# Patient Record
Sex: FEMALE | Race: WHITE | NOT HISPANIC OR LATINO | Employment: OTHER | URBAN - METROPOLITAN AREA
[De-identification: names, ages, dates, MRNs, and addresses within clinical notes are randomized per-mention and may not be internally consistent; named-entity substitution may affect disease eponyms.]

---

## 2017-01-30 ENCOUNTER — APPOINTMENT (OUTPATIENT)
Dept: PHYSICAL THERAPY | Facility: CLINIC | Age: 67
End: 2017-01-30
Payer: MEDICARE

## 2017-01-30 PROCEDURE — 97162 PT EVAL MOD COMPLEX 30 MIN: CPT

## 2017-01-30 PROCEDURE — G8991 OTHER PT/OT GOAL STATUS: HCPCS

## 2017-01-30 PROCEDURE — G8990 OTHER PT/OT CURRENT STATUS: HCPCS

## 2017-02-01 ENCOUNTER — APPOINTMENT (OUTPATIENT)
Dept: PHYSICAL THERAPY | Facility: CLINIC | Age: 67
End: 2017-02-01
Payer: MEDICARE

## 2017-02-01 PROCEDURE — 97110 THERAPEUTIC EXERCISES: CPT

## 2017-02-06 ENCOUNTER — APPOINTMENT (OUTPATIENT)
Dept: PHYSICAL THERAPY | Facility: CLINIC | Age: 67
End: 2017-02-06
Payer: MEDICARE

## 2017-02-06 PROCEDURE — 97113 AQUATIC THERAPY/EXERCISES: CPT

## 2017-02-08 ENCOUNTER — APPOINTMENT (OUTPATIENT)
Dept: PHYSICAL THERAPY | Facility: CLINIC | Age: 67
End: 2017-02-08
Payer: MEDICARE

## 2017-02-09 ENCOUNTER — APPOINTMENT (OUTPATIENT)
Dept: PHYSICAL THERAPY | Facility: CLINIC | Age: 67
End: 2017-02-09
Payer: MEDICARE

## 2017-02-13 ENCOUNTER — APPOINTMENT (OUTPATIENT)
Dept: PHYSICAL THERAPY | Facility: CLINIC | Age: 67
End: 2017-02-13
Payer: MEDICARE

## 2017-02-13 PROCEDURE — 97140 MANUAL THERAPY 1/> REGIONS: CPT

## 2017-02-13 PROCEDURE — 97113 AQUATIC THERAPY/EXERCISES: CPT

## 2017-02-16 ENCOUNTER — APPOINTMENT (OUTPATIENT)
Dept: PHYSICAL THERAPY | Facility: CLINIC | Age: 67
End: 2017-02-16
Payer: MEDICARE

## 2017-02-16 PROCEDURE — 97110 THERAPEUTIC EXERCISES: CPT

## 2017-02-20 ENCOUNTER — APPOINTMENT (OUTPATIENT)
Dept: PHYSICAL THERAPY | Facility: CLINIC | Age: 67
End: 2017-02-20
Payer: MEDICARE

## 2017-02-22 ENCOUNTER — APPOINTMENT (OUTPATIENT)
Dept: PHYSICAL THERAPY | Facility: CLINIC | Age: 67
End: 2017-02-22
Payer: MEDICARE

## 2017-02-23 ENCOUNTER — APPOINTMENT (OUTPATIENT)
Dept: PHYSICAL THERAPY | Facility: CLINIC | Age: 67
End: 2017-02-23
Payer: MEDICARE

## 2017-02-23 PROCEDURE — 97113 AQUATIC THERAPY/EXERCISES: CPT

## 2017-02-27 ENCOUNTER — APPOINTMENT (OUTPATIENT)
Dept: PHYSICAL THERAPY | Facility: CLINIC | Age: 67
End: 2017-02-27
Payer: MEDICARE

## 2017-02-27 PROCEDURE — 97113 AQUATIC THERAPY/EXERCISES: CPT

## 2017-02-27 PROCEDURE — G8990 OTHER PT/OT CURRENT STATUS: HCPCS | Performed by: PHYSICAL THERAPIST

## 2017-02-27 PROCEDURE — G8991 OTHER PT/OT GOAL STATUS: HCPCS | Performed by: PHYSICAL THERAPIST

## 2017-03-02 ENCOUNTER — APPOINTMENT (OUTPATIENT)
Dept: PHYSICAL THERAPY | Facility: CLINIC | Age: 67
End: 2017-03-02
Payer: MEDICARE

## 2017-03-02 PROCEDURE — G8990 OTHER PT/OT CURRENT STATUS: HCPCS

## 2017-03-02 PROCEDURE — 97110 THERAPEUTIC EXERCISES: CPT

## 2017-03-02 PROCEDURE — 97140 MANUAL THERAPY 1/> REGIONS: CPT

## 2017-03-02 PROCEDURE — G8991 OTHER PT/OT GOAL STATUS: HCPCS

## 2017-03-06 ENCOUNTER — APPOINTMENT (OUTPATIENT)
Dept: PHYSICAL THERAPY | Facility: CLINIC | Age: 67
End: 2017-03-06
Payer: MEDICARE

## 2017-03-06 PROCEDURE — 97113 AQUATIC THERAPY/EXERCISES: CPT

## 2017-03-09 ENCOUNTER — APPOINTMENT (OUTPATIENT)
Dept: PHYSICAL THERAPY | Facility: CLINIC | Age: 67
End: 2017-03-09
Payer: MEDICARE

## 2017-03-09 PROCEDURE — 97110 THERAPEUTIC EXERCISES: CPT

## 2017-03-13 ENCOUNTER — APPOINTMENT (OUTPATIENT)
Dept: PHYSICAL THERAPY | Facility: CLINIC | Age: 67
End: 2017-03-13
Payer: MEDICARE

## 2017-03-13 PROCEDURE — 97113 AQUATIC THERAPY/EXERCISES: CPT

## 2017-03-16 ENCOUNTER — APPOINTMENT (OUTPATIENT)
Dept: PHYSICAL THERAPY | Facility: CLINIC | Age: 67
End: 2017-03-16
Payer: MEDICARE

## 2017-03-16 PROCEDURE — 97110 THERAPEUTIC EXERCISES: CPT

## 2017-03-20 ENCOUNTER — APPOINTMENT (OUTPATIENT)
Dept: PHYSICAL THERAPY | Facility: CLINIC | Age: 67
End: 2017-03-20
Payer: MEDICARE

## 2017-03-23 ENCOUNTER — APPOINTMENT (OUTPATIENT)
Dept: PHYSICAL THERAPY | Facility: CLINIC | Age: 67
End: 2017-03-23
Payer: MEDICARE

## 2017-03-23 PROCEDURE — 97113 AQUATIC THERAPY/EXERCISES: CPT

## 2017-03-27 ENCOUNTER — APPOINTMENT (OUTPATIENT)
Dept: PHYSICAL THERAPY | Facility: CLINIC | Age: 67
End: 2017-03-27
Payer: MEDICARE

## 2017-03-27 PROCEDURE — 97113 AQUATIC THERAPY/EXERCISES: CPT

## 2017-03-30 ENCOUNTER — APPOINTMENT (OUTPATIENT)
Dept: PHYSICAL THERAPY | Facility: CLINIC | Age: 67
End: 2017-03-30
Payer: MEDICARE

## 2017-03-30 PROCEDURE — 97110 THERAPEUTIC EXERCISES: CPT

## 2017-04-03 ENCOUNTER — APPOINTMENT (OUTPATIENT)
Dept: PHYSICAL THERAPY | Facility: CLINIC | Age: 67
End: 2017-04-03
Payer: MEDICARE

## 2017-04-03 PROCEDURE — 97113 AQUATIC THERAPY/EXERCISES: CPT

## 2017-04-06 ENCOUNTER — APPOINTMENT (OUTPATIENT)
Dept: PHYSICAL THERAPY | Facility: CLINIC | Age: 67
End: 2017-04-06
Payer: MEDICARE

## 2017-04-10 ENCOUNTER — APPOINTMENT (OUTPATIENT)
Dept: PHYSICAL THERAPY | Facility: CLINIC | Age: 67
End: 2017-04-10
Payer: MEDICARE

## 2017-04-13 ENCOUNTER — APPOINTMENT (OUTPATIENT)
Dept: PHYSICAL THERAPY | Facility: CLINIC | Age: 67
End: 2017-04-13
Payer: MEDICARE

## 2017-04-13 PROCEDURE — G8991 OTHER PT/OT GOAL STATUS: HCPCS

## 2017-04-13 PROCEDURE — 97140 MANUAL THERAPY 1/> REGIONS: CPT

## 2017-04-13 PROCEDURE — 97110 THERAPEUTIC EXERCISES: CPT

## 2017-04-13 PROCEDURE — G8990 OTHER PT/OT CURRENT STATUS: HCPCS

## 2017-04-13 PROCEDURE — G8992 OTHER PT/OT  D/C STATUS: HCPCS

## 2018-06-12 ENCOUNTER — HOSPITAL ENCOUNTER (EMERGENCY)
Facility: HOSPITAL | Age: 68
Discharge: HOME | End: 2018-06-12
Attending: EMERGENCY MEDICINE | Admitting: EMERGENCY MEDICINE
Payer: MEDICARE

## 2018-06-12 ENCOUNTER — APPOINTMENT (EMERGENCY)
Dept: RADIOLOGY | Facility: HOSPITAL | Age: 68
End: 2018-06-12
Payer: MEDICARE

## 2018-06-12 VITALS
BODY MASS INDEX: 23.78 KG/M2 | TEMPERATURE: 97.9 F | HEART RATE: 90 BPM | SYSTOLIC BLOOD PRESSURE: 133 MMHG | WEIGHT: 148 LBS | OXYGEN SATURATION: 100 % | DIASTOLIC BLOOD PRESSURE: 62 MMHG | RESPIRATION RATE: 20 BRPM | HEIGHT: 66 IN

## 2018-06-12 DIAGNOSIS — S83.412A SPRAIN OF MEDIAL COLLATERAL LIGAMENT OF LEFT KNEE, INITIAL ENCOUNTER: Primary | ICD-10-CM

## 2018-06-12 PROCEDURE — 99283 EMERGENCY DEPT VISIT LOW MDM: CPT

## 2018-06-12 PROCEDURE — 73564 X-RAY EXAM KNEE 4 OR MORE: CPT

## 2018-06-12 PROCEDURE — 96372 THER/PROPH/DIAG INJ SC/IM: CPT

## 2018-06-12 RX ORDER — KETOROLAC TROMETHAMINE 30 MG/ML
15 INJECTION, SOLUTION INTRAMUSCULAR; INTRAVENOUS ONCE
Status: COMPLETED | OUTPATIENT
Start: 2018-06-12 | End: 2018-06-12

## 2018-06-12 RX ADMIN — KETOROLAC TROMETHAMINE 15 MG: 30 INJECTION, SOLUTION INTRAMUSCULAR; INTRAVENOUS at 15:59

## 2018-06-12 NOTE — DISCHARGE INSTRUCTIONS
Knee Sprain   WHAT YOU NEED TO KNOW:   A knee sprain occurs when one or more ligaments in your knee are suddenly stretched or torn  Ligaments are tissues that hold bones together  Ligaments support the knee and keep the joint and bones in the correct position  DISCHARGE INSTRUCTIONS:   Return to the emergency department if:   · Any part of your leg feels cold, numb, or looks pale     Contact your healthcare provider if:   · You have new or increased swelling, bruising, or pain in your knee  · Your symptoms do not improve within 6 weeks, even with treatment  · You have questions or concerns about your condition or care  Medicines:   · NSAIDs , such as ibuprofen, help decrease swelling, pain, and fever  This medicine is available with or without a doctor's order  NSAIDs can cause stomach bleeding or kidney problems in certain people  If you take blood thinner medicine, always ask your healthcare provider if NSAIDs are safe for you  Always read the medicine label and follow directions  · Acetaminophen  decreases pain and fever  It is available without a doctor's order  Ask how much to take and how often to take it  Follow directions  Read the labels of all other medicines you are using to see if they also contain acetaminophen, or ask your doctor or pharmacist  Acetaminophen can cause liver damage if not taken correctly  Do not use more than 4 grams (4,000 milligrams) total of acetaminophen in one day  · Prescription pain medicine  may be given  Ask how to take this medicine safely  · Take your medicine as directed  Contact your healthcare provider if you think your medicine is not helping or if you have side effects  Tell him or her if you are allergic to any medicine  Keep a list of the medicines, vitamins, and herbs you take  Include the amounts, and when and why you take them  Bring the list or the pill bottles to follow-up visits   Carry your medicine list with you in case of an emergency  Self-care:   · Rest  your knee and do not exercise  You may be told to keep weight off your knee  This means that you should not walk on your injured leg  Rest helps decrease swelling and allows the injury to heal  You can do gentle range of motion (ROM) exercises as directed  This will prevent stiffness  · Apply ice  on your knee for 15 to 20 minutes every hour or as directed  Use an ice pack, or put crushed ice in a plastic bag  Cover it with a towel  Ice helps prevent tissue damage and decreases swelling and pain  · Apply compression to your knee as directed  You may need to wear an elastic bandage  This helps keep your injured knee from moving too much while it heals  You can loosen or tighten the elastic bandage to make it comfortable  It should be tight enough for you to feel support  It should not be so tight that it causes your toes to feel numb or tingly  If you are wearing an elastic bandage, take it off and rewrap it once a day  · Elevate your knee  above the level of your heart as often as you can  This will help decrease swelling and pain  Prop your leg on pillows or blankets to keep it elevated comfortably  Do not put pillows directly behind your knee  · Use support devices as directed:  Support devices such as a splint or brace may be needed  These devices limit movement and protect your joint while it heals  You may be given crutches to use until you can stand on your injured leg without pain  Use devices as directed  Physical therapy:  A physical therapist teaches you exercises to help improve movement and strength, and to decrease pain  Prevent another knee sprain:  Exercise your legs to keep your muscles strong  Strong leg muscles help protect your knee and prevent strain  The following may also prevent a knee sprain:  · Slowly start your exercise or training program   Slowly increase the time, distance, and intensity of your exercise   Sudden increases in training may cause you to injure your knee again  · Wear protective braces and equipment as directed  Braces may prevent your knee from moving the wrong way and causing another sprain  Protective equipment may support your bones and ligaments to prevent injury  · Warm up and stretch before exercise  Warm up by walking or using an exercise bike before starting your regular exercise  Do gentle stretches after warming up  This helps to loosen your muscles and decrease stress on your knee  Cool down and stretch after you exercise  · Wear shoes that fit correctly and support your feet  Replace your running or exercise shoes before the padding or shock absorption is worn out  Ask your healthcare provider which exercise shoes are best for you  Ask if you should wear special shoe inserts  Shoe inserts can help support your heels and arches or keep your foot lined up correctly in your shoes  Exercise on flat surfaces  Follow up with your healthcare provider as directed:  Write down your questions so you remember to ask them during your visits  © 2017 [facility] Information is for End User's use only and may not be sold, redistributed or otherwise used for commercial purposes  All illustrations and images included in CareNotes® are the copyrighted property of [employer] , [employer]  or Saman Guallpa  The above information is an  only  It is not intended as medical advice for individual conditions or treatments  Talk to your doctor, nurse or pharmacist before following any medical regimen to see if it is safe and effective for you

## 2018-06-12 NOTE — ED NOTES
Patient refuses to get changed into a gown states "I wore these pants so I didn't have to I am not getting changed"     Dianne Guillermo RN  06/12/18 3801

## 2018-06-12 NOTE — ED PROVIDER NOTES
History  Chief Complaint   Patient presents with    Knee Pain     Twisted left knee when gettin gout of bed last night  Reports taking excedrin migrain 4 hrs ago  29-year-old female complains of acute onset left knee pain after she "twisted" while getting out of bed last night patient states pain is rated 8/10 mostly localized to the left medial knee denies radiation of pain pain is worse with palpation or attempted straightening of the leg  Denies any focal weakness, numbness, or tingling  Denies any fevers or chills  No other complaints at this time    Impression:  Left knee pain likely sprain  Plan:  Left knee films offer analgesia  Reassess  Prior to Admission Medications   Prescriptions Last Dose Informant Patient Reported? Taking? ALPRAZolam (XANAX) 0 25 mg tablet   Yes No   Sig: Take 0 25 mg by mouth every 6 (six) hours as needed for anxiety  atorvastatin (LIPITOR) 40 mg tablet   Yes No   Sig: Take 40 mg by mouth daily  cilostazol (PLETAL) 50 mg tablet   Yes No   Sig: Take 50 mg by mouth 2 (two) times a day  citalopram (CeleXA) 20 mg tablet   Yes No   Sig: Take 20 mg by mouth daily  clopidogrel (PLAVIX) 75 mg tablet   Yes No   Sig: Take 75 mg by mouth daily  furosemide (LASIX) 20 mg tablet   Yes No   Sig: Take 20 mg by mouth as needed  lisinopril (ZESTRIL) 2 5 mg tablet   Yes No   Sig: Take 2 5 mg by mouth daily  nitroglycerin (NITROSTAT) 0 4 mg SL tablet   Yes No   Sig: Place 0 4 mg under the tongue every 5 (five) minutes as needed for chest pain  omeprazole (PriLOSEC) 20 mg delayed release capsule   Yes No   Sig: Take 20 mg by mouth daily  zolpidem (AMBIEN) 10 mg tablet   Yes No   Sig: Take 10 mg by mouth daily at bedtime as needed for sleep        Facility-Administered Medications: None       Past Medical History:   Diagnosis Date    Anemia     Cardiac disease     PVD (peripheral vascular disease) ([facility] [address] )        Past Surgical History:   Procedure Laterality Date  ANKLE SURGERY         History reviewed  No pertinent family history  I have reviewed and agree with the history as documented  Social History   Substance Use Topics    Smoking status: Current Every Day Smoker    Smokeless tobacco: Never Used    Alcohol use No        Review of Systems   Constitutional: Negative for chills and fever  Respiratory: Negative for chest tightness and shortness of breath  Cardiovascular: Negative for chest pain  Gastrointestinal: Negative for nausea and vomiting  Musculoskeletal: Positive for arthralgias  Negative for back pain and myalgias  Skin: Negative for rash  Neurological: Negative for syncope, weakness, light-headedness, numbness and headaches  All other systems reviewed and are negative  Physical Exam  Physical Exam   Constitutional: She is oriented to person, place, and time  She appears well-developed and well-nourished  No distress  HENT:   Head: Normocephalic and atraumatic  Eyes: Conjunctivae and EOM are normal    Neck: Normal range of motion  Cardiovascular: Intact distal pulses  Pulmonary/Chest: Effort normal  No respiratory distress  Musculoskeletal:   Decreased range of motion of left knee held in partial flexion  Patient unable to straighten it fully due to pain  There is no obvious deformity  There is no effusion over the knee  There is no erythema over the knee  Patient has tenderness to palpation over the medial aspect of the knee  Neurological: She is alert and oriented to person, place, and time  No sensory deficit  She exhibits normal muscle tone  Skin: Skin is warm and dry  Capillary refill takes less than 2 seconds  No rash noted  She is not diaphoretic  No erythema  No pallor  Psychiatric: She has a normal mood and affect  Nursing note and vitals reviewed        Vital Signs  ED Triage Vitals [06/12/18 1507]   Temperature Pulse Respirations Blood Pressure SpO2   97 9 °F (36 6 °C) 90 20 133/62 100 %      Temp Source Heart Rate Source Patient Position - Orthostatic VS BP Location FiO2 (%)   Tympanic Monitor -- Left arm --      Pain Score       8           Vitals:    06/12/18 1507   BP: 133/62   Pulse: 90       Visual Acuity      ED Medications  Medications   ketorolac (TORADOL) injection 15 mg (15 mg Intramuscular Given 6/12/18 1559)       Diagnostic Studies  Results Reviewed     None                 XR knee 4+ views LEFT   ED Interpretation by Elsy Hanna DO (06/12 1540)   Severe tricompartmental arthritis  Final Result by Hellen Connor DO (06/12 1545)      No acute osseous abnormality  Degenerative changes as described  Workstation performed: [id number]                    Procedures  Procedures       Phone Contacts  ED Phone Contact    ED Course                               MDM  CritCare Time    Disposition  Final diagnoses:   Sprain of medial collateral ligament of left knee, initial encounter     Time reflects when diagnosis was documented in both MDM as applicable and the Disposition within this note     Time User Action Codes Description Comment    6/12/2018  3:50 PM LaurenRodo licea Numbers Add [L17 748S] Sprain of medial collateral ligament of left knee, initial encounter       ED Disposition     ED Disposition Condition Comment    Discharge  Antonina Bock discharge to home/self care  Condition at discharge: Good        Follow-up Information     Follow up With Specialties Details Why Contact Info    Jovi Medrano MD Internal Medicine In 1 week  [address] [place] [address]  [phone]            Discharge Medication List as of 6/12/2018  3:50 PM      CONTINUE these medications which have NOT CHANGED    Details   ALPRAZolam (XANAX) 0 25 mg tablet Take 0 25 mg by mouth every 6 (six) hours as needed for anxiety  , Until Discontinued, Historical Med      atorvastatin (LIPITOR) 40 mg tablet Take 40 mg by mouth daily  , Until Discontinued, Historical Med cilostazol (PLETAL) 50 mg tablet Take 50 mg by mouth 2 (two) times a day , Until Discontinued, Historical Med      citalopram (CeleXA) 20 mg tablet Take 20 mg by mouth daily  , Until Discontinued, Historical Med      clopidogrel (PLAVIX) 75 mg tablet Take 75 mg by mouth daily  , Until Discontinued, Historical Med      furosemide (LASIX) 20 mg tablet Take 20 mg by mouth as needed  , Until Discontinued, Historical Med      lisinopril (ZESTRIL) 2 5 mg tablet Take 2 5 mg by mouth daily  , Until Discontinued, Historical Med      nitroglycerin (NITROSTAT) 0 4 mg SL tablet Place 0 4 mg under the tongue every 5 (five) minutes as needed for chest pain , Until Discontinued, Historical Med      omeprazole (PriLOSEC) 20 mg delayed release capsule Take 20 mg by mouth daily  , Until Discontinued, Historical Med      zolpidem (AMBIEN) 10 mg tablet Take 10 mg by mouth daily at bedtime as needed for sleep , Until Discontinued, Historical Med           No discharge procedures on file      ED Provider  Electronically Signed by           Mary Hernandez DO  06/12/18 2043

## 2018-06-27 ENCOUNTER — TRANSCRIBE ORDERS (OUTPATIENT)
Dept: PHYSICAL THERAPY | Facility: CLINIC | Age: 68
End: 2018-06-27

## 2018-06-27 ENCOUNTER — EVALUATION (OUTPATIENT)
Dept: PHYSICAL THERAPY | Facility: CLINIC | Age: 68
End: 2018-06-27
Payer: MEDICARE

## 2018-06-27 DIAGNOSIS — M54.5 LOW BACK PAIN, UNSPECIFIED BACK PAIN LATERALITY, UNSPECIFIED CHRONICITY, WITH SCIATICA PRESENCE UNSPECIFIED: Primary | ICD-10-CM

## 2018-06-27 PROCEDURE — 97162 PT EVAL MOD COMPLEX 30 MIN: CPT

## 2018-06-27 PROCEDURE — G8991 OTHER PT/OT GOAL STATUS: HCPCS

## 2018-06-27 PROCEDURE — 97112 NEUROMUSCULAR REEDUCATION: CPT | Performed by: PHYSICAL THERAPIST

## 2018-06-27 PROCEDURE — G8990 OTHER PT/OT CURRENT STATUS: HCPCS

## 2018-06-27 NOTE — LETTER
2018    Shruti Jordan  [address]  [facility] [address]    Patient: Naomi Bundy   YOB: 1950   Date of Visit: 2018     Encounter Diagnosis     ICD-10-CM    1  Low back pain, unspecified back pain laterality, unspecified chronicity, with sciatica presence unspecified M54 5        Dear Dr Candelario Sensor:    [facility]  Please verify that you agree therapy should continue by signing the attached document and sending it back to our office  If you have any questions or concerns, please don't hesitate to call  Sincerely,    Duane Fuller PT      Referring Provider:      I certify that I have read the below Plan of Care and certify the need for these services furnished under this plan of treatment while under my care  Reymundo Nash MD  [address] [place] [id number]  VIA Facsimile: [phone]          PT Evaluation     Today's date: 2018  Patient name: Naomi Bundy  :   MRN: [id number]  Referring provider: Arslan Mcleod MD  Dx:   Encounter Diagnosis     ICD-10-CM    1  Low back pain, unspecified back pain laterality, unspecified chronicity, with sciatica presence unspecified M54 5                   Assessment  Impairments: abnormal gait, impaired balance, impaired physical strength and pain with function    Assessment details: Pt is a 69year old female with central low back pain  Pt has decreased hip abduction/extension/flexion strength, decreased lumbar extension ROM, decreased knee extension/flexion strength, impaired gait mechanics and balance, and tight hip flexors  Pt has decreased functional mobility and will benefit from physical therapy  Thank you kindly for your referral!  Understanding of Dx/Px/POC: fair   Prognosis: fair  Prognosis details: Biopsychosocial factors may influence prognosis    Goals  ST   Pt will have bilateral hip abduction strength of 4/5 within 4 weeks  2  Pt will have a decrease of 1-3 points on the pain scale within 4 weeks  LT  Pt will be able to walk around the block with no pain within 6 weeks  2  Pt will have a decrease of 4-6 points on the pain scale within 6 weeks  3  Pt will have a FOTO score of 65/100 within 6 weeks  Plan  Planned therapy interventions: functional ROM exercises, home exercise program, therapeutic exercise, therapeutic activities, stretching, strengthening, postural training, neuromuscular re-education, manual therapy and joint mobilization  Frequency: 2x week  Duration in weeks: 6  Plan of Care beginning date: 2018  Plan of Care expiration date: 2018  Treatment plan discussed with: patient        Subjective Evaluation    History of Present Illness  Date of onset: 2012  Mechanism of injury: Pt reports low back pain coming on and off since , when pt broke both ankles  Pt reports pain either present very strongly, or not at all, and comes on randomly     Pain  At best pain ratin  At worst pain ratin  Location: middle of lower sacrum   Quality: dull ache  Relieving factors: relaxation and rest  Aggravating factors: walking  Progression: worsening      Diagnostic Tests  No diagnostic tests performed  Treatments  No previous or current treatments  Patient Goals  Patient goal: walk the dog, walk around the block, no longer being afraid of falling        Objective     General Comments     Lumbar Comments  Gait: increased L knee valgus during stance, wide base of support, high guard upper extremities  Palpation: slight increased lumbar lordosis   SLS eyes open: L and R could not balance without upper extremity support  Step ups: normal   Squat: bilateral knee valgus, 50% of depth  Calf raise - loss of balance, upper extremity support, less height with R ankle (surgery)  Toe raise - 5x toe raise, no loss of balance     ROM: Lumbar  Flexion: normal   Extension: 25% limited   L Rotation: 25% limited   R Rotation: 50% limited   Tight Hip flexors    Slump test (-)   SLR (-)   Lumbar compression/distraction (-)    Strength:  Hip flexion B/L 3+/5  Hip Abduction L 3+/5, R 4-/5  Hip Extension L 3+/5, R 4-/5  Knee Extension L 4-/5, R 4/5   Knee Flexion L 4-/5, R 4/5    Reflexes:   L4: R 2+, L 2+  S2: R 1+, L 1+                   Attestation signed by Patrick Zamudio PT at 6/27/2018  1:05 PM:  Exercise chart:    Daily Treatment Diary     DX: LBP  EPOC: 8/8/18  Precautions: NONE  CO-MORBIDITES: previous b/l ankle surgery  PERSONAL FACTORS: getting cataracts surgery tomorrow    Manual  6/27                                                                                 Exercise Diary              Recumbent Bike             PPT             DLS: hip abduction HEP            DLS: hip adduction HEP            DLS: marches             DLS: pball roll out             Bent Knee Fall Outs             LTRs             Hip abduction (out for 1, in for 4)             Clamshells HEP            Bird Dog             Dying Bug             Pball Core Press                                                                                                                         Modalities                                                       [facility] SPT was under direction supervision of myself for this session

## 2018-06-27 NOTE — PROGRESS NOTES
PT Evaluation     Today's date: 2018  Patient name: Naomi Bundy  : 1950  MRN: [id number]  Referring provider: Arslan Mcleod MD  Dx:   Encounter Diagnosis     ICD-10-CM    1  Low back pain, unspecified back pain laterality, unspecified chronicity, with sciatica presence unspecified M54 5                   Assessment  Impairments: abnormal gait, impaired balance, impaired physical strength and pain with function    Assessment details: Pt is a 69year old female with central low back pain  Pt has decreased hip abduction/extension/flexion strength, decreased lumbar extension ROM, decreased knee extension/flexion strength, impaired gait mechanics and balance, and tight hip flexors  Pt has decreased functional mobility and will benefit from physical therapy  Thank you kindly for your referral!  Understanding of Dx/Px/POC: fair   Prognosis: fair  Prognosis details: Biopsychosocial factors may influence prognosis    Goals  ST  Pt will have bilateral hip abduction strength of 4/5 within 4 weeks  2  Pt will have a decrease of 1-3 points on the pain scale within 4 weeks  LT  Pt will be able to walk around the block with no pain within 6 weeks  2  Pt will have a decrease of 4-6 points on the pain scale within 6 weeks  3  Pt will have a FOTO score of 65/100 within 6 weeks  Plan  Planned therapy interventions: functional ROM exercises, home exercise program, therapeutic exercise, therapeutic activities, stretching, strengthening, postural training, neuromuscular re-education, manual therapy and joint mobilization  Frequency: 2x week  Duration in weeks: 6  Plan of Care beginning date: 2018  Plan of Care expiration date: 2018  Treatment plan discussed with: patient        Subjective Evaluation    History of Present Illness  Date of onset: 2012  Mechanism of injury: Pt reports low back pain coming on and off since , when pt broke both ankles   Pt reports pain either present very strongly, or not at all, and comes on randomly     Pain  At best pain ratin  At worst pain ratin  Location: middle of lower sacrum   Quality: dull ache  Relieving factors: relaxation and rest  Aggravating factors: walking  Progression: worsening      Diagnostic Tests  No diagnostic tests performed  Treatments  No previous or current treatments  Patient Goals  Patient goal: walk the dog, walk around the block, no longer being afraid of falling        Objective     General Comments     Lumbar Comments  Gait: increased L knee valgus during stance, wide base of support, high guard upper extremities  Palpation: slight increased lumbar lordosis   SLS eyes open: L and R could not balance without upper extremity support  Step ups: normal   Squat: bilateral knee valgus, 50% of depth  Calf raise - loss of balance, upper extremity support, less height with R ankle (surgery)  Toe raise - 5x toe raise, no loss of balance     ROM: Lumbar  Flexion: normal   Extension: 25% limited   L Rotation: 25% limited   R Rotation: 50% limited   Tight Hip flexors    Slump test (-)   SLR (-)   Lumbar compression/distraction (-)    Strength:  Hip flexion B/L 3+/5  Hip Abduction L 3+/5, R 4-/5  Hip Extension L 3+/5, R 4-/5  Knee Extension L 4-/5, R 4/5   Knee Flexion L 4-/5, R 4/5    Reflexes:   L4: R 2+, L 2+  S2: R 1+, L 1+

## 2018-07-18 NOTE — PROGRESS NOTES
Patient has not returned to PT due to issues at home  Lapse in skilled care >2 weeks   Pt D/C from PT

## 2019-12-19 ENCOUNTER — HOSPITAL ENCOUNTER (EMERGENCY)
Facility: HOSPITAL | Age: 69
Discharge: HOME | End: 2019-12-19
Attending: EMERGENCY MEDICINE | Admitting: EMERGENCY MEDICINE
Payer: MEDICARE

## 2019-12-19 VITALS
DIASTOLIC BLOOD PRESSURE: 72 MMHG | BODY MASS INDEX: 24.21 KG/M2 | OXYGEN SATURATION: 95 % | SYSTOLIC BLOOD PRESSURE: 170 MMHG | RESPIRATION RATE: 17 BRPM | TEMPERATURE: 97.8 F | WEIGHT: 150 LBS | HEART RATE: 86 BPM

## 2019-12-19 DIAGNOSIS — M79.604 BILATERAL LEG PAIN: ICD-10-CM

## 2019-12-19 DIAGNOSIS — M79.605 BILATERAL LEG PAIN: ICD-10-CM

## 2019-12-19 DIAGNOSIS — Z53.29 LEFT AGAINST MEDICAL ADVICE: Primary | ICD-10-CM

## 2019-12-19 PROCEDURE — 99282 EMERGENCY DEPT VISIT SF MDM: CPT | Performed by: PHYSICIAN ASSISTANT

## 2019-12-19 PROCEDURE — 99284 EMERGENCY DEPT VISIT MOD MDM: CPT

## 2019-12-19 RX ORDER — BUPROPION HYDROCHLORIDE 150 MG/1
150 TABLET, EXTENDED RELEASE ORAL DAILY
COMMUNITY

## 2019-12-19 NOTE — ED PROVIDER NOTES
History  Chief Complaint   Patient presents with    Ankle Pain     To ED wioth c/o bilateral ankle ache and pain which makes "my legs ache and knees hurt for 7 years  Patient is a 67 y/o F with h/o CAD, PVD that presents to the ED with b/l ankle "aching" for 7 years  She states 7 years ago she broke both her ankles and since then has had an aching in her ankles that shoots up her legs  She denies chest pain or SOB  Bearing weight makes the pain worse, nothing makes the pain better  She was seen by ortho for this problem  She was also seen by her cardiologist for this problem due to her PVD  She denies numbness/tingling in her feet  She states she is here today because she has a lot to do with the holiday coming up and does not want to deal with the pain  History provided by:  Patient  Ankle Pain   Location:  Ankle  Time since incident: 7 years  Ankle location:  L ankle and R ankle  Pain details:     Quality:  Aching    Radiates to:  L leg and R leg    Severity:  Moderate    Onset quality:  Gradual    Duration: 7 years  Timing:  Constant    Progression:  Unchanged  Chronicity:  Chronic  Prior injury to area:  Yes  Relieved by:  Rest  Worsened by:  Bearing weight  Ineffective treatments:  None tried  Associated symptoms: no decreased ROM, no fever, no numbness, no swelling and no tingling        Prior to Admission Medications   Prescriptions Last Dose Informant Patient Reported? Taking? ALPRAZolam (XANAX) 0 25 mg tablet   Yes No   Sig: Take 0 25 mg by mouth every 6 (six) hours as needed for anxiety  atorvastatin (LIPITOR) 40 mg tablet   Yes No   Sig: Take 40 mg by mouth daily  buPROPion (WELLBUTRIN SR) 150 mg 12 hr tablet   Yes No   Sig: Take 150 mg by mouth daily   clopidogrel (PLAVIX) 75 mg tablet   Yes No   Sig: Take 75 mg by mouth daily  furosemide (LASIX) 20 mg tablet   Yes No   Sig: Take 20 mg by mouth as needed     omeprazole (PriLOSEC) 20 mg delayed release capsule   Yes No Sig: Take 20 mg by mouth daily  zolpidem (AMBIEN) 10 mg tablet   Yes No   Sig: Take 10 mg by mouth daily at bedtime as needed for sleep  Facility-Administered Medications: None       Past Medical History:   Diagnosis Date    Anemia     Cardiac disease     Chronic pain     PVD (peripheral vascular disease) (HCC)        Past Surgical History:   Procedure Laterality Date    ANKLE SURGERY         Family History   Problem Relation Age of Onset    No Known Problems Mother     No Known Problems Father      I have reviewed and agree with the history as documented  Social History     Tobacco Use    Smoking status: Current Every Day Smoker     Packs/day: 1 00     Types: Cigarettes    Smokeless tobacco: Never Used   Substance Use Topics    Alcohol use: Yes     Comment: social    Drug use: No        Review of Systems   Constitutional: Negative for chills and fever  Musculoskeletal: Positive for arthralgias  Skin: Negative for color change, pallor and rash  Neurological: Negative for dizziness, weakness and numbness  Psychiatric/Behavioral: Negative for confusion  All other systems reviewed and are negative  Physical Exam  Physical Exam   Constitutional: She is oriented to person, place, and time  She appears well-developed and well-nourished  She is cooperative  She does not appear ill  No distress  HENT:   Head: Normocephalic and atraumatic  Nose: Nose normal    Mouth/Throat: Oropharynx is clear and moist and mucous membranes are normal    Eyes: Conjunctivae are normal    Neck: Normal range of motion  Cardiovascular: Normal rate, regular rhythm and normal heart sounds  No murmur heard  Pulses:       Dorsalis pedis pulses are Detected w/ doppler on the right side, and 0 on the left side  Posterior tibial pulses are Detected w/ doppler on the right side, and Detected w/ doppler on the left side  Pulmonary/Chest: Effort normal and breath sounds normal  She has no wheezes  She has no rhonchi  She has no rales  Musculoskeletal:   B/L ankle nontender to palpation  No swelling, erythema or ecchymosis  No swelling of b/l legs  Skin warm and dry, no pallor  Neurological: She is alert and oriented to person, place, and time  She has normal strength  Gait (patient able to ambulate with a cane without difficulty  ) normal    Skin: Skin is warm and dry  No bruising, no ecchymosis and no rash noted  She is not diaphoretic  No erythema  No pallor  Psychiatric: She has a normal mood and affect  Nursing note and vitals reviewed  Vital Signs  ED Triage Vitals   Temperature Pulse Respirations Blood Pressure SpO2   12/19/19 1019 12/19/19 1030 12/19/19 1030 12/19/19 1030 12/19/19 1019   97 8 °F (36 6 °C) 86 17 170/72 100 %      Temp Source Heart Rate Source Patient Position - Orthostatic VS BP Location FiO2 (%)   12/19/19 1019 -- -- -- --   Tympanic          Pain Score       12/19/19 1019       8           Vitals:    12/19/19 1030   BP: 170/72   Pulse: 86         Visual Acuity      ED Medications  Medications - No data to display    Diagnostic Studies  Results Reviewed     None                 No orders to display              Procedures  Procedures         ED Course  ED Course as of Dec 19 1603   Thu Dec 19, 2019   1237 Patient states she does not want to wait for a doppler  Patient signed out AMA  MDM  Number of Diagnoses or Management Options  Bilateral leg pain: established and worsening  Left against medical advice:   Diagnosis management comments: Patient with chronic b/l ankle and leg pain  Faint pulse to right leg, unable to palpate or detect DP on left foot, but foot warm, skin color normal, sensation intact  Will order arterial doppler  Patient requesting to leave, she does not want to wait for a doppler  She states she will just f/u with her cardiologist   Patient signed out AMA    She is aware she could lose sensation and pulses in her legs and risk amputation or death  Amount and/or Complexity of Data Reviewed  Tests in the radiology section of CPT®: ordered    Patient Progress  Patient progress: stable        Disposition  Final diagnoses:   Left against medical advice   Bilateral leg pain     Time reflects when diagnosis was documented in both MDM as applicable and the Disposition within this note     Time User Action Codes Description Comment    12/19/2019 12:36 PM Shaina Das [Z53 [id number]] Left against medical advice     12/19/2019 12:36 PM Bandar Morales [H39 [id number],  [id number]] Bilateral leg pain       ED Disposition     ED Disposition Condition Date/Time Comment    LIANE  yoel Dec 19, 2019 12:35 PM Date: 12/19/2019  Patient: Michelle Enrique  Admitted: 12/19/2019 10:13 AM  Attending Provider: Shannon Ibarra DO    Michelle Enrique or her authorized caregiver has made the decision for the patient to leave the emergency department against the advic e of the emergency department staff  She or her authorized caregiver has been informed and understands the inherent risks, including death, loss of limbs  She or her authorized caregiver has decided to accept the responsibility for this decision  Ma lenaconchita Ashley Morales and all necessary parties have been advised that she may return for further evaluation or treatment  Her condition at time of discharge was stable    Michelle Enrique had current vital signs as follows:  /72   Pulse 86   Temp 97  8 °F (36 6 °C) (Tympanic)   Resp 17   Wt 68 kg (150 lb)         Follow-up Information     Follow up With Specialties Details Why Contact Info    Katrin Mcnulty MD Cardiology Call in 1 day For recheck [address] [facility] [address] [facility]            Discharge Medication List as of 12/19/2019 12:37 PM      CONTINUE these medications which have NOT CHANGED    Details   ALPRAZolam (XANAX) 0 25 mg tablet Take 0 25 mg by mouth every 6 (six) hours as needed for anxiety  , Until Discontinued, Historical Med      atorvastatin (LIPITOR) 40 mg tablet Take 40 mg by mouth daily  , Until Discontinued, Historical Med      buPROPion (WELLBUTRIN SR) 150 mg 12 hr tablet Take 150 mg by mouth daily, Historical Med      clopidogrel (PLAVIX) 75 mg tablet Take 75 mg by mouth daily  , Until Discontinued, Historical Med      furosemide (LASIX) 20 mg tablet Take 20 mg by mouth as needed  , Until Discontinued, Historical Med      omeprazole (PriLOSEC) 20 mg delayed release capsule Take 20 mg by mouth daily  , Until Discontinued, Historical Med      zolpidem (AMBIEN) 10 mg tablet Take 10 mg by mouth daily at bedtime as needed for sleep , Until Discontinued, Historical Med           No discharge procedures on file      ED Provider  Electronically Signed by           Sly Gordon PA-C  12/19/19 9960

## 2019-12-19 NOTE — ED NOTES
Pts family member at nurses station stating patient is not going to wait any longer because she is very inpatient  Family asking when VAS duplex study would be done  VAS tech stated closer to 3pm because of an appointment and another STAT study   Family states "then we're going to leave because were not waiting" KITA hoang notified      Meghan Bautista, RN  12/19/19 5159

## 2019-12-19 NOTE — ED NOTES
Patient ambulated to room without any assistive devices  Gait steady        Nita Da Silva RN  12/19/19 9418

## 2021-03-21 ENCOUNTER — HOSPITAL ENCOUNTER (INPATIENT)
Facility: HOSPITAL | Age: 71
LOS: 7 days | DRG: 871 | End: 2021-03-29
Attending: EMERGENCY MEDICINE | Admitting: INTERNAL MEDICINE
Payer: MEDICARE

## 2021-03-21 ENCOUNTER — APPOINTMENT (EMERGENCY)
Dept: RADIOLOGY | Facility: HOSPITAL | Age: 71
DRG: 871 | End: 2021-03-21
Payer: MEDICARE

## 2021-03-21 DIAGNOSIS — J18.9 PNEUMONIA OF RIGHT LOWER LOBE DUE TO INFECTIOUS ORGANISM: ICD-10-CM

## 2021-03-21 DIAGNOSIS — D68.9 COAGULOPATHY (HCC): ICD-10-CM

## 2021-03-21 DIAGNOSIS — J90 PLEURAL EFFUSION ON RIGHT: ICD-10-CM

## 2021-03-21 DIAGNOSIS — R91.8 MASS OF LOWER LOBE OF RIGHT LUNG: ICD-10-CM

## 2021-03-21 DIAGNOSIS — J18.1 CONSOLIDATION OF RIGHT LOWER LOBE OF LUNG (HCC): Primary | ICD-10-CM

## 2021-03-21 LAB
ALBUMIN SERPL BCP-MCNC: 1.8 G/DL (ref 3.5–5)
ALP SERPL-CCNC: 259 U/L (ref 46–116)
ALT SERPL W P-5'-P-CCNC: 25 U/L (ref 12–78)
ANION GAP SERPL CALCULATED.3IONS-SCNC: 18 MMOL/L (ref 4–13)
AST SERPL W P-5'-P-CCNC: 29 U/L (ref 5–45)
BASOPHILS # BLD MANUAL: 0 THOUSAND/UL (ref 0–0.1)
BASOPHILS NFR MAR MANUAL: 0 % (ref 0–1)
BILIRUB SERPL-MCNC: 0.5 MG/DL (ref 0.2–1)
BUN SERPL-MCNC: 18 MG/DL (ref 5–25)
CALCIUM ALBUM COR SERPL-MCNC: 11 MG/DL (ref 8.3–10.1)
CALCIUM SERPL-MCNC: 9.2 MG/DL (ref 8.3–10.1)
CHLORIDE SERPL-SCNC: 95 MMOL/L (ref 100–108)
CK SERPL-CCNC: 56 U/L (ref 26–192)
CO2 SERPL-SCNC: 21 MMOL/L (ref 21–32)
CREAT SERPL-MCNC: 1.01 MG/DL (ref 0.6–1.3)
CRP SERPL QL: 394.5 MG/L
D DIMER PPP FEU-MCNC: 3.67 UG/ML FEU
EOSINOPHIL # BLD MANUAL: 0 THOUSAND/UL (ref 0–0.4)
EOSINOPHIL NFR BLD MANUAL: 0 % (ref 0–6)
ERYTHROCYTE [DISTWIDTH] IN BLOOD BY AUTOMATED COUNT: 16.6 % (ref 11.6–15.1)
GFR SERPL CREATININE-BSD FRML MDRD: 56 ML/MIN/1.73SQ M
GLUCOSE SERPL-MCNC: 108 MG/DL (ref 65–140)
HCT VFR BLD AUTO: 31.6 % (ref 34.8–46.1)
HGB BLD-MCNC: 10.1 G/DL (ref 11.5–15.4)
LG PLATELETS BLD QL SMEAR: PRESENT
LYMPHOCYTES # BLD AUTO: 10 % (ref 14–44)
LYMPHOCYTES # BLD AUTO: 3.61 THOUSAND/UL (ref 0.6–4.47)
MAGNESIUM SERPL-MCNC: 1.5 MG/DL (ref 1.6–2.6)
MCH RBC QN AUTO: 28.4 PG (ref 26.8–34.3)
MCHC RBC AUTO-ENTMCNC: 32 G/DL (ref 31.4–37.4)
MCV RBC AUTO: 89 FL (ref 82–98)
MONOCYTES # BLD AUTO: 1.8 THOUSAND/UL (ref 0–1.22)
MONOCYTES NFR BLD: 5 % (ref 4–12)
NEUTROPHILS # BLD MANUAL: 30.67 THOUSAND/UL (ref 1.85–7.62)
NEUTS BAND NFR BLD MANUAL: 4 % (ref 0–8)
NEUTS SEG NFR BLD AUTO: 81 % (ref 43–75)
NRBC BLD AUTO-RTO: 0 /100 WBCS
NT-PROBNP SERPL-MCNC: 779 PG/ML
PLATELET # BLD AUTO: 680 THOUSANDS/UL (ref 149–390)
PLATELET BLD QL SMEAR: ABNORMAL
PMV BLD AUTO: 10.9 FL (ref 8.9–12.7)
POTASSIUM SERPL-SCNC: 3.1 MMOL/L (ref 3.5–5.3)
PROT SERPL-MCNC: 7.4 G/DL (ref 6.4–8.2)
RBC # BLD AUTO: 3.56 MILLION/UL (ref 3.81–5.12)
RBC MORPH BLD: NORMAL
SODIUM SERPL-SCNC: 134 MMOL/L (ref 136–145)
TOTAL CELLS COUNTED SPEC: 100
TROPONIN I SERPL-MCNC: <0.02 NG/ML
WBC # BLD AUTO: 36.08 THOUSAND/UL (ref 4.31–10.16)

## 2021-03-21 PROCEDURE — 36415 COLL VENOUS BLD VENIPUNCTURE: CPT | Performed by: EMERGENCY MEDICINE

## 2021-03-21 PROCEDURE — 83735 ASSAY OF MAGNESIUM: CPT | Performed by: EMERGENCY MEDICINE

## 2021-03-21 PROCEDURE — 83880 ASSAY OF NATRIURETIC PEPTIDE: CPT | Performed by: EMERGENCY MEDICINE

## 2021-03-21 PROCEDURE — 96365 THER/PROPH/DIAG IV INF INIT: CPT

## 2021-03-21 PROCEDURE — 84484 ASSAY OF TROPONIN QUANT: CPT | Performed by: EMERGENCY MEDICINE

## 2021-03-21 PROCEDURE — 96375 TX/PRO/DX INJ NEW DRUG ADDON: CPT

## 2021-03-21 PROCEDURE — 85610 PROTHROMBIN TIME: CPT | Performed by: EMERGENCY MEDICINE

## 2021-03-21 PROCEDURE — 82728 ASSAY OF FERRITIN: CPT | Performed by: EMERGENCY MEDICINE

## 2021-03-21 PROCEDURE — 87040 BLOOD CULTURE FOR BACTERIA: CPT | Performed by: EMERGENCY MEDICINE

## 2021-03-21 PROCEDURE — 85007 BL SMEAR W/DIFF WBC COUNT: CPT | Performed by: EMERGENCY MEDICINE

## 2021-03-21 PROCEDURE — 1124F ACP DISCUSS-NO DSCNMKR DOCD: CPT | Performed by: EMERGENCY MEDICINE

## 2021-03-21 PROCEDURE — 85730 THROMBOPLASTIN TIME PARTIAL: CPT | Performed by: EMERGENCY MEDICINE

## 2021-03-21 PROCEDURE — 80053 COMPREHEN METABOLIC PANEL: CPT | Performed by: EMERGENCY MEDICINE

## 2021-03-21 PROCEDURE — 86140 C-REACTIVE PROTEIN: CPT | Performed by: EMERGENCY MEDICINE

## 2021-03-21 PROCEDURE — 99285 EMERGENCY DEPT VISIT HI MDM: CPT

## 2021-03-21 PROCEDURE — 85027 COMPLETE CBC AUTOMATED: CPT | Performed by: EMERGENCY MEDICINE

## 2021-03-21 PROCEDURE — 93005 ELECTROCARDIOGRAM TRACING: CPT

## 2021-03-21 PROCEDURE — 71045 X-RAY EXAM CHEST 1 VIEW: CPT

## 2021-03-21 PROCEDURE — 82550 ASSAY OF CK (CPK): CPT | Performed by: EMERGENCY MEDICINE

## 2021-03-21 PROCEDURE — 84145 PROCALCITONIN (PCT): CPT | Performed by: EMERGENCY MEDICINE

## 2021-03-21 PROCEDURE — 85379 FIBRIN DEGRADATION QUANT: CPT | Performed by: EMERGENCY MEDICINE

## 2021-03-21 PROCEDURE — 82077 ASSAY SPEC XCP UR&BREATH IA: CPT | Performed by: EMERGENCY MEDICINE

## 2021-03-21 PROCEDURE — 99285 EMERGENCY DEPT VISIT HI MDM: CPT | Performed by: EMERGENCY MEDICINE

## 2021-03-21 RX ORDER — CEFTRIAXONE 1 G/50ML
1000 INJECTION, SOLUTION INTRAVENOUS ONCE
Status: COMPLETED | OUTPATIENT
Start: 2021-03-21 | End: 2021-03-22

## 2021-03-21 RX ORDER — BENZONATATE 100 MG/1
100 CAPSULE ORAL ONCE
Status: COMPLETED | OUTPATIENT
Start: 2021-03-21 | End: 2021-03-22

## 2021-03-21 RX ORDER — KETOROLAC TROMETHAMINE 30 MG/ML
15 INJECTION, SOLUTION INTRAMUSCULAR; INTRAVENOUS ONCE
Status: COMPLETED | OUTPATIENT
Start: 2021-03-21 | End: 2021-03-21

## 2021-03-21 RX ORDER — POTASSIUM CHLORIDE 14.9 MG/ML
20 INJECTION INTRAVENOUS ONCE
Status: COMPLETED | OUTPATIENT
Start: 2021-03-21 | End: 2021-03-22

## 2021-03-21 RX ORDER — POTASSIUM CHLORIDE 20 MEQ/1
20 TABLET, EXTENDED RELEASE ORAL ONCE
Status: COMPLETED | OUTPATIENT
Start: 2021-03-21 | End: 2021-03-22

## 2021-03-21 RX ORDER — MORPHINE SULFATE 4 MG/ML
4 INJECTION, SOLUTION INTRAMUSCULAR; INTRAVENOUS ONCE
Status: COMPLETED | OUTPATIENT
Start: 2021-03-21 | End: 2021-03-22

## 2021-03-21 RX ORDER — LORAZEPAM 2 MG/ML
1 INJECTION INTRAMUSCULAR ONCE
Status: COMPLETED | OUTPATIENT
Start: 2021-03-21 | End: 2021-03-21

## 2021-03-21 RX ADMIN — LORAZEPAM 1 MG: 2 INJECTION INTRAMUSCULAR; INTRAVENOUS at 23:18

## 2021-03-21 RX ADMIN — KETOROLAC TROMETHAMINE 15 MG: 30 INJECTION, SOLUTION INTRAMUSCULAR at 23:18

## 2021-03-21 RX ADMIN — CEFTRIAXONE 1000 MG: 1 INJECTION, SOLUTION INTRAVENOUS at 23:46

## 2021-03-22 ENCOUNTER — APPOINTMENT (EMERGENCY)
Dept: CT IMAGING | Facility: HOSPITAL | Age: 71
DRG: 871 | End: 2021-03-22
Payer: MEDICARE

## 2021-03-22 PROBLEM — D68.9 COAGULOPATHY (HCC): Status: ACTIVE | Noted: 2021-03-22

## 2021-03-22 PROBLEM — R79.1 SUPRATHERAPEUTIC INR: Status: ACTIVE | Noted: 2021-03-22

## 2021-03-22 PROBLEM — J96.01 ACUTE RESPIRATORY FAILURE WITH HYPOXIA (HCC): Status: ACTIVE | Noted: 2021-03-22

## 2021-03-22 PROBLEM — I25.10 CAD (CORONARY ARTERY DISEASE): Status: ACTIVE | Noted: 2021-03-22

## 2021-03-22 PROBLEM — K21.9 GERD (GASTROESOPHAGEAL REFLUX DISEASE): Status: ACTIVE | Noted: 2021-03-22

## 2021-03-22 PROBLEM — J18.1 CONSOLIDATION OF RIGHT LOWER LOBE OF LUNG (HCC): Status: ACTIVE | Noted: 2021-03-22

## 2021-03-22 PROBLEM — E87.1 HYPONATREMIA: Status: ACTIVE | Noted: 2021-03-22

## 2021-03-22 PROBLEM — A41.9 SEPSIS (HCC): Status: ACTIVE | Noted: 2021-03-22

## 2021-03-22 PROBLEM — E87.6 HYPOKALEMIA: Status: ACTIVE | Noted: 2021-03-22

## 2021-03-22 PROBLEM — J90 PLEURAL EFFUSION ON RIGHT: Status: ACTIVE | Noted: 2021-03-22

## 2021-03-22 PROBLEM — I10 HTN (HYPERTENSION): Status: ACTIVE | Noted: 2021-03-22

## 2021-03-22 LAB
ALBUMIN SERPL BCP-MCNC: 1.6 G/DL (ref 3.5–5)
ALP SERPL-CCNC: 220 U/L (ref 46–116)
ALT SERPL W P-5'-P-CCNC: 24 U/L (ref 12–78)
ANION GAP SERPL CALCULATED.3IONS-SCNC: 13 MMOL/L (ref 4–13)
APTT PPP: 108 SECONDS (ref 23–37)
AST SERPL W P-5'-P-CCNC: 30 U/L (ref 5–45)
ATRIAL RATE: 101 BPM
BILIRUB SERPL-MCNC: 0.4 MG/DL (ref 0.2–1)
BUN SERPL-MCNC: 17 MG/DL (ref 5–25)
CALCIUM ALBUM COR SERPL-MCNC: 10.4 MG/DL (ref 8.3–10.1)
CALCIUM SERPL-MCNC: 8.5 MG/DL (ref 8.3–10.1)
CHLORIDE SERPL-SCNC: 97 MMOL/L (ref 100–108)
CO2 SERPL-SCNC: 23 MMOL/L (ref 21–32)
CREAT SERPL-MCNC: 0.88 MG/DL (ref 0.6–1.3)
ERYTHROCYTE [DISTWIDTH] IN BLOOD BY AUTOMATED COUNT: 16.9 % (ref 11.6–15.1)
ETHANOL SERPL-MCNC: <3 MG/DL (ref 0–3)
FERRITIN SERPL-MCNC: 1334 NG/ML (ref 8–388)
FLUAV RNA RESP QL NAA+PROBE: NEGATIVE
FLUBV RNA RESP QL NAA+PROBE: NEGATIVE
GFR SERPL CREATININE-BSD FRML MDRD: 66 ML/MIN/1.73SQ M
GLUCOSE SERPL-MCNC: 118 MG/DL (ref 65–140)
HCT VFR BLD AUTO: 29.5 % (ref 34.8–46.1)
HGB BLD-MCNC: 9.3 G/DL (ref 11.5–15.4)
INR PPP: 1.48 (ref 0.84–1.19)
INR PPP: 7.81 (ref 0.84–1.19)
INR PPP: 8.77 (ref 0.84–1.19)
LACTATE SERPL-SCNC: 1.4 MMOL/L (ref 0.5–2)
MAGNESIUM SERPL-MCNC: 1.7 MG/DL (ref 1.6–2.6)
MCH RBC QN AUTO: 28.8 PG (ref 26.8–34.3)
MCHC RBC AUTO-ENTMCNC: 31.5 G/DL (ref 31.4–37.4)
MCV RBC AUTO: 91 FL (ref 82–98)
NRBC BLD AUTO-RTO: 0 /100 WBCS
P AXIS: 77 DEGREES
PHOSPHATE SERPL-MCNC: 3.2 MG/DL (ref 2.3–4.1)
PLATELET # BLD AUTO: 594 THOUSANDS/UL (ref 149–390)
PMV BLD AUTO: 10.5 FL (ref 8.9–12.7)
POTASSIUM SERPL-SCNC: 3.9 MMOL/L (ref 3.5–5.3)
PR INTERVAL: 148 MS
PROCALCITONIN SERPL-MCNC: 0.8 NG/ML
PROT SERPL-MCNC: 6.8 G/DL (ref 6.4–8.2)
PROTHROMBIN TIME: 17.9 SECONDS (ref 11.6–14.5)
PROTHROMBIN TIME: 65 SECONDS (ref 11.6–14.5)
PROTHROMBIN TIME: 71 SECONDS (ref 11.6–14.5)
QRS AXIS: 52 DEGREES
QRSD INTERVAL: 112 MS
QT INTERVAL: 360 MS
QTC INTERVAL: 466 MS
RBC # BLD AUTO: 3.23 MILLION/UL (ref 3.81–5.12)
RSV RNA RESP QL NAA+PROBE: NEGATIVE
SARS-COV-2 RNA RESP QL NAA+PROBE: NEGATIVE
SODIUM SERPL-SCNC: 133 MMOL/L (ref 136–145)
T WAVE AXIS: 87 DEGREES
TROPONIN I SERPL-MCNC: <0.02 NG/ML
TROPONIN I SERPL-MCNC: <0.02 NG/ML
TSH SERPL DL<=0.05 MIU/L-ACNC: 0.89 UIU/ML (ref 0.36–3.74)
VENTRICULAR RATE: 101 BPM
WBC # BLD AUTO: 33.14 THOUSAND/UL (ref 4.31–10.16)

## 2021-03-22 PROCEDURE — 85610 PROTHROMBIN TIME: CPT | Performed by: INTERNAL MEDICINE

## 2021-03-22 PROCEDURE — 84443 ASSAY THYROID STIM HORMONE: CPT | Performed by: INTERNAL MEDICINE

## 2021-03-22 PROCEDURE — 0241U HB NFCT DS VIR RESP RNA 4 TRGT: CPT | Performed by: EMERGENCY MEDICINE

## 2021-03-22 PROCEDURE — 92610 EVALUATE SWALLOWING FUNCTION: CPT

## 2021-03-22 PROCEDURE — 84100 ASSAY OF PHOSPHORUS: CPT | Performed by: INTERNAL MEDICINE

## 2021-03-22 PROCEDURE — 94668 MNPJ CHEST WALL SBSQ: CPT

## 2021-03-22 PROCEDURE — G1004 CDSM NDSC: HCPCS

## 2021-03-22 PROCEDURE — 99223 1ST HOSP IP/OBS HIGH 75: CPT | Performed by: INTERNAL MEDICINE

## 2021-03-22 PROCEDURE — 93010 ELECTROCARDIOGRAM REPORT: CPT | Performed by: INTERNAL MEDICINE

## 2021-03-22 PROCEDURE — 71275 CT ANGIOGRAPHY CHEST: CPT

## 2021-03-22 PROCEDURE — 94760 N-INVAS EAR/PLS OXIMETRY 1: CPT

## 2021-03-22 PROCEDURE — 83735 ASSAY OF MAGNESIUM: CPT | Performed by: INTERNAL MEDICINE

## 2021-03-22 PROCEDURE — 84484 ASSAY OF TROPONIN QUANT: CPT | Performed by: INTERNAL MEDICINE

## 2021-03-22 PROCEDURE — 85027 COMPLETE CBC AUTOMATED: CPT | Performed by: INTERNAL MEDICINE

## 2021-03-22 PROCEDURE — 94669 MECHANICAL CHEST WALL OSCILL: CPT

## 2021-03-22 PROCEDURE — 83605 ASSAY OF LACTIC ACID: CPT | Performed by: EMERGENCY MEDICINE

## 2021-03-22 PROCEDURE — 96367 TX/PROPH/DG ADDL SEQ IV INF: CPT

## 2021-03-22 PROCEDURE — 96375 TX/PRO/DX INJ NEW DRUG ADDON: CPT

## 2021-03-22 PROCEDURE — 36415 COLL VENOUS BLD VENIPUNCTURE: CPT | Performed by: EMERGENCY MEDICINE

## 2021-03-22 PROCEDURE — 80053 COMPREHEN METABOLIC PANEL: CPT | Performed by: INTERNAL MEDICINE

## 2021-03-22 RX ORDER — ONDANSETRON 2 MG/ML
4 INJECTION INTRAMUSCULAR; INTRAVENOUS EVERY 6 HOURS PRN
Status: DISCONTINUED | OUTPATIENT
Start: 2021-03-22 | End: 2021-03-29 | Stop reason: HOSPADM

## 2021-03-22 RX ORDER — ALPRAZOLAM 0.25 MG/1
0.25 TABLET ORAL EVERY 6 HOURS PRN
Status: DISCONTINUED | OUTPATIENT
Start: 2021-03-22 | End: 2021-03-25

## 2021-03-22 RX ORDER — IPRATROPIUM BROMIDE AND ALBUTEROL SULFATE 2.5; .5 MG/3ML; MG/3ML
3 SOLUTION RESPIRATORY (INHALATION) EVERY 6 HOURS PRN
Status: DISCONTINUED | OUTPATIENT
Start: 2021-03-22 | End: 2021-03-29 | Stop reason: HOSPADM

## 2021-03-22 RX ORDER — MAGNESIUM SULFATE HEPTAHYDRATE 40 MG/ML
2 INJECTION, SOLUTION INTRAVENOUS ONCE
Status: COMPLETED | OUTPATIENT
Start: 2021-03-22 | End: 2021-03-22

## 2021-03-22 RX ORDER — BUPROPION HYDROCHLORIDE 150 MG/1
150 TABLET ORAL DAILY
Status: DISCONTINUED | OUTPATIENT
Start: 2021-03-22 | End: 2021-03-25

## 2021-03-22 RX ORDER — ATORVASTATIN CALCIUM 40 MG/1
40 TABLET, FILM COATED ORAL
Status: DISCONTINUED | OUTPATIENT
Start: 2021-03-22 | End: 2021-03-29 | Stop reason: HOSPADM

## 2021-03-22 RX ORDER — FOLIC ACID 1 MG/1
1 TABLET ORAL DAILY
Status: DISCONTINUED | OUTPATIENT
Start: 2021-03-22 | End: 2021-03-25

## 2021-03-22 RX ORDER — LANOLIN ALCOHOL/MO/W.PET/CERES
100 CREAM (GRAM) TOPICAL DAILY
Status: DISCONTINUED | OUTPATIENT
Start: 2021-03-22 | End: 2021-03-25

## 2021-03-22 RX ORDER — ACETAMINOPHEN 325 MG/1
650 TABLET ORAL EVERY 6 HOURS PRN
Status: DISCONTINUED | OUTPATIENT
Start: 2021-03-22 | End: 2021-03-24

## 2021-03-22 RX ORDER — GUAIFENESIN DEXTROMETHORPHAN HYDROBROMIDE ORAL SOLUTION 10; 100 MG/5ML; MG/5ML
10 SOLUTION ORAL EVERY 4 HOURS PRN
Status: DISCONTINUED | OUTPATIENT
Start: 2021-03-22 | End: 2021-03-22 | Stop reason: SDUPTHER

## 2021-03-22 RX ORDER — PANTOPRAZOLE SODIUM 20 MG/1
20 TABLET, DELAYED RELEASE ORAL
Status: DISCONTINUED | OUTPATIENT
Start: 2021-03-22 | End: 2021-03-29 | Stop reason: HOSPADM

## 2021-03-22 RX ORDER — ZOLPIDEM TARTRATE 5 MG/1
5 TABLET ORAL
Status: DISCONTINUED | OUTPATIENT
Start: 2021-03-22 | End: 2021-03-25

## 2021-03-22 RX ORDER — GUAIFENESIN/DEXTROMETHORPHAN 100-10MG/5
10 SYRUP ORAL EVERY 4 HOURS PRN
Status: DISCONTINUED | OUTPATIENT
Start: 2021-03-22 | End: 2021-03-29 | Stop reason: HOSPADM

## 2021-03-22 RX ORDER — POTASSIUM CHLORIDE 20 MEQ/1
40 TABLET, EXTENDED RELEASE ORAL ONCE
Status: COMPLETED | OUTPATIENT
Start: 2021-03-22 | End: 2021-03-22

## 2021-03-22 RX ORDER — HYDRALAZINE HYDROCHLORIDE 20 MG/ML
10 INJECTION INTRAMUSCULAR; INTRAVENOUS EVERY 6 HOURS PRN
Status: DISCONTINUED | OUTPATIENT
Start: 2021-03-22 | End: 2021-03-27

## 2021-03-22 RX ORDER — CEFTRIAXONE 1 G/50ML
1000 INJECTION, SOLUTION INTRAVENOUS EVERY 24 HOURS
Status: DISCONTINUED | OUTPATIENT
Start: 2021-03-22 | End: 2021-03-24

## 2021-03-22 RX ORDER — MULTIVITAMIN/IRON/FOLIC ACID 18MG-0.4MG
1 TABLET ORAL DAILY
Status: DISCONTINUED | OUTPATIENT
Start: 2021-03-22 | End: 2021-03-29 | Stop reason: HOSPADM

## 2021-03-22 RX ORDER — PHYTONADIONE 10 MG/ML
10 INJECTION, EMULSION INTRAMUSCULAR; INTRAVENOUS; SUBCUTANEOUS ONCE
Status: COMPLETED | OUTPATIENT
Start: 2021-03-22 | End: 2021-03-22

## 2021-03-22 RX ORDER — HEPARIN SODIUM 5000 [USP'U]/ML
5000 INJECTION, SOLUTION INTRAVENOUS; SUBCUTANEOUS EVERY 8 HOURS SCHEDULED
Status: DISCONTINUED | OUTPATIENT
Start: 2021-03-22 | End: 2021-03-22

## 2021-03-22 RX ORDER — OXYCODONE HYDROCHLORIDE 5 MG/1
5 TABLET ORAL EVERY 6 HOURS PRN
Status: DISCONTINUED | OUTPATIENT
Start: 2021-03-22 | End: 2021-03-25

## 2021-03-22 RX ORDER — SODIUM CHLORIDE 9 MG/ML
75 INJECTION, SOLUTION INTRAVENOUS CONTINUOUS
Status: DISCONTINUED | OUTPATIENT
Start: 2021-03-22 | End: 2021-03-23

## 2021-03-22 RX ORDER — CLOPIDOGREL BISULFATE 75 MG/1
75 TABLET ORAL DAILY
Status: DISCONTINUED | OUTPATIENT
Start: 2021-03-22 | End: 2021-03-29

## 2021-03-22 RX ADMIN — POTASSIUM CHLORIDE 20 MEQ: 14.9 INJECTION, SOLUTION INTRAVENOUS at 00:27

## 2021-03-22 RX ADMIN — ALPRAZOLAM 0.25 MG: 0.25 TABLET ORAL at 20:50

## 2021-03-22 RX ADMIN — FOLIC ACID 1 MG: 1 TABLET ORAL at 11:14

## 2021-03-22 RX ADMIN — METRONIDAZOLE 500 MG: 500 INJECTION, SOLUTION INTRAVENOUS at 18:31

## 2021-03-22 RX ADMIN — POTASSIUM CHLORIDE 40 MEQ: 1500 TABLET, EXTENDED RELEASE ORAL at 03:16

## 2021-03-22 RX ADMIN — ZOLPIDEM TARTRATE 5 MG: 5 TABLET, COATED ORAL at 23:30

## 2021-03-22 RX ADMIN — IOHEXOL 90 ML: 350 INJECTION, SOLUTION INTRAVENOUS at 00:23

## 2021-03-22 RX ADMIN — SODIUM CHLORIDE 75 ML/HR: 0.9 INJECTION, SOLUTION INTRAVENOUS at 08:14

## 2021-03-22 RX ADMIN — FOLIC ACID 1 MG: 5 INJECTION, SOLUTION INTRAMUSCULAR; INTRAVENOUS; SUBCUTANEOUS at 01:00

## 2021-03-22 RX ADMIN — PHYTONADIONE 10 MG: 10 INJECTION, EMULSION INTRAMUSCULAR; INTRAVENOUS; SUBCUTANEOUS at 05:44

## 2021-03-22 RX ADMIN — ATORVASTATIN CALCIUM 40 MG: 40 TABLET, FILM COATED ORAL at 16:40

## 2021-03-22 RX ADMIN — ACETAMINOPHEN 650 MG: 325 TABLET, FILM COATED ORAL at 13:40

## 2021-03-22 RX ADMIN — MORPHINE SULFATE 4 MG: 4 INJECTION INTRAVENOUS at 00:01

## 2021-03-22 RX ADMIN — GUAIFENESIN AND DEXTROMETHORPHAN 10 ML: 100; 10 SYRUP ORAL at 20:36

## 2021-03-22 RX ADMIN — CLOPIDOGREL BISULFATE 75 MG: 75 TABLET ORAL at 08:05

## 2021-03-22 RX ADMIN — MORPHINE SULFATE 2 MG: 2 INJECTION, SOLUTION INTRAMUSCULAR; INTRAVENOUS at 20:50

## 2021-03-22 RX ADMIN — ALPRAZOLAM 0.25 MG: 0.25 TABLET ORAL at 08:04

## 2021-03-22 RX ADMIN — BENZONATATE 100 MG: 100 CAPSULE ORAL at 00:00

## 2021-03-22 RX ADMIN — METRONIDAZOLE 500 MG: 500 INJECTION, SOLUTION INTRAVENOUS at 11:15

## 2021-03-22 RX ADMIN — AZITHROMYCIN MONOHYDRATE 500 MG: 500 INJECTION, POWDER, LYOPHILIZED, FOR SOLUTION INTRAVENOUS at 04:04

## 2021-03-22 RX ADMIN — MAGNESIUM SULFATE HEPTAHYDRATE 2 G: 40 INJECTION, SOLUTION INTRAVENOUS at 05:15

## 2021-03-22 RX ADMIN — PANTOPRAZOLE SODIUM 20 MG: 20 TABLET, DELAYED RELEASE ORAL at 05:13

## 2021-03-22 RX ADMIN — GUAIFENESIN AND DEXTROMETHORPHAN 10 ML: 100; 10 SYRUP ORAL at 14:15

## 2021-03-22 RX ADMIN — POTASSIUM CHLORIDE 20 MEQ: 1500 TABLET, EXTENDED RELEASE ORAL at 00:00

## 2021-03-22 RX ADMIN — OXYCODONE HYDROCHLORIDE 5 MG: 5 TABLET ORAL at 13:39

## 2021-03-22 RX ADMIN — Medication 1 TABLET: at 11:14

## 2021-03-22 RX ADMIN — SODIUM CHLORIDE 75 ML/HR: 0.9 INJECTION, SOLUTION INTRAVENOUS at 03:29

## 2021-03-22 RX ADMIN — THIAMINE HCL TAB 100 MG 100 MG: 100 TAB at 11:13

## 2021-03-22 RX ADMIN — MORPHINE SULFATE 2 MG: 2 INJECTION, SOLUTION INTRAMUSCULAR; INTRAVENOUS at 08:05

## 2021-03-22 RX ADMIN — SODIUM CHLORIDE 75 ML/HR: 0.9 INJECTION, SOLUTION INTRAVENOUS at 23:44

## 2021-03-22 RX ADMIN — METRONIDAZOLE 500 MG: 500 INJECTION, SOLUTION INTRAVENOUS at 03:16

## 2021-03-22 RX ADMIN — SODIUM CHLORIDE 1000 ML: 0.9 INJECTION, SOLUTION INTRAVENOUS at 01:30

## 2021-03-22 RX ADMIN — SODIUM CHLORIDE 500 ML: 0.9 INJECTION, SOLUTION INTRAVENOUS at 00:30

## 2021-03-22 RX ADMIN — MORPHINE SULFATE 2 MG: 2 INJECTION, SOLUTION INTRAMUSCULAR; INTRAVENOUS at 13:34

## 2021-03-22 RX ADMIN — THIAMINE HYDROCHLORIDE 100 MG: 100 INJECTION, SOLUTION INTRAMUSCULAR; INTRAVENOUS at 02:33

## 2021-03-22 RX ADMIN — BUPROPION HYDROCHLORIDE 150 MG: 150 TABLET, FILM COATED, EXTENDED RELEASE ORAL at 08:05

## 2021-03-22 RX ADMIN — OXYCODONE HYDROCHLORIDE 5 MG: 5 TABLET ORAL at 08:14

## 2021-03-22 RX ADMIN — ALPRAZOLAM 0.25 MG: 0.25 TABLET ORAL at 13:40

## 2021-03-22 RX ADMIN — GUAIFENESIN AND DEXTROMETHORPHAN 10 ML: 100; 10 SYRUP ORAL at 23:30

## 2021-03-22 NOTE — ASSESSMENT & PLAN NOTE
Patient with INR of 8 77 on admission  Not on any anticoagulation  Will repeat PT INR in a m    Will order vitamin K  No evidence of bleeding

## 2021-03-22 NOTE — ASSESSMENT & PLAN NOTE
Coagulopathy possibly due to ETOH abuse a/e/b INR 8 77, treated with vitamin K 10mg SQ, telemetry and monitoring for signs of bleeding/CBCs

## 2021-03-22 NOTE — OCCUPATIONAL THERAPY NOTE
Occupational Therapy Cancellation    Patient Name: Aura Villatoro  BDEHK'D Date: 3/22/2021    OT orders received and chart reviewed  Pt with elevated INR  OT to follow up when pt medically stable      Alexandra [employer] MS [place], OTR/L

## 2021-03-22 NOTE — ED PROVIDER NOTES
History  Chief Complaint   Patient presents with    Cough     pt came is via EMS from home  Pt states she has had a cough for a week, hasn't eaten for a week, and feels weaker than usual      Patient with PMH former smoker, chronic pain, ETOH use, PVD, complains of SOB cough and right chest pain about 1 week now  Denies fever/ chills  No injury  Drank alcohol for pain  Patient was brought in by ambulance  Hx from paramedics, patient, and her   Patient did have right groin outpatient procedure about 2 weeks ago  Prior to Admission Medications   Prescriptions Last Dose Informant Patient Reported? Taking? ALPRAZolam (XANAX) 0 25 mg tablet   Yes No   Sig: Take 0 25 mg by mouth every 6 (six) hours as needed for anxiety  atorvastatin (LIPITOR) 40 mg tablet   Yes No   Sig: Take 40 mg by mouth daily  buPROPion (WELLBUTRIN SR) 150 mg 12 hr tablet   Yes No   Sig: Take 150 mg by mouth daily   clopidogrel (PLAVIX) 75 mg tablet   Yes No   Sig: Take 75 mg by mouth daily  furosemide (LASIX) 20 mg tablet   Yes No   Sig: Take 20 mg by mouth as needed  omeprazole (PriLOSEC) 20 mg delayed release capsule   Yes No   Sig: Take 20 mg by mouth daily  zolpidem (AMBIEN) 10 mg tablet   Yes No   Sig: Take 10 mg by mouth daily at bedtime as needed for sleep  Facility-Administered Medications: None       Past Medical History:   Diagnosis Date    Anemia     Cardiac disease     Chronic pain     Coronary artery disease     Hypertension     PVD (peripheral vascular disease) (HCC)        Past Surgical History:   Procedure Laterality Date    ANKLE SURGERY         Family History   Problem Relation Age of Onset    No Known Problems Mother     No Known Problems Father      I have reviewed and agree with the history as documented      E-Cigarette/Vaping     E-Cigarette/Vaping Substances     Social History     Tobacco Use    Smoking status: Former Smoker     Packs/day: 1 00     Years: 0 10     Pack years: 0 10     Types: Cigarettes    Smokeless tobacco: Never Used    Tobacco comment: pt states she stopped smoking 3 weeks ago   Substance Use Topics    Alcohol use: Yes     Frequency: 2-4 times a month     Drinks per session: 1 or 2     Comment: social    Drug use: No       Review of Systems   Constitutional: Negative for chills and fever  HENT: Negative for rhinorrhea and sore throat  Respiratory: Positive for cough and shortness of breath  Cardiovascular: Positive for chest pain  Gastrointestinal: Negative for constipation, diarrhea, nausea and vomiting  Genitourinary: Negative for dysuria and frequency  Skin: Negative for rash  Neurological: Positive for weakness  Psychiatric/Behavioral: The patient is nervous/anxious  All other systems reviewed and are negative  Physical Exam  Physical Exam  Vitals signs and nursing note reviewed  Constitutional:       General: She is in acute distress  Appearance: She is well-developed  HENT:      Head: Normocephalic and atraumatic  Right Ear: External ear normal       Left Ear: External ear normal    Eyes:      Extraocular Movements: Extraocular movements intact  Conjunctiva/sclera: Conjunctivae normal       Pupils: Pupils are equal, round, and reactive to light  Neck:      Musculoskeletal: Normal range of motion and neck supple  No spinous process tenderness  Cardiovascular:      Rate and Rhythm: Normal rate and regular rhythm  Heart sounds: Normal heart sounds  Pulmonary:      Effort: Pulmonary effort is normal  No respiratory distress  Breath sounds: Examination of the right-upper field reveals decreased breath sounds  Examination of the right-middle field reveals rhonchi  Examination of the right-lower field reveals rhonchi  Decreased breath sounds and rhonchi present  No wheezing  Abdominal:      General: Bowel sounds are normal  There is no distension  Palpations: Abdomen is soft        Tenderness: There is no abdominal tenderness  Musculoskeletal: Normal range of motion  General: No tenderness  Right lower leg: No edema  Left lower leg: No edema  Comments: Generalized weakness   Skin:     General: Skin is warm and dry  Findings: No rash  Neurological:      Mental Status: She is alert and oriented to person, place, and time  GCS: GCS eye subscore is 4  GCS verbal subscore is 5  GCS motor subscore is 6  Sensory: No sensory deficit  Psychiatric:         Mood and Affect: Mood is anxious  Behavior: Behavior is cooperative           Vital Signs  ED Triage Vitals   Temperature Pulse Respirations Blood Pressure SpO2   03/21/21 2321 03/21/21 2232 03/21/21 2232 03/21/21 2232 03/21/21 2232   (!) 97 °F (36 1 °C) 55 19 (!) 169/101 94 %      Temp Source Heart Rate Source Patient Position - Orthostatic VS BP Location FiO2 (%)   03/22/21 0241 03/21/21 2232 03/21/21 2232 03/21/21 2232 --   Oral Monitor Sitting Left arm       Pain Score       03/22/21 0001       8           Vitals:    03/22/21 1532 03/23/21 0029 03/23/21 0512 03/23/21 0715   BP: 104/59 142/94 142/76 (!) 176/74   Pulse: 94 104 104 104   Patient Position - Orthostatic VS: Lying            Visual Acuity  Visual Acuity      Most Recent Value   L Pupil Size (mm)  3          ED Medications  Medications   ALPRAZolam (XANAX) tablet 0 25 mg (0 25 mg Oral Given 3/23/21 0838)   atorvastatin (LIPITOR) tablet 40 mg (40 mg Oral Given 3/22/21 1640)   buPROPion (WELLBUTRIN XL) 24 hr tablet 150 mg (150 mg Oral Given 3/23/21 0838)   clopidogrel (PLAVIX) tablet 75 mg (75 mg Oral Given 3/23/21 0838)   pantoprazole (PROTONIX) EC tablet 20 mg (20 mg Oral Given 3/23/21 0524)   zolpidem (AMBIEN) tablet 5 mg (5 mg Oral Given 3/22/21 2330)   acetaminophen (TYLENOL) tablet 650 mg (650 mg Oral Given 3/22/21 1340)   ondansetron (ZOFRAN) injection 4 mg (has no administration in time range)   cefTRIAXone (ROCEPHIN) IVPB (premix in dextrose) 1,000 mg 50 mL (1,000 mg Intravenous New Bag 3/23/21 0003)   azithromycin (ZITHROMAX) 500 mg in sodium chloride 0 9% 250mL IVPB 500 mg (500 mg Intravenous New Bag 3/23/21 0326)   metroNIDAZOLE (FLAGYL) IVPB (premix) 500 mg 100 mL (500 mg Intravenous New Bag 3/23/21 1047)   ipratropium-albuterol (DUO-NEB) 0 5-2 5 mg/3 mL inhalation solution 3 mL (has no administration in time range)   hydrALAZINE (APRESOLINE) injection 10 mg (has no administration in time range)   morphine injection 2 mg (2 mg Intravenous Given 3/23/21 0835)   oxyCODONE (ROXICODONE) IR tablet 5 mg (5 mg Oral Given 3/23/21 0838)   thiamine tablet 100 mg (100 mg Oral Given 8/02/11 8897)   folic acid (FOLVITE) tablet 1 mg (1 mg Oral Given 3/23/21 0838)   multivitamin-minerals (CENTRUM ADULTS) tablet 1 tablet (1 tablet Oral Given 3/23/21 0838)   dextromethorphan-guaiFENesin (ROBITUSSIN DM) oral syrup 10 mL (10 mL Oral Given 3/23/21 0838)   furosemide (LASIX) tablet 20 mg (20 mg Oral Given 3/23/21 0838)   ipratropium-albuterol (DUO-NEB) 0 5-2 5 mg/3 mL inhalation solution 3 mL (3 mL Nebulization Given 3/23/21 1012)   ketorolac (TORADOL) injection 15 mg (15 mg Intravenous Given 3/21/21 2318)   LORazepam (ATIVAN) injection 1 mg (1 mg Intravenous Given 3/21/21 2318)   cefTRIAXone (ROCEPHIN) IVPB (premix in dextrose) 1,000 mg 50 mL (0 mg Intravenous Stopped 3/22/21 0018)   potassium chloride 20 mEq IVPB (premix) (0 mEq Intravenous Stopped 3/22/21 0214)   potassium chloride (K-DUR,KLOR-CON) CR tablet 20 mEq (20 mEq Oral Given 3/22/21 0000)   benzonatate (TESSALON PERLES) capsule 100 mg (100 mg Oral Given 3/22/21 0000)   thiamine (VITAMIN B1) 100 mg in sodium chloride 0 9 % 50 mL IVPB (0 mg Intravenous Stopped 7/36/63 6430)   folic acid 1 mg in sodium chloride 0 9 % 50 mL IVPB (0 mg Intravenous Stopped 3/22/21 0130)   morphine (PF) 4 mg/mL injection 4 mg (4 mg Intravenous Given 3/22/21 0001)   sodium chloride 0 9 % bolus 500 mL (0 mL Intravenous Stopped 3/22/21 0145)   iohexol (OMNIPAQUE) 350 MG/ML injection (SINGLE-DOSE) 90 mL (90 mL Intravenous Given 3/22/21 0023)   sodium chloride 0 9 % bolus 1,000 mL (0 mL Intravenous Stopped 3/22/21 0200)   potassium chloride (K-DUR,KLOR-CON) CR tablet 40 mEq (40 mEq Oral Given 3/22/21 0316)   magnesium sulfate 2 g/50 mL IVPB (premix) 2 g (0 g Intravenous Stopped 3/22/21 2037)   phytonadione (AQUA-MEPHYTON) 10 mg/mL SC/IM injection 10 mg (10 mg Subcutaneous Given 3/22/21 0544)   LORazepam (ATIVAN) injection 2 mg (2 mg Intravenous Given 3/23/21 0524)   LORazepam (ATIVAN) injection 4 mg (4 mg Intravenous Given 3/23/21 0739)       Diagnostic Studies  Results Reviewed     Procedure Component Value Units Date/Time    Blood culture #2 [[id number]] Collected: 03/21/21 2254    Lab Status: Preliminary result Specimen: Blood from Arm, Right Updated: 03/23/21 0701     Blood Culture No Growth at 24 hrs  Blood culture #1 [[id number]] Collected: 03/21/21 2254    Lab Status: Preliminary result Specimen: Blood from Arm, Left Updated: 03/23/21 0701     Blood Culture No Growth at 24 hrs  Procalcitonin with AM Reflex [[id number]]  (Abnormal) Collected: 03/21/21 2254    Lab Status: Final result Specimen: Blood from Arm, Right Updated: 03/22/21 0736     Procalcitonin 0 80 ng/ml     Ferritin [[id number]]  (Abnormal) Collected: 03/21/21 2256    Lab Status: Final result Specimen: Blood from Arm, Right Updated: 03/22/21 0608     Ferritin 1,334 ng/mL     Lactic acid [[id number]]  (Normal) Collected: 03/22/21 0104    Lab Status: Final result Specimen: Blood from Arm, Right Updated: 03/22/21 0136     LACTIC ACID 1 4 mmol/L     Narrative:      Result may be elevated if tourniquet was used during collection      Protime-INR [[id number]]  (Abnormal) Collected: 03/21/21 2254    Lab Status: Final result Specimen: Blood from Arm, Right Updated: 03/22/21 0117     Protime 71 0 seconds      INR 8 77    APTT [[id number]]  (Abnormal) Collected: 03/21/21 8309    Lab Status: Final result Specimen: Blood from Arm, Right Updated: 03/22/21 0117      seconds     COVID19, [facility]/B, [facility] [[id number]]  (Normal) Collected: 03/22/21 0014    Lab Status: Final result Specimen: Nares from Nasopharyngeal Swab Updated: 03/22/21 0056     SARS-CoV-2 Negative     INFLUENZA A PCR Negative     INFLUENZA B PCR Negative     RSV PCR Negative    Narrative: This test has been authorized by FDA under an EUA (Emergency Use Assay) for use by authorized laboratories  Clinical caution and judgement should be used with the interpretation of these results with consideration of the clinical impression and other laboratory testing  Testing reported as "Positive" or "Negative" has been proven to be accurate according to standard laboratory validation requirements  All testing is performed with control materials showing appropriate reactivity at standard intervals      Ethanol [[id number]]  (Normal) Collected: 03/21/21 2256    Lab Status: Final result Specimen: Blood from Arm, Right Updated: 03/22/21 0001     Ethanol Lvl <3 mg/dL     CBC and differential [[id number]]  (Abnormal) Collected: 03/21/21 2254    Lab Status: Final result Specimen: Blood from Arm, Right Updated: 03/21/21 2346     WBC 36 08 Thousand/uL      RBC 3 56 Million/uL      Hemoglobin 10 1 g/dL      Hematocrit 31 6 %      MCV 89 fL      MCH 28 4 pg      MCHC 32 0 g/dL      RDW 16 6 %      MPV 10 9 fL      Platelets 932 Thousands/uL      nRBC 0 /100 WBCs     Manual Differential(PHLEBS Do Not Order) [[id number]]  (Abnormal) Collected: 03/21/21 2254    Lab Status: Final result Specimen: Blood from Arm, Right Updated: 03/21/21 2346     Segmented % 81 %      Bands % 4 %      Lymphocytes % 10 %      Monocytes % 5 %      Eosinophils, % 0 %      Basophils % 0 %      Absolute Neutrophils 30 67 Thousand/uL      Lymphocytes Absolute 3 61 Thousand/uL      Monocytes Absolute 1 80 Thousand/uL      Eosinophils Absolute 0 00 Thousand/uL Basophils Absolute 0 00 Thousand/uL      Total Counted 100     RBC Morphology Normal     Platelet Estimate Increased     Large Platelet Present    D-dimer, quantitative [[id number]]  (Abnormal) Collected: 03/21/21 2254    Lab Status: Final result Specimen: Blood from Arm, Right Updated: 03/21/21 2345     D-Dimer, Quant 3 67 ug/ml FEU     C-reactive protein [[id number]]  (Abnormal) Collected: 03/21/21 2256    Lab Status: Final result Specimen: Blood from Arm, Right Updated: 03/21/21 2341      5 mg/L     NT-BNP PRO [[id number]]  (Abnormal) Collected: 03/21/21 2254    Lab Status: Final result Specimen: Blood from Arm, Right Updated: 03/21/21 2339     NT-proBNP 779 pg/mL     Magnesium [[id number]]  (Abnormal) Collected: 03/21/21 2254    Lab Status: Final result Specimen: Blood from Arm, Right Updated: 03/21/21 2339     Magnesium 1 5 mg/dL     Troponin I [[id number]]  (Normal) Collected: 03/21/21 2254    Lab Status: Final result Specimen: Blood from Arm, Right Updated: 03/21/21 2331     Troponin I <0 02 ng/mL     Comprehensive metabolic panel [[id number]]  (Abnormal) Collected: 03/21/21 2254    Lab Status: Final result Specimen: Blood from Arm, Right Updated: 03/21/21 2330     Sodium 134 mmol/L      Potassium 3 1 mmol/L      Chloride 95 mmol/L      CO2 21 mmol/L      ANION GAP 18 mmol/L      BUN 18 mg/dL      Creatinine 1 01 mg/dL      Glucose 108 mg/dL      Calcium 9 2 mg/dL      Corrected Calcium 11 0 mg/dL      AST 29 U/L      ALT 25 U/L      Alkaline Phosphatase 259 U/L      Total Protein 7 4 g/dL      Albumin 1 8 g/dL      Total Bilirubin 0 50 mg/dL      eGFR 56 ml/min/1 73sq m     Chad:      [facility] guidelines for Chronic Kidney Disease (CKD):     Stage 1 with normal or high GFR (GFR > 90 mL/min/1 73 square meters)    Stage 2 Mild CKD (GFR = 60-89 mL/min/1 73 square meters)    Stage 3A Moderate CKD (GFR = 45-59 mL/min/1 73 square meters)    Stage 3B Moderate CKD (GFR = 30-44 mL/min/1 73 square meters)    Stage 4 Severe CKD (GFR = 15-29 mL/min/1 73 square meters)    Stage 5 End Stage CKD (GFR <15 mL/min/1 73 square meters)  Note: GFR calculation is accurate only with a steady state creatinine    CK (with reflex to MB) [[id number]]  (Normal) Collected: 03/21/21 2256    Lab Status: Final result Specimen: Blood from Arm, Right Updated: 03/21/21 2327     Total CK 56 U/L                  CTA ED chest PE Study   Final Result by Jasper Fernandez DO (03/22 0056)      No evidence of pulmonary artery embolus      Consolidation versus mass in the right lower lobe  Recommend short-term follow-up CT scan of the chest posttreatment to evaluate for resolution  The study was marked in [facility] for immediate notification                    Workstation performed: [id number]         XR chest 1 view portable   ED Interpretation by Brennen Fallon DO (03/21 9750)   Right lung complete opacification      Final Result by Keyla Kessler MD (03/22 3151)   Fairly diffuse right pulmonary opacification likely representing pneumonia   Chest CT correlation may be warranted         Workstation performed: [id number]         IR chest tube placement    (Results Pending)   XR chest portable    (Results Pending)              Procedures  ECG 12 Lead Documentation Only    Date/Time: 3/22/2021 12:24 AM  Performed by: Brennen Flalon DO  Authorized by: Brennen Fallon DO     Indications / Diagnosis:  Sob  ECG reviewed by me, the ED Provider: yes    Patient location:  ED  Previous ECG:     Previous ECG:  Compared to current    Comparison ECG info:  2012    Similarity:  No change  Interpretation:     Interpretation: non-specific    Rate:     ECG rate:  101    ECG rate assessment: normal    Rhythm:     Rhythm: sinus tachycardia    Ectopy:     Ectopy: PVCs      PVCs:  Infrequent  QRS:     QRS axis:  Normal    QRS intervals:  Normal  Conduction:     Conduction: normal    ST segments:     ST segments:  Normal  T waves:     T waves: normal ED Course                         Initial Sepsis Screening     Row Name 03/22/21 0211 03/22/21 0027 03/21/21 8344          Is the patient's history suggestive of a new or worsening infection?  --  (!) Yes (Proceed)  -FRANDY  (!) Yes (Proceed)  -FRANDY      Suspected source of infection  --  pneumonia  -FRANDY  pneumonia  -FRANDY      Are two or more of the following signs & symptoms of infection both present and new to the patient?  --  (!) Yes (Proceed)  -FRANDY  No  -FRANDY      Indicate SIRS criteria  --  Leukocytosis (WBC > 85930 IJL); Tachycardia > 90 bpm  -FRANDY  --      If the answer is yes to both questions, suspicion of sepsis is present  --  --  --      If severe sepsis is present AND tissue hypoperfusion perists in the hour after fluid resuscitation or lactate > 4, the patient meets criteria for SEPTIC SHOCK  --  --  --      Are any of the following organ dysfunction criteria present within 6 hours of suspected infection and SIRS criteria that are NOT considered to be chronic conditions? (!) Yes  -FRANDY  --  --      Organ dysfunction  INR > 1 5 or aPTT > 60 secs  -FRANDY  --  --      Date of presentation of severe sepsis  03/22/21  -Staci Ospina  --  --      Time of presentation of severe sepsis  0211  -FRANDY  --  --      Tissue hypoperfusion persists in the hour after crystalloid fluid administration, evidenced, by either:  --  --  --      Was hypotension present within one hour of the conclusion of crystalloid fluid administration?   No  -FRANDY  --  --      Date of presentation of septic shock  --  --  --      Time of presentation of septic shock  --  --  --        User Key  (r) = Recorded By, (t) = Taken By, (c) = Cosigned By    [address] Name Provider Type    Iona Morgan DO Physician           Default Flowsheet Data (last 720 hours)      Sepsis Reassess     [address] Name 03/22/21 0211                   Repeat Volume Status and Tissue Perfusion Assessment Performed    Repeat Volume Status and Tissue Perfusion Assessment Performed  Yes  -Staci Ospina Volume Status and Tissue Perfusion Post Fluid Resuscitation * Must Document All *    Vital Signs Reviewed (HR, RR, BP, T)  Yes  -FRANDY        Shock Index Reviewed  --        Arterial Oxygen Saturation Reviewed (POx, SaO2 or SpO2)  Yes (comment %) 98% on 2 liters nasal canula  -FRANDY        Cardio  Normal S1/S2; Regular rate and rhythm  -FRANDY        Pulmonary  (!) Normal effort;Rhonchi  -FRANDY        Capillary Refill  Brisk  -FRANDY        Peripheral Pulses  Radial  -FRANDY        Peripheral Pulse  +2  -FRANDY        Skin  Warm  -FRANDY        Urine output assessed  Decreased  -FRANDY           *OR*   Intensive Monitoring- Must Document One of the Following Four *:    Vital Signs Reviewed  --        * Central Venous Pressure (CVP or RAP)  --        * Central Venous Oxygen (SVO2, ScvO2 or Oxygen saturation via central catheter)  --        * Bedside Cardiovascular US in IVC diameter and % collapse  --        * Passive Leg Raise OR Crystalloid Challenge  --          User Key  (r) = Recorded By, (t) = Taken By, (c) = Cosigned By    Initials Name Provider Type    [address], DO Physician        SBIRT 20yo+      Most Recent Value   SBIRT (23 yo +)   In order to provide better care to our patients, we are screening all of our patients for alcohol and drug use  Would it be okay to ask you these screening questions? Yes Filed at: 03/21/2021 2249   Initial Alcohol Screen: US AUDIT-C    1  How often do you have a drink containing alcohol? 3 Filed at: 03/21/2021 2249   2  How many drinks containing alcohol do you have on a typical day you are drinking? 1 Filed at: 03/21/2021 2249   3a  Male UNDER 65: How often do you have five or more drinks on one occasion? 0 Filed at: 03/21/2021 2249   3b  FEMALE Any Age, or MALE 65+: How often do you have 4 or more drinks on one occassion? 0 Filed at: 03/21/2021 2249   Audit-C Score  4 Filed at: 03/21/2021 2249   CHIVO: How many times in the past year have you       Used an illegal drug or used a prescription medication for non-medical reasons?   Never Filed at: 03/21/2021 2249          Wells' Criteria for PE      Most Recent Value   Wells' Criteria for PE   Clinical signs and symptoms of DVT  0 Filed at: 03/21/2021 2336   PE is primary diagnosis or equally likely  0 Filed at: 03/21/2021 2336   HR >100  1 5 Filed at: 03/21/2021 2336   Immobilization at least 3 days or Surgery in the previous 4 weeks  1 5 Filed at: 03/21/2021 2336   Previous, objectively diagnosed PE or DVT  0 Filed at: 03/21/2021 2336   Hemoptysis  0 Filed at: 03/21/2021 2336   Malignancy with treatment within 6 months or palliative  0 Filed at: 03/21/2021 2336   Wells' Criteria Total  3 Filed at: 03/21/2021 2336                [facility]  Number of Diagnoses or Management Options  Coagulopathy ([address] ): new and requires workup  Consolidation of right lower lobe of lung ([address] ): new and requires workup  Mass of lower lobe of right lung: new and requires workup  Pleural effusion on right: new and requires workup     Amount and/or Complexity of Data Reviewed  Clinical lab tests: ordered and reviewed  Tests in the radiology section of CPT®: ordered and reviewed  Obtain history from someone other than the patient: yes  Discuss the patient with other providers: yes    Patient Progress  Patient progress: improved      Disposition  Final diagnoses:   Consolidation of right lower lobe of lung ([address] )   Mass of lower lobe of right lung   Pleural effusion on right   Coagulopathy ([address] )     Time reflects when diagnosis was documented in both MDM as applicable and the Disposition within this note     Time User Action Codes Description Comment    3/22/2021  1:12 AM Eleni Clay Add [J18 1] Consolidation of right lower lobe of lung ([address] )     3/22/2021  1:12 AM Eleni Clay Add [R91 8] Mass of lower lobe of right lung     3/22/2021  1:12 AM Eleni Clay Add [J90] Pleural effusion on right     3/22/2021  2:09 AM Eleni Clay Add [D68 9] Coagulopathy ([address] )     3/22/2021  2:34 WADE Torrez Modify [J90] Pleural effusion on right       ED Disposition     ED Disposition Condition Date/Time Comment    Admit Stable Mon Mar 22, 2021  1:12 AM Case was discussed with Julia Montoya and the patient's admission status was agreed to be Admission Status: inpatient status to the service of Dr Bindu Santacruz*   Follow-up Information     Follow up With Specialties Details Why Contact Info Additional Information    [facility] Call   [address]  [place] [address] [address] [facility] [id number] [phone] [facility] [address], [address], [place], [place], [address] [facility]          Current Discharge Medication List      CONTINUE these medications which have NOT CHANGED    Details   ALPRAZolam (XANAX) 0 25 mg tablet Take 0 25 mg by mouth every 6 (six) hours as needed for anxiety  atorvastatin (LIPITOR) 40 mg tablet Take 40 mg by mouth daily  [facility]ion [facility]) 150 mg 12 hr tablet Take 150 mg by mouth daily      clopidogrel (PLAVIX) 75 mg tablet Take 75 mg by mouth daily  furosemide (LASIX) 20 mg tablet Take 20 mg by mouth as needed  omeprazole (PriLOSEC) 20 mg delayed release capsule Take 20 mg by mouth daily  zolpidem (AMBIEN) 10 mg tablet Take 10 mg by mouth daily at bedtime as needed for sleep  No discharge procedures on file      PDMP Review     None          ED Provider  Electronically Signed by           Goldie Burk DO  03/23/21 2253

## 2021-03-22 NOTE — ASSESSMENT & PLAN NOTE
Patient has large right-sided pleural effusion  Will need thoracentesis  Pulmonary consult  Oxygen supplementation

## 2021-03-22 NOTE — SEPSIS NOTE
Sepsis Note   Junior Maldonado 65 y o  female MRN: [id number]  Unit/Bed#: ED 11 Encounter: [id number]      qSOFA     [address] Name 03/22/21 0126 03/22/21 0000 03/21/21 2232          Altered mental status GCS < 15  0  --  --      Respiratory Rate > / =22  --  0  0      Systolic BP < / =960  --  0  0      Q Sofa Score  0  0  0          Initial Sepsis Screening     Row Name 03/22/21 0211 03/22/21 0027 03/21/21 0484          Is the patient's history suggestive of a new or worsening infection?  --  (!) Yes (Proceed)  -FRANDY  (!) Yes (Proceed)  -FRANDY      Suspected source of infection  --  pneumonia  -FRANDY  pneumonia  -FRANDY      Are two or more of the following signs & symptoms of infection both present and new to the patient?  --  (!) Yes (Proceed)  -FRANDY  No  -FRANDY      Indicate SIRS criteria  --  Leukocytosis (WBC > 11868 IJL); Tachycardia > 90 bpm  -FRANDY  --      If the answer is yes to both questions, suspicion of sepsis is present  --  --  --      If severe sepsis is present AND tissue hypoperfusion perists in the hour after fluid resuscitation or lactate > 4, the patient meets criteria for SEPTIC SHOCK  --  --  --      Are any of the following organ dysfunction criteria present within 6 hours of suspected infection and SIRS criteria that are NOT considered to be chronic conditions? (!) Yes  -FRANDY  --  --      Organ dysfunction  INR > 1 5 or aPTT > 60 secs  -FRANDY  --  --      Date of presentation of severe sepsis  03/22/21  -Camila Islas  --  --      Time of presentation of severe sepsis  0211  -FRANDY  --  --      Tissue hypoperfusion persists in the hour after crystalloid fluid administration, evidenced, by either:  --  --  --      Was hypotension present within one hour of the conclusion of crystalloid fluid administration?   No  -FRANDY  --  --      Date of presentation of septic shock  --  --  --      Time of presentation of septic shock  --  --  --        User Key  (r) = Recorded By, (t) = Taken By, (c) = Cosigned By    Initials Name Provider Type [address], DO Physician

## 2021-03-22 NOTE — ASSESSMENT & PLAN NOTE
CT chest showed- No evidence of pulmonary artery embolus  Consolidation versus mass in the right lower lobe      Probable pneumonia with parapneumonic effusion  On Rocephin, Zithromax and Flagyl  Follow-up culture results  Sputum culture, urine for Legionella and Streptococcus  Oxygen supplementation and bronchodilators  Pulmonary consult

## 2021-03-22 NOTE — SEPSIS NOTE
Sepsis Note   Sarika Payment 65 y o  female MRN: [id number]  Unit/Bed#: ED 11 Encounter: [id number]      qSOFA     [address] Name 03/21/21 2232                Altered mental status GCS < 15  --        Respiratory Rate > / =95  0        Systolic BP < / =737  0        Q Sofa Score  0            Initial Sepsis Screening     Row Name 03/21/21 4446                Is the patient's history suggestive of a new or worsening infection? (!) Yes (Proceed)  -FRANDY        Suspected source of infection  pneumonia  -FRANDY        Are two or more of the following signs & symptoms of infection both present and new to the patient? No  -FRANDY        Indicate SIRS criteria  --        If the answer is yes to both questions, suspicion of sepsis is present  --        If severe sepsis is present AND tissue hypoperfusion perists in the hour after fluid resuscitation or lactate > 4, the patient meets criteria for SEPTIC SHOCK  --        Are any of the following organ dysfunction criteria present within 6 hours of suspected infection and SIRS criteria that are NOT considered to be chronic conditions?   --        Organ dysfunction  --        Date of presentation of severe sepsis  --        Time of presentation of severe sepsis  --        Tissue hypoperfusion persists in the hour after crystalloid fluid administration, evidenced, by either:  --        Was hypotension present within one hour of the conclusion of crystalloid fluid administration?  --        Date of presentation of septic shock  --        Time of presentation of septic shock  --          User Key  (r) = Recorded By, (t) = Taken By, (c) = Cosigned By    [address] Name Provider Type    [address], DO Physician

## 2021-03-22 NOTE — TELEMEDICINE
INTERPROFESSIONAL (PHONE) CONSULTATION - Interventional Radiology  Aura Villatoro 65 y o  female MRN: [id number]  Unit/Bed#: -01 Encounter: [id number]    IR has been consulted to evaluate the patient, determine the appropriate procedure, and whether or not a procedure can and should be performed regarding the care of Aura Villatoro  IP Consult to IR  Consult performed by: Violet Carmona MD  Consult ordered by: Rosangela Ospina DO        03/22/21      Assessment/Recommendation:   75-year-old woman admitted with respiratory failure found to have right-sided pneumonia and parapneumonic effusion  Referred for chest tube placement  Her INR was over 8 on admission etiology is unknown  Plan for image guided right-sided chest tube placement once INR is corrected  Goal for INR less than 3 0  Discussed with Dr Emilio Villatoro  Patient to receive vitamin K and recheck of INR later this evening to determine need for FFP  Will make NPO after midnight tonight in anticipation of performing procedure tomorrow pending INR correction  Total time spent in review of data, discussion with requesting provider and rendering advice was 15 minutes  Thank you for allowing Interventional Radiology to participate in the care of Aura Villatoro  Please don't hesitate to call or TigerText us with any questions       Sugey Banks MD

## 2021-03-22 NOTE — PLAN OF CARE
Problem: Potential for Falls  Goal: Patient will remain free of falls  Description: INTERVENTIONS:  - Assess patient frequently for physical needs  -  Identify cognitive and physical deficits and behaviors that affect risk of falls    -  Lambert fall precautions as indicated by assessment   - Educate patient/family on patient safety including physical limitations  - Instruct patient to call for assistance with activity based on assessment  - Modify environment to reduce risk of injury  - Consider OT/PT consult to assist with strengthening/mobility  Outcome: Progressing

## 2021-03-22 NOTE — ASSESSMENT & PLAN NOTE
Patient with INR of 8 77 on admission  Not on any anticoagulation  Will repeat PT INR in a m    No evidence of bleeding

## 2021-03-22 NOTE — SEPSIS NOTE
Sepsis Note   Tash Raman 65 y o  female MRN: [id number]  Unit/Bed#: [facility]I Encounter: [id number]      qSOFCATRINA     [address] Name 03/22/21 0126 03/22/21 0000 03/21/21 2232          Altered mental status GCS < 15  0  --  --      Respiratory Rate > / =22  --  0  0      Systolic BP < / =503  --  0  0      Q Sofa Score  0  0  0          Initial Sepsis Screening     Row Name 03/22/21 0211 03/22/21 0027 03/21/21 9664          Is the patient's history suggestive of a new or worsening infection?  --  (!) Yes (Proceed)  -FRANDY  (!) Yes (Proceed)  -FRANDY      Suspected source of infection  --  pneumonia  -FRANDY  pneumonia  -FRANDY      Are two or more of the following signs & symptoms of infection both present and new to the patient?  --  (!) Yes (Proceed)  -FRANDY  No  -FRANDY      Indicate SIRS criteria  --  Leukocytosis (WBC > 14511 IJL); Tachycardia > 90 bpm  -FRANDY  --      If the answer is yes to both questions, suspicion of sepsis is present  --  --  --      If severe sepsis is present AND tissue hypoperfusion perists in the hour after fluid resuscitation or lactate > 4, the patient meets criteria for SEPTIC SHOCK  --  --  --      Are any of the following organ dysfunction criteria present within 6 hours of suspected infection and SIRS criteria that are NOT considered to be chronic conditions? (!) Yes  -FRANDY  --  --      Organ dysfunction  INR > 1 5 or aPTT > 60 secs  -FRANDY  --  --      Date of presentation of severe sepsis  03/22/21  -Videostir  --  --      Time of presentation of severe sepsis  0211  -FRANDY  --  --      Tissue hypoperfusion persists in the hour after crystalloid fluid administration, evidenced, by either:  --  --  --      Was hypotension present within one hour of the conclusion of crystalloid fluid administration?   No  -FRANDY  --  --      Date of presentation of septic shock  --  --  --      Time of presentation of septic shock  --  --  --        User Key  (r) = Recorded By, (t) = Taken By, (c) = Cosigned By    Initials Name Provider Type [address], DO Physician               Default Flowsheet Data (last 720 hours)      Sepsis Reassess     Row Name 03/22/21 0211                   Repeat Volume Status and Tissue Perfusion Assessment Performed    Repeat Volume Status and Tissue Perfusion Assessment Performed  Yes  -FRANDY           Volume Status and Tissue Perfusion Post Fluid Resuscitation * Must Document All *    Vital Signs Reviewed (HR, RR, BP, T)  Yes  -FRANDY        Shock Index Reviewed  --        Arterial Oxygen Saturation Reviewed (POx, SaO2 or SpO2)  Yes (comment %) 98% on 2 liters nasal canula  -FRANDY        Cardio  Normal S1/S2; Regular rate and rhythm  -FRANDY        Pulmonary  (!) Normal effort;Rhonchi  -FRANDY        Capillary Refill  Brisk  -FRANDY        Peripheral Pulses  Radial  -FRANDY        Peripheral Pulse  +2  -FRANDY        Skin  Warm  -FRANDY        Urine output assessed  unknown output  -FRANDY           *OR*   Intensive Monitoring- Must Document One of the Following Four *:    Vital Signs Reviewed  --        * Central Venous Pressure (CVP or RAP)  --        * Central Venous Oxygen (SVO2, ScvO2 or Oxygen saturation via central catheter)  --        * Bedside Cardiovascular US in IVC diameter and % collapse  --        * Passive Leg Raise OR Crystalloid Challenge  --          User Key  (r) = Recorded By, (t) = Taken By, (c) = Cosigned By    Initials Name Provider Type    [address], DO Physician

## 2021-03-22 NOTE — ASSESSMENT & PLAN NOTE
Admitted with leukocytosis and tachycardia  Likely secondary to pneumonia  On IV antibiotics  Follow-up culture results  Sputum culture and urine Legionella and Streptococcus  Trend WBC and fever curve  Lactate is 1 4  Gentle IV fluids

## 2021-03-22 NOTE — H&P
Jaqui Kilgore is a 11year old female who was brought in for this visit. History was provided by the CAREGIVER  HPI:   Patient presents with:   Anxiety: ongoing; pt states that she has been feeling depressed and had a panic attack at school yest.  Aries Varela Robbie Viramontes  H&P- Tash Raman 1950, 65 y o  female MRN: [id number]  Unit/Bed#: -01 Encounter: [id number]  Primary Care Provider: Pia Dawson MD   Date and time admitted to hospital: 3/21/2021 10:27 PM    * Sepsis [facility])  Assessment & Plan  Admitted with leukocytosis and tachycardia  Likely secondary to pneumonia  On IV antibiotics  Follow-up culture results  Sputum culture and urine Legionella and Streptococcus  Trend WBC and fever curve  Lactate is 1 4  Gentle IV fluids    Consolidation of right lower lobe of lung ([address] )  Assessment & Plan  CT chest showed- No evidence of pulmonary artery embolus  Consolidation versus mass in the right lower lobe  Probable pneumonia with parapneumonic effusion  On Rocephin, Zithromax and Flagyl  Follow-up culture results  Sputum culture, urine for Legionella and Streptococcus  Oxygen supplementation and bronchodilators  Pulmonary consult    Pleural effusion on right  Assessment & Plan  Patient has large right-sided pleural effusion  Will need thoracentesis  Pulmonary consult  Oxygen supplementation    Acute respiratory failure with hypoxia [facility])  Assessment & Plan  Patient is requiring 2 L of supplemental oxygen  Secondary to above  Wean oxygen as tolerated    Supratherapeutic INR  Assessment & Plan  Patient with INR of 8 77 on admission  Not on any anticoagulation  Will repeat PT INR in a m  No evidence of bleeding    Hyponatremia  Assessment & Plan  Admitted with sodium of 134  Gentle IV fluids  Monitor labs in a m  HTN (hypertension)  Assessment & Plan  Will hold Lasix  Hydralazine p r n  GERD (gastroesophageal reflux disease)  Assessment & Plan  On Protonix    Hypokalemia  Assessment & Plan  Potassium supplementation  Monitor and replace electrolytes as needed    CAD (coronary artery disease)  Assessment & Plan  Continue on aspirin, Plavix and Lipitor  Hold Lasix  Denies any chest pain    Stable    Anticipated length of stay bruising  Psychologic: tearful when talking about not feeling well      ASSESSMENT AND PLAN:  Diagnoses and all orders for this visit:    Anxiety  -     DRUG ABUSE PANEL 10 SCREEN; Future  -     CBC WITH DIFFERENTIAL WITH PLATELET;  Future  -     BASIC META greater than 2 midnights  Chief Complaint   Patient presents with    Cough     pt came is via EMS from home  Pt states she has had a cough for a week, hasn't eaten for a week, and feels weaker than usual         HPI:  Randell Quezada is a 65 y o  female with past medical history of  CAD, hypertension, hyperlipidemia, former smoker who presented to the emergency department with cough, pleuritic right sided chest pain and shortness of breath  Patient states that she has been coughing for the past 1 week and had associated shortness of breath with exertion and right-sided chest pain which is worse with breathing  Patient lives with her   Denies any sputum production, palpitations, fever, chills, weight loss, nausea, vomiting, abdominal pain, headache, dizziness or any other complaints  In ER patient was found to have right-sided consolidation vs mass and pleural effusion    Patient received Rocephin and had CT chest which was negative for PE    Historical Information   Past Medical History:   Diagnosis Date    Anemia     Cardiac disease     Chronic pain     PVD (peripheral vascular disease) ([facility] [address]      Past Surgical History:   Procedure Laterality Date    ANKLE SURGERY       Social History   Social History     Substance and Sexual Activity   Alcohol Use Yes    Frequency: 2-4 times a month    Drinks per session: 1 or 2    Comment: social     Social History     Substance and Sexual Activity   Drug Use No     Social History     Tobacco Use   Smoking Status Former Smoker    Packs/day: 1 00    Years: 0 10    Pack years: 0 10    Types: Cigarettes   Smokeless Tobacco Never Used   Tobacco Comment    pt states she stopped smoking 3 weeks ago     Family History   Problem Relation Age of Onset    No Known Problems Mother     No Known Problems Father        Meds/Allergies   Allergies   Allergen Reactions    Digoxin Hives     HA    Gadolinium Derivatives        Meds:    Current Facility-Administered Medications:     acetaminophen (TYLENOL) tablet 650 mg, 650 mg, Oral, Q6H PRN, Amber Oro MD    ALPRAZolam Gonzalez ) tablet 0 25 mg, 0 25 mg, Oral, Q6H PRN, Amber Oro MD    atorvastatin (LIPITOR) tablet 40 mg, 40 mg, Oral, Daily, Amber Oro MD    azithromycin (ZITHROMAX) 500 mg in sodium chloride 0 9% 250mL IVPB 500 mg, 500 mg, Intravenous, Q24H, Amber Oro MD    buPROPion (WELLBUTRIN XL) 24 hr tablet 150 mg, 150 mg, Oral, Daily, Amber Oro MD    cefTRIAXone (ROCEPHIN) IVPB (premix in dextrose) 1,000 mg 50 mL, 1,000 mg, Intravenous, Q24H, Amber Oro MD    clopidogrel (PLAVIX) tablet 75 mg, 75 mg, Oral, Daily, Amber Oro MD    heparin (porcine) subcutaneous injection 5,000 Units, 5,000 Units, Subcutaneous, Q8H [facility] **AND** Platelet count, , , Once, Amber Oro MD    metroNIDAZOLE (FLAGYL) IVPB (premix) 500 mg 100 mL, 500 mg, Intravenous, Q8H, Amber Oro MD    ondansetron (ZOFRAN) injection 4 mg, 4 mg, Intravenous, Q6H PRN, Amber Oro MD    pantoprazole (PROTONIX) EC tablet 20 mg, 20 mg, Oral, Early Morning, Amber Oro MD    potassium chloride (K-DUR,KLOR-CON) CR tablet 40 mEq, 40 mEq, Oral, Once, Amber Oro MD    sodium chloride 0 9 % infusion, 75 mL/hr, Intravenous, Continuous, Amber Oro MD    thiamine (VITAMIN B1) 100 mg in sodium chloride 0 9 % 50 mL IVPB, 100 mg, Intravenous, Once, Chelo Fontaine DO, Last Rate: 100 mL/hr at 03/22/21 0233, 100 mg at 03/22/21 0233    zolpidem (AMBIEN) tablet 5 mg, 5 mg, Oral, HS PRN, Amber Oro MD    Medications Prior to Admission   Medication    ALPRAZolam (XANAX) 0 25 mg tablet    atorvastatin (LIPITOR) 40 mg tablet    buPROPion (WELLBUTRIN SR) 150 mg 12 hr tablet    clopidogrel (PLAVIX) 75 mg tablet    furosemide (LASIX) 20 mg tablet    omeprazole (PriLOSEC) 20 mg delayed release capsule    zolpidem (AMBIEN) 10 mg tablet         Review of Systems   Constitutional: Positive for activity change and fatigue  HENT: Negative  Eyes: Negative  Respiratory: Positive for cough and shortness of breath  Cardiovascular: Positive for chest pain  Gastrointestinal: Negative  Endocrine: Negative  Genitourinary: Negative  Musculoskeletal: Negative  Skin: Negative  Allergic/Immunologic: Negative  Neurological: Negative  Hematological: Negative  Psychiatric/Behavioral: Negative  Current Vitals:   Blood Pressure: 128/63 (03/22/21 0241)  Pulse: 96 (03/22/21 0241)  Temperature: 98 9 °F (37 2 °C) (03/22/21 0241)  Respirations: 18 (03/22/21 0000)  SpO2: 93 % (03/22/21 0241)  SPO2 RA Rest      ED to Hosp-Admission (Current) from 3/21/2021 in [address] Med Surg Unit   SpO2  93 %   SpO2 Activity  --   O2 Device  None (Room air)   O2 Flow Rate  --        No intake or output data in the 24 hours ending 03/22/21 0243  There is no height or weight on file to calculate BMI  Physical Exam  Vitals signs and nursing note reviewed  Constitutional:       General: She is not in acute distress  Appearance: She is well-developed  HENT:      Head: Normocephalic and atraumatic  Nose: Nose normal    Eyes:      General: No scleral icterus  Conjunctiva/sclera: Conjunctivae normal       Pupils: Pupils are equal, round, and reactive to light  Neck:      Musculoskeletal: Normal range of motion and neck supple  Thyroid: No thyromegaly  Vascular: No JVD  Trachea: No tracheal deviation  Cardiovascular:      Rate and Rhythm: Regular rhythm  Tachycardia present  Heart sounds: Normal heart sounds  Pulmonary:      Effort: Respiratory distress present  Breath sounds: Normal breath sounds  No wheezing  Comments: Decreased on right side with some rhonchi present  Chest:      Chest wall: No tenderness  Abdominal:      General: Bowel sounds are normal  There is no distension  Palpations: Abdomen is soft  There is no mass  Tenderness: There is no abdominal tenderness  There is no guarding or rebound  Musculoskeletal: Normal range of motion  General: No tenderness or deformity  Right lower leg: No edema  Left lower leg: No edema  Lymphadenopathy:      Cervical: No cervical adenopathy  Skin:     General: Skin is warm  Coloration: Skin is not pale  Findings: No erythema or rash  Neurological:      General: No focal deficit present  Mental Status: She is alert and oriented to person, place, and time  Cranial Nerves: No cranial nerve deficit  Coordination: Coordination normal    Psychiatric:         Behavior: Behavior normal          Thought Content: Thought content normal          Lab Results:   CBC:   Lab Results   Component Value Date    WBC 36 08 (HH) 03/21/2021    HGB 10 1 (L) 03/21/2021    HCT 31 6 (L) 03/21/2021    MCV 89 03/21/2021     (H) 03/21/2021    MCH 28 4 03/21/2021    MCHC 32 0 03/21/2021    RDW 16 6 (H) 03/21/2021    MPV 10 9 03/21/2021    NRBC 0 03/21/2021     CMP:  Lab Results   Component Value Date    CL 95 (L) 03/21/2021    CO2 21 03/21/2021    BUN 18 03/21/2021    CREATININE 1 01 03/21/2021    CALCIUM 9 2 03/21/2021    AST 29 03/21/2021    ALT 25 03/21/2021    ALKPHOS 259 (H) 03/21/2021    EGFR 56 03/21/2021     Lab Results   Component Value Date    TROPONINI <0 02 03/21/2021    CKTOTAL 56 03/21/2021     Coagulation:   Lab Results   Component Value Date    INR 8 77 (HH) 03/21/2021    Urinalysis:No results found for: Deborah Juan, SPECGRAV, PHUR, LEUKOCYTESUR, NITRITE, PROTEINUA, GLUCOSEU, KETONESU, BILIRUBINUR, BLOODU   Amylase: No results found for: AMYLASE  Lipase: No results found for: LIPASE     Imaging: Cta Ed Chest Pe Study    Result Date: 3/22/2021  Narrative: CTA - CHEST WITH IV CONTRAST - PULMONARY ANGIOGRAM INDICATION:   Shortness of breath PE suspected, intermediate prob, positive D-dimer sob elevated d-dimer   Patient has suspected COVID-19  COMPARISON: None  TECHNIQUE: CTA examination of the chest was performed using angiographic technique according to a protocol specifically tailored to evaluate for pulmonary embolism  Axial, sagittal, and coronal 2D reformatted images were created from the source data and  submitted for interpretation  In addition, coronal 3D MIP postprocessing was performed on the acquisition scanner  Radiation dose length product (DLP) for this visit:  428 86 mGy-cm   This examination, like all CT scans performed in the [facility], was performed utilizing techniques to minimize radiation dose exposure, including the use of iterative  reconstruction and automated exposure control  IV Contrast:  90 mL of iohexol (OMNIPAQUE)  FINDINGS: PULMONARY ARTERIAL TREE:  No pulmonary embolus is seen  LUNGS:  The trachea and central bronchial tree are patent  Consolidation in the right lower lobe is seen  PLEURA:  Large right-sided pleural effusion is seen  HEART/GREAT VESSELS:  The heart is enlarged  MEDIASTINUM AND RUBIO:  Unremarkable  CHEST WALL AND LOWER NECK:   Unremarkable  VISUALIZED STRUCTURES IN THE UPPER ABDOMEN:  Large hiatal hernia is seen  OSSEOUS STRUCTURES:  No acute fracture or destructive osseous lesion  Impression: No evidence of pulmonary artery embolus Consolidation versus mass in the right lower lobe  Recommend short-term follow-up CT scan of the chest posttreatment to evaluate for resolution  The study was marked in [facility] for immediate notification  Workstation performed: [id number]     EKG, Pathology, and Other Studies: I have personally reviewed the results  VTE Pharmacologic Prophylaxis: Reason for no pharmacologic prophylaxis Supratherapeutic INR  VTE Mechanical Prophylaxis: sequential compression device    Code Status: Level 1 - Full Code    Counseling / Coordination of Care  Total floor / unit time spent today 75 minutes    Greater than 50% of total time was spent with the patient and / or family counseling and / or coordination of care       "This note has been constructed using a voice recognition system"      Josephine Edwards MD  3/22/2021, 2:43 AM

## 2021-03-22 NOTE — PLAN OF CARE
Problem: Potential for Falls  Goal: Patient will remain free of falls  Description: INTERVENTIONS:  - Assess patient frequently for physical needs  -  Identify cognitive and physical deficits and behaviors that affect risk of falls    -  [place] fall precautions as indicated by assessment   - Educate patient/family on patient safety including physical limitations  - Instruct patient to call for assistance with activity based on assessment  - Modify environment to reduce risk of injury  - Consider OT/PT consult to assist with strengthening/mobility  Outcome: Progressing     Problem: PAIN - ADULT  Goal: Verbalizes/displays adequate comfort level or baseline comfort level  Description: Interventions:  - Encourage patient to monitor pain and request assistance  - Assess pain using appropriate pain scale  - Administer analgesics based on type and severity of pain and evaluate response  - Implement non-pharmacological measures as appropriate and evaluate response  - Consider cultural and social influences on pain and pain management  - Notify physician/advanced practitioner if interventions unsuccessful or patient reports new pain  Outcome: Progressing     Problem: INFECTION - ADULT  Goal: Absence or prevention of progression during hospitalization  Description: INTERVENTIONS:  - Assess and monitor for signs and symptoms of infection  - Monitor lab/diagnostic results  - Monitor all insertion sites, i e  indwelling lines, tubes, and drains  - Monitor endotracheal if appropriate and nasal secretions for changes in amount and color  - [place] appropriate cooling/warming therapies per order  - Administer medications as ordered  - Instruct and encourage patient and family to use good hand hygiene technique  - Identify and instruct in appropriate isolation precautions for identified infection/condition  Outcome: Progressing  Goal: Absence of fever/infection during neutropenic period  Description: INTERVENTIONS:  - Monitor WBC    Outcome: Progressing     Problem: SAFETY ADULT  Goal: Patient will remain free of falls  Description: INTERVENTIONS:  - Assess patient frequently for physical needs  -  Identify cognitive and physical deficits and behaviors that affect risk of falls    -  [place] fall precautions as indicated by assessment   - Educate patient/family on patient safety including physical limitations  - Instruct patient to call for assistance with activity based on assessment  - Modify environment to reduce risk of injury  - Consider OT/PT consult to assist with strengthening/mobility  Outcome: Progressing  Goal: Maintain or return to baseline ADL function  Description: INTERVENTIONS:  -  Assess patient's ability to carry out ADLs; assess patient's baseline for ADL function and identify physical deficits which impact ability to perform ADLs (bathing, care of mouth/teeth, toileting, grooming, dressing, etc )  - Assess/evaluate cause of self-care deficits   - Assess range of motion  - Assess patient's mobility; develop plan if impaired  - Assess patient's need for assistive devices and provide as appropriate  - Encourage maximum independence but intervene and supervise when necessary  - Involve family in performance of ADLs  - Assess for home care needs following discharge   - Consider OT consult to assist with ADL evaluation and planning for discharge  - Provide patient education as appropriate  Outcome: Progressing  Goal: Maintain or return mobility status to optimal level  Description: INTERVENTIONS:  - Assess patient's baseline mobility status (ambulation, transfers, stairs, etc )    - Identify cognitive and physical deficits and behaviors that affect mobility  - Identify mobility aids required to assist with transfers and/or ambulation (gait belt, sit-to-stand, lift, walker, cane, etc )  - [place] fall precautions as indicated by assessment  - Record patient progress and toleration of activity level on Mobility SBAR; progress patient to next Phase/Stage  - Instruct patient to call for assistance with activity based on assessment  - Consider rehabilitation consult to assist with strengthening/weightbearing, etc   Outcome: Progressing     Problem: DISCHARGE PLANNING  Goal: Discharge to home or other facility with appropriate resources  Description: INTERVENTIONS:  - Identify barriers to discharge w/patient and caregiver  - Arrange for needed discharge resources and transportation as appropriate  - Identify discharge learning needs (meds, wound care, etc )  - Arrange for interpretive services to assist at discharge as needed  - Refer to Case Management Department for coordinating discharge planning if the patient needs post-hospital services based on physician/advanced practitioner order or complex needs related to functional status, cognitive ability, or social support system  Outcome: Progressing     Problem: Knowledge Deficit  Goal: Patient/family/caregiver demonstrates understanding of disease process, treatment plan, medications, and discharge instructions  Description: Complete learning assessment and assess knowledge base    Interventions:  - Provide teaching at level of understanding  - Provide teaching via preferred learning methods  Outcome: Progressing

## 2021-03-22 NOTE — CONSULTS
Consultation - Pulmonary Medicine   Yadi Hernandez 65 y o  female MRN: [id number]  Unit/Bed#: -01 Encounter: [id number]      Assessment/Plan:   Acute hypoxic respiratory failure   -likely secondary to her right lower lobe pneumonia atelectasis and effusion    Community-acquired pneumonia with probable parapneumonic effusion   -I agree with Rocephin Zithromax and add Flagyl given her propensity toward aspiration  Unfortunately her right lower lobe effusion is loculated and concerning for a parapneumonic process and/or hemothorax given her coagulopathy  Will discuss with IR insertion of of chest tube and timing a reversal of anticoagulation  Will need eventual repeat CT scan in 6-8 weeks to assure resolution of this masslike consolidation    Coagulopathy   -unclear source will send mixing study patient was given vitamin K will likely need FFP to correct coagulopathy prior to any procedure, likely needs right upper quadrant ultrasound to evaluate for cirrhosis portal hypertension    Possible COPD   -these outpatient follow-up for PFTs but will start on DuoNebs on admission      History of Present Illness   Physician Requesting Consult: Alvarez Prado MD  Reason for Consult / Principal Problem:  Pneumonia  Hx and PE limited by:  Probable mild dementia  HPI: Yadi Hernandez is a 65y o  year old female who presents with increased shortness of breath cough  Priya Perez is a poor historian and difficult to focus with regards to getting history however she complains of weakness and shortness of breath for the past week  She presented emergency room with a right lower lobe pneumonia parapneumonic effusion and a significant coagulopathy of unknown etiology  She does complain of some shortness of breath and was hypoxic offer oxygen when I went to examine her  She is coughing unable to expectorate mucus  She is a lifelong smoker but quit several weeks ago according to her    She claims that she drinks once a month however her previous history of reports heavy beer drinking  She is difficult to focus with regards to her eating but does admit that she does not eat healthy  She was seen by her cardiologist last year but has been quarantine for most of this year  She denies taking Coumadin  Inpatient consult to Pulmonology  Consult performed by: Andre Kim DO  Consult ordered by: Shelly Otero MD          Review of Systems   Constitutional: Negative  Negative for unexpected weight change  HENT: Negative  Negative for postnasal drip  Eyes: Negative  Respiratory: Negative  Negative for cough, shortness of breath and wheezing  Cardiovascular: Negative  Negative for chest pain and leg swelling  Gastrointestinal: Negative  Endocrine: Negative  Genitourinary: Negative  Musculoskeletal: Negative  Allergic/Immunologic: Negative  Neurological: Negative  Hematological: Negative          Historical Information   Past Medical History:   Diagnosis Date    Anemia     Cardiac disease     Chronic pain     Coronary artery disease     Hypertension     PVD (peripheral vascular disease) (HCC)      Past Surgical History:   Procedure Laterality Date    ANKLE SURGERY       Social History   Social History     Substance and Sexual Activity   Alcohol Use Yes    Frequency: 2-4 times a month    Drinks per session: 1 or 2    Comment: social     Social History     Substance and Sexual Activity   Drug Use No     E-Cigarette/Vaping     E-Cigarette/Vaping Substances     Social History     Tobacco Use   Smoking Status Former Smoker    Packs/day: 1 00    Years: 0 10    Pack years: 0 10    Types: Cigarettes   Smokeless Tobacco Never Used   Tobacco Comment    pt states she stopped smoking 3 weeks ago     Occupational History:  Not applicable    Family History: non-contributory    Meds/Allergies   all current active meds have been reviewed    Allergies   Allergen Reactions    Digoxin Hives     HA    Gadolinium Derivatives        Objective   Vitals: Blood pressure 134/75, pulse (!) 110, temperature 98 9 °F (37 2 °C), resp  rate 18, height 5' 6" (1 676 m), weight 68 kg (149 lb 14 6 oz), SpO2 95 %, not currently breastfeeding  ,Body mass index is 24 2 kg/m²  No intake or output data in the 24 hours ending 03/22/21 1103  Invasive Devices     Peripheral Intravenous Line            Peripheral IV 03/21/21 Right Antecubital less than 1 day                Physical Exam  Constitutional:       Appearance: She is well-developed  She is ill-appearing  HENT:      Head: Normocephalic and atraumatic  Eyes:      Pupils: Pupils are equal, round, and reactive to light  Neck:      Musculoskeletal: Normal range of motion and neck supple  Cardiovascular:      Rate and Rhythm: Normal rate and regular rhythm  Heart sounds: No murmur  Pulmonary:      Effort: Pulmonary effort is normal  No respiratory distress  Breath sounds: Rhonchi present  No wheezing or rales  Abdominal:      Palpations: Abdomen is soft  Musculoskeletal:         General: Swelling present  Skin:     General: Skin is warm and dry  Findings: Bruising present  Neurological:      Mental Status: She is alert and oriented to person, place, and time           Lab Results:   BNP: No results found for: BNP, CBC:   Lab Results   Component Value Date    WBC 33 14 (HH) 03/22/2021    HGB 9 3 (L) 03/22/2021    HCT 29 5 (L) 03/22/2021    MCV 91 03/22/2021     (H) 03/22/2021    MCH 28 8 03/22/2021    MCHC 31 5 03/22/2021    RDW 16 9 (H) 03/22/2021    MPV 10 5 03/22/2021    NRBC 0 03/22/2021   , CMP:   Lab Results   Component Value Date    SODIUM 133 (L) 03/22/2021    K 3 9 03/22/2021    CL 97 (L) 03/22/2021    CO2 23 03/22/2021    BUN 17 03/22/2021    CREATININE 0 88 03/22/2021    CALCIUM 8 5 03/22/2021    AST 30 03/22/2021    ALT 24 03/22/2021    ALKPHOS 220 (H) 03/22/2021    EGFR 66 03/22/2021   , PT/INR:   Lab Results   Component Value Date INR 7 81 () 03/22/2021   , Troponin:   Lab Results   Component Value Date    TROPONINI <0 02 03/22/2021     Imaging Studies: I have personally reviewed pertinent films in PACS  EKG, Pathology, and Other Studies: I have personally reviewed pertinent films in PACS    Code Status: Level 1 - Full Code  Advance Directive and Living Will:      Power of :    POLST:      None

## 2021-03-22 NOTE — H&P (VIEW-ONLY)
Consultation - Pulmonary Medicine   Godwin Echeverria 65 y o  female MRN: [id number]  Unit/Bed#: -01 Encounter: [id number]      Assessment/Plan:   Acute hypoxic respiratory failure   -likely secondary to her right lower lobe pneumonia atelectasis and effusion    Community-acquired pneumonia with probable parapneumonic effusion   -I agree with Rocephin Zithromax and add Flagyl given her propensity toward aspiration  Unfortunately her right lower lobe effusion is loculated and concerning for a parapneumonic process and/or hemothorax given her coagulopathy  Will discuss with IR insertion of of chest tube and timing a reversal of anticoagulation  Will need eventual repeat CT scan in 6-8 weeks to assure resolution of this masslike consolidation    Coagulopathy   -unclear source will send mixing study patient was given vitamin K will likely need FFP to correct coagulopathy prior to any procedure, likely needs right upper quadrant ultrasound to evaluate for cirrhosis portal hypertension    Possible COPD   -these outpatient follow-up for PFTs but will start on DuoNebs on admission      History of Present Illness   Physician Requesting Consult: Jasmine Tenorio MD  Reason for Consult / Principal Problem:  Pneumonia  Hx and PE limited by:  Probable mild dementia  HPI: Godwin Echeverria is a 65y o  year old female who presents with increased shortness of breath cough  Hollie Arciniega is a poor historian and difficult to focus with regards to getting history however she complains of weakness and shortness of breath for the past week  She presented emergency room with a right lower lobe pneumonia parapneumonic effusion and a significant coagulopathy of unknown etiology  She does complain of some shortness of breath and was hypoxic offer oxygen when I went to examine her  She is coughing unable to expectorate mucus  She is a lifelong smoker but quit several weeks ago according to her    She claims that she drinks once a month however her previous history of reports heavy beer drinking  She is difficult to focus with regards to her eating but does admit that she does not eat healthy  She was seen by her cardiologist last year but has been quarantine for most of this year  She denies taking Coumadin  Inpatient consult to Pulmonology  Consult performed by: Kieran Henley DO  Consult ordered by: Josephine Edwards MD          Review of Systems   Constitutional: Negative  Negative for unexpected weight change  HENT: Negative  Negative for postnasal drip  Eyes: Negative  Respiratory: Negative  Negative for cough, shortness of breath and wheezing  Cardiovascular: Negative  Negative for chest pain and leg swelling  Gastrointestinal: Negative  Endocrine: Negative  Genitourinary: Negative  Musculoskeletal: Negative  Allergic/Immunologic: Negative  Neurological: Negative  Hematological: Negative          Historical Information   Past Medical History:   Diagnosis Date    Anemia     Cardiac disease     Chronic pain     Coronary artery disease     Hypertension     PVD (peripheral vascular disease) (HCC)      Past Surgical History:   Procedure Laterality Date    ANKLE SURGERY       Social History   Social History     Substance and Sexual Activity   Alcohol Use Yes    Frequency: 2-4 times a month    Drinks per session: 1 or 2    Comment: social     Social History     Substance and Sexual Activity   Drug Use No     E-Cigarette/Vaping     E-Cigarette/Vaping Substances     Social History     Tobacco Use   Smoking Status Former Smoker    Packs/day: 1 00    Years: 0 10    Pack years: 0 10    Types: Cigarettes   Smokeless Tobacco Never Used   Tobacco Comment    pt states she stopped smoking 3 weeks ago     Occupational History:  Not applicable    Family History: non-contributory    Meds/Allergies   all current active meds have been reviewed    Allergies   Allergen Reactions    Digoxin Hives     HA    Gadolinium Derivatives        Objective   Vitals: Blood pressure 134/75, pulse (!) 110, temperature 98 9 °F (37 2 °C), resp  rate 18, height 5' 6" (1 676 m), weight 68 kg (149 lb 14 6 oz), SpO2 95 %, not currently breastfeeding  ,Body mass index is 24 2 kg/m²  No intake or output data in the 24 hours ending 03/22/21 1103  Invasive Devices     Peripheral Intravenous Line            Peripheral IV 03/21/21 Right Antecubital less than 1 day                Physical Exam  Constitutional:       Appearance: She is well-developed  She is ill-appearing  HENT:      Head: Normocephalic and atraumatic  Eyes:      Pupils: Pupils are equal, round, and reactive to light  Neck:      Musculoskeletal: Normal range of motion and neck supple  Cardiovascular:      Rate and Rhythm: Normal rate and regular rhythm  Heart sounds: No murmur  Pulmonary:      Effort: Pulmonary effort is normal  No respiratory distress  Breath sounds: Rhonchi present  No wheezing or rales  Abdominal:      Palpations: Abdomen is soft  Musculoskeletal:         General: Swelling present  Skin:     General: Skin is warm and dry  Findings: Bruising present  Neurological:      Mental Status: She is alert and oriented to person, place, and time           Lab Results:   BNP: No results found for: BNP, CBC:   Lab Results   Component Value Date    WBC 33 14 (HH) 03/22/2021    HGB 9 3 (L) 03/22/2021    HCT 29 5 (L) 03/22/2021    MCV 91 03/22/2021     (H) 03/22/2021    MCH 28 8 03/22/2021    MCHC 31 5 03/22/2021    RDW 16 9 (H) 03/22/2021    MPV 10 5 03/22/2021    NRBC 0 03/22/2021   , CMP:   Lab Results   Component Value Date    SODIUM 133 (L) 03/22/2021    K 3 9 03/22/2021    CL 97 (L) 03/22/2021    CO2 23 03/22/2021    BUN 17 03/22/2021    CREATININE 0 88 03/22/2021    CALCIUM 8 5 03/22/2021    AST 30 03/22/2021    ALT 24 03/22/2021    ALKPHOS 220 (H) 03/22/2021    EGFR 66 03/22/2021   , PT/INR:   Lab Results   Component Value Date INR 7 81 () 03/22/2021   , Troponin:   Lab Results   Component Value Date    TROPONINI <0 02 03/22/2021     Imaging Studies: I have personally reviewed pertinent films in PACS  EKG, Pathology, and Other Studies: I have personally reviewed pertinent films in PACS    Code Status: Level 1 - Full Code  Advance Directive and Living Will:      Power of :    POLST:      None

## 2021-03-22 NOTE — SPEECH THERAPY NOTE
Speech Language/Pathology  Speech-Language Pathology Bedside Swallow Evaluation        Patient Name: Ria Marti    NPPWK'S Date: 3/22/2021     Problem List  Principal Problem:    Sepsis [facility])  Active Problems:    Consolidation of right lower lobe of lung (HCC)    CAD (coronary artery disease)    Hypokalemia    Pleural effusion on right    GERD (gastroesophageal reflux disease)    HTN (hypertension)    Hyponatremia    Acute respiratory failure with hypoxia (HCC)    Supratherapeutic INR    Coagulopathy ([facility] [address] )         Past Medical History  Past Medical History:   Diagnosis Date    Anemia     Cardiac disease     Chronic pain     Coronary artery disease     Hypertension     PVD (peripheral vascular disease) ([facility] [address] )        Past Surgical History  Past Surgical History:   Procedure Laterality Date    ANKLE SURGERY         Summary    Evaluation was somewhat limited due to pt's confusion and agitation, inability to sit still, and pt was only agreeable to eating/drinking a small amount for assessment  With limited assessment, pt presents with mild oral dysphagia, characterized by mildly prolonged mastication, bolus formation and transfer with soft solids  Pt declined to try harder solids, as she reports she eats softer foods from home due to having only a few teeth  Pharyngeal swallows appeared mostly WFL  There were no overt s/s immediately after swallowing, however, pt noted to cough before, during and after eval  Risk of aspiration appears low, however, aspiration cannot be r/o without VBS  Pt is not appropriate for VBS today, due to agitation and confusion, inability to sit still, and limited agreement for PO  Recommend pt remain on current diet, choosing softer foods, but with ST to follow up for further assessment  Pt may benefit from VBS when better able to cooperate and with improved mental status, if aspiration is suspected        Recommendations:   Diet: regular diet and thin liquids as long as pt is tolerating, choose softer foods, straws ok  Meds: whole with liquid   Intermittent supervision/assist as needed  Aspiration precautions and compensatory swallowing strategies: upright posture, only feed when fully alert, slow rate of feeding and small bites/sips  Other Recommendations/ considerations: ST to see for dysphagia tx, 2-3x per week  Consider VBS for further assessment of the swallow when pt better able to participate  If pt not tolerating diet, please contact speech tx  Current Medical Status  Copied from admission:  Sissy Whatley is a 65 y o  female with past medical history of  CAD, hypertension, hyperlipidemia, former smoker who presented to the emergency department with cough, pleuritic right sided chest pain and shortness of breath  Patient states that she has been coughing for the past 1 week and had associated shortness of breath with exertion and right-sided chest pain which is worse with breathing  Patient lives with her   Denies any sputum production, palpitations, fever, chills, weight loss, nausea, vomiting, abdominal pain, headache, dizziness or any other complaints  In ER patient was found to have right-sided consolidation vs mass and pleural effusion  Patient received Rocephin and had CT chest which was negative for PE    Order received and chart reviewed  Spoke with RN  Attempted to see pt earlier this morning, but pt was confused and agitated, and coughing nearly nonstop  Notified RN, and returned later when pt was more calm (she received Ativan, pain meds)  Past medical history:   Please see H&P for details    Special Studies:  CXR-MPRESSION:   No evidence of pulmonary artery embolus  Consolidation versus mass in the right lower lobe  Recommend short-term follow-up CT scan of the chest posttreatment to evaluate for resolution        Social/Education/Vocational Hx:  Pt lives with family    Swallow Information   Current Risks for Dysphagia & Aspiration: AMS  Current Symptoms/Concerns: change in respiratory status and pneumonia vs mass  Current Diet: regular diet and thin liquids   Baseline Diet: regular diet and thin liquids, but more soft foods    Baseline Assessment   Behavior/Cognition: decreased attention and agitated, nonstop moving of legs, moving around in bed, confused  Speech/Language Status: able to participate in basic conversation and able to follow commands inconsistently  Patient Positioning: upright in bed and but pt constantly moving and changing position     Swallow Mechanism Exam   Facial: symmetrical  Labial: WFL  Lingual: WFL  Velum: unable to visualize  Mandible: adequate ROM  Dentition: edentulous on top, some anterior natural dentition on bottom, pt does not have dentures  Vocal quality:somewhat weak, somewhat breathy   Volitional Cough: strong/productive   Respiratory: NC, sats in mid 90s    Consistencies Assessed and Performance   Consistencies Administered: thin liquids, puree and soft solids  Pt drank several sips of thin water by cup, 4-5 bites of pudding, and 2 bites of blueberry muffin with butter, several pills, taken whole with water  Declined harder solids  Oral Stage: Adequate bolus retrieval and draw from straw, good lip seal, prompt bolus manipulation and transfer with pureed and thin liquids; mildly prolonged with soft solid  No pocketing or residue  Prompt transfer of whole pills with water  Pharyngeal Stage: Hyolaryngeal elevation observed and palpated  Swallows appear timely  There were no overt s/s of aspiration, however, pt coughed prior to, during, and after the assessment  There was not immediate cough after the swallow  Esophageal Concerns: none reported      Results Reviewed with: patient and RN   Dysphagia Goals: 1  Pt will participate in further assessment of the swallow  2  Pt will tolerate a regular diet and thin liquids with prompt oropharyngeal swallows and no s/s of aspiration   3  Pt will tolerate LRD without s/s of aspiration     Discharge recommendation: unsure at this time    Speech Therapy Prognosis   Prognosis: fair    Prognosis Considerations: age, medical status, prior medical history, cognitive status and therapeutic potential

## 2021-03-22 NOTE — PROGRESS NOTES
Pastoral Care Progress Note    3/22/2021  Patient: Junior Maldonado : [id number]  Admission Date & Time: 3/21/2021 2227  MRN: [id number] [facility]: [id number]                     Chaplaincy Interventions Utilized:   Empowerment: Clarified, confirmed, or reviewed information from treatment team     Exploration: Explored spiritual needs & resources        Relationship Building: Cultivated a relationship of care and support    Ritual: Provided prayer    Patient requested prayer for her family and for herslf for comfort and healing        Chaplaincy Outcomes Achieved:  Expressed gratitude    Patient grateful for the prayers      Spiritual Coping Strategies Utilized:   Connectedness    Provided prayers for comfort and healing, provided patient with Rosary Beads  Offered to have a visiting Carmelo Riojas come in to see her, patient declined       21 1200   Clinical Encounter Type   Visited With Patient   Routine Visit Introduction   Referral From Other (Comment)  (patient daily census)   Mandaen Encounters   Mandaen Needs Prayer;Mandaen articles   Patient Spiritual Encounters   Fear Level 5   Coping 5   Social Interaction 100% of the time

## 2021-03-23 ENCOUNTER — APPOINTMENT (INPATIENT)
Dept: INTERVENTIONAL RADIOLOGY/VASCULAR | Facility: HOSPITAL | Age: 71
DRG: 871 | End: 2021-03-23
Attending: RADIOLOGY
Payer: MEDICARE

## 2021-03-23 ENCOUNTER — APPOINTMENT (INPATIENT)
Dept: NON INVASIVE DIAGNOSTICS | Facility: HOSPITAL | Age: 71
DRG: 871 | End: 2021-03-23
Payer: MEDICARE

## 2021-03-23 PROBLEM — J18.9 PNEUMONIA OF RIGHT LOWER LOBE DUE TO INFECTIOUS ORGANISM: Status: ACTIVE | Noted: 2021-03-22

## 2021-03-23 LAB
APPEARANCE FLD: ABNORMAL
COLOR FLD: ABNORMAL
GLUCOSE FLD-MCNC: 4 MG/DL
GRAM STN SPEC: NORMAL
GRAM STN SPEC: NORMAL
INR PPP: 1.27 (ref 0.84–1.19)
LDH FLD L TO P-CCNC: 2309 U/L
LYMPHOCYTES NFR BLD AUTO: 10 %
MONOCYTES NFR BLD AUTO: 4 %
NEUTS SEG NFR BLD AUTO: 86 %
PH BODY FLUID: 6.6
PROCALCITONIN SERPL-MCNC: 1.12 NG/ML
PROT FLD-MCNC: 4.3 G/DL
PROTHROMBIN TIME: 15.9 SECONDS (ref 11.6–14.5)
SITE: ABNORMAL
TOTAL CELLS COUNTED SPEC: 100
WBC # FLD MANUAL: 3857 /UL

## 2021-03-23 PROCEDURE — 88305 TISSUE EXAM BY PATHOLOGIST: CPT | Performed by: PATHOLOGY

## 2021-03-23 PROCEDURE — 32557 INSERT CATH PLEURA W/ IMAGE: CPT

## 2021-03-23 PROCEDURE — 87070 CULTURE OTHR SPECIMN AEROBIC: CPT | Performed by: NURSE PRACTITIONER

## 2021-03-23 PROCEDURE — 0W9930Z DRAINAGE OF RIGHT PLEURAL CAVITY WITH DRAINAGE DEVICE, PERCUTANEOUS APPROACH: ICD-10-PCS | Performed by: RADIOLOGY

## 2021-03-23 PROCEDURE — 83986 ASSAY PH BODY FLUID NOS: CPT | Performed by: NURSE PRACTITIONER

## 2021-03-23 PROCEDURE — 94760 N-INVAS EAR/PLS OXIMETRY 1: CPT

## 2021-03-23 PROCEDURE — 99232 SBSQ HOSP IP/OBS MODERATE 35: CPT | Performed by: INTERNAL MEDICINE

## 2021-03-23 PROCEDURE — 93306 TTE W/DOPPLER COMPLETE: CPT | Performed by: INTERNAL MEDICINE

## 2021-03-23 PROCEDURE — 88112 CYTOPATH CELL ENHANCE TECH: CPT | Performed by: PATHOLOGY

## 2021-03-23 PROCEDURE — C1729 CATH, DRAINAGE: HCPCS

## 2021-03-23 PROCEDURE — 83615 LACTATE (LD) (LDH) ENZYME: CPT | Performed by: NURSE PRACTITIONER

## 2021-03-23 PROCEDURE — 94640 AIRWAY INHALATION TREATMENT: CPT

## 2021-03-23 PROCEDURE — 84157 ASSAY OF PROTEIN OTHER: CPT | Performed by: NURSE PRACTITIONER

## 2021-03-23 PROCEDURE — C1769 GUIDE WIRE: HCPCS

## 2021-03-23 PROCEDURE — 32561 LYSE CHEST FIBRIN INIT DAY: CPT | Performed by: INTERNAL MEDICINE

## 2021-03-23 PROCEDURE — 82945 GLUCOSE OTHER FLUID: CPT | Performed by: NURSE PRACTITIONER

## 2021-03-23 PROCEDURE — 87205 SMEAR GRAM STAIN: CPT | Performed by: NURSE PRACTITIONER

## 2021-03-23 PROCEDURE — 85610 PROTHROMBIN TIME: CPT | Performed by: NURSE PRACTITIONER

## 2021-03-23 PROCEDURE — 89051 BODY FLUID CELL COUNT: CPT | Performed by: NURSE PRACTITIONER

## 2021-03-23 PROCEDURE — 84145 PROCALCITONIN (PCT): CPT | Performed by: EMERGENCY MEDICINE

## 2021-03-23 PROCEDURE — 93306 TTE W/DOPPLER COMPLETE: CPT

## 2021-03-23 RX ORDER — FUROSEMIDE 20 MG/1
20 TABLET ORAL DAILY
Status: DISCONTINUED | OUTPATIENT
Start: 2021-03-23 | End: 2021-03-29 | Stop reason: HOSPADM

## 2021-03-23 RX ORDER — LORAZEPAM 2 MG/ML
4 INJECTION INTRAMUSCULAR ONCE
Status: COMPLETED | OUTPATIENT
Start: 2021-03-23 | End: 2021-03-23

## 2021-03-23 RX ORDER — IPRATROPIUM BROMIDE AND ALBUTEROL SULFATE 2.5; .5 MG/3ML; MG/3ML
3 SOLUTION RESPIRATORY (INHALATION)
Status: DISCONTINUED | OUTPATIENT
Start: 2021-03-23 | End: 2021-03-29 | Stop reason: HOSPADM

## 2021-03-23 RX ORDER — LORAZEPAM 2 MG/ML
2 INJECTION INTRAMUSCULAR ONCE
Status: COMPLETED | OUTPATIENT
Start: 2021-03-23 | End: 2021-03-23

## 2021-03-23 RX ADMIN — MORPHINE SULFATE 2 MG: 2 INJECTION, SOLUTION INTRAMUSCULAR; INTRAVENOUS at 08:35

## 2021-03-23 RX ADMIN — GUAIFENESIN AND DEXTROMETHORPHAN 10 ML: 100; 10 SYRUP ORAL at 08:38

## 2021-03-23 RX ADMIN — CEFTRIAXONE 1000 MG: 1 INJECTION, SOLUTION INTRAVENOUS at 00:03

## 2021-03-23 RX ADMIN — FUROSEMIDE 20 MG: 20 TABLET ORAL at 08:38

## 2021-03-23 RX ADMIN — CEFTRIAXONE 1000 MG: 1 INJECTION, SOLUTION INTRAVENOUS at 22:59

## 2021-03-23 RX ADMIN — METRONIDAZOLE 500 MG: 500 INJECTION, SOLUTION INTRAVENOUS at 02:57

## 2021-03-23 RX ADMIN — THIAMINE HCL TAB 100 MG 100 MG: 100 TAB at 08:38

## 2021-03-23 RX ADMIN — OXYCODONE HYDROCHLORIDE 5 MG: 5 TABLET ORAL at 08:38

## 2021-03-23 RX ADMIN — BUPROPION HYDROCHLORIDE 150 MG: 150 TABLET, FILM COATED, EXTENDED RELEASE ORAL at 08:38

## 2021-03-23 RX ADMIN — ALPRAZOLAM 0.25 MG: 0.25 TABLET ORAL at 08:38

## 2021-03-23 RX ADMIN — CLOPIDOGREL BISULFATE 75 MG: 75 TABLET ORAL at 08:38

## 2021-03-23 RX ADMIN — IPRATROPIUM BROMIDE AND ALBUTEROL SULFATE 3 ML: 2.5; .5 SOLUTION RESPIRATORY (INHALATION) at 10:12

## 2021-03-23 RX ADMIN — LORAZEPAM 2 MG: 2 INJECTION INTRAMUSCULAR; INTRAVENOUS at 05:24

## 2021-03-23 RX ADMIN — AZITHROMYCIN MONOHYDRATE 500 MG: 500 INJECTION, POWDER, LYOPHILIZED, FOR SOLUTION INTRAVENOUS at 03:26

## 2021-03-23 RX ADMIN — ALPRAZOLAM 0.25 MG: 0.25 TABLET ORAL at 16:05

## 2021-03-23 RX ADMIN — LORAZEPAM 4 MG: 2 INJECTION INTRAMUSCULAR; INTRAVENOUS at 18:34

## 2021-03-23 RX ADMIN — GUAIFENESIN AND DEXTROMETHORPHAN 10 ML: 100; 10 SYRUP ORAL at 21:40

## 2021-03-23 RX ADMIN — PANTOPRAZOLE SODIUM 20 MG: 20 TABLET, DELAYED RELEASE ORAL at 05:24

## 2021-03-23 RX ADMIN — ATORVASTATIN CALCIUM 40 MG: 40 TABLET, FILM COATED ORAL at 16:05

## 2021-03-23 RX ADMIN — ALPRAZOLAM 0.25 MG: 0.25 TABLET ORAL at 03:22

## 2021-03-23 RX ADMIN — IPRATROPIUM BROMIDE AND ALBUTEROL SULFATE 3 ML: 2.5; .5 SOLUTION RESPIRATORY (INHALATION) at 13:54

## 2021-03-23 RX ADMIN — MORPHINE SULFATE 2 MG: 2 INJECTION, SOLUTION INTRAMUSCULAR; INTRAVENOUS at 21:41

## 2021-03-23 RX ADMIN — METRONIDAZOLE 500 MG: 500 INJECTION, SOLUTION INTRAVENOUS at 20:16

## 2021-03-23 RX ADMIN — FOLIC ACID 1 MG: 1 TABLET ORAL at 08:38

## 2021-03-23 RX ADMIN — Medication 1 TABLET: at 08:38

## 2021-03-23 RX ADMIN — LORAZEPAM 4 MG: 2 INJECTION INTRAMUSCULAR; INTRAVENOUS at 07:39

## 2021-03-23 RX ADMIN — LORAZEPAM 2 MG: 2 INJECTION INTRAMUSCULAR; INTRAVENOUS at 21:06

## 2021-03-23 RX ADMIN — METRONIDAZOLE 500 MG: 500 INJECTION, SOLUTION INTRAVENOUS at 10:47

## 2021-03-23 RX ADMIN — GUAIFENESIN AND DEXTROMETHORPHAN 10 ML: 100; 10 SYRUP ORAL at 03:21

## 2021-03-23 RX ADMIN — IPRATROPIUM BROMIDE AND ALBUTEROL SULFATE 3 ML: 2.5; .5 SOLUTION RESPIRATORY (INHALATION) at 20:19

## 2021-03-23 RX ADMIN — LORAZEPAM 4 MG: 2 INJECTION INTRAMUSCULAR; INTRAVENOUS at 22:56

## 2021-03-23 NOTE — ASSESSMENT & PLAN NOTE
Sepsis, poa, a/e/b leukocytosis 36k with 4% bands and tachycardia secondary to right sided pneumonia/parapneumonic effusion  INR 8 on admission  S/p Vit K, INR now 1 27  On IV antibiotics - Ceftriaxone, flagyl and azithromycin  Plan for IR chest tube placement today  Sputum culture, urine Legionella and Streptococcus - pending collection  Trend WBC and fever curve - wbc downtrending now 33k  Blood cultures negative till date

## 2021-03-23 NOTE — ASSESSMENT & PLAN NOTE
Acute hypoxic resp failure, POA, a/e/b sob, hypoxia 85% on 2L NC, tachypnea 19-22 secondary to right lower lobe pneumonia, parapneumonic effusion and atelectasis  Patient is requiring 2 L of supplemental oxygen  Secondary to above  Wean oxygen as tolerated

## 2021-03-23 NOTE — PROGRESS NOTES
Progress Note - Pulmonary   Sissy Whatley 65 y o  female MRN: [id number]  Unit/Bed#: -01 Encounter: [id number]      Assessment & Recommendations:  1  Acute hypoxic respiratory failure  · Tirate O2 to keep SpO2 >88%  · IS, OOB-Chair, flutter valve    2  Community Acquired pneumonia - possible aspiration component  · Continue CTX/Azithro/Flagyl for now  · Follow cultures  · Trend Temp/WBC    3  Large right parapneumonic pleural effusion, less likely hemothorax given Hgb stability  · Plan for IR CT placement today  · Repeat CXR in AM  · Monitor drainage  · Follow up fluid analysis (cultures, cell count/diff, LDH, TP, glucose, pH, cytology)    4  Coagulopathy - suspect secondary to ETOH abuse and nutritional depletion given rapid improvement - per primary service    5  Possible COPD w/o AE  · Add duonebs TID  · Outpatient PFTs and pulmonary follow up    6  Nicotine dependence in remission    7  ETOH abuse  · CIWA and monitor for ETOH w/d  · Thiamine/folate      Subjective:   Reports feeling tired, denied pain, no fevers or chills, no pleurisy, no hemoptysis    Objective:     Vitals: Blood pressure (!) 176/74, pulse 104, temperature 99 1 °F (37 3 °C), resp  rate 20, height 5' 6" (1 676 m), weight 68 4 kg (150 lb 12 7 oz), SpO2 (!) 85 %, not currently breastfeeding  , 94% 5L/min NC, Body mass index is 24 34 kg/m²      No intake or output data in the 24 hours ending 03/23/21 0919      Physical Exam  Gen: Older woman, mildly somnolent post ativan/xanax dosing, was able to arouse and converse with prompting  HEENT: Mucous membranes moist, no oral lesions, no thrush  NECK: No accessory muscle use, JVP not elevated  Cardiac: Regular, single S1, single S2, no murmurs, no rubs, no gallops  Lungs: course BS on right with diminished at right base, no wheeze, no rales, moist cough noted  Abdomen: normoactive bowel sounds, soft nontender, nondistended, no rebound or rigidity, no guarding  Extremities: no cyanosis, no clubbing, no edema, noted ecchymosis of right arm    Labs: I have personally reviewed pertinent lab results  Laboratory and Diagnostics  Results from last 7 days   Lab Units 03/22/21  0401 03/21/21  2254   WBC Thousand/uL 33 14* 36 08*   HEMOGLOBIN g/dL 9 3* 10 1*   HEMATOCRIT % 29 5* 31 6*   PLATELETS Thousands/uL 594* 680*   BANDS PCT %  --  4   MONO PCT %  --  5     Results from last 7 days   Lab Units 03/22/21  0401 03/21/21  2254   SODIUM mmol/L 133* 134*   POTASSIUM mmol/L 3 9 3 1*   CHLORIDE mmol/L 97* 95*   CO2 mmol/L 23 21   ANION GAP mmol/L 13 18*   BUN mg/dL 17 18   CREATININE mg/dL 0 88 1 01   CALCIUM mg/dL 8 5 9 2   GLUCOSE RANDOM mg/dL 118 108   ALT U/L 24 25   AST U/L 30 29   ALK PHOS U/L 220* 259*   ALBUMIN g/dL 1 6* 1 8*   TOTAL BILIRUBIN mg/dL 0 40 0 50     Results from last 7 days   Lab Units 03/22/21  0401 03/21/21  2254   MAGNESIUM mg/dL 1 7 1 5*   PHOSPHORUS mg/dL 3 2  --       Results from last 7 days   Lab Units 03/23/21  0447 03/22/21  1149 03/22/21  0401 03/21/21  2254   INR  1 27* 1 48* 7 81* 8 77*   PTT seconds  --   --   --  108*      Results from last 7 days   Lab Units 03/22/21  1149 03/22/21  0401 03/21/21  2254   TROPONIN I ng/mL <0 02 <0 02 <0 02     Results from last 7 days   Lab Units 03/22/21  0104   LACTIC ACID mmol/L 1 4     Results from last 7 days   Lab Units 03/21/21  2256   FERRITIN ng/mL 1,334*   CRP mg/L 394 5*             Results from last 7 days   Lab Units 03/21/21  2254   D-DIMER QUANTITATIVE ug/ml FEU 3 67*     Results from last 7 days   Lab Units 03/21/21  2254   PROCALCITONIN ng/ml 0 80*       Microbiology:  FLU/RSV neg  COVID-19 neg 3/22  Bld Cx 3/21 - NGTD    Imaging and other studies: I have personally reviewed pertinent reports  and I have personally reviewed pertinent films in PACS  CT angio 3/22 - large right sided pleural effusion, right basilar infiltrate/consolidation, no PE, no sig mediastinal adenopathy      Blue Cary DO, [address] [facility]

## 2021-03-23 NOTE — BRIEF OP NOTE (RAD/CATH)
INTERVENTIONAL RADIOLOGY PROCEDURE NOTE    Date: 3/23/2021    Procedure: Procedure name not found  Preoperative diagnosis:   1  Consolidation of right lower lobe of lung ([address] )    2  Mass of lower lobe of right lung    3  Pleural effusion on right    4  Coagulopathy ([facility] [address] )         Postoperative diagnosis: Same  Surgeon: Gardenia Fischer MD     Assistant: None  No qualified resident was available  Blood loss: None    Specimens: Yes     Findings: Technically successful placement of 12F APDL throacostomy tube in posterior right pleural space extensively loculated effusion, initially yielding about 70 ml serosanguinous fluid, sent for requested labs  Placed on -20 cm H2O suction  Will likely benefit from pleurolysis to better evaculate  Complications: None immediate      Anesthesia: local

## 2021-03-23 NOTE — ASSESSMENT & PLAN NOTE
Right lower lobe pneumonia, poa  CT chest showed- No evidence of pulmonary artery embolus  Consolidation versus mass in the right lower lobe      Probable pneumonia with parapneumonic effusion  On Rocephin, Zithromax and Flagyl  Follow-up culture results  Sputum culture, urine for Legionella and Streptococcus  Oxygen supplementation and bronchodilators  Pulmonary consult

## 2021-03-23 NOTE — CASE MANAGEMENT
LOS: 1 day  PATIENT IS NOT A MEDICARE BUNDLE OR A 30 DAY READMISSION  UNPLANNED READMISSION RISK SCORE IS 13 - GREEN  Attempted to meet with patient who was somewhat confused and medically unstable  Called and spoke with patients daughter Carlos Silva  Explained role of care management  Patient lives with spouse, Mya Jarquin in a [address] [facility] with 1 + 1 MARIANN  She is independent adl's, uses a cane/RW to ambulate, she can drive but has had cataract surgery and is no longer supposed to drive - family transports, spouse provides meals, grocery shops  DME - cane, RW  Past services - denies history of VNA, SNF or D&A  Daughter states that patient has a history of depression and sees a doctor in [place] who prescribes her meds  Pharmacy of choice is [facility] in [place]  She does not have a POA/AD  Her PCP is Ayesha Jason  As per daughter, patients spouse is not very supportive and patient and spouse fight a lot and threaten divorce often  Daughter feels that patient would benefit from placement and getting out of her current situation  Discussed SNF vs assisted living  Called and [facility] for patients spouse, Mya Jarquin re: discharge plan  Await PT/OT recommendation  CM reviewed d/c planning process including the following: identifying help at home, patient preference for d/c planning needs, availability of treatment team to discuss questions or concerns patient and/or family may have regarding understanding medications and recognizing signs and symptoms once discharged  CM also encouraged patient to follow up with all recommended appointments after discharge  Patient advised of importance for patient and family to participate in managing patients medical well being

## 2021-03-23 NOTE — CASE MANAGEMENT
List of local skilled nursing facilities emailed to patients daughter at [email or web address] with instructions to notify case management of choices  Received call from patients spouse Mathew Jefferson  Spouse given update, informed him that we would call when patient was closer to discharge and medically stable and discharge needs were determined

## 2021-03-23 NOTE — PLAN OF CARE
Problem: Potential for Falls  Goal: Patient will remain free of falls  Description: INTERVENTIONS:  - Assess patient frequently for physical needs  -  Identify cognitive and physical deficits and behaviors that affect risk of falls    -  [place] fall precautions as indicated by assessment   - Educate patient/family on patient safety including physical limitations  - Instruct patient to call for assistance with activity based on assessment  - Modify environment to reduce risk of injury  - Consider OT/PT consult to assist with strengthening/mobility  Outcome: Progressing

## 2021-03-23 NOTE — INTERVAL H&P NOTE
Update: (This section must be completed if the H&P was completed greater than 24 hrs to procedure or admission)    H&P reviewed  After examining the patient, I find no changed to the H&P since it had been written  Patient re-evaluated   Accept as history and physical     Dawna Anderson MD/March 23, 2021/5:33 PM

## 2021-03-23 NOTE — PROGRESS NOTES
[place]  Progress Note - Mkie Ludwig 1950, 65 y o  female MRN: [id number]  Unit/Bed#: -01 Encounter: [id number]  Primary Care Provider: Donna Ryan MD   Date and time admitted to hospital: 3/21/2021 10:27 PM    * Sepsis [facility])  Assessment & Plan  Sepsis, poa, a/e/b leukocytosis 36k with 4% bands and tachycardia secondary to right sided pneumonia/parapneumonic effusion  INR 8 on admission  S/p Vit K, INR now 1 27  On IV antibiotics - Ceftriaxone, flagyl and azithromycin  Plan for IR chest tube placement today  Sputum culture, urine Legionella and Streptococcus - pending collection  Trend WBC and fever curve - wbc downtrending now 33k  Blood cultures negative till date      Coagulopathy [facility])  Assessment & Plan  Coagulopathy possibly due to ETOH abuse a/e/b INR 8 77, treated with vitamin K 10mg SQ, telemetry and monitoring for signs of bleeding/CBCs    Supratherapeutic INR  Assessment & Plan  Patient with INR of 8 77 on admission  Not on any anticoagulation  Will repeat PT INR in a m  Will order vitamin K  No evidence of bleeding    Acute respiratory failure with hypoxia (HCC)  Assessment & Plan  Acute hypoxic resp failure, POA, a/e/b sob, hypoxia 85% on 2L NC, tachypnea 19-22 secondary to right lower lobe pneumonia, parapneumonic effusion and atelectasis  Patient is requiring 2 L of supplemental oxygen  Secondary to above  Wean oxygen as tolerated    Hyponatremia  Assessment & Plan  Hyponatremia, Na 134 on admission  S/p IVFs  Trend BMP      HTN (hypertension)  Assessment & Plan  Home meds - 20mg lasix daily  Monitor BP per unit protocol  Hydralazine p r n      GERD (gastroesophageal reflux disease)  Assessment & Plan  On Protonix    Pleural effusion on right  Assessment & Plan  CT chest - large right-sided pleural effusion  Plan for IR chest tube placement today   Pulmonary on board  Oxygen supplementation to keep sats > 92%    Hypokalemia  Assessment & Plan    Monitor and replace electrolytes as needed    CAD (coronary artery disease)  Assessment & Plan  Continue on aspirin, Plavix and Lipitor  Denies any chest pain  Stable    Pneumonia of right lower lobe due to infectious organism  Assessment & Plan  Right lower lobe pneumonia, poa  CT chest showed- No evidence of pulmonary artery embolus  Consolidation versus mass in the right lower lobe  Probable pneumonia with parapneumonic effusion  On Rocephin, Zithromax and Flagyl  Follow-up culture results  Sputum culture, urine for Legionella and Streptococcus  Oxygen supplementation and bronchodilators  Pulmonary consult      VTE Pharmacologic Prophylaxis:   Pharmacologic: Pharmacologic VTE Prophylaxis contraindicated due to supratherapeutic INR  Mechanical VTE Prophylaxis in Place: Yes    Patient Centered Rounds: I have performed bedside rounds with nursing staff today  Discussions with Specialists or Other Care Team Provider: CM, pulm    Education and Discussions with Family / Patient: I called patient's , no answer, VM box full    Time Spent for Care: 45 minutes  More than 50% of total time spent on counseling and coordination of care as described above  Current Length of Stay: 1 day(s)    Current Patient Status: Inpatient   Certification Statement: The patient will continue to require additional inpatient hospital stay due to as above    Discharge Plan: 2-3 days    Code Status: Level 1 - Full Code      Subjective:   No overnight events, no complaints this am    Objective:     Vitals:   Temp (24hrs), Av 9 °F (37 2 °C), Min:98 7 °F (37 1 °C), Max:99 1 °F (37 3 °C)    Temp:  [98 7 °F (37 1 °C)-99 1 °F (37 3 °C)] 99 1 °F (37 3 °C)  HR:  [] 104  Resp:  [19-22] 20  BP: (104-176)/(59-94) 176/74  SpO2:  [85 %-95 %] 85 %  Body mass index is 24 34 kg/m²       Input and Output Summary (last 24 hours):     No intake or output data in the 24 hours ending 21 0825    Physical Exam:     Physical Exam  Vitals signs and nursing note reviewed  Constitutional:       General: She is not in acute distress  Appearance: She is ill-appearing  She is not toxic-appearing  Cardiovascular:      Rate and Rhythm: Regular rhythm  Tachycardia present  Pulses: Normal pulses  Pulmonary:      Effort: Pulmonary effort is normal  Tachypnea present  No respiratory distress  Breath sounds: Transmitted upper airway sounds present  Examination of the right-lower field reveals decreased breath sounds  Decreased breath sounds present  No rhonchi  Abdominal:      General: Bowel sounds are normal  There is no distension  Palpations: Abdomen is soft  Tenderness: There is no abdominal tenderness  There is no right CVA tenderness, left CVA tenderness or guarding  Musculoskeletal: Normal range of motion  General: No swelling or deformity  Right lower leg: No edema  Left lower leg: No edema  Skin:     General: Skin is warm and dry  Capillary Refill: Capillary refill takes less than 2 seconds  Coloration: Skin is not jaundiced or pale  Findings: No bruising, erythema, lesion or rash  Neurological:      General: No focal deficit present  Mental Status: She is alert  Mental status is at baseline     Psychiatric:         Mood and Affect: Mood normal        Additional Data:     Labs:    Results from last 7 days   Lab Units 03/22/21  0401 03/21/21  2254   WBC Thousand/uL 33 14* 36 08*   HEMOGLOBIN g/dL 9 3* 10 1*   HEMATOCRIT % 29 5* 31 6*   PLATELETS Thousands/uL 594* 680*   BANDS PCT %  --  4   LYMPHO PCT %  --  10*   MONO PCT %  --  5   EOS PCT %  --  0     Results from last 7 days   Lab Units 03/22/21  0401   SODIUM mmol/L 133*   POTASSIUM mmol/L 3 9   CHLORIDE mmol/L 97*   CO2 mmol/L 23   BUN mg/dL 17   CREATININE mg/dL 0 88   ANION GAP mmol/L 13   CALCIUM mg/dL 8 5   ALBUMIN g/dL 1 6*   TOTAL BILIRUBIN mg/dL 0 40   ALK PHOS U/L 220*   ALT U/L 24   AST U/L 30   GLUCOSE RANDOM mg/dL 118 Results from last 7 days   Lab Units 03/23/21  0447   INR  1 27*             Results from last 7 days   Lab Units 03/22/21  0104 03/21/21  2254   LACTIC ACID mmol/L 1 4  --    PROCALCITONIN ng/ml  --  0 80*           * I Have Reviewed All Lab Data Listed Above  * Additional Pertinent Lab Tests Reviewed: All Labs Within Last 24 Hours Reviewed    Imaging:    Imaging Reports Reviewed Today Include:   Imaging Personally Reviewed by Myself Includes:      Recent Cultures (last 7 days):     Results from last 7 days   Lab Units 03/21/21 2254   BLOOD CULTURE  No Growth at 24 hrs  No Growth at 24 hrs         Last 24 Hours Medication List:   Current Facility-Administered Medications   Medication Dose Route Frequency Provider Last Rate    acetaminophen  650 mg Oral Q6H PRN Raheel Young MD      ALPRAZolam  0 25 mg Oral Q6H PRN Raheel Young MD      atorvastatin  40 mg Oral Daily With Derrick Romo MD      azithromycin  500 mg Intravenous Q24H Raheel Young MD      buPROPion  150 mg Oral Daily Raheel Young MD      cefTRIAXone  1,000 mg Intravenous Q24H Raheel Young MD 1,000 mg (03/23/21 0003)    clopidogrel  75 mg Oral Daily Raheel Young MD      dextromethorphan-guaiFENesin  10 mL Oral Q4H PRN Jasmine Tenorio MD      folic acid  1 mg Oral Daily Jasmine Tenorio MD      furosemide  20 mg Oral Daily Jasmine Tenorio MD      hydrALAZINE  10 mg Intravenous Q6H PRN Raheel Young MD      ipratropium-albuterol  3 mL Nebulization Q6H PRN Raheel Young MD      metroNIDAZOLE  500 mg Intravenous Q8H Raheel Young  mg (03/23/21 0257)    morphine injection  2 mg Intravenous Q6H PRN Raheel Young MD      multivitamin-minerals  1 tablet Oral Daily Jasmine Tenorio MD      ondansetron  4 mg Intravenous Q6H PRN Raheel Young MD      oxyCODONE  5 mg Oral Q6H PRN Raheel Young MD      pantoprazole  20 mg Oral Early Morning Isaias S Malik Bailey MD      thiamine  100 mg Oral Daily Sanna Escoto MD      zolpidem  5 mg Oral HS PRN Nae Mederos MD          Today, Patient Was Seen By: Sanna Escoto MD    ** Please Note: Dictation voice to text software may have been used in the creation of this document   **

## 2021-03-24 ENCOUNTER — APPOINTMENT (INPATIENT)
Dept: RADIOLOGY | Facility: HOSPITAL | Age: 71
DRG: 871 | End: 2021-03-24
Payer: MEDICARE

## 2021-03-24 ENCOUNTER — APPOINTMENT (INPATIENT)
Dept: INTERVENTIONAL RADIOLOGY/VASCULAR | Facility: HOSPITAL | Age: 71
DRG: 871 | End: 2021-03-24
Payer: MEDICARE

## 2021-03-24 PROBLEM — E87.1 HYPONATREMIA: Status: RESOLVED | Noted: 2021-03-22 | Resolved: 2021-03-24

## 2021-03-24 PROBLEM — R41.0 DELIRIUM: Status: ACTIVE | Noted: 2021-03-24

## 2021-03-24 PROBLEM — E43 SEVERE PROTEIN-CALORIE MALNUTRITION (HCC): Status: ACTIVE | Noted: 2021-03-24

## 2021-03-24 PROBLEM — D75.839 THROMBOCYTOSIS: Status: ACTIVE | Noted: 2021-03-24

## 2021-03-24 LAB
ANION GAP SERPL CALCULATED.3IONS-SCNC: 13 MMOL/L (ref 4–13)
ARTERIAL PATENCY WRIST A: 13
ATRIAL RATE: 99 BPM
BASE EXCESS BLDA CALC-SCNC: 1 MMOL/L (ref -2–3)
BUN SERPL-MCNC: 11 MG/DL (ref 5–25)
CALCIUM SERPL-MCNC: 8.7 MG/DL (ref 8.3–10.1)
CHLORIDE SERPL-SCNC: 101 MMOL/L (ref 100–108)
CO2 SERPL-SCNC: 22 MMOL/L (ref 21–32)
CREAT SERPL-MCNC: 0.75 MG/DL (ref 0.6–1.3)
DS:DELIVERY SYSTEM: 1
ERYTHROCYTE [DISTWIDTH] IN BLOOD BY AUTOMATED COUNT: 16.7 % (ref 11.6–15.1)
FIO2 GAS DIL.REBREATH: 50 L
GFR SERPL CREATININE-BSD FRML MDRD: 80 ML/MIN/1.73SQ M
GLUCOSE SERPL-MCNC: 100 MG/DL (ref 65–140)
GLUCOSE SERPL-MCNC: 94 MG/DL (ref 65–140)
HCO3 BLDA-SCNC: 24.4 MMOL/L (ref 22–28)
HCT VFR BLD AUTO: 26.3 % (ref 34.8–46.1)
HCT VFR BLD CALC: 20 % (ref 34.8–46.1)
HGB BLD-MCNC: 8.4 G/DL (ref 11.5–15.4)
HGB BLDA-MCNC: 6.8 G/DL (ref 11.5–15.4)
MCH RBC QN AUTO: 28.8 PG (ref 26.8–34.3)
MCHC RBC AUTO-ENTMCNC: 31.9 G/DL (ref 31.4–37.4)
MCV RBC AUTO: 90 FL (ref 82–98)
P AXIS: 60 DEGREES
PCO2 BLD: 25 MMOL/L (ref 21–32)
PCO2 BLD: 30.6 MM HG (ref 36–44)
PH BLD: 7.51 [PH] (ref 7.35–7.45)
PLATELET # BLD AUTO: 618 THOUSANDS/UL (ref 149–390)
PMV BLD AUTO: 10.8 FL (ref 8.9–12.7)
PO2 BLD: 72 MM HG (ref 75–129)
POTASSIUM BLD-SCNC: 4.4 MMOL/L (ref 3.5–5.3)
POTASSIUM SERPL-SCNC: 4.8 MMOL/L (ref 3.5–5.3)
PR INTERVAL: 162 MS
QRS AXIS: 95 DEGREES
QRSD INTERVAL: 106 MS
QT INTERVAL: 362 MS
QTC INTERVAL: 464 MS
RBC # BLD AUTO: 2.92 MILLION/UL (ref 3.81–5.12)
SAO2 % BLD FROM PO2: 96 % (ref 60–85)
SODIUM BLD-SCNC: 134 MMOL/L (ref 136–145)
SODIUM SERPL-SCNC: 136 MMOL/L (ref 136–145)
SPECIMEN SOURCE: ABNORMAL
T WAVE AXIS: 92 DEGREES
VENTRICULAR RATE: 99 BPM
WBC # BLD AUTO: 39.01 THOUSAND/UL (ref 4.31–10.16)

## 2021-03-24 PROCEDURE — 94760 N-INVAS EAR/PLS OXIMETRY 1: CPT

## 2021-03-24 PROCEDURE — 36600 WITHDRAWAL OF ARTERIAL BLOOD: CPT

## 2021-03-24 PROCEDURE — 76937 US GUIDE VASCULAR ACCESS: CPT | Performed by: RADIOLOGY

## 2021-03-24 PROCEDURE — 77001 FLUOROGUIDE FOR VEIN DEVICE: CPT

## 2021-03-24 PROCEDURE — 94002 VENT MGMT INPAT INIT DAY: CPT

## 2021-03-24 PROCEDURE — 99233 SBSQ HOSP IP/OBS HIGH 50: CPT | Performed by: INTERNAL MEDICINE

## 2021-03-24 PROCEDURE — 80048 BASIC METABOLIC PNL TOTAL CA: CPT | Performed by: NURSE PRACTITIONER

## 2021-03-24 PROCEDURE — 71045 X-RAY EXAM CHEST 1 VIEW: CPT

## 2021-03-24 PROCEDURE — 82947 ASSAY GLUCOSE BLOOD QUANT: CPT

## 2021-03-24 PROCEDURE — 85027 COMPLETE CBC AUTOMATED: CPT | Performed by: NURSE PRACTITIONER

## 2021-03-24 PROCEDURE — 76937 US GUIDE VASCULAR ACCESS: CPT

## 2021-03-24 PROCEDURE — 85014 HEMATOCRIT: CPT

## 2021-03-24 PROCEDURE — 94640 AIRWAY INHALATION TREATMENT: CPT

## 2021-03-24 PROCEDURE — C1751 CATH, INF, PER/CENT/MIDLINE: HCPCS

## 2021-03-24 PROCEDURE — 84295 ASSAY OF SERUM SODIUM: CPT

## 2021-03-24 PROCEDURE — 93010 ELECTROCARDIOGRAM REPORT: CPT | Performed by: INTERNAL MEDICINE

## 2021-03-24 PROCEDURE — 93005 ELECTROCARDIOGRAM TRACING: CPT

## 2021-03-24 PROCEDURE — 84132 ASSAY OF SERUM POTASSIUM: CPT

## 2021-03-24 PROCEDURE — 82803 BLOOD GASES ANY COMBINATION: CPT

## 2021-03-24 PROCEDURE — 36556 INSERT NON-TUNNEL CV CATH: CPT | Performed by: RADIOLOGY

## 2021-03-24 PROCEDURE — 99232 SBSQ HOSP IP/OBS MODERATE 35: CPT | Performed by: INTERNAL MEDICINE

## 2021-03-24 PROCEDURE — 36556 INSERT NON-TUNNEL CV CATH: CPT

## 2021-03-24 PROCEDURE — 3E0L3GC INTRODUCTION OF OTHER THERAPEUTIC SUBSTANCE INTO PLEURAL CAVITY, PERCUTANEOUS APPROACH: ICD-10-PCS | Performed by: INTERNAL MEDICINE

## 2021-03-24 RX ORDER — ACETAMINOPHEN 650 MG/1
650 SUPPOSITORY RECTAL EVERY 6 HOURS PRN
Status: DISCONTINUED | OUTPATIENT
Start: 2021-03-24 | End: 2021-03-25 | Stop reason: SDUPTHER

## 2021-03-24 RX ORDER — CEFEPIME HYDROCHLORIDE 2 G/50ML
2000 INJECTION, SOLUTION INTRAVENOUS EVERY 8 HOURS
Status: DISCONTINUED | OUTPATIENT
Start: 2021-03-24 | End: 2021-03-26

## 2021-03-24 RX ORDER — HALOPERIDOL 5 MG/ML
1 INJECTION INTRAMUSCULAR EVERY 4 HOURS PRN
Status: DISCONTINUED | OUTPATIENT
Start: 2021-03-24 | End: 2021-03-25

## 2021-03-24 RX ORDER — FLUOXETINE HYDROCHLORIDE 20 MG/1
20 CAPSULE ORAL DAILY
COMMUNITY
End: 2021-11-24

## 2021-03-24 RX ORDER — TRAZODONE HYDROCHLORIDE 100 MG/1
100 TABLET ORAL
Status: ON HOLD | COMMUNITY
End: 2021-04-13 | Stop reason: SDUPTHER

## 2021-03-24 RX ORDER — LORAZEPAM 2 MG/ML
4 INJECTION INTRAMUSCULAR ONCE
Status: COMPLETED | OUTPATIENT
Start: 2021-03-24 | End: 2021-03-24

## 2021-03-24 RX ORDER — LORAZEPAM 2 MG/ML
2 INJECTION INTRAMUSCULAR ONCE
Status: DISCONTINUED | OUTPATIENT
Start: 2021-03-24 | End: 2021-03-24

## 2021-03-24 RX ORDER — LORAZEPAM 1 MG/1
2 TABLET ORAL ONCE
Status: COMPLETED | OUTPATIENT
Start: 2021-03-24 | End: 2021-03-24

## 2021-03-24 RX ADMIN — LORAZEPAM 2 MG: 1 TABLET ORAL at 03:17

## 2021-03-24 RX ADMIN — METRONIDAZOLE 500 MG: 500 INJECTION, SOLUTION INTRAVENOUS at 19:39

## 2021-03-24 RX ADMIN — LORAZEPAM 4 MG: 2 INJECTION INTRAMUSCULAR; INTRAVENOUS at 02:09

## 2021-03-24 RX ADMIN — GUAIFENESIN AND DEXTROMETHORPHAN 10 ML: 100; 10 SYRUP ORAL at 03:18

## 2021-03-24 RX ADMIN — METRONIDAZOLE 500 MG: 500 INJECTION, SOLUTION INTRAVENOUS at 11:04

## 2021-03-24 RX ADMIN — HALOPERIDOL LACTATE 1 MG: 5 INJECTION, SOLUTION INTRAMUSCULAR at 23:28

## 2021-03-24 RX ADMIN — MORPHINE SULFATE 2 MG: 2 INJECTION, SOLUTION INTRAMUSCULAR; INTRAVENOUS at 18:10

## 2021-03-24 RX ADMIN — HALOPERIDOL LACTATE 1 MG: 5 INJECTION, SOLUTION INTRAMUSCULAR at 14:26

## 2021-03-24 RX ADMIN — CEFEPIME HYDROCHLORIDE 2000 MG: 2 INJECTION, SOLUTION INTRAVENOUS at 12:19

## 2021-03-24 RX ADMIN — ACETAMINOPHEN 650 MG: 650 SUPPOSITORY RECTAL at 20:41

## 2021-03-24 RX ADMIN — IPRATROPIUM BROMIDE AND ALBUTEROL SULFATE 3 ML: 2.5; .5 SOLUTION RESPIRATORY (INHALATION) at 20:07

## 2021-03-24 RX ADMIN — AZITHROMYCIN MONOHYDRATE 500 MG: 500 INJECTION, POWDER, LYOPHILIZED, FOR SOLUTION INTRAVENOUS at 02:55

## 2021-03-24 RX ADMIN — METRONIDAZOLE 500 MG: 500 INJECTION, SOLUTION INTRAVENOUS at 02:15

## 2021-03-24 RX ADMIN — IPRATROPIUM BROMIDE AND ALBUTEROL SULFATE 3 ML: 2.5; .5 SOLUTION RESPIRATORY (INHALATION) at 14:46

## 2021-03-24 RX ADMIN — MORPHINE SULFATE 2 MG: 2 INJECTION, SOLUTION INTRAMUSCULAR; INTRAVENOUS at 03:42

## 2021-03-24 RX ADMIN — IPRATROPIUM BROMIDE AND ALBUTEROL SULFATE 3 ML: 2.5; .5 SOLUTION RESPIRATORY (INHALATION) at 08:55

## 2021-03-24 RX ADMIN — WATER 10 MG: 1 INJECTION INTRAMUSCULAR; INTRAVENOUS; SUBCUTANEOUS at 22:15

## 2021-03-24 RX ADMIN — PHENOBARBITAL SODIUM 165 MG: 65 INJECTION INTRAMUSCULAR; INTRAVENOUS at 10:27

## 2021-03-24 RX ADMIN — MORPHINE SULFATE 2 MG: 2 INJECTION, SOLUTION INTRAMUSCULAR; INTRAVENOUS at 22:45

## 2021-03-24 RX ADMIN — CEFEPIME HYDROCHLORIDE 2000 MG: 2 INJECTION, SOLUTION INTRAVENOUS at 17:54

## 2021-03-24 RX ADMIN — WATER 5 MG: 1 INJECTION INTRAMUSCULAR; INTRAVENOUS; SUBCUTANEOUS at 22:15

## 2021-03-24 NOTE — ASSESSMENT & PLAN NOTE
Patient with INR of 8 77 on admission, possibly secondary to severe malnutrition and alcohol abuse, Albumin was 1 8 on admission  Liver enzymes within normal limits, no history of liver disease  Not on any anticoagulation  Family denies heavy alcohol abuse  S/p vitamin  K 10mg once - with rapid improvement in INR  INR improved to 1 27  For completeness, check factor VII activity and mixing study

## 2021-03-24 NOTE — QUICK NOTE
Chest Tube Management  1015 - placed 10mg TPA in 30ml SW and Dornase 5mg in 30ml SW with additional 15cc NS flush  Tube to be clamped for 60 minutes, unclamp at 1115 and monitor drainage  Will repeat q12hrs x 3 days  Nursing and CCAPs aware      Chandler Black, DO

## 2021-03-24 NOTE — ASSESSMENT & PLAN NOTE
Coagulopathy possibly due to ETOH abuse, malnutrition a/e/b INR 8 77, treated with vitamin K 10mg SQ, telemetry and monitoring for signs of bleeding/CBCs  INR improved to 1 2

## 2021-03-24 NOTE — ASSESSMENT & PLAN NOTE
Acute hypoxic resp failure, POA, a/e/b sob, hypoxia 85% on 2L NC, tachypnea 19-22 secondary to right lower lobe pneumonia, large right parapneumonic effusion and atelectasis  S/p IR chest tube placement 3/23/21  Oxygen requirement up to 8L   Wean oxygen as tolerated  Goal O2 sats > 92%

## 2021-03-24 NOTE — ASSESSMENT & PLAN NOTE
Acute delirium, for which she received 16mg ativan overnight based off CIWA protocol  patient's family denies heavy alcohol abuse, she takes maybe a glass of wine occasionally  Would DC ciwa protocol as patient is being overmedicated  Upgrade to SDU  Place on haldol Prn  Trial of phenobarb x 1  Monitor QTC interval  If mentation does not improve, would check CT head  continue with IV antibiotics for pneumonia treatment

## 2021-03-24 NOTE — ASSESSMENT & PLAN NOTE
Sepsis, poa, a/e/b leukocytosis 36k with 4% bands and tachycardia secondary to right sided pneumonia/large right parapneumonic effusion  INR 8 on admission  S/p Vit K, INR now 1 27  S/p IR chest tube placement 3/23/21 - drainage reduced, for tpa x 3 days  Fluid cultures - rare gram positive cocci, pending sensitivities  On IV antibiotics - Ceftriaxone, flagyl and azithromycin  Sputum culture, urine Legionella and Streptococcus - pending collection  Trend WBC and fever curve - wbc initially improving, but trended back up to 39k - check CBC w/differential in the morning  Consult infectious disease  Blood cultures negative till date

## 2021-03-24 NOTE — ASSESSMENT & PLAN NOTE
CT chest - large right-sided complicated parapenumonic pleural effusion  S/p IR chest tube placement 3/23/21  Chest tube drainage reduced  For trial of tpa BID x 3days  Pulmonary on board  Oxygen supplementation to keep sats > 92%

## 2021-03-24 NOTE — PROGRESS NOTES
[place]  Progress Note - Harry Epps 1950, 65 y o  female MRN: [id number]  Unit/Bed#: -01 Encounter: [id number]  Primary Care Provider: Sylvain Adkins MD   Date and time admitted to hospital: 3/21/2021 10:27 PM    Delirium  Assessment & Plan  Acute delirium, for which she received 16mg ativan overnight based off CIWA protocol  patient's family denies heavy alcohol abuse, she takes maybe a glass of wine occasionally  Would DC ciwa protocol as patient is being overmedicated  Upgrade to SDU  Place on haldol Prn  Trial of phenobarb x 1  Monitor QTC interval  If mentation does not improve, would check CT head  continue with IV antibiotics for pneumonia treatment    * Sepsis [facility])  Assessment & Plan  Sepsis, poa, a/e/b leukocytosis 36k with 4% bands and tachycardia secondary to right sided pneumonia/large right parapneumonic effusion  INR 8 on admission  S/p Vit K, INR now 1 27  S/p IR chest tube placement 3/23/21 - drainage reduced, for tpa x 3 days  Fluid cultures - rare gram positive cocci, pending sensitivities  On IV antibiotics - Ceftriaxone, flagyl and azithromycin  Sputum culture, urine Legionella and Streptococcus - pending collection  Trend WBC and fever curve - wbc initially improving, but trended back up to 39k - check CBC w/differential in the morning  Consult infectious disease  Blood cultures negative till date      Pneumonia of right lower lobe due to infectious organism  Assessment & Plan  Right lower lobe pneumonia with parapneumonic effusion  CT chest showed- No evidence of pulmonary artery embolus  Consolidation versus mass in the right lower lobe      On Rocephin, Zithromax and Flagyl  Follow-up culture results  Sputum culture, urine Legionella and Streptococcus - never collected by nursing  Oxygen supplementation and bronchodilators  Pulmonary on board    Pleural effusion on right  Assessment & Plan  CT chest - large right-sided complicated parapenumonic pleural effusion  S/p IR chest tube placement 3/23/21  Chest tube drainage reduced  For trial of tpa BID x 3days  Pulmonary on board  Oxygen supplementation to keep sats > 92%    Supratherapeutic INR  Assessment & Plan  Patient with INR of 8 77 on admission, possibly secondary to severe malnutrition and alcohol abuse, Albumin was 1 8 on admission  Liver enzymes within normal limits, no history of liver disease  Not on any anticoagulation  Family denies heavy alcohol abuse  S/p vitamin  K 10mg once - with rapid improvement in INR  INR improved to 1 27  For completeness, check factor VII activity and mixing study    Severe protein-calorie malnutrition (HCC)  Assessment & Plan  Malnutrition Findings:       severe protein calorie malnutrition as evidenced by hollowed orbitals, temple hollowing, clavicle protrusion, with prominent bone and low albumin levels 1 6> 1 8, and associated coagulapathy with high INR 8 on admission in the setting of right lower lobe pneumonia with complicated right parapneumonic effusion    BMI Findings: Body mass index is 24 94 kg/m²  Thrombocytosis ([address] )  Assessment & Plan  Most likely reactive, In the setting of coagulopathy/sepsis  Plt 618 today  Check serum ferritin    Coagulopathy (HCC)  Assessment & Plan  Coagulopathy possibly due to ETOH abuse, malnutrition a/e/b INR 8 77, treated with vitamin K 10mg SQ, telemetry and monitoring for signs of bleeding/CBCs  INR improved to 1 2    Acute respiratory failure with hypoxia [facility])  Assessment & Plan  Acute hypoxic resp failure, POA, a/e/b sob, hypoxia 85% on 2L NC, tachypnea 19-22 secondary to right lower lobe pneumonia, large right parapneumonic effusion and atelectasis  S/p IR chest tube placement 3/23/21  Oxygen requirement up to 8L   Wean oxygen as tolerated  Goal O2 sats > 92%    HTN (hypertension)  Assessment & Plan  Home meds - 20mg lasix daily  Monitor BP per unit protocol  Hydralazine p r n      GERD (gastroesophageal reflux disease)  Assessment & Plan  On Protonix    Hypokalemia  Assessment & Plan  Monitor and replace electrolytes as needed    CAD (coronary artery disease)  Assessment & Plan  Continue on aspirin, Plavix and Lipitor  Denies any chest pain  Stable    Hyponatremia-resolved as of 3/24/2021  Assessment & Plan  Hyponatremia, Na 134 on admission  S/p IVFs  Na 136, hyponatremia resolved  Trend BMP        VTE Pharmacologic Prophylaxis:   Pharmacologic: Pharmacologic VTE Prophylaxis contraindicated due to elevated INR  Mechanical VTE Prophylaxis in Place: Yes    Patient Centered Rounds: I have performed bedside rounds with nursing staff today  Discussions with Specialists or Other Care Team Provider: CM, pulm     Education and Discussions with Family / Patient: patient's daughter    Time Spent for Care: 30 minutes  More than 50% of total time spent on counseling and coordination of care as described above  Current Length of Stay: 2 day(s)    Current Patient Status: Inpatient   Certification Statement: The patient will continue to require additional inpatient hospital stay due to as above    Discharge Plan: 2-3 days    Code Status: Level 1 - Full Code      Subjective:   Patient agitated overnight, requiring high amounts of IV ativan  This am, she is delirious, restless, minimally interactive    Objective:     Vitals:   Temp (24hrs), Av 8 °F (37 1 °C), Min:98 3 °F (36 8 °C), Max:99 6 °F (37 6 °C)    Temp:  [98 3 °F (36 8 °C)-99 6 °F (37 6 °C)] 99 4 °F (37 4 °C)  HR:  [] 96  Resp:  [19-35] 35  BP: (112-145)/(60-76) 138/76  SpO2:  [85 %-96 %] 93 %  Body mass index is 24 94 kg/m²  Input and Output Summary (last 24 hours): Intake/Output Summary (Last 24 hours) at 3/24/2021 1652  Last data filed at 3/24/2021 1439  Gross per 24 hour   Intake 250 ml   Output 583 ml   Net -333 ml       Physical Exam:     Physical Exam  Vitals signs and nursing note reviewed     Constitutional:       General: She is not in acute distress  Appearance: She is ill-appearing  She is not toxic-appearing or diaphoretic  Comments: Chronically ill looking, with prominent clavicles, bones, temple hollowing   Cardiovascular:      Rate and Rhythm: Regular rhythm  Tachycardia present  Pulses: Normal pulses  Heart sounds: No murmur  Pulmonary:      Effort: Pulmonary effort is normal  No respiratory distress  Breath sounds: Normal breath sounds  No wheezing or rales  Chest:      Chest wall: No tenderness  Abdominal:      General: Bowel sounds are normal  There is no distension  Palpations: Abdomen is soft  Tenderness: There is no abdominal tenderness  There is no right CVA tenderness, left CVA tenderness or guarding  Musculoskeletal: Normal range of motion  General: No swelling or deformity  Right lower leg: No edema  Left lower leg: No edema  Skin:     General: Skin is warm and dry  Capillary Refill: Capillary refill takes less than 2 seconds  Coloration: Skin is pale  Skin is not jaundiced  Findings: Bruising and erythema (right upper arm erythema (from IV infiltration in the setting of coagulopathy)) present  Neurological:      General: No focal deficit present  Mental Status: She is disoriented and confused  Psychiatric:         Attention and Perception: She is inattentive  Mood and Affect: Mood is anxious  Behavior: Behavior is hyperactive           Additional Data:     Labs:    Results from last 7 days   Lab Units 03/24/21  1236 03/24/21  0336  03/21/21  2254   WBC Thousand/uL  --  39 01*   < > 36 08*   HEMOGLOBIN g/dL  --  8 4*   < > 10 1*   I STAT HEMOGLOBIN g/dl 6 8*  --   --   --    HEMATOCRIT %  --  26 3*   < > 31 6*   HEMATOCRIT, ISTAT % 20*  --   --   --    PLATELETS Thousands/uL  --  618*   < > 680*   BANDS PCT %  --   --   --  4   LYMPHO PCT %  --   --   --  10*   MONO PCT %  --   --   --  5   EOS PCT %  --   --   --  0    < > = values in this interval not displayed  Results from last 7 days   Lab Units 03/24/21  1236 03/24/21  0336 03/22/21  0401   SODIUM mmol/L  --  136 133*   POTASSIUM mmol/L  --  4 8 3 9   CHLORIDE mmol/L  --  101 97*   CO2 mmol/L  --  22 23   CO2, I-STAT mmol/L 25  --   --    BUN mg/dL  --  11 17   CREATININE mg/dL  --  0 75 0 88   ANION GAP mmol/L  --  13 13   CALCIUM mg/dL  --  8 7 8 5   ALBUMIN g/dL  --   --  1 6*   TOTAL BILIRUBIN mg/dL  --   --  0 40   ALK PHOS U/L  --   --  220*   ALT U/L  --   --  24   AST U/L  --   --  30   GLUCOSE RANDOM mg/dL  --  100 118     Results from last 7 days   Lab Units 03/23/21  0447   INR  1 27*             Results from last 7 days   Lab Units 03/23/21  0447 03/22/21  0104 03/21/21  2254   LACTIC ACID mmol/L  --  1 4  --    PROCALCITONIN ng/ml 1 12*  --  0 80*           * I Have Reviewed All Lab Data Listed Above  * Additional Pertinent Lab Tests Reviewed: All Labs Within Last 24 Hours Reviewed    Imaging:    Imaging Reports Reviewed Today Include:   Imaging Personally Reviewed by Myself Includes:      Recent Cultures (last 7 days):     Results from last 7 days   Lab Units 03/23/21  1743 03/21/21  2254   BLOOD CULTURE   --  No Growth at 48 hrs  No Growth at 48 hrs     GRAM STAIN RESULT  1+ Polys*  Rare Gram positive cocci in pairs*  2+ Polys  No No bacteria seen  --    BODY FLUID CULTURE, STERILE  No growth  --        Last 24 Hours Medication List:   Current Facility-Administered Medications   Medication Dose Route Frequency Provider Last Rate    acetaminophen  650 mg Oral Q6H PRN KITA Urena-CELIA      ALPRAZolam  0 25 mg Oral Q6H PRN Bebeto Montague PA-C      dornase sheryl (PULMOZYME) 5 mg in 30 mL SWFI chest tube/drain tube instillation  5 mg Chest Tube BID Bebeto Montague PA-C      And    alteplase (TPA) 10 mg in SWFI 30 mL chest tube/drain tube instillation  10 mg Chest Tube BID Bebeto Montague PA-C      atorvastatin  40 mg Oral Daily With [facility] Charlsie Sandhoff, PA-C      buPROPion  150 mg Oral Daily [place], [place]      cefepime  2,000 mg Intravenous Q8H Savana Linton, HECTOR Stopped (03/24/21 1301)    clopidogrel  75 mg Oral Daily [facility], [place]      dextromethorphan-guaiFENesin  10 mL Oral Q4H PRN Savana Royer, PA-CELIA      folic acid  1 mg Oral Daily [facility] Roy[facility], PA-CELIA      furosemide  20 mg Oral Daily [place], [place]      haloperidol lactate  1 mg Intramuscular Q4H PRN Reynold Dwyer MD      hydrALAZINE  10 mg Intravenous Q6H PRN Jimena Royer, PA-CELIA      ipratropium-albuterol  3 mL Nebulization Q6H PRN Savana Royer, PA-CELIA      ipratropium-albuterol  3 mL Nebulization TID Savana Royer, HECTOR      metroNIDAZOLE  500 mg Intravenous Q8H Savana Linton, HECTOR Stopped (03/24/21 1140)    morphine injection  2 mg Intravenous Q6H PRN Savana Royer, PA-CELIA      multivitamin-minerals  1 tablet Oral Daily Jimena Royer, HECTOR      ondansetron  4 mg Intravenous Q6H PRN Savana Royer, PA-CELIA      oxyCODONE  5 mg Oral Q6H PRN Savana Royer, HECTOR      pantoprazole  20 mg Oral Early Morning Jimena Royer, HECTOR      thiamine  100 mg Oral Daily Rogena Royer, PA-CELIA      zolpidem  5 mg Oral HS PRN Savana Linton PA-C          Today, Patient Was Seen By: Reynold Dwyer MD    ** Please Note: Dictation voice to text software may have been used in the creation of this document   **

## 2021-03-24 NOTE — SPEECH THERAPY NOTE
Attempted to see pt this morning for dysphagia tx, but pt was too lethargic  Returned in the afternoon, but pt had been transferred to ICU; spoke with RN who reports pt is still lethargic and not appropriate for PO at this time  ST to follow up for tx when able and when pt is appropriate/able to participate

## 2021-03-24 NOTE — PROGRESS NOTES
Progress Note - Pulmonary   Michelle Enrique 65 y o  female MRN: [id number]  Unit/Bed#: -01 Encounter: [id number]      Assessment & Recommendations:  1  Acute hypoxic respiratory failure  · Tirate O2 to keep SpO2 >88%  · IS, OOB-Chair, flutter valve    2  Community Acquired pneumonia - possible aspiration component  · Stop Azithro, continue flagyl, will change CTX to Cefepime given increased PCT/WBC  · Follow cultures  · Trend Temp/WBC    3  Large right complicated parapneumonic pleural effusion  · CT placed by IR with diminished output  · Trial of TPA/Dornase BID x 3 days  · Monitor drainage  · Follow up cultures, cytology  · If not improving, may need Thoracic surgery evaluation/VATS    4  Coagulopathy - suspect secondary to ETOH abuse and nutritional depletion given rapid improvement - per primary service    5  Possible COPD w/o AE  · Add duonebs TID  · Outpatient PFTs and pulmonary follow up    6  Nicotine dependence in remission    7  ETOH abuse with active withdraw  · High Ativan requirements and continued agitation  · Will plan transfer to SDU 2 for closer monitoring  · Plan trial of phenobarbital dosing  · Thiamine/folate  · Consider CT head if not improving    8  Thrombocytosis - likely reactive secondary to #2,3, trend    D/W Dr Rajendra Waters - SLIM      Subjective:   Had 12fr right chest tube placed by IR yesterday  PF analysis as listed below  Total 110cc output sine placement  Noted 16mg ativan given overnight, would not answer questions this am and became agitated  Objective:     Vitals: Blood pressure 117/71, pulse 94, temperature 99 6 °F (37 6 °C), resp  rate 20, height 5' 6" (1 676 m), weight 70 1 kg (154 lb 8 7 oz), SpO2 96 %, not currently breastfeeding  , 97% 6L/min NC, Body mass index is 24 94 kg/m²        Intake/Output Summary (Last 24 hours) at 3/24/2021 0857  Last data filed at 3/24/2021 0701  Gross per 24 hour   Intake --   Output 735 ml   Net -735 ml         Physical Exam  Exam  GEN Older woman in bed, awake, restless, agitated, oriented to person only  HEENT Dry MM, no thrush, no oral lesions  Neck No accessory muscle use, JVP not elevated  CV reg, single s1/2, no m/r  Pulm right CT in place, serosanguinous output, no air leak, diminished BS at right base, moist cough, no wheeze  ABD +BS soft NTND, no rebound  EXT warm, brisk cap refill, no edema      Labs: I have personally reviewed pertinent lab results    Laboratory and Diagnostics  Results from last 7 days   Lab Units 03/24/21  0336 03/22/21  0401 03/21/21  2254   WBC Thousand/uL 39 01* 33 14* 36 08*   HEMOGLOBIN g/dL 8 4* 9 3* 10 1*   HEMATOCRIT % 26 3* 29 5* 31 6*   PLATELETS Thousands/uL 618* 594* 680*   BANDS PCT %  --   --  4   MONO PCT %  --   --  5     Results from last 7 days   Lab Units 03/24/21  0336 03/22/21  0401 03/21/21  2254   SODIUM mmol/L 136 133* 134*   POTASSIUM mmol/L 4 8 3 9 3 1*   CHLORIDE mmol/L 101 97* 95*   CO2 mmol/L 22 23 21   ANION GAP mmol/L 13 13 18*   BUN mg/dL 11 17 18   CREATININE mg/dL 0 75 0 88 1 01   CALCIUM mg/dL 8 7 8 5 9 2   GLUCOSE RANDOM mg/dL 100 118 108   ALT U/L  --  24 25   AST U/L  --  30 29   ALK PHOS U/L  --  220* 259*   ALBUMIN g/dL  --  1 6* 1 8*   TOTAL BILIRUBIN mg/dL  --  0 40 0 50     Results from last 7 days   Lab Units 03/22/21  0401 03/21/21  2254   MAGNESIUM mg/dL 1 7 1 5*   PHOSPHORUS mg/dL 3 2  --       Results from last 7 days   Lab Units 03/23/21  0447 03/22/21  1149 03/22/21  0401 03/21/21  2254   INR  1 27* 1 48* 7 81* 8 77*   PTT seconds  --   --   --  108*      Results from last 7 days   Lab Units 03/22/21  1149 03/22/21  0401 03/21/21  2254   TROPONIN I ng/mL <0 02 <0 02 <0 02     Results from last 7 days   Lab Units 03/22/21  0104   LACTIC ACID mmol/L 1 4     Results from last 7 days   Lab Units 03/21/21  2256   FERRITIN ng/mL 1,334*   CRP mg/L 394 5*             Results from last 7 days   Lab Units 03/21/21  2254   D-DIMER QUANTITATIVE ug/ml FEU 3 67*     Results from last 7 days   Lab Units 03/23/21  0447 03/21/21  2254   PROCALCITONIN ng/ml 1 12* 0 80*       Microbiology:  FLU/RSV neg  COVID-19 neg 3/22  Bld Cx 3/21 - NGTD  Right Pleural Fluid - WBC 3800 (N-86%), glucose 4, pH 6 6, TP 4 3, LDH 2300, grm stain (+) PMN, neg bacteria    Imaging and other studies: I have personally reviewed pertinent reports  and I have personally reviewed pertinent films in PACS  CXR 3/24 - large right effusion with alveolar infiltrates, right basilar CT in place w/o purulence, increased left sided vascular infiltrates    CT angio 3/22 - large right sided pleural effusion, right basilar infiltrate/consolidation, no PE, no sig mediastinal adenopathy      Blue Cary DO, [facility]

## 2021-03-24 NOTE — ASSESSMENT & PLAN NOTE
Right lower lobe pneumonia with parapneumonic effusion  CT chest showed- No evidence of pulmonary artery embolus  Consolidation versus mass in the right lower lobe      On Rocephin, Zithromax and Flagyl  Follow-up culture results  Sputum culture, urine Legionella and Streptococcus - never collected by nursing  Oxygen supplementation and bronchodilators  Pulmonary on board

## 2021-03-24 NOTE — PROGRESS NOTES
Chest tube unclamped at 1115 per order from Dr Shayna Daniels  184 thick bloody additional drainage noted in chest tube atrium  Pt currently on 8L [address] SpO2 94%  Pt lethargic but restless and confused, flailing arms around  Dr Kierra Corcoran aware  With orders recieved to place pt in soft B/L Wrist restraints for safety, D/C Ativan and instead use Haldol for agitation as family states she does not drink ETOH  Order also received for ABG  RT at bedside

## 2021-03-24 NOTE — ASSESSMENT & PLAN NOTE
Malnutrition Findings:       severe protein calorie malnutrition as evidenced by hollowed orbitals, temple hollowing, clavicle protrusion, with prominent bone and low albumin levels 1 6> 1 8, and associated coagulapathy with high INR 8 on admission in the setting of right lower lobe pneumonia with complicated right parapneumonic effusion    BMI Findings: Body mass index is 24 94 kg/m²

## 2021-03-25 ENCOUNTER — TELEPHONE (OUTPATIENT)
Dept: CT IMAGING | Facility: HOSPITAL | Age: 71
End: 2021-03-25

## 2021-03-25 LAB
ANION GAP SERPL CALCULATED.3IONS-SCNC: 10 MMOL/L (ref 4–13)
BASOPHILS # BLD MANUAL: 0.25 THOUSAND/UL (ref 0–0.1)
BASOPHILS NFR MAR MANUAL: 1 % (ref 0–1)
BILIRUB UR QL STRIP: NEGATIVE
BUN SERPL-MCNC: 11 MG/DL (ref 5–25)
CALCIUM SERPL-MCNC: 8.1 MG/DL (ref 8.3–10.1)
CHLORIDE SERPL-SCNC: 105 MMOL/L (ref 100–108)
CLARITY UR: CLEAR
CO2 SERPL-SCNC: 25 MMOL/L (ref 21–32)
COLOR UR: YELLOW
CREAT SERPL-MCNC: 0.72 MG/DL (ref 0.6–1.3)
D DIMER PPP FEU-MCNC: 5.19 UG/ML FEU
EOSINOPHIL # BLD MANUAL: 0.25 THOUSAND/UL (ref 0–0.4)
EOSINOPHIL NFR BLD MANUAL: 1 % (ref 0–6)
ERYTHROCYTE [DISTWIDTH] IN BLOOD BY AUTOMATED COUNT: 16.6 % (ref 11.6–15.1)
FERRITIN SERPL-MCNC: 914 NG/ML (ref 8–388)
FIBRINOGEN PPP-MCNC: 883 MG/DL (ref 227–495)
GFR SERPL CREATININE-BSD FRML MDRD: 84 ML/MIN/1.73SQ M
GLUCOSE SERPL-MCNC: 103 MG/DL (ref 65–140)
GLUCOSE UR STRIP-MCNC: NEGATIVE MG/DL
HCT VFR BLD AUTO: 21.4 % (ref 34.8–46.1)
HGB BLD-MCNC: 6.9 G/DL (ref 11.5–15.4)
HGB BLD-MCNC: 8.8 G/DL (ref 11.5–15.4)
HGB UR QL STRIP.AUTO: NEGATIVE
IRON SATN MFR SERPL: 14 %
IRON SERPL-MCNC: 16 UG/DL (ref 50–170)
KETONES UR STRIP-MCNC: ABNORMAL MG/DL
L PNEUMO1 AG UR QL IA.RAPID: NEGATIVE
LEUKOCYTE ESTERASE UR QL STRIP: NEGATIVE
LG PLATELETS BLD QL SMEAR: PRESENT
LYMPHOCYTES # BLD AUTO: 1.52 THOUSAND/UL (ref 0.6–4.47)
LYMPHOCYTES # BLD AUTO: 6 % (ref 14–44)
MCH RBC QN AUTO: 28.5 PG (ref 26.8–34.3)
MCHC RBC AUTO-ENTMCNC: 32.2 G/DL (ref 31.4–37.4)
MCV RBC AUTO: 88 FL (ref 82–98)
METAMYELOCYTES NFR BLD MANUAL: 1 % (ref 0–1)
MONOCYTES # BLD AUTO: 1.78 THOUSAND/UL (ref 0–1.22)
MONOCYTES NFR BLD: 7 % (ref 4–12)
NEUTROPHILS # BLD MANUAL: 21.34 THOUSAND/UL (ref 1.85–7.62)
NEUTS BAND NFR BLD MANUAL: 1 % (ref 0–8)
NEUTS SEG NFR BLD AUTO: 83 % (ref 43–75)
NITRITE UR QL STRIP: NEGATIVE
NRBC BLD AUTO-RTO: 0 /100 WBCS
PH UR STRIP.AUTO: 5 [PH]
PLATELET # BLD AUTO: 605 THOUSANDS/UL (ref 149–390)
PLATELET BLD QL SMEAR: ABNORMAL
PMV BLD AUTO: 10.4 FL (ref 8.9–12.7)
POLYCHROMASIA BLD QL SMEAR: PRESENT
POTASSIUM SERPL-SCNC: 3.9 MMOL/L (ref 3.5–5.3)
PROT UR STRIP-MCNC: NEGATIVE MG/DL
PROTHROMBIN TIME: 17 SECONDS (ref 11.6–14.5)
PT 1H NP PPP: 14.5 SEC (ref 12–14.3)
PT IMM NP PPP: 13.8 SECONDS (ref 12–14.3)
RBC # BLD AUTO: 2.42 MILLION/UL (ref 3.81–5.12)
S PNEUM AG UR QL: NEGATIVE
SODIUM SERPL-SCNC: 140 MMOL/L (ref 136–145)
SP GR UR STRIP.AUTO: 1.02 (ref 1–1.03)
TIBC SERPL-MCNC: 114 UG/DL (ref 250–450)
TOTAL CELLS COUNTED SPEC: 100
UROBILINOGEN UR QL STRIP.AUTO: 0.2 E.U./DL
WBC # BLD AUTO: 25.4 THOUSAND/UL (ref 4.31–10.16)

## 2021-03-25 PROCEDURE — 99232 SBSQ HOSP IP/OBS MODERATE 35: CPT | Performed by: INTERNAL MEDICINE

## 2021-03-25 PROCEDURE — 94640 AIRWAY INHALATION TREATMENT: CPT

## 2021-03-25 PROCEDURE — 94664 DEMO&/EVAL PT USE INHALER: CPT

## 2021-03-25 PROCEDURE — 85018 HEMOGLOBIN: CPT | Performed by: NURSE PRACTITIONER

## 2021-03-25 PROCEDURE — 80048 BASIC METABOLIC PNL TOTAL CA: CPT | Performed by: INTERNAL MEDICINE

## 2021-03-25 PROCEDURE — 3E0L3GC INTRODUCTION OF OTHER THERAPEUTIC SUBSTANCE INTO PLEURAL CAVITY, PERCUTANEOUS APPROACH: ICD-10-PCS | Performed by: INTERNAL MEDICINE

## 2021-03-25 PROCEDURE — 85611 PROTHROMBIN TEST: CPT | Performed by: INTERNAL MEDICINE

## 2021-03-25 PROCEDURE — 85379 FIBRIN DEGRADATION QUANT: CPT | Performed by: INTERNAL MEDICINE

## 2021-03-25 PROCEDURE — 94003 VENT MGMT INPAT SUBQ DAY: CPT

## 2021-03-25 PROCEDURE — 94760 N-INVAS EAR/PLS OXIMETRY 1: CPT

## 2021-03-25 PROCEDURE — 83540 ASSAY OF IRON: CPT | Performed by: INTERNAL MEDICINE

## 2021-03-25 PROCEDURE — 85027 COMPLETE CBC AUTOMATED: CPT | Performed by: INTERNAL MEDICINE

## 2021-03-25 PROCEDURE — 83550 IRON BINDING TEST: CPT | Performed by: INTERNAL MEDICINE

## 2021-03-25 PROCEDURE — 87449 NOS EACH ORGANISM AG IA: CPT | Performed by: PHYSICIAN ASSISTANT

## 2021-03-25 PROCEDURE — 82728 ASSAY OF FERRITIN: CPT | Performed by: INTERNAL MEDICINE

## 2021-03-25 PROCEDURE — 85007 BL SMEAR W/DIFF WBC COUNT: CPT | Performed by: INTERNAL MEDICINE

## 2021-03-25 PROCEDURE — 85230 CLOT FACTOR VII PROCONVERTIN: CPT | Performed by: INTERNAL MEDICINE

## 2021-03-25 PROCEDURE — 85384 FIBRINOGEN ACTIVITY: CPT | Performed by: INTERNAL MEDICINE

## 2021-03-25 PROCEDURE — 81003 URINALYSIS AUTO W/O SCOPE: CPT | Performed by: NURSE PRACTITIONER

## 2021-03-25 RX ORDER — ACETAMINOPHEN 160 MG/5ML
975 SUSPENSION, ORAL (FINAL DOSE FORM) ORAL EVERY 6 HOURS PRN
Status: DISCONTINUED | OUTPATIENT
Start: 2021-03-25 | End: 2021-03-29 | Stop reason: HOSPADM

## 2021-03-25 RX ORDER — KETOROLAC TROMETHAMINE 30 MG/ML
15 INJECTION, SOLUTION INTRAMUSCULAR; INTRAVENOUS ONCE
Status: COMPLETED | OUTPATIENT
Start: 2021-03-25 | End: 2021-03-25

## 2021-03-25 RX ORDER — LIDOCAINE 50 MG/G
1 PATCH TOPICAL DAILY
Status: DISCONTINUED | OUTPATIENT
Start: 2021-03-26 | End: 2021-03-29 | Stop reason: HOSPADM

## 2021-03-25 RX ORDER — LORAZEPAM 2 MG/ML
2 INJECTION INTRAMUSCULAR ONCE
Status: COMPLETED | OUTPATIENT
Start: 2021-03-25 | End: 2021-03-25

## 2021-03-25 RX ORDER — OLANZAPINE 10 MG/1
5 INJECTION, POWDER, LYOPHILIZED, FOR SOLUTION INTRAMUSCULAR ONCE
Status: COMPLETED | OUTPATIENT
Start: 2021-03-25 | End: 2021-03-25

## 2021-03-25 RX ORDER — VANCOMYCIN HYDROCHLORIDE 1 G/200ML
15 INJECTION, SOLUTION INTRAVENOUS EVERY 12 HOURS
Status: DISCONTINUED | OUTPATIENT
Start: 2021-03-25 | End: 2021-03-27 | Stop reason: DRUGHIGH

## 2021-03-25 RX ADMIN — OLANZAPINE 5 MG: 10 INJECTION, POWDER, LYOPHILIZED, FOR SOLUTION INTRAMUSCULAR at 01:58

## 2021-03-25 RX ADMIN — METRONIDAZOLE 500 MG: 500 INJECTION, SOLUTION INTRAVENOUS at 03:03

## 2021-03-25 RX ADMIN — WATER 5 MG: 1 INJECTION INTRAMUSCULAR; INTRAVENOUS; SUBCUTANEOUS at 21:50

## 2021-03-25 RX ADMIN — MORPHINE SULFATE 2 MG: 2 INJECTION, SOLUTION INTRAMUSCULAR; INTRAVENOUS at 05:58

## 2021-03-25 RX ADMIN — WATER 5 MG: 1 INJECTION INTRAMUSCULAR; INTRAVENOUS; SUBCUTANEOUS at 09:33

## 2021-03-25 RX ADMIN — CEFEPIME HYDROCHLORIDE 2000 MG: 2 INJECTION, SOLUTION INTRAVENOUS at 17:25

## 2021-03-25 RX ADMIN — KETOROLAC TROMETHAMINE 15 MG: 30 INJECTION, SOLUTION INTRAMUSCULAR; INTRAVENOUS at 17:30

## 2021-03-25 RX ADMIN — WATER 10 MG: 1 INJECTION INTRAMUSCULAR; INTRAVENOUS; SUBCUTANEOUS at 21:50

## 2021-03-25 RX ADMIN — METRONIDAZOLE 500 MG: 500 INJECTION, SOLUTION INTRAVENOUS at 18:31

## 2021-03-25 RX ADMIN — THIAMINE HYDROCHLORIDE 100 MG: 100 INJECTION, SOLUTION INTRAMUSCULAR; INTRAVENOUS at 12:13

## 2021-03-25 RX ADMIN — IPRATROPIUM BROMIDE AND ALBUTEROL SULFATE 3 ML: 2.5; .5 SOLUTION RESPIRATORY (INHALATION) at 20:11

## 2021-03-25 RX ADMIN — VANCOMYCIN HYDROCHLORIDE 1000 MG: 1 INJECTION, SOLUTION INTRAVENOUS at 22:01

## 2021-03-25 RX ADMIN — IPRATROPIUM BROMIDE AND ALBUTEROL SULFATE 3 ML: 2.5; .5 SOLUTION RESPIRATORY (INHALATION) at 08:00

## 2021-03-25 RX ADMIN — ACETAMINOPHEN 975 MG: 650 SUSPENSION ORAL at 11:25

## 2021-03-25 RX ADMIN — VANCOMYCIN HYDROCHLORIDE 1000 MG: 1 INJECTION, SOLUTION INTRAVENOUS at 11:05

## 2021-03-25 RX ADMIN — CEFEPIME HYDROCHLORIDE 2000 MG: 2 INJECTION, SOLUTION INTRAVENOUS at 10:22

## 2021-03-25 RX ADMIN — FOLIC ACID 1 MG: 5 INJECTION, SOLUTION INTRAMUSCULAR; INTRAVENOUS; SUBCUTANEOUS at 11:45

## 2021-03-25 RX ADMIN — IPRATROPIUM BROMIDE AND ALBUTEROL SULFATE 3 ML: 2.5; .5 SOLUTION RESPIRATORY (INHALATION) at 14:40

## 2021-03-25 RX ADMIN — CEFEPIME HYDROCHLORIDE 2000 MG: 2 INJECTION, SOLUTION INTRAVENOUS at 01:58

## 2021-03-25 RX ADMIN — METRONIDAZOLE 500 MG: 500 INJECTION, SOLUTION INTRAVENOUS at 12:13

## 2021-03-25 RX ADMIN — LORAZEPAM 2 MG: 2 INJECTION INTRAMUSCULAR; INTRAVENOUS at 04:28

## 2021-03-25 RX ADMIN — WATER 10 MG: 1 INJECTION INTRAMUSCULAR; INTRAVENOUS; SUBCUTANEOUS at 09:33

## 2021-03-25 NOTE — ASSESSMENT & PLAN NOTE
Acute delirium, for which she received 16mg ativan overnight based off [facility] protocol  patient's family denies heavy alcohol abuse, she takes maybe a glass of wine occasionally, so it is unlikely she is in active alcohol withdrawal - more like severe delirium    [facility] protocol discontinued 3/24 as patient is being overmedicated  Trial of phenobarb x 1  Upgraded to SDU, but she still received 5mg Zyprexa, 2mg ativan, 2mg morphine, 1mg haldol - disoriented this morning  Would recommend, careful dosing with sedating med  C/w haldol Prn  Check CT head  Monitor QTC interval - daily EKG  continue with IV antibiotics for pneumonia treatment

## 2021-03-25 NOTE — NUTRITION
03/25/21 1203   Assessment   Timepoint Reassess  (per discussion with MD at rounds)   Labs & Meds   Labs/Meds Review Lab values reviewed; Meds reviewed  (labs reviewed, meds: folic acid, zofran, lasix, ativan, centrum, protonix, thiamine)   Adequacy of Intake   Nutrition Modality NPO   Estimated calorie intake compared to estimated need Pt is not meeting estimated needs while NPO  Nutrition Prognosis   Nutrition Concerns RN skin assessment reviewed; No edema documented  (ST: not appropriate for po trials today)   Comorbid Concerns   (per chart review: acute hypoxic respiratory failure, community aquired PNA, large L sided complicated parapenumatic PE, possible COPD, nicotone dependence, TME, delerium, +restraints )   Nutrition Considerations Pt/Parents not appropriate for educ  at this time   PES Statement   Problem Continue previous diagnosis   Patient Nutrition Goals   Goal Nutrition via appropriate route   Goal Status not met;revised   Timeframe to complete goal by next f/u   Recommendations/Interventions   Summary Still unable to interview pt due to mental condition, + restraints, + chest tube, + confusion and agitation  Pt now NPO  Malnutrition/BMI Present Yes   Adult Malnutrition type Acute illness   Adult Degree of Malnutrition Other severe protein calorie malnutrition   Malnutrition Characteristics Muscle loss; Fat loss; Inadequate energy  (Pt presentes with severe protein calroei malnutrition as evideinced by sunken orbitals, temporal hollowing, prominent clavicle bone and < 50% po intake x 3 days )   Nutrition Recommendations Tube feeding recs provided  (If prolonged hypocaloric status anticipated consider start of NGT feeds  Consider Jevity 1 2 @ 20 ml/hr and increase 20 ml q 8 hrs until at goal rate Jevity 1 2 @ 60 ml/hr  100 ml q 4 hr flush   1728 kcal, 80 gm protein, 1759 ml total fluids )   Nutrition Complexity Risk   Nutrition complexity level High risk   Nutrition review: 03/29/21   Follow up date 03/29/21  (TF vs po and supplements )

## 2021-03-25 NOTE — MALNUTRITION/BMI
This medical record reflects one or more clinical indicators suggestive of malnutrition and/or morbid obesity  Malnutrition Findings:   Adult Malnutrition type: Acute illness  Adult Degree of Malnutrition: Other severe protein calorie malnutrition  Malnutrition Characteristics: Muscle loss, Fat loss, Inadequate energy(Pt presentes with severe protein calroei malnutrition as evideinced by sunken orbitals, temporal hollowing, prominent clavicle bone and < 50% po intake x 3 days )   Treat with diet and supplements vs TF pending medical status  BMI Findings: Body mass index is 26 44 kg/m²  See Nutrition note dated 03/25/21 for additional details  Completed nutrition assessment is viewable in the nutrition documentation

## 2021-03-25 NOTE — CONSULTS
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 as cause of patient's current syndrome  However, given her coagulopathy present on admission, documented history of relatively recent heavy ethanol use, and possibility of underlying liver disease patient is also risk for less common pathogens  For now, will continue current broad antibiotic coverage but anticipate narrowing to directed therapy within the next 24 hours as culture results become available     ===> Will CONTINUE VANCOMYCIN, CEFEPIME, METRONIDAZOLE  Will follow BMP, CBC to assess for toxicity while patient is on these agents     ===> Will follow pending pleural fluid cultures  3,4) AMS, heavy EtOH use Hx - Suspect as cause of patient's altered mental status, it is not entirely clear if this is the primary factor  Certainly toxic metabolic encephalopathy due to above-mentioned pneumonia, empyema is playing a role as well  Management per primary team    5) Leukocytosis - 2/2 #1, will follow as marker of clinical improvement  Improving  6) Fever - 2/2 #1, will follow as marker of clinical improvement  Improving           History of Present Illness   Physician Requesting Consult: Reynold Dwyer MD  Hx and PE limited by: somnolence    HPI:   Briefly, this 57-year-old woman with a past medical history significant for peripheral vascular disease, coronary artery disease, hypertension, and heavy ethanol use presented to the [facility] emergency department on 03/23 complaining of roughly 7 days of decreased p o  Intake, increased cough, and right-sided pleuritic chest pain  At the time of admission, she denied fevers or chills, and also denied productive cough  Workup at the time of admission revealed markedly elevated white blood cell count (36,000, now 25,000), an unremarkable creatinine (1 01), as well as a chest x-ray which showed a right-sided consolidation and effusion, which was redemonstrated on CT scan  Patient was started on broad antibiotics upon admission (ceftriaxone, azithromycin (, and vancomycin was added on 03/25 when a culture from the chest tube is placed on 03/24 grew Gram-positive cocci in pairs) procedure report reviewed independently by me)  Pleural fluid analysis showed 3800 white blood cells per mL, and pleural fluid studies are consistent with empyema  Patient has been intermittently febrile on this admission, with T-max 100 8°, though her fever curve is improving  Her hospital course has been complicated by altered mental status, it is unclear if this is from over-sedation, or from alcohol withdrawal  She was found to have coagulopathy (elevated INR), however this was reversed with administration of vitamin K  Physical exam is notable for decreased mental status, decreased breath sounds at the right lung base, and presence of a right-sided internal jugular triple-lumen catheter, as well as right-sided chest tube as mentioned above  Inpatient consult to Infectious Diseases  Consult performed by: Walter Stanton MD  Consult ordered by: Rachel Peng MD          ROS:  A complete review of systems was done and is negative except as per the HPI       Historical Information   Past Medical History:   Diagnosis Date    Anemia     Cardiac disease     Chronic pain     Coronary artery disease     Hypertension     PVD (peripheral vascular disease) ([facility] [address] )      Past Surgical History:   Procedure Laterality Date    ANKLE SURGERY      IR CHEST TUBE PLACEMENT  3/23/2021    IR NON-TUNNELED CENTRAL LINE PLACEMENT  3/24/2021     Social History   Social History     Substance and Sexual Activity   Alcohol Use Yes    Frequency: 2-4 times a month    Drinks per session: 1 or 2    Comment: social     Social History     Substance and Sexual Activity   Drug Use No     E-Cigarette/Vaping     E-Cigarette/Vaping Substances     Social History     Tobacco Use   Smoking Status Former Smoker    Packs/day: 1 00    Years: 0 10    Pack years: 0 10    Types: Cigarettes   Smokeless Tobacco Never Used   Tobacco Comment    pt states she stopped smoking 3 weeks ago     Family History: non-contributory    Meds/Allergies   all current active meds have been reviewed    Allergies   Allergen Reactions    Digoxin Hives     HA    Gadolinium Derivatives        Objective       Intake/Output Summary (Last 24 hours) at 3/25/2021 1557  Last data filed at 3/25/2021 1145  Gross per 24 hour   Intake 750 ml   Output 1549 ml   Net -799 ml       Invasive Devices:   CVC Central Lines 03/24/21 Triple Left Internal jugular (Active)   Reasons to continue CVC line  No peripheral vascular access 03/24/21 2000   Goal for Removal D/C when peripheral access obtained 03/24/21 2000   Line Necessity Reviewed Yes, reviewed with provider 03/24/21 2000   Site Assessment WDL 03/24/21 2000   Proximal Lumen Status Flushed;Blood return noted; Infusing 03/24/21 2000   Medial Lumen Status Flushed;Blood return noted;Normal saline locked 03/24/21 2000   Distal Lumen Status Flushed;Blood return noted;Normal saline locked 03/24/21 2000   Dressing Type Chlorhexidine dressing 03/24/21 2000   Dressing Status Clean;Dry; Intact 03/24/21 2000       Chest Tube Right Other (Comment) 12 Fr  (Active)   Function -20 cm H2O 03/24/21 2200   Chest Tube Air Leak No 03/24/21 2200   Drainage Description Sanguineous 03/24/21 2200   Dressing Status Clean;Dry; Intact 03/24/21 2200   Site Assessment WDL 03/24/21 2200   Surrounding Skin Unable to view 03/24/21 2200   Output (mL) 140 mL 03/25/21 0601       Physical Exam  Gen:  Somnolent, NAD  HENT: Normocephalic, atraumatic, MMM, no oropharyngeal exudate  Neck: Supple, trachea midline  CV: No m/r/g appreciated, regular rate  Pulm: No resp  Distress, otherwise per HPI  Abd: S/NT/ND, no hepatomegaly appreciated  Ext: No LE edema, intact radial pulses  Lymph: No anterior cervical or supraclav  LAD appreciated  Skin: No rash on exposed skin, warm, dry  Psych:  Unable to assess  Neuro:  Unable to assess due to altered mental status    Lab Results: I have personally reviewed pertinent labs  Imaging Studies: I have personally reviewed pertinent reports  and I have personally reviewed pertinent films in PACS  EKG, Pathology, and Other Studies: I have personally reviewed pertinent reports

## 2021-03-25 NOTE — ASSESSMENT & PLAN NOTE
Acute hypoxic resp failure, POA, a/e/b sob, hypoxia 85% on 2L NC, tachypnea 19-22 secondary to right lower lobe pneumonia, large right parapneumonic effusion and atelectasis  S/p IR chest tube placement 3/23/21  Oxygen requirement 8L > 6L NC  Wean oxygen as tolerated  Goal O2 sats > 92%

## 2021-03-25 NOTE — PROGRESS NOTES
[place]  Progress Note - Stephanie Denson 1950, 65 y o  female MRN: [id number]  Unit/Bed#: -01 Encounter: [id number]  Primary Care Provider: Cee Martinez MD   Date and time admitted to hospital: 3/21/2021 10:27 PM    Delirium  Assessment & Plan  Acute delirium, for which she received 16mg ativan overnight based off CIWA protocol  patient's family denies heavy alcohol abuse, she takes maybe a glass of wine occasionally, so it is unlikely she is in active alcohol withdrawal - more like severe delirium  CIWA protocol discontinued 3/24 as patient is being overmedicated  Trial of phenobarb x 1  Upgraded to SDU, but she still received 5mg Zyprexa, 2mg ativan, 2mg morphine, 1mg haldol - disoriented this morning  Would recommend, careful dosing with sedating med  C/w haldol Prn  Check CT head  Monitor QTC interval - daily EKG  continue with IV antibiotics for pneumonia treatment    * Sepsis [facility])  Assessment & Plan  Sepsis, poa, a/e/b leukocytosis 36k with 4% bands and tachycardia secondary to right sided pneumonia/large right parapneumonic effusion  INR 8 77 on admission  S/p Vit K, INR now 1 27  S/p IR chest tube placement 3/23/21 - now on tpa/dornase BID x 3 days  Fluid cultures - rare gram positive cocci, pending sensitivities  On IV antibiotics - Ceftriaxone, flagyl and azithromycin  Sputum culture, urine Legionella and Streptococcus - pending collection  Trend WBC and fever curve - wbc improving, 25k with 1% bands  Infectious disease on board  Blood cultures negative till date      Pneumonia of right lower lobe due to infectious organism  Assessment & Plan  Right lower lobe pneumonia with parapneumonic effusion  CT chest showed- No evidence of pulmonary artery embolus  Consolidation versus mass in the right lower lobe      On Rocephin, Zithromax and Flagyl  urine Legionella and Streptococcus negative  Oxygen supplementation and bronchodilators  Pulmonary on board    Pleural effusion on right  Assessment & Plan  CT chest - large right-sided complicated parapenumonic pleural effusion  S/p IR chest tube placement 3/23/21  Chest tube drainage reduced  For trial of tpa BID x 3days  Pulmonary on board  Oxygen supplementation to keep sats > 92%    Supratherapeutic INR  Assessment & Plan  Patient with INR of 8 77 on admission, possibly secondary to severe malnutrition, Albumin was 1 8 on admission  Patient's family denies history of heavy alcohol abuse (CIWA discontinued)  Liver enzymes within normal limits, no history of liver disease  Not on any anticoagulation  S/p vitamin  K 10mg once - with rapid improvement in INR  INR improved to 1 27  For completeness, check factor VII activity and mixing study - results pending    Severe protein-calorie malnutrition (HCC)  Assessment & Plan  Malnutrition Findings:       severe protein calorie malnutrition as evidenced by hollowed orbitals, temple hollowing, clavicle protrusion, with prominent bone and low albumin levels 1 6> 1 8, and associated coagulapathy with high INR 8 on admission in the setting of right lower lobe pneumonia with complicated right parapneumonic effusion    BMI Findings: Body mass index is 26 44 kg/m²         Thrombocytosis ([address] )  Assessment & Plan  Most likely reactive, In the setting of coagulopathy/sepsis  Plt 618 today  Check serum ferritin    Coagulopathy (HCC)  Assessment & Plan  Coagulopathy possibly due to ETOH abuse, malnutrition a/e/b INR 8 77, treated with vitamin K 10mg SQ, telemetry and monitoring for signs of bleeding/CBCs  INR improved to 1 2    Acute respiratory failure with hypoxia [facility])  Assessment & Plan  Acute hypoxic resp failure, POA, a/e/b sob, hypoxia 85% on 2L NC, tachypnea 19-22 secondary to right lower lobe pneumonia, large right parapneumonic effusion and atelectasis  S/p IR chest tube placement 3/23/21  Oxygen requirement 8L > 6L NC  Wean oxygen as tolerated  Goal O2 sats > 92%    HTN (hypertension)  Assessment & Plan  Home meds - 20mg lasix daily  Monitor BP per unit protocol  Hydralazine p r n  GERD (gastroesophageal reflux disease)  Assessment & Plan  On Protonix    Hypokalemia  Assessment & Plan  Monitor and replace electrolytes as needed    CAD (coronary artery disease)  Assessment & Plan  Continue on aspirin, Plavix and Lipitor  Denies any chest pain  Stable      VTE Pharmacologic Prophylaxis:   Pharmacologic: Pharmacologic VTE Prophylaxis contraindicated due to coagulopathy  Mechanical VTE Prophylaxis in Place: Yes    Patient Centered Rounds: I have performed bedside rounds with nursing staff today  Discussions with Specialists or Other Care Team Provider: CM, pulm    Education and Discussions with Family / Patient: patient's daughter    Time Spent for Care: 30 minutes  More than 50% of total time spent on counseling and coordination of care as described above  Current Length of Stay: 3 day(s)    Current Patient Status: Inpatient   Certification Statement: The patient will continue to require additional inpatient hospital stay due to as above    Discharge Plan: 2-3 days    Code Status: Level 1 - Full Code      Subjective:   Patient was agitated overnight and received heavy doses of sedatives  This am, she is disoriented and restless    Objective:     Vitals:   Temp (24hrs), Av 7 °F (37 6 °C), Min:98 8 °F (37 1 °C), Max:100 8 °F (38 2 °C)    Temp:  [98 8 °F (37 1 °C)-100 8 °F (38 2 °C)] 98 8 °F (37 1 °C)  HR:  [] 105  Resp:  [24-45] 24  BP: (121-138)/(59-76) 130/68  SpO2:  [90 %-100 %] 96 %  Body mass index is 26 44 kg/m²  Input and Output Summary (last 24 hours): Intake/Output Summary (Last 24 hours) at 3/25/2021 1423  Last data filed at 3/25/2021 1145  Gross per 24 hour   Intake 750 ml   Output 1838 ml   Net -1088 ml       Physical Exam:     Physical Exam  Vitals signs and nursing note reviewed     Constitutional:       General: She is not in acute distress  Appearance: She is not ill-appearing or toxic-appearing  Cardiovascular:      Rate and Rhythm: Normal rate and regular rhythm  Pulses: Normal pulses  Heart sounds: No murmur  No gallop  Pulmonary:      Effort: Pulmonary effort is normal  No respiratory distress  Breath sounds: Normal breath sounds  No stridor  No wheezing, rhonchi or rales  Chest:      Chest wall: No tenderness  Abdominal:      General: Bowel sounds are normal  There is no distension  Palpations: Abdomen is soft  Tenderness: There is no abdominal tenderness  There is no right CVA tenderness, left CVA tenderness or guarding  Musculoskeletal: Normal range of motion  General: No swelling or deformity  Right lower leg: No edema  Left lower leg: No edema  Skin:     General: Skin is warm and dry  Capillary Refill: Capillary refill takes less than 2 seconds  Coloration: Skin is not jaundiced or pale  Findings: No bruising, erythema, lesion or rash  Neurological:      General: No focal deficit present  Mental Status: She is disoriented and confused  Cranial Nerves: No cranial nerve deficit  Psychiatric:         Behavior: Behavior is hyperactive  Cognition and Memory: Cognition is impaired           Additional Data:     Labs:    Results from last 7 days   Lab Units 03/25/21  0803 03/25/21  0435   WBC Thousand/uL  --  25 40*   HEMOGLOBIN g/dL 8 8* 6 9*   HEMATOCRIT %  --  21 4*   PLATELETS Thousands/uL  --  605*   BANDS PCT %  --  1   LYMPHO PCT %  --  6*   MONO PCT %  --  7   EOS PCT %  --  1     Results from last 7 days   Lab Units 03/25/21  0435  03/22/21  0401   SODIUM mmol/L 140   < > 133*   POTASSIUM mmol/L 3 9   < > 3 9   CHLORIDE mmol/L 105   < > 97*   CO2 mmol/L 25   < > 23   CO2, I-STAT   --    < >  --    BUN mg/dL 11   < > 17   CREATININE mg/dL 0 72   < > 0 88   ANION GAP mmol/L 10   < > 13   CALCIUM mg/dL 8 1*   < > 8 5   ALBUMIN g/dL  --   -- 1 6*   TOTAL BILIRUBIN mg/dL  --   --  0 40   ALK PHOS U/L  --   --  220*   ALT U/L  --   --  24   AST U/L  --   --  30   GLUCOSE RANDOM mg/dL 103   < > 118    < > = values in this interval not displayed  Results from last 7 days   Lab Units 03/23/21  0447   INR  1 27*             Results from last 7 days   Lab Units 03/23/21  0447 03/22/21  0104 03/21/21  2254   LACTIC ACID mmol/L  --  1 4  --    PROCALCITONIN ng/ml 1 12*  --  0 80*           * I Have Reviewed All Lab Data Listed Above  * Additional Pertinent Lab Tests Reviewed: All Labs Within Last 24 Hours Reviewed    Imaging:    Imaging Reports Reviewed Today Include:   Imaging Personally Reviewed by Myself Includes:      Recent Cultures (last 7 days):     Results from last 7 days   Lab Units 03/25/21  0757 03/23/21  1743 03/21/21  2254   BLOOD CULTURE   --   --  No Growth at 72 hrs  No Growth at 72 hrs     GRAM STAIN RESULT   --  1+ Polys*  Rare Gram positive cocci in pairs*  2+ Polys  No No bacteria seen  --    BODY FLUID CULTURE, STERILE   --  No growth  --    LEGIONELLA URINARY ANTIGEN  Negative  --   --        Last 24 Hours Medication List:   Current Facility-Administered Medications   Medication Dose Route Frequency Provider Last Rate    acetaminophen  975 mg Oral Q6H PRN NICKOLAS Martinez      dornase sheryl (PULMOZYME) 5 mg in 30 mL SWFI chest tube/drain tube instillation  5 mg Chest Tube BID Aiyana HECTOR Wolff      And    alteplase (TPA) 10 mg in SWFI 30 mL chest tube/drain tube instillation  10 mg Chest Tube BID Aiyana HECTOR Wolff      atorvastatin  40 mg Oral Daily With Morris Campoverde PA-C      cefepime  2,000 mg Intravenous Q8H Aiyanamina Wolff PA-C Stopped (03/25/21 1052)    clopidogrel  75 mg Oral Daily Aiyana HECTOR Wolff      dextromethorphan-guaiFENesin  10 mL Oral Q4H PRN Aiyana KITA Wolff-C      folic acid IVPB  1 mg Intravenous Daily NICKOLAS Martinez 1 mg (03/25/21 1145)    furosemide 20 mg Oral Daily Kumar Cindy, PA-C      hydrALAZINE  10 mg Intravenous Q6H PRN Kumar Cindy, PA-C      ipratropium-albuterol  3 mL Nebulization Q6H PRN Kumar Cindy, PA-C      ipratropium-albuterol  3 mL Nebulization TID Kumar Cindy, PA-C      metroNIDAZOLE  500 mg Intravenous Q8H Kumar Cindy, PA-C 500 mg (03/25/21 1213)    multivitamin-minerals  1 tablet Oral Daily Kumar Cindy, PA-C      ondansetron  4 mg Intravenous Q6H PRN Kumar Cindy, PA-C      pantoprazole  20 mg Oral Early Morning Kumar Cindy, PA-C      thiamine  100 mg Intravenous Daily Vallery Ryan, CRNP 100 mg (03/25/21 1213)    vancomycin  15 mg/kg Intravenous Q12H Vallery Ryan, CRNP 1,000 mg (03/25/21 1105)        Today, Patient Was Seen By: Jasmine Tenorio MD    ** Please Note: Dictation voice to text software may have been used in the creation of this document   **

## 2021-03-25 NOTE — PROGRESS NOTES
Vancomycin Assessment    Keiry Cota is a 65 y o  female who is currently receiving vancomycin 1000 mg IV Q12H for Pneumonia     Relevant clinical data and objective history reviewed:  Creatinine   Date Value Ref Range Status   03/25/2021 0 72 0 60 - 1 30 mg/dL Final     Comment:     Standardized to IDMS reference method   03/24/2021 0 75 0 60 - 1 30 mg/dL Final     Comment:     Standardized to IDMS reference method   03/22/2021 0 88 0 60 - 1 30 mg/dL Final     Comment:     Standardized to IDMS reference method     /59 (BP Location: Left arm)   Pulse 100   Temp 98 8 °F (37 1 °C) (Oral)   Resp (!) 24   Ht 5' 6" (1 676 m)   Wt 74 3 kg (163 lb 12 8 oz)   SpO2 93%   BMI 26 44 kg/m²   I/O last 3 completed shifts: In: 550 [IV Piggyback:550]  Out: 0726 [Urine:702; Chest Tube:1044]  Lab Results   Component Value Date/Time    BUN 11 03/25/2021 04:35 AM    WBC 25 40 (H) 03/25/2021 04:35 AM    HGB 8 8 (L) 03/25/2021 08:03 AM    HCT 21 4 (L) 03/25/2021 04:35 AM    MCV 88 03/25/2021 04:35 AM     (H) 03/25/2021 04:35 AM     Temp Readings from Last 3 Encounters:   03/25/21 98 8 °F (37 1 °C) (Oral)   12/19/19 97 8 °F (36 6 °C) (Tympanic)   06/12/18 97 9 °F (36 6 °C) (Tympanic)     Vancomycin Days of Therapy: 1    Assessment/Plan  The patient is currently on vancomycin utilizing scheduled dosing based on actual body weight  Baseline risks associated with therapy include: advanced age and nephrotoxic medication  The patient is currently receiving 1000 mg IV Q12H and is clinically appropriate and dose will be continued  Pharmacy will also follow closely for s/sx of nephrotoxicity, infusion reactions, and appropriateness of therapy  BMP and CBC will be ordered per protocol  Plan for trough as patient approaches steady state, prior to the 5th dose at approximately 1000 on 3/27/2021  Due to infection severity, will target a trough of 15-20 (appropriate for most indications)     Pharmacy will continue to follow the patients culture results and clinical progress daily      Trevon Fill, Pharmacist

## 2021-03-25 NOTE — SPEECH THERAPY NOTE
Speech Language/Pathology  Attempted to see pt for dx dysphagia tx  Per RN, pt is currently agitated, s/p sedation  Pt not appropriate for PO trials for ST tx today  ST to f/u as able and medically/clinically appropriate  May need to consider at least short-term alternate means of nutrition is pt is not able to safely take PO

## 2021-03-25 NOTE — PHYSICAL THERAPY NOTE
Physical Therapy Cancellation Note    Chart review completed  Spoke with RN Mony Mckeon) who reports that patient was highly agitated all night  Not following commands  Not appropriate for OOB activity  Also with low HGB 6 9  Will continue to follow and see when medically stable  Francesco Cespedes, PT

## 2021-03-25 NOTE — ASSESSMENT & PLAN NOTE
Patient with INR of 8 77 on admission, possibly secondary to severe malnutrition, Albumin was 1 8 on admission  Patient's family denies history of heavy alcohol abuse (CIWA discontinued)  Liver enzymes within normal limits, no history of liver disease  Not on any anticoagulation  S/p vitamin  K 10mg once - with rapid improvement in INR  INR improved to 1 27  For completeness, check factor VII activity and mixing study - results pending stated

## 2021-03-25 NOTE — ASSESSMENT & PLAN NOTE
Malnutrition Findings:       severe protein calorie malnutrition as evidenced by hollowed orbitals, temple hollowing, clavicle protrusion, with prominent bone and low albumin levels 1 6> 1 8, and associated coagulapathy with high INR 8 on admission in the setting of right lower lobe pneumonia with complicated right parapneumonic effusion    BMI Findings: Body mass index is 26 44 kg/m²

## 2021-03-25 NOTE — QUICK NOTE
2215 - placed 10mg TPA in 30ml SW and Dornase 5mg in 30ml SW into proximal chest tube port and subsequently clamped  Tube will remain clamped for 60 minutes, unclamp at 2315 and will monitor drainage  Nursing staff aware   Patient tolerated the procedure well

## 2021-03-25 NOTE — OCCUPATIONAL THERAPY NOTE
Occupational Therapy Hold Note     Patient Name: Gonzalo Osman  SSITU'W Date: 3/25/2021  Problem List  Principal Problem:    Sepsis ([facility] )  Active Problems:    Pneumonia of right lower lobe due to infectious organism    CAD (coronary artery disease)    Hypokalemia    Pleural effusion on right    GERD (gastroesophageal reflux disease)    HTN (hypertension)    Acute respiratory failure with hypoxia (HCC)    Supratherapeutic INR    Coagulopathy (HCC)    Delirium    Thrombocytosis (HCC)    Severe protein-calorie malnutrition ([facility] )    Past Medical History  Past Medical History:   Diagnosis Date    Anemia     Cardiac disease     Chronic pain     Coronary artery disease     Hypertension     PVD (peripheral vascular disease) ([facility] )      Past Surgical History  Past Surgical History:   Procedure Laterality Date    ANKLE SURGERY      IR CHEST TUBE PLACEMENT  3/23/2021    IR NON-TUNNELED CENTRAL LINE PLACEMENT  3/24/2021         03/25/21 0826   OT Last Visit   OT Visit Date 03/26/21   Note Type   Cancel Reasons Medical status   Assessment   Assessment OT orders received  Chart reviewed  Per RN Timmie Skiff, pt previously agitated/combative, s/p sedation  + Restraints  Will hold OT evaluation and follow when appropriate            Lynnette Messina OTR/L

## 2021-03-25 NOTE — ASSESSMENT & PLAN NOTE
Sepsis, poa, a/e/b leukocytosis 36k with 4% bands and tachycardia secondary to right sided pneumonia/large right parapneumonic effusion  INR 8 77 on admission  S/p Vit K, INR now 1 27  S/p IR chest tube placement 3/23/21 - now on tpa/dornase BID x 3 days  Fluid cultures - rare gram positive cocci, pending sensitivities  On IV antibiotics - Ceftriaxone, flagyl and azithromycin  Sputum culture, urine Legionella and Streptococcus - pending collection  Trend WBC and fever curve - wbc improving, 25k with 1% bands  Infectious disease on board  Blood cultures negative till date

## 2021-03-25 NOTE — PROGRESS NOTES
Progress Note - Pulmonary   Jay Jay Morales 65 y o  female MRN: [id number]  Unit/Bed#: -01 Encounter: [id number]      Assessment & Recommendations:  1  Acute hypoxic respiratory failure  · Tirate O2 to keep SpO2 >88%  · IS, OOB-Chair, flutter valve if able    2  Community Acquired pneumonia - possible aspiration component  · Continue flagyl and cefepime for now  · GPC on culture - add vanc, check MRSA swab  · Consult ID given (+) pleurla culture   · Follow cultures  · Trend Temp/WBC    3  Large right complicated parapneumonic pleural effusion - GPC Empyema   · CT placed by IR with diminished output  · Trial of TPA/Dornase BID x 3 days - D2/3  · Monitor drainage  · Follow up cultures, cytology, consulted ID  · If not improving, may need Thoracic surgery evaluation/VATS  · Repeat CXR in AM    4  Coagulopathy - suspect secondary to ETOH abuse and nutritional depletion given rapid improvement - per primary service    5  Possible COPD w/o AE  · Cont duonebs TID  · Outpatient PFTs and pulmonary follow up    6  Nicotine dependence in remission    7  ETOH abuse with active withdraw/Toxic-metabolic encephalopathy  · Thiamine/folate  · Potential delirium component, now holding benzo's given less W/D signs/symptoms, follow Qtc with haldol/olanzapine dosing  · Consider CT head if not improving    8  Thrombocytosis - likely reactive secondary to #2,3, trend    D/W Dr Shamir Alejandre and NICKOLAS Hanley Moss- [facility]      Subjective:   Transferred to SDU2, given phenobaritol 165mg x 1 yesterday, reduced ativan needs, given haldol/zyprexa, given tPA/Dornase x 2 with 1L CT output, slight fever 100 8  Had CVC placed by IR  Objective:     Vitals: Blood pressure 129/59, pulse 100, temperature 98 8 °F (37 1 °C), temperature source Oral, resp  rate (!) 24, height 5' 6" (1 676 m), weight 74 3 kg (163 lb 12 8 oz), SpO2 93 %, not currently breastfeeding  , 94% 6L/min NC, Body mass index is 26 44 kg/m²        Intake/Output Summary (Last 24 hours) at 3/25/2021 0859  Last data filed at 3/25/2021 1900  Gross per 24 hour   Intake 550 ml   Output 1952 ml   Net -1402 ml         Physical Exam  GEN older agitated woman in bed, agitated, moaning, answer to person, not answering other questions, redirects briefly  HEENT MMM, no thrush, no oral lesions  Neck No accessory muscle use, JVP not elevated, RIJ TLC in place w/o purulence  CV reg, single s1/2, no m/r  Pulm diminished at right base, no wheeze, moist cough right CT in place 1L serosanguinous output, no air leak  ABD +BS soft NTND, no rebound  EXT warm, brisk cap refill, no edema      Labs: I have personally reviewed pertinent lab results    Laboratory and Diagnostics  Results from last 7 days   Lab Units 03/25/21  0803 03/25/21  0435 03/24/21  1236 03/24/21  0336 03/22/21  0401 03/21/21  2254   WBC Thousand/uL  --  25 40*  --  39 01* 33 14* 36 08*   HEMOGLOBIN g/dL 8 8* 6 9*  --  8 4* 9 3* 10 1*   I STAT HEMOGLOBIN g/dl  --   --  6 8*  --   --   --    HEMATOCRIT %  --  21 4*  --  26 3* 29 5* 31 6*   HEMATOCRIT, ISTAT %  --   --  20*  --   --   --    PLATELETS Thousands/uL  --  605*  --  618* 594* 680*   BANDS PCT %  --  1  --   --   --  4   MONO PCT %  --  7  --   --   --  5     Results from last 7 days   Lab Units 03/25/21  0435 03/24/21  1236 03/24/21  0336 03/22/21  0401 03/21/21  2254   SODIUM mmol/L 140  --  136 133* 134*   POTASSIUM mmol/L 3 9  --  4 8 3 9 3 1*   CHLORIDE mmol/L 105  --  101 97* 95*   CO2 mmol/L 25  --  22 23 21   CO2, I-STAT mmol/L  --  25  --   --   --    ANION GAP mmol/L 10  --  13 13 18*   BUN mg/dL 11  --  11 17 18   CREATININE mg/dL 0 72  --  0 75 0 88 1 01   CALCIUM mg/dL 8 1*  --  8 7 8 5 9 2   GLUCOSE RANDOM mg/dL 103  --  100 118 108   ALT U/L  --   --   --  24 25   AST U/L  --   --   --  30 29   ALK PHOS U/L  --   --   --  220* 259*   ALBUMIN g/dL  --   --   --  1 6* 1 8*   TOTAL BILIRUBIN mg/dL  --   --   --  0 40 0 50     Results from last 7 days   Lab Units 03/22/21  0401 03/21/21  2254   MAGNESIUM mg/dL 1 7 1 5*   PHOSPHORUS mg/dL 3 2  --       Results from last 7 days   Lab Units 03/23/21  0447 03/22/21  1149 03/22/21  0401 03/21/21  2254   INR  1 27* 1 48* 7 81* 8 77*   PTT seconds  --   --   --  108*      Results from last 7 days   Lab Units 03/22/21  1149 03/22/21  0401 03/21/21  2254   TROPONIN I ng/mL <0 02 <0 02 <0 02     Results from last 7 days   Lab Units 03/22/21  0104   LACTIC ACID mmol/L 1 4     Results from last 7 days   Lab Units 03/21/21  2256   FERRITIN ng/mL 1,334*   CRP mg/L 394 5*             Results from last 7 days   Lab Units 03/25/21  0435 03/21/21  2254   D-DIMER QUANTITATIVE ug/ml FEU 5 19* 3 67*     Results from last 7 days   Lab Units 03/23/21  0447 03/21/21  2254   PROCALCITONIN ng/ml 1 12* 0 80*       Microbiology:  FLU/RSV neg  COVID-19 neg 3/22  Bld Cx 3/21 - NGTD  Right Pleural Fluid - WBC 3800 (N-86%), glucose 4, pH 6 6, TP 4 3, LDH 2300, grm stain (+) PMN, Rare GPC  Strep/legionella ag neg    Imaging and other studies: No new images  CXR 3/24 - large right effusion with alveolar infiltrates, right basilar CT in place w/o purulence, increased left sided vascular infiltrates    CT angio 3/22 - large right sided pleural effusion, right basilar infiltrate/consolidation, no PE, no sig mediastinal adenopathy      Blue Cary DO, [facility]

## 2021-03-25 NOTE — ASSESSMENT & PLAN NOTE
Right lower lobe pneumonia with parapneumonic effusion  CT chest showed- No evidence of pulmonary artery embolus  Consolidation versus mass in the right lower lobe      On Rocephin, Zithromax and Flagyl  urine Legionella and Streptococcus negative  Oxygen supplementation and bronchodilators  Pulmonary on board

## 2021-03-26 ENCOUNTER — APPOINTMENT (INPATIENT)
Dept: CT IMAGING | Facility: HOSPITAL | Age: 71
DRG: 871 | End: 2021-03-26
Payer: MEDICARE

## 2021-03-26 ENCOUNTER — APPOINTMENT (INPATIENT)
Dept: RADIOLOGY | Facility: HOSPITAL | Age: 71
DRG: 871 | End: 2021-03-26
Payer: MEDICARE

## 2021-03-26 PROBLEM — I38 VALVULAR HEART DISEASE: Status: ACTIVE | Noted: 2021-03-26

## 2021-03-26 PROBLEM — D64.9 ANEMIA: Status: ACTIVE | Noted: 2021-03-26

## 2021-03-26 LAB
ANION GAP SERPL CALCULATED.3IONS-SCNC: 11 MMOL/L (ref 4–13)
BACTERIA SPEC BFLD CULT: NO GROWTH
BUN SERPL-MCNC: 11 MG/DL (ref 5–25)
CALCIUM SERPL-MCNC: 7.9 MG/DL (ref 8.3–10.1)
CHLORIDE SERPL-SCNC: 103 MMOL/L (ref 100–108)
CO2 SERPL-SCNC: 24 MMOL/L (ref 21–32)
CREAT SERPL-MCNC: 0.75 MG/DL (ref 0.6–1.3)
ERYTHROCYTE [DISTWIDTH] IN BLOOD BY AUTOMATED COUNT: 16.7 % (ref 11.6–15.1)
GFR SERPL CREATININE-BSD FRML MDRD: 80 ML/MIN/1.73SQ M
GLUCOSE SERPL-MCNC: 74 MG/DL (ref 65–140)
GLUCOSE SERPL-MCNC: 79 MG/DL (ref 65–140)
GLUCOSE SERPL-MCNC: 96 MG/DL (ref 65–140)
GRAM STN SPEC: ABNORMAL
GRAM STN SPEC: ABNORMAL
HCT VFR BLD AUTO: 22.6 % (ref 34.8–46.1)
HGB BLD-MCNC: 7.2 G/DL (ref 11.5–15.4)
INR PPP: 1.3 (ref 0.84–1.19)
MCH RBC QN AUTO: 28.3 PG (ref 26.8–34.3)
MCHC RBC AUTO-ENTMCNC: 31.9 G/DL (ref 31.4–37.4)
MCV RBC AUTO: 89 FL (ref 82–98)
NRBC BLD AUTO-RTO: 0 /100 WBCS
PLATELET # BLD AUTO: 624 THOUSANDS/UL (ref 149–390)
PMV BLD AUTO: 10.2 FL (ref 8.9–12.7)
POTASSIUM SERPL-SCNC: 3.5 MMOL/L (ref 3.5–5.3)
PROTHROMBIN TIME: 16.2 SECONDS (ref 11.6–14.5)
RBC # BLD AUTO: 2.54 MILLION/UL (ref 3.81–5.12)
SODIUM SERPL-SCNC: 138 MMOL/L (ref 136–145)
WBC # BLD AUTO: 20.6 THOUSAND/UL (ref 4.31–10.16)

## 2021-03-26 PROCEDURE — 70450 CT HEAD/BRAIN W/O DYE: CPT

## 2021-03-26 PROCEDURE — 3E0L3GC INTRODUCTION OF OTHER THERAPEUTIC SUBSTANCE INTO PLEURAL CAVITY, PERCUTANEOUS APPROACH: ICD-10-PCS | Performed by: INTERNAL MEDICINE

## 2021-03-26 PROCEDURE — 97167 OT EVAL HIGH COMPLEX 60 MIN: CPT

## 2021-03-26 PROCEDURE — 97163 PT EVAL HIGH COMPLEX 45 MIN: CPT

## 2021-03-26 PROCEDURE — 32562 LYSE CHEST FIBRIN SUBQ DAY: CPT | Performed by: INTERNAL MEDICINE

## 2021-03-26 PROCEDURE — 99232 SBSQ HOSP IP/OBS MODERATE 35: CPT | Performed by: INTERNAL MEDICINE

## 2021-03-26 PROCEDURE — 92526 ORAL FUNCTION THERAPY: CPT

## 2021-03-26 PROCEDURE — 94760 N-INVAS EAR/PLS OXIMETRY 1: CPT

## 2021-03-26 PROCEDURE — G1004 CDSM NDSC: HCPCS

## 2021-03-26 PROCEDURE — 85610 PROTHROMBIN TIME: CPT | Performed by: PHYSICIAN ASSISTANT

## 2021-03-26 PROCEDURE — 80048 BASIC METABOLIC PNL TOTAL CA: CPT | Performed by: PHYSICIAN ASSISTANT

## 2021-03-26 PROCEDURE — 85027 COMPLETE CBC AUTOMATED: CPT | Performed by: PHYSICIAN ASSISTANT

## 2021-03-26 PROCEDURE — 94640 AIRWAY INHALATION TREATMENT: CPT

## 2021-03-26 PROCEDURE — 82948 REAGENT STRIP/BLOOD GLUCOSE: CPT

## 2021-03-26 PROCEDURE — 71045 X-RAY EXAM CHEST 1 VIEW: CPT

## 2021-03-26 RX ORDER — FENTANYL CITRATE 50 UG/ML
INJECTION, SOLUTION INTRAMUSCULAR; INTRAVENOUS
Status: COMPLETED
Start: 2021-03-26 | End: 2021-03-26

## 2021-03-26 RX ORDER — ALPRAZOLAM 0.25 MG/1
0.25 TABLET ORAL EVERY 6 HOURS PRN
Status: DISCONTINUED | OUTPATIENT
Start: 2021-03-26 | End: 2021-03-29 | Stop reason: HOSPADM

## 2021-03-26 RX ORDER — LANOLIN ALCOHOL/MO/W.PET/CERES
6 CREAM (GRAM) TOPICAL
Status: DISCONTINUED | OUTPATIENT
Start: 2021-03-26 | End: 2021-03-29 | Stop reason: HOSPADM

## 2021-03-26 RX ORDER — FENTANYL CITRATE 50 UG/ML
25 INJECTION, SOLUTION INTRAMUSCULAR; INTRAVENOUS ONCE
Status: COMPLETED | OUTPATIENT
Start: 2021-03-26 | End: 2021-03-26

## 2021-03-26 RX ORDER — KETOROLAC TROMETHAMINE 30 MG/ML
15 INJECTION, SOLUTION INTRAMUSCULAR; INTRAVENOUS ONCE
Status: COMPLETED | OUTPATIENT
Start: 2021-03-26 | End: 2021-03-26

## 2021-03-26 RX ADMIN — WATER 5 MG: 1 INJECTION INTRAMUSCULAR; INTRAVENOUS; SUBCUTANEOUS at 21:43

## 2021-03-26 RX ADMIN — ATORVASTATIN CALCIUM 40 MG: 40 TABLET, FILM COATED ORAL at 18:14

## 2021-03-26 RX ADMIN — VANCOMYCIN HYDROCHLORIDE 1000 MG: 1 INJECTION, SOLUTION INTRAVENOUS at 10:07

## 2021-03-26 RX ADMIN — IPRATROPIUM BROMIDE AND ALBUTEROL SULFATE 3 ML: 2.5; .5 SOLUTION RESPIRATORY (INHALATION) at 07:35

## 2021-03-26 RX ADMIN — ACETAMINOPHEN 975 MG: 650 SUSPENSION ORAL at 19:47

## 2021-03-26 RX ADMIN — ALPRAZOLAM 0.25 MG: 0.25 TABLET ORAL at 09:47

## 2021-03-26 RX ADMIN — ACETAMINOPHEN 975 MG: 650 SUSPENSION ORAL at 13:46

## 2021-03-26 RX ADMIN — LIDOCAINE 1 PATCH: 50 PATCH TOPICAL at 09:41

## 2021-03-26 RX ADMIN — IPRATROPIUM BROMIDE AND ALBUTEROL SULFATE 3 ML: 2.5; .5 SOLUTION RESPIRATORY (INHALATION) at 19:21

## 2021-03-26 RX ADMIN — CEFEPIME HYDROCHLORIDE 2000 MG: 2 INJECTION, SOLUTION INTRAVENOUS at 09:33

## 2021-03-26 RX ADMIN — METRONIDAZOLE 500 MG: 500 INJECTION, SOLUTION INTRAVENOUS at 20:00

## 2021-03-26 RX ADMIN — METRONIDAZOLE 500 MG: 500 INJECTION, SOLUTION INTRAVENOUS at 11:09

## 2021-03-26 RX ADMIN — FUROSEMIDE 20 MG: 20 TABLET ORAL at 09:43

## 2021-03-26 RX ADMIN — WATER 10 MG: 1 INJECTION INTRAMUSCULAR; INTRAVENOUS; SUBCUTANEOUS at 09:36

## 2021-03-26 RX ADMIN — THIAMINE HYDROCHLORIDE 100 MG: 100 INJECTION, SOLUTION INTRAMUSCULAR; INTRAVENOUS at 09:43

## 2021-03-26 RX ADMIN — IPRATROPIUM BROMIDE AND ALBUTEROL SULFATE 3 ML: 2.5; .5 SOLUTION RESPIRATORY (INHALATION) at 13:42

## 2021-03-26 RX ADMIN — CLOPIDOGREL BISULFATE 75 MG: 75 TABLET ORAL at 09:43

## 2021-03-26 RX ADMIN — KETOROLAC TROMETHAMINE 15 MG: 30 INJECTION, SOLUTION INTRAMUSCULAR at 09:31

## 2021-03-26 RX ADMIN — VANCOMYCIN HYDROCHLORIDE 1000 MG: 1 INJECTION, SOLUTION INTRAVENOUS at 22:25

## 2021-03-26 RX ADMIN — MELATONIN 6 MG: at 22:25

## 2021-03-26 RX ADMIN — FENTANYL CITRATE 25 MCG: 50 INJECTION, SOLUTION INTRAMUSCULAR; INTRAVENOUS at 04:51

## 2021-03-26 RX ADMIN — FOLIC ACID 1 MG: 5 INJECTION, SOLUTION INTRAMUSCULAR; INTRAVENOUS; SUBCUTANEOUS at 09:57

## 2021-03-26 RX ADMIN — WATER 5 MG: 1 INJECTION INTRAMUSCULAR; INTRAVENOUS; SUBCUTANEOUS at 09:37

## 2021-03-26 RX ADMIN — METRONIDAZOLE 500 MG: 500 INJECTION, SOLUTION INTRAVENOUS at 02:45

## 2021-03-26 RX ADMIN — WATER 10 MG: 1 INJECTION INTRAMUSCULAR; INTRAVENOUS; SUBCUTANEOUS at 21:44

## 2021-03-26 RX ADMIN — ENOXAPARIN SODIUM 40 MG: 40 INJECTION SUBCUTANEOUS at 09:31

## 2021-03-26 RX ADMIN — CEFEPIME HYDROCHLORIDE 2000 MG: 2 INJECTION, SOLUTION INTRAVENOUS at 02:45

## 2021-03-26 RX ADMIN — ALPRAZOLAM 0.25 MG: 0.25 TABLET ORAL at 20:11

## 2021-03-26 NOTE — PLAN OF CARE
Problem: Potential for Falls  Goal: Patient will remain free of falls  Description: INTERVENTIONS:  - Assess patient frequently for physical needs  -  Identify cognitive and physical deficits and behaviors that affect risk of falls    -  [place] fall precautions as indicated by assessment   - Educate patient/family on patient safety including physical limitations  - Instruct patient to call for assistance with activity based on assessment  - Modify environment to reduce risk of injury  - Consider OT/PT consult to assist with strengthening/mobility  Outcome: Progressing     Problem: PAIN - ADULT  Goal: Verbalizes/displays adequate comfort level or baseline comfort level  Description: Interventions:  - Encourage patient to monitor pain and request assistance  - Assess pain using appropriate pain scale  - Administer analgesics based on type and severity of pain and evaluate response  - Implement non-pharmacological measures as appropriate and evaluate response  - Consider cultural and social influences on pain and pain management  - Notify physician/advanced practitioner if interventions unsuccessful or patient reports new pain  Outcome: Progressing     Problem: INFECTION - ADULT  Goal: Absence or prevention of progression during hospitalization  Description: INTERVENTIONS:  - Assess and monitor for signs and symptoms of infection  - Monitor lab/diagnostic results  - Monitor all insertion sites, i e  indwelling lines, tubes, and drains  - Monitor endotracheal if appropriate and nasal secretions for changes in amount and color  - [place] appropriate cooling/warming therapies per order  - Administer medications as ordered  - Instruct and encourage patient and family to use good hand hygiene technique  - Identify and instruct in appropriate isolation precautions for identified infection/condition  Outcome: Progressing  Goal: Absence of fever/infection during neutropenic period  Description: INTERVENTIONS:  - Monitor WBC    Outcome: Progressing     Problem: SAFETY ADULT  Goal: Patient will remain free of falls  Description: INTERVENTIONS:  - Assess patient frequently for physical needs  -  Identify cognitive and physical deficits and behaviors that affect risk of falls    -  [place] fall precautions as indicated by assessment   - Educate patient/family on patient safety including physical limitations  - Instruct patient to call for assistance with activity based on assessment  - Modify environment to reduce risk of injury  - Consider OT/PT consult to assist with strengthening/mobility  Outcome: Progressing  Goal: Maintain or return to baseline ADL function  Description: INTERVENTIONS:  -  Assess patient's ability to carry out ADLs; assess patient's baseline for ADL function and identify physical deficits which impact ability to perform ADLs (bathing, care of mouth/teeth, toileting, grooming, dressing, etc )  - Assess/evaluate cause of self-care deficits   - Assess range of motion  - Assess patient's mobility; develop plan if impaired  - Assess patient's need for assistive devices and provide as appropriate  - Encourage maximum independence but intervene and supervise when necessary  - Involve family in performance of ADLs  - Assess for home care needs following discharge   - Consider OT consult to assist with ADL evaluation and planning for discharge  - Provide patient education as appropriate  Outcome: Progressing  Goal: Maintain or return mobility status to optimal level  Description: INTERVENTIONS:  - Assess patient's baseline mobility status (ambulation, transfers, stairs, etc )    - Identify cognitive and physical deficits and behaviors that affect mobility  - Identify mobility aids required to assist with transfers and/or ambulation (gait belt, sit-to-stand, lift, walker, cane, etc )  - [place] fall precautions as indicated by assessment  - Record patient progress and toleration of activity level on Mobility SBAR; progress patient to next Phase/Stage  - Instruct patient to call for assistance with activity based on assessment  - Consider rehabilitation consult to assist with strengthening/weightbearing, etc   Outcome: Progressing     Problem: DISCHARGE PLANNING  Goal: Discharge to home or other facility with appropriate resources  Description: INTERVENTIONS:  - Identify barriers to discharge w/patient and caregiver  - Arrange for needed discharge resources and transportation as appropriate  - Identify discharge learning needs (meds, wound care, etc )  - Arrange for interpretive services to assist at discharge as needed  - Refer to Case Management Department for coordinating discharge planning if the patient needs post-hospital services based on physician/advanced practitioner order or complex needs related to functional status, cognitive ability, or social support system  Outcome: Progressing     Problem: Knowledge Deficit  Goal: Patient/family/caregiver demonstrates understanding of disease process, treatment plan, medications, and discharge instructions  Description: Complete learning assessment and assess knowledge base  Interventions:  - Provide teaching at level of understanding  - Provide teaching via preferred learning methods  Outcome: Progressing     Problem: Nutrition/Hydration-ADULT  Goal: Nutrient/Hydration intake appropriate for improving, restoring or maintaining nutritional needs  Description: Monitor and assess patient's nutrition/hydration status for malnutrition  Collaborate with interdisciplinary team and initiate plan and interventions as ordered  Monitor patient's weight and dietary intake as ordered or per policy  Utilize nutrition screening tool and intervene as necessary  Determine patient's food preferences and provide high-protein, high-caloric foods as appropriate       INTERVENTIONS:  - Monitor oral intake, urinary output, labs, and treatment plans  - Assess nutrition and hydration status and recommend course of action  - Evaluate amount of meals eaten  - Assist patient with eating if necessary   - Allow adequate time for meals  - Recommend/ encourage appropriate diets, oral nutritional supplements, and vitamin/mineral supplements  - Order, calculate, and assess calorie counts as needed  - Recommend, monitor, and adjust tube feedings and TPN/PPN based on assessed needs  - Assess need for intravenous fluids  - Provide specific nutrition/hydration education as appropriate  - Include patient/family/caregiver in decisions related to nutrition  Outcome: Progressing     Problem: Prexisting or High Potential for Compromised Skin Integrity  Goal: Skin integrity is maintained or improved  Description: INTERVENTIONS:  - Identify patients at risk for skin breakdown  - Assess and monitor skin integrity  - Assess and monitor nutrition and hydration status  - Monitor labs   - Assess for incontinence   - Turn and reposition patient  - Assist with mobility/ambulation  - Relieve pressure over bony prominences  - Avoid friction and shearing  - Provide appropriate hygiene as needed including keeping skin clean and dry  - Evaluate need for skin moisturizer/barrier cream  - Collaborate with interdisciplinary team   - Patient/family teaching  - Consider wound care consult   Outcome: Progressing

## 2021-03-26 NOTE — ASSESSMENT & PLAN NOTE
Acute hypoxic resp failure, POA, a/e/b sob, hypoxia 85% on 2L >8L, tachypnea 19-22 secondary to right lower lobe pneumonia, large right parapneumonic effusion and atelectasis  S/p IR chest tube placement 3/23/21  Oxygen requirements down to 6L NC  Wean oxygen as tolerated  Goal O2 sats > 92%

## 2021-03-26 NOTE — ASSESSMENT & PLAN NOTE
Patietn with multiple valvular disease - Moderate AS, mild -mod MR, moderate TR  EF 50%, grade 1 diastolic dysfunction with pulmonary hypertension, 60mmhg

## 2021-03-26 NOTE — ASSESSMENT & PLAN NOTE
Patient with INR of 8 77 on admission, possibly secondary to severe malnutrition, Albumin was 1 8 on admission  Patient's family denies history of heavy alcohol abuse (CIWA discontinued)  Liver enzymes within normal limits, no history of liver disease  Not on any anticoagulation  S/p vitamin  K 10mg once - with rapid improvement in INR to 1 27  factor VII activity results pending

## 2021-03-26 NOTE — CASE MANAGEMENT
Continue to follow  Call placed to Pt's dtr(Sharla: [phone]) re: SNF facilities, left message  Anticipate return call  CM to follow

## 2021-03-26 NOTE — ASSESSMENT & PLAN NOTE
Hb 10 1 on admission - chronic normocytic normochromic anemia  Iron panel - Chronic anemia of inflammation  Hb downtrended to 7 2 today - no signs of overt bleeding seen  Keep hb > 7, transfuse if needed

## 2021-03-26 NOTE — PLAN OF CARE
Problem: Potential for Falls  Goal: Patient will remain free of falls  Description: INTERVENTIONS:  - Assess patient frequently for physical needs  -  Identify cognitive and physical deficits and behaviors that affect risk of falls    -  [place] fall precautions as indicated by assessment   - Educate patient/family on patient safety including physical limitations  - Instruct patient to call for assistance with activity based on assessment  - Modify environment to reduce risk of injury  - Consider OT/PT consult to assist with strengthening/mobility  Outcome: Progressing     Problem: PAIN - ADULT  Goal: Verbalizes/displays adequate comfort level or baseline comfort level  Description: Interventions:  - Encourage patient to monitor pain and request assistance  - Assess pain using appropriate pain scale  - Administer analgesics based on type and severity of pain and evaluate response  - Implement non-pharmacological measures as appropriate and evaluate response  - Consider cultural and social influences on pain and pain management  - Notify physician/advanced practitioner if interventions unsuccessful or patient reports new pain  Outcome: Progressing     Problem: INFECTION - ADULT  Goal: Absence or prevention of progression during hospitalization  Description: INTERVENTIONS:  - Assess and monitor for signs and symptoms of infection  - Monitor lab/diagnostic results  - Monitor all insertion sites, i e  indwelling lines, tubes, and drains  - Monitor endotracheal if appropriate and nasal secretions for changes in amount and color  - [place] appropriate cooling/warming therapies per order  - Administer medications as ordered  - Instruct and encourage patient and family to use good hand hygiene technique  - Identify and instruct in appropriate isolation precautions for identified infection/condition  Outcome: Progressing  Goal: Absence of fever/infection during neutropenic period  Description: INTERVENTIONS:  - Monitor WBC    Outcome: Progressing     Problem: SAFETY ADULT  Goal: Patient will remain free of falls  Description: INTERVENTIONS:  - Assess patient frequently for physical needs  -  Identify cognitive and physical deficits and behaviors that affect risk of falls    -  [place] fall precautions as indicated by assessment   - Educate patient/family on patient safety including physical limitations  - Instruct patient to call for assistance with activity based on assessment  - Modify environment to reduce risk of injury  - Consider OT/PT consult to assist with strengthening/mobility  Outcome: Progressing  Goal: Maintain or return to baseline ADL function  Description: INTERVENTIONS:  -  Assess patient's ability to carry out ADLs; assess patient's baseline for ADL function and identify physical deficits which impact ability to perform ADLs (bathing, care of mouth/teeth, toileting, grooming, dressing, etc )  - Assess/evaluate cause of self-care deficits   - Assess range of motion  - Assess patient's mobility; develop plan if impaired  - Assess patient's need for assistive devices and provide as appropriate  - Encourage maximum independence but intervene and supervise when necessary  - Involve family in performance of ADLs  - Assess for home care needs following discharge   - Consider OT consult to assist with ADL evaluation and planning for discharge  - Provide patient education as appropriate  Outcome: Progressing  Goal: Maintain or return mobility status to optimal level  Description: INTERVENTIONS:  - Assess patient's baseline mobility status (ambulation, transfers, stairs, etc )    - Identify cognitive and physical deficits and behaviors that affect mobility  - Identify mobility aids required to assist with transfers and/or ambulation (gait belt, sit-to-stand, lift, walker, cane, etc )  - [place] fall precautions as indicated by assessment  - Record patient progress and toleration of activity level on Mobility SBAR; progress patient to next Phase/Stage  - Instruct patient to call for assistance with activity based on assessment  - Consider rehabilitation consult to assist with strengthening/weightbearing, etc   Outcome: Progressing     Problem: DISCHARGE PLANNING  Goal: Discharge to home or other facility with appropriate resources  Description: INTERVENTIONS:  - Identify barriers to discharge w/patient and caregiver  - Arrange for needed discharge resources and transportation as appropriate  - Identify discharge learning needs (meds, wound care, etc )  - Arrange for interpretive services to assist at discharge as needed  - Refer to Case Management Department for coordinating discharge planning if the patient needs post-hospital services based on physician/advanced practitioner order or complex needs related to functional status, cognitive ability, or social support system  Outcome: Progressing     Problem: Knowledge Deficit  Goal: Patient/family/caregiver demonstrates understanding of disease process, treatment plan, medications, and discharge instructions  Description: Complete learning assessment and assess knowledge base  Interventions:  - Provide teaching at level of understanding  - Provide teaching via preferred learning methods  Outcome: Progressing     Problem: Nutrition/Hydration-ADULT  Goal: Nutrient/Hydration intake appropriate for improving, restoring or maintaining nutritional needs  Description: Monitor and assess patient's nutrition/hydration status for malnutrition  Collaborate with interdisciplinary team and initiate plan and interventions as ordered  Monitor patient's weight and dietary intake as ordered or per policy  Utilize nutrition screening tool and intervene as necessary  Determine patient's food preferences and provide high-protein, high-caloric foods as appropriate       INTERVENTIONS:  - Monitor oral intake, urinary output, labs, and treatment plans  - Assess nutrition and hydration status and recommend course of action  - Evaluate amount of meals eaten  - Assist patient with eating if necessary   - Allow adequate time for meals  - Recommend/ encourage appropriate diets, oral nutritional supplements, and vitamin/mineral supplements  - Order, calculate, and assess calorie counts as needed  - Recommend, monitor, and adjust tube feedings and TPN/PPN based on assessed needs  - Assess need for intravenous fluids  - Provide specific nutrition/hydration education as appropriate  - Include patient/family/caregiver in decisions related to nutrition  Outcome: Progressing     Problem: Prexisting or High Potential for Compromised Skin Integrity  Goal: Skin integrity is maintained or improved  Description: INTERVENTIONS:  - Identify patients at risk for skin breakdown  - Assess and monitor skin integrity  - Assess and monitor nutrition and hydration status  - Monitor labs   - Assess for incontinence   - Turn and reposition patient  - Assist with mobility/ambulation  - Relieve pressure over bony prominences  - Avoid friction and shearing  - Provide appropriate hygiene as needed including keeping skin clean and dry  - Evaluate need for skin moisturizer/barrier cream  - Collaborate with interdisciplinary team   - Patient/family teaching  - Consider wound care consult   Outcome: Progressing   Updated Care Plan

## 2021-03-26 NOTE — PLAN OF CARE
Problem: Potential for Falls  Goal: Patient will remain free of falls  Description: INTERVENTIONS:  - Assess patient frequently for physical needs  -  Identify cognitive and physical deficits and behaviors that affect risk of falls    -  [place] fall precautions as indicated by assessment   - Educate patient/family on patient safety including physical limitations  - Instruct patient to call for assistance with activity based on assessment  - Modify environment to reduce risk of injury  - Consider OT/PT consult to assist with strengthening/mobility  3/25/2021 2039 by Richard Gaytan RN  Outcome: Progressing  3/25/2021 2023 by Richard Gaytan RN  Outcome: Progressing     Problem: PAIN - ADULT  Goal: Verbalizes/displays adequate comfort level or baseline comfort level  Description: Interventions:  - Encourage patient to monitor pain and request assistance  - Assess pain using appropriate pain scale  - Administer analgesics based on type and severity of pain and evaluate response  - Implement non-pharmacological measures as appropriate and evaluate response  - Consider cultural and social influences on pain and pain management  - Notify physician/advanced practitioner if interventions unsuccessful or patient reports new pain  3/25/2021 2039 by Richard Gaytan RN  Outcome: Progressing  3/25/2021 2023 by Richard Gyatan RN  Outcome: Progressing     Problem: INFECTION - ADULT  Goal: Absence or prevention of progression during hospitalization  Description: INTERVENTIONS:  - Assess and monitor for signs and symptoms of infection  - Monitor lab/diagnostic results  - Monitor all insertion sites, i e  indwelling lines, tubes, and drains  - Monitor endotracheal if appropriate and nasal secretions for changes in amount and color  - [place] appropriate cooling/warming therapies per order  - Administer medications as ordered  - Instruct and encourage patient and family to use good hand hygiene technique  - Identify and instruct in appropriate isolation precautions for identified infection/condition  3/25/2021 2039 by Andrei Ignacio RN  Outcome: Progressing  3/25/2021 2023 by Andrei Ignacio RN  Outcome: Progressing  Goal: Absence of fever/infection during neutropenic period  Description: INTERVENTIONS:  - Monitor WBC    3/25/2021 2039 by Andrei Ignacio RN  Outcome: Progressing  3/25/2021 2023 by Andrei Ignacio RN  Outcome: Progressing     Problem: SAFETY ADULT  Goal: Patient will remain free of falls  Description: INTERVENTIONS:  - Assess patient frequently for physical needs  -  Identify cognitive and physical deficits and behaviors that affect risk of falls    -  [place] fall precautions as indicated by assessment   - Educate patient/family on patient safety including physical limitations  - Instruct patient to call for assistance with activity based on assessment  - Modify environment to reduce risk of injury  - Consider OT/PT consult to assist with strengthening/mobility  3/25/2021 2039 by Andrei Ignacio RN  Outcome: Progressing  3/25/2021 2023 by Andrei Ignacio RN  Outcome: Progressing  Goal: Maintain or return to baseline ADL function  Description: INTERVENTIONS:  -  Assess patient's ability to carry out ADLs; assess patient's baseline for ADL function and identify physical deficits which impact ability to perform ADLs (bathing, care of mouth/teeth, toileting, grooming, dressing, etc )  - Assess/evaluate cause of self-care deficits   - Assess range of motion  - Assess patient's mobility; develop plan if impaired  - Assess patient's need for assistive devices and provide as appropriate  - Encourage maximum independence but intervene and supervise when necessary  - Involve family in performance of ADLs  - Assess for home care needs following discharge   - Consider OT consult to assist with ADL evaluation and planning for discharge  - Provide patient education as appropriate  3/25/2021 2039 by Jonna Jones Timo Mckeon RN  Outcome: Progressing  3/25/2021 2023 by Chase Zuleta RN  Outcome: Progressing  Goal: Maintain or return mobility status to optimal level  Description: INTERVENTIONS:  - Assess patient's baseline mobility status (ambulation, transfers, stairs, etc )    - Identify cognitive and physical deficits and behaviors that affect mobility  - Identify mobility aids required to assist with transfers and/or ambulation (gait belt, sit-to-stand, lift, walker, cane, etc )  - [place] fall precautions as indicated by assessment  - Record patient progress and toleration of activity level on Mobility SBAR; progress patient to next Phase/Stage  - Instruct patient to call for assistance with activity based on assessment  - Consider rehabilitation consult to assist with strengthening/weightbearing, etc   3/25/2021 2039 by Chase Zuleta RN  Outcome: Progressing  3/25/2021 2023 by Chase Zuleta RN  Outcome: Progressing     Problem: DISCHARGE PLANNING  Goal: Discharge to home or other facility with appropriate resources  Description: INTERVENTIONS:  - Identify barriers to discharge w/patient and caregiver  - Arrange for needed discharge resources and transportation as appropriate  - Identify discharge learning needs (meds, wound care, etc )  - Arrange for interpretive services to assist at discharge as needed  - Refer to Case Management Department for coordinating discharge planning if the patient needs post-hospital services based on physician/advanced practitioner order or complex needs related to functional status, cognitive ability, or social support system  3/25/2021 2039 by Chase Zuleta RN  Outcome: Progressing  3/25/2021 2023 by Chase Zuleta RN  Outcome: Progressing     Problem: Knowledge Deficit  Goal: Patient/family/caregiver demonstrates understanding of disease process, treatment plan, medications, and discharge instructions  Description: Complete learning assessment and assess knowledge base  Interventions:  - Provide teaching at level of understanding  - Provide teaching via preferred learning methods  3/25/2021 2039 by Manjit Arnett RN  Outcome: Progressing  3/25/2021 2023 by Manjit Arnett RN  Outcome: Progressing     Problem: Nutrition/Hydration-ADULT  Goal: Nutrient/Hydration intake appropriate for improving, restoring or maintaining nutritional needs  Description: Monitor and assess patient's nutrition/hydration status for malnutrition  Collaborate with interdisciplinary team and initiate plan and interventions as ordered  Monitor patient's weight and dietary intake as ordered or per policy  Utilize nutrition screening tool and intervene as necessary  Determine patient's food preferences and provide high-protein, high-caloric foods as appropriate       INTERVENTIONS:  - Monitor oral intake, urinary output, labs, and treatment plans  - Assess nutrition and hydration status and recommend course of action  - Evaluate amount of meals eaten  - Assist patient with eating if necessary   - Allow adequate time for meals  - Recommend/ encourage appropriate diets, oral nutritional supplements, and vitamin/mineral supplements  - Order, calculate, and assess calorie counts as needed  - Recommend, monitor, and adjust tube feedings and TPN/PPN based on assessed needs  - Assess need for intravenous fluids  - Provide specific nutrition/hydration education as appropriate  - Include patient/family/caregiver in decisions related to nutrition  3/25/2021 2039 by Manjit Arnett RN  Outcome: Progressing  3/25/2021 2023 by Manjit Arnett RN  Outcome: Progressing     Problem: Prexisting or High Potential for Compromised Skin Integrity  Goal: Skin integrity is maintained or improved  Description: INTERVENTIONS:  - Identify patients at risk for skin breakdown  - Assess and monitor skin integrity  - Assess and monitor nutrition and hydration status  - Monitor labs   - Assess for incontinence - Turn and reposition patient  - Assist with mobility/ambulation  - Relieve pressure over bony prominences  - Avoid friction and shearing  - Provide appropriate hygiene as needed including keeping skin clean and dry  - Evaluate need for skin moisturizer/barrier cream  - Collaborate with interdisciplinary team   - Patient/family teaching  - Consider wound care consult   3/25/2021 2039 by Juan Luque RN  Outcome: Progressing  3/25/2021 2023 by Juan Luque RN  Outcome: Progressing   Updated Care Plan

## 2021-03-26 NOTE — QUICK NOTE
2150 - placed 10mg TPA in 30ml SW and Dornase 5mg in 30ml SW into proximal chest tube port and subsequently clamped  Tube will remain clamped for 60 minutes, unclamp at 2250 and will monitor drainage      Nursing staff aware  Patient tolerated the procedure well

## 2021-03-26 NOTE — ASSESSMENT & PLAN NOTE
Sepsis, poa, a/e/b leukocytosis 36k with 4% bands and tachycardia secondary to right sided pneumonia/large right parapneumonic effusion  INR 8 77 on admission  S/p Vit K, INR now 1 27  S/p IR chest tube placement 3/23/21 - now on tpa/dornase BID x 3 days  Fluid cultures - rare gram positive cocci, pending sensitivities  On IV antibiotics - Ceftriaxone, flagyl and vancomycin  urine Legionella and Streptococcus - negative  Trend WBC and fever curve - wbc improving, 20k  Infectious disease on board  Blood cultures negative till date

## 2021-03-26 NOTE — PHYSICAL THERAPY NOTE
PHYSICAL THERAPY EVAL       03/26/21 0903   PT Last Visit   PT Visit Date 03/26/21   Note Type   Note type Evaluation   Pain Assessment   Pain Assessment Tool 0-10   Pain Score Worst Possible Pain   Pain Location/Orientation   (chest tube site)   Home Living   Type of [address] [facility] Two level  (1+1 MARIANN)   Home Equipment Cane;Walker   Prior Function   Level of [place] Independent with ADLs and functional mobility   Lives With Spouse   Receives Help From Family   ADL Assistance Independent   IADLs Independent   Restrictions/Precautions   Weight Bearing Precautions Per Order No   Other Precautions Visual impairment;Pain; Fall Risk;O2;Telemetry;Multiple lines; Bed Alarm;Cognitive   General   Additional Pertinent History delirium improving   Family/Caregiver Present No   Cognition   Overall Cognitive Status Impaired   Arousal/Participation Alert   Attention Attends with cues to redirect   Orientation Level Unable to assess   Memory Decreased recall of recent events   Following Commands Follows one step commands with increased time or repetition   Comments Very soft spoken, hard to understand at times  Emotional     RLE Assessment   RLE Assessment WFL   LLE Assessment   LLE Assessment WFL   Coordination   Sensation WFL   Light Touch   RLE Light Touch Grossly intact   LLE Light Touch Grossly intact   Bed Mobility   Supine to Sit 5  Supervision   Additional items [facility] elevated;Verbal cues   Sit to Supine 5  Supervision   Additional items [facility] elevated;Verbal cues   Additional Comments Sat edge of bed approx 3 minutes with supervision  Patient very fatigued requesting to lie back down     Transfers   Sit to Stand Unable to assess   Balance   Static Sitting Fair +   Dynamic Sitting Fair   Endurance Deficit   Endurance Deficit Yes   Activity Tolerance   Activity Tolerance Patient limited by fatigue   Medical Staff Made Aware OT, [facility]   Nurse Made Aware RNSergio   Assessment   Prognosis Good   Problem List Decreased strength;Decreased endurance; Impaired balance;Decreased mobility; Decreased cognition;Pain   Assessment Patient is a 68y/o F who is currently in the ICU with delirium, pneumonia, sepsis, R pleural effusion  She currently has a chest tube  PMH significant for HTN, GERD, CAD, anemia, cardiac disease, PVD  Patient resides in spouse in a home with steps to enter  Was ind with a cane/RW prior to admission  Current medical status includes ICU stay, multiple lines, chest tube, O2, pain, confusion, fatigue, decreased strength, balance, endurance and mobility  Patient was received supine in bed and needed encouragement to participate  She was able to participate in bed mobility but fatigued quickly and requested to lie back down  Unable to further assess mobility  She has significant pain at the chest tube site which is also limiting her mobility  Cognition has improve per nursing  Will continue to assess as able  At this time, recommending rehab as patient is not at her baseline  The patient's AM-PAC Basic Mobility Inpatient Short Form Raw Score is 16, Standardized Score is 38 32  A standardized score less than 42 9 suggests the patient may benefit from discharge to post-acute rehabilitation services  Please also refer to the recommendation of the Physical Therapist for safe discharge planning  Barriers to Discharge Decreased caregiver support; Inaccessible home environment   Goals   Patient Goals To feel better  STG Expiration Date 04/09/21   Short Term Goal #1 1  Perform supine<>sit with HOB flat without the use of bedrails ind 2  Peform sit<>stand transfers with supervision 3  Amb 100ft with least restrictive device at a mod I level 4   Ascend/descend 1 curb step with least restrictive device at a supervision level   PT Treatment Day 0   Plan   Treatment/Interventions Functional transfer training;LE strengthening/ROM; Elevations; Therapeutic exercise; Endurance training;Cognitive reorientation;Equipment eval/education; Bed mobility;Gait training;Spoke to nursing;OT   PT Frequency Other (Comment)  (3-5x/wk)   Recommendation   PT Discharge Recommendation Post-Acute Rehabilitation Services   Equipment Recommended   (TBD at rehab)   PT - OK to Discharge No   Additional Comments To rehab when medically stable   Martita 8 in Bed Without Bedrails 3   Lying on Back to Sitting on Edge of Flat Bed 3   Moving Bed to Chair 3   Standing Up From Chair 3   Walk in Room 2   Climb 3-5 Stairs 2   Basic Mobility Inpatient Raw Score 16   Basic Mobility Standardized Score 38 32   Cait Cespedes, PT            Patient Name: Mery Alberts  YIUBO'H Date: 3/26/2021

## 2021-03-26 NOTE — PROGRESS NOTES
Vancomycin IV Pharmacy-to-Dose Consultation    Stephanie Denson is a 65 y o  female who is currently receiving Vancomycin IV with management by the Pharmacy Consult service  Assessment/Plan:  The patient was reviewed  Renal function is stable and no signs or symptoms of nephrotoxicity and/or infusion reactions were documented in the chart  Based on todays assessment, continue current vancomycin (day # 2) dosing of 1000mg q12h, with a plan for trough to be drawn at 1000 on 3/27  We will continue to follow the patients culture results and clinical progress daily      Shayla Brandt, Pharmacist

## 2021-03-26 NOTE — PLAN OF CARE
Problem: OCCUPATIONAL THERAPY ADULT  Goal: Performs self-care activities at highest level of function for planned discharge setting  See evaluation for individualized goals  Description: Treatment Interventions: ADL retraining, Functional transfer training, UE strengthening/ROM, Endurance training, Patient/family training, Equipment evaluation/education, Energy conservation, Activityengagement          See flowsheet documentation for full assessment, interventions and recommendations  Note: Limitation: Decreased ADL status, Decreased UE strength, Decreased endurance, Decreased self-care trans, Decreased high-level ADLs  Prognosis: Fair  Assessment: Pt admit 3/24/21 with sepsis  Dx: R lower lobe pneumonia parapneumonic effusion and significant coagulopathy  OT completed extensive review of pt's medical and social history  Pt with h/o anemia, cardiac disease, chronic pain, HTN, PVD, and ankle sx  Prior to admit was living w/ spouse in [address] [place] [address] w/ 1 + 1 MARIANN  Per chart, pt is (I) w/ ADLS and IADLS  Ambulates using RW or SPC  Daughter reported to CM that spouse is not supportive and they fight often  Therefore, would like pt to be placed  Pt presents to OT below baseline due to the following performance deficits: strength; pain; balance; functional mobility; problem solving; awareness; coping; community integration; self care; and IADLs  Therefore, pt would benefit from OT services to achieve optimal level of performance  Occupational performance areas to be addressed include: grooming, bathing, toileting, dressing, activity tolerance, functional mobility, and clothing management  Pt currently has chest tube and numerous lines for monitoring  Able to follow simple 1 step commands w/ increased time  Difficulties assessing orientation due to low whispers to communicate; anxious  Pt is (S) for bed mobility and tolerated sitting for approx 3 mins then needed to return to bed  Activity tolerance remains low   Pt not appropriate for mobility attempt at this time due to fatigue levels  Remains below functional baseline  Decreased home situation  Based on findings, pt is of high complexity  The patient's raw score on the AM-PAC Daily Activity inpatient short form is 17, standardized score is 37 26, less than 39 4  Patients at this level are likely to benefit from discharge to post-acute rehabilitation services  Recommend STR        OT Discharge Recommendation: Post-Acute Rehabilitation Services

## 2021-03-26 NOTE — SPEECH THERAPY NOTE
Speech Language/Pathology    Speech/Language Pathology Progress Note    Patient Name: Stephanie Denson  [facility]'L Date: 3/26/2021     Problem List  Principal Problem:    Sepsis ([facility] )  Active Problems:    Pneumonia of right lower lobe due to infectious organism    CAD (coronary artery disease)    Hypokalemia    Pleural effusion on right    GERD (gastroesophageal reflux disease)    HTN (hypertension)    Acute respiratory failure with hypoxia (HCC)    Supratherapeutic INR    Coagulopathy (HCC)    Delirium    Thrombocytosis (HCC)    Severe protein-calorie malnutrition ([facility] )    Anemia    Valvular heart disease       Past Medical History  Past Medical History:   Diagnosis Date    Anemia     Cardiac disease     Chronic pain     Coronary artery disease     Hypertension     [facility]         Past Surgical History  Past Surgical History:   Procedure Laterality Date    ANKLE SURGERY      IR CHEST TUBE PLACEMENT  3/23/2021    IR NON-TUNNELED CENTRAL LINE PLACEMENT  3/24/2021         Subjective:  Pt seen for dysphagia tx  Pt alert, but frequently tearful, asking for family, asking for something to calm her down  Pt's daughter arrived during tx  Objective:  Reviewed chart and spoke with her nurse, who reported pt to be more alert and asking for food  Pt had been NPO but was changed to pureed and thin liquids this morning  RN reports pt has already been given something for her anxiety  Pt's speech is somewhat garbled, with breathy vocal quality and reduced volume, which reduced intelligibility  Pt fed herself a few bites of her lunch, including mashed potatoes with gravy, pureed carrots and broccoli, and thin liquids by cup and straw  Bolus manipulation and transfer appeared prompt  Pharyngeal swallows appeared timely, and there were no s/s of aspiration  O2 sats remained in the low to mid 90s   Pt asked for crackers; she ate 2 kimberlee crackers, with moderately prolonged mastication, bolus formation and transfer, but no significant oral residue, and no s/s of aspiration  Discussed pt's premorbid nutrition and swallowing with pt's daughter (she is radiology tech and is educated on aspiration)  Daughter reports no s/s of aspiration at home, and none observed here when she has been present here at this facility  She did report reduced PO at home, due to being edentulous and reduced appetite; pt ate softer foods at home  Assessment:  Pt more alert today, but frequently crying/tearful, difficulty attending to task, distractible  She tolerated pureed foods well, prolonged oral phase with hard solids  There were no s/s of aspiration  Plan/Recommendations:  Change diet to dysphagia 2 and continue thin liquids  Aspiration precautions  Avoid eating when upset/tearful  ST to monitor diet toleration, trial more advanced solids  VBS can be completed if physician feels it's needed to r/o aspiration, although not suspected at this time based on today's tx session

## 2021-03-26 NOTE — QUICK NOTE
Chest Tube Management  0935 - placed 10mg TPA in 30ml SW and Dornase 5mg in 30ml SW with additional 10cc NS flush  Tube to be clamped for 60 minutes, unclamp at 1035 and monitor drainage      Instillation #5/6      Nursing and CCAPs aware      Trixie Burnham, DO

## 2021-03-26 NOTE — PROGRESS NOTES
Progress Note - Pulmonary   Junior Maldonado 65 y o  female MRN: [id number]  Unit/Bed#: -01 Encounter: [id number]      Assessment & Recommendations:  1  Acute hypoxic respiratory failure  · Tirate O2 to keep SpO2 >88%  · IS, OOB-Chair, flutter valve if able  · Despite CXR with appearance of volume overload, physical exam is not consistent, will hold on diuresis at this time    2  Community Acquired pneumonia - possible aspiration component  · Continue cefepime/flagyl/vanc for now  · Further abx adjustments per ID, appreciate consultation  · Follow cultures  · Trend Temp/WBC    3  Large right complicated parapneumonic pleural effusion - GPC Empyema   · CT placed by IR with initial low output  · Trial of TPA/Dornase BID x 3 days - D3/3  · Monitor drainage  · Follow up cultures, cytology  · If not improving, may need Thoracic surgery evaluation/VATS  · Repeat CXR in AM, may need CT Chest    4  Coagulopathy - suspect secondary to ETOH abuse and nutritional depletion given rapid improvement - per primary service    5  Possible COPD w/o AE  · Cont duonebs TID  · Outpatient PFTs and pulmonary follow up    6  Nicotine dependence in remission    7  ETOH abuse with active withdraw/Toxic-metabolic encephalopathy  · Thiamine/folate  · CT head w/o acute changes  · Clinically improving    8  Thrombocytosis - likely reactive secondary to #2,3, trend    D/W HECTOR Carranza SLIM      Subjective:   Good response to TPA/dornase, added vanc yesterday, no further benzodiazepines needed, improving mental status, reported pain at CT site, no other pain  Objective:     Vitals: Blood pressure 127/60, pulse 91, temperature 98 3 °F (36 8 °C), temperature source Oral, resp  rate 22, height 5' 6" (1 676 m), weight 70 6 kg (155 lb 10 3 oz), SpO2 90 %, not currently breastfeeding  , 98% 4L/min NC, Body mass index is 25 12 kg/m²        Intake/Output Summary (Last 24 hours) at 3/26/2021 9003  Last data filed at 3/26/2021 0600  Gross per 24 hour Intake 1690 ml   Output 1342 ml   Net 348 ml         Physical Exam  GEN older womain in bed, more conversant, oriented to person/place, year, less agitated  HEENT MMM, no thrush, no oral lesions  Neck No accessory muscle use, JVP not elevated  CV reg, single s1/2, no m/r  Pulm improving BS at right base, moist cough, no wheeze, no rales, CT with 600cc output, cloudy serous, no air leak  ABD +BS soft NTND, no rebound  EXT warm, brisk cap refill, no edema        Labs: I have personally reviewed pertinent lab results    Laboratory and Diagnostics  Results from last 7 days   Lab Units 03/26/21  0246 03/25/21  0803 03/25/21  0435 03/24/21  1236 03/24/21  0336 03/22/21  0401 03/21/21  2254   WBC Thousand/uL 20 60*  --  25 40*  --  39 01* 33 14* 36 08*   HEMOGLOBIN g/dL 7 2* 8 8* 6 9*  --  8 4* 9 3* 10 1*   I STAT HEMOGLOBIN g/dl  --   --   --  6 8*  --   --   --    HEMATOCRIT % 22 6*  --  21 4*  --  26 3* 29 5* 31 6*   HEMATOCRIT, ISTAT %  --   --   --  20*  --   --   --    PLATELETS Thousands/uL 624*  --  605*  --  618* 594* 680*   BANDS PCT %  --   --  1  --   --   --  4   MONO PCT %  --   --  7  --   --   --  5     Results from last 7 days   Lab Units 03/26/21  0303 03/25/21  0435 03/24/21  1236 03/24/21  0336 03/22/21  0401 03/21/21  2254   SODIUM mmol/L 138 140  --  136 133* 134*   POTASSIUM mmol/L 3 5 3 9  --  4 8 3 9 3 1*   CHLORIDE mmol/L 103 105  --  101 97* 95*   CO2 mmol/L 24 25  --  22 23 21   CO2, I-STAT mmol/L  --   --  25  --   --   --    ANION GAP mmol/L 11 10  --  13 13 18*   BUN mg/dL 11 11  --  11 17 18   CREATININE mg/dL 0 75 0 72  --  0 75 0 88 1 01   CALCIUM mg/dL 7 9* 8 1*  --  8 7 8 5 9 2   GLUCOSE RANDOM mg/dL 79 103  --  100 118 108   ALT U/L  --   --   --   --  24 25   AST U/L  --   --   --   --  30 29   ALK PHOS U/L  --   --   --   --  220* 259*   ALBUMIN g/dL  --   --   --   --  1 6* 1 8*   TOTAL BILIRUBIN mg/dL  --   --   --   --  0 40 0 50     Results from last 7 days   Lab Units 03/22/21  0401 03/21/21  2254   MAGNESIUM mg/dL 1 7 1 5*   PHOSPHORUS mg/dL 3 2  --       Results from last 7 days   Lab Units 03/26/21  0246 03/23/21  0447 03/22/21  1149 03/22/21  0401 03/21/21  2254   INR  1 30* 1 27* 1 48* 7 81* 8 77*   PTT seconds  --   --   --   --  108*      Results from last 7 days   Lab Units 03/22/21  1149 03/22/21  0401 03/21/21  2254   TROPONIN I ng/mL <0 02 <0 02 <0 02     Results from last 7 days   Lab Units 03/22/21  0104   LACTIC ACID mmol/L 1 4     Results from last 7 days   Lab Units 03/25/21  0435 03/21/21  2256   FERRITIN ng/mL 914* 1,334*   CRP mg/L  --  394 5*         Results from last 7 days   Lab Units 03/25/21  0435   FIBRINOGEN mg/dL 883*     Results from last 7 days   Lab Units 03/25/21  0435 03/21/21  2254   D-DIMER QUANTITATIVE ug/ml FEU 5 19* 3 67*     Results from last 7 days   Lab Units 03/23/21  0447 03/21/21  2254   PROCALCITONIN ng/ml 1 12* 0 80*       Microbiology:  FLU/RSV neg  COVID-19 neg 3/22  Bld Cx 3/21 - NGTD  Right Pleural Fluid - WBC 3800 (N-86%), glucose 4, pH 6 6, TP 4 3, LDH 2300, grm stain (+) PMN, Rare GPC  Strep/legionella ag neg    Imaging and other studies: New images personally reviewed  CT head 3/26 - no acute mass, bleed or shift    CXR 3/26 - significantly improved right pleural effusion, right CT and CVC in place, persistent fluid in fissure, bilateral alveolar infiltrates with cephalization/vascular prominence    CXR 3/24 - large right effusion with alveolar infiltrates, right basilar CT in place w/o purulence, increased left sided vascular infiltrates    CT angio 3/22 - large right sided pleural effusion, right basilar infiltrate/consolidation, no PE, no sig mediastinal adenopathy      Blue Cary DO, Wend[facility] Mor[facility]

## 2021-03-26 NOTE — PROGRESS NOTES
Follow up visit with patient and her daughter  Patient is now requesting a visit with Trina Weaver, contacted Lynn Connolly to visit with patient  Informed patients nurse and patient and family  03/26/21 1300   Clinical Encounter Type   Visited With Patient; Family   Routine Visit Follow-up   Sacramental Encounters   Sacrament of Sick-Anointing Patient requested anointing   Family Spiritual Encounters   Family Coping Accepting;Open/discussion   Family Normalization 5   Family Participation in Care 5   Family Support During Treatment 5   Caregiver-Patient Relationship 5

## 2021-03-26 NOTE — ASSESSMENT & PLAN NOTE
Malnutrition Findings:   Adult Malnutrition type: Acute illness  Adult Degree of Malnutrition: Other severe protein calorie malnutritionsevere protein calorie malnutrition as evidenced by hollowed orbitals, temple hollowing, clavicle protrusion, with prominent bone and low albumin levels 1 6> 1 8, and associated coagulapathy with high INR 8 on admission in the setting of right lower lobe pneumonia with complicated right parapneumonic effusion    BMI Findings: Body mass index is 25 12 kg/m²

## 2021-03-26 NOTE — PLAN OF CARE
Problem: PHYSICAL THERAPY ADULT  Goal: Performs mobility at highest level of function for planned discharge setting  See evaluation for individualized goals  Description: Treatment/Interventions: Functional transfer training, LE strengthening/ROM, Elevations, Therapeutic exercise, Endurance training, Cognitive reorientation, Equipment eval/education, Bed mobility, Gait training, Spoke to nursing, OT  Equipment Recommended: (TBD at rehab)       See flowsheet documentation for full assessment, interventions and recommendations  Note: Prognosis: Good  Problem List: Decreased strength, Decreased endurance, Impaired balance, Decreased mobility, Decreased cognition, Pain  Assessment: Patient is a 68y/o F who is currently in the ICU with delirium, pneumonia, sepsis, R pleural effusion  She currently has a chest tube  PMH significant for HTN, GERD, CAD, anemia, cardiac disease, PVD  Patient resides in spouse in a home with steps to enter  Was ind with a cane/RW prior to admission  Current medical status includes ICU stay, multiple lines, chest tube, O2, pain, confusion, fatigue, decreased strength, balance, endurance and mobility  Patient was received supine in bed and needed encouragement to participate  She was able to participate in bed mobility but fatigued quickly and requested to lie back down  Unable to further assess mobility  She has significant pain at the chest tube site which is also limiting her mobility  Cognition has improve per nursing  Will continue to assess as able  At this time, recommending rehab as patient is not at her baseline  The patient's AM-PAC Basic Mobility Inpatient Short Form Raw Score is 16, Standardized Score is 38 32  A standardized score less than 42 9 suggests the patient may benefit from discharge to post-acute rehabilitation services  Please also refer to the recommendation of the Physical Therapist for safe discharge planning    Barriers to Discharge: Decreased caregiver support, Inaccessible home environment     PT Discharge Recommendation: [id number]     PT - OK to Discharge: No    See flowsheet documentation for full assessment

## 2021-03-26 NOTE — PROGRESS NOTES
Progress Note - Infectious Disease   Dwight Nolan 65 y o  female MRN: [id number]  Unit/Bed#: -01 Encounter: [id number]    Assessment:  75-year-old woman with right lower lobe pneumonia complicated by empyema, leukocytosis, history of heavy ethanol use, altered mental status, leukocytosis, fever     Plan:  1,2) Pneumonia, empyema - unfortunately, pleural fluid cultures are without growth  That said, gram-positive cocci 3 she did on g stain are helpful in directing therapy  Could this be a streptococcal infection? An anaerobic infection (possibly including organisms such as Peptostreptococci spp )  Could also explain microbiologic findings less far  Given lack of evidence for Gram-negative infection, will narrow antibiotics and continue to follow patient     ===> Will CONTINUE VANCOMYCIN, METRONIDAZOLE  Will follow BMP, CBC to assess for toxicity while patient is on these agents     ===> Will DISCONTINUE CEFEPIME      ===> Will follow pending pleural fluid cultures     3) AMS, improving    4) Leukocytosis - 2/2 #1, will follow as marker of clinical improvement  Improving       5) Fever - 2/2 #1, will follow as marker of clinical improvement  Improving      ===> Will f/u formally with pt  in 72h, but will monitor relevant clinical data in the interim, which may include changes to the patient's antibiotic regimen as culture data become available  Please do not hesitate to contact ID attending on call should questions arise, or if a change in pt's clinical status should warrant  Subjective/Objective   No acute events overnight  Patient more alert today, but still not at baseline  She does remain afebrile, and white blood cell count continues to improve  Pleural fluid cultures without growth      Temp:  [97 7 °F (36 5 °C)-98 9 °F (37 2 °C)] 97 8 °F (36 6 °C)  HR:  [79-91] 85  Resp:  [20-26] 26  BP: ()/(33-64) 95/62  SpO2:  [90 %-100 %] 91 %  Temp (24hrs), Av 2 °F (36 8 °C), Min:97 7 °F (36 5 °C), Max:98 9 °F (37 2 °C)  Current: Temperature: 97 8 °F (36 6 °C)    Physical Exam:   Gen:  Awake, but remains somewhat confused, NAD, VS reviewed  ENT: MMM  CV: No m/r/g, regular rate  Pulm: Lungs CTAB, no respiratory distress, right-sided chest tube in place  ABD: S/NT/DT  Skin: No rash on exposed skin  Psych:  Unable to assess    Invasive Devices     Central Venous Catheter Line            CVC Central Lines 03/24/21 Triple Left Internal jugular 1 day          Drain            Chest Tube Right Other (Comment) 12 Fr  2 days                Lab, Imaging and other studies: I have personally reviewed pertinent reports

## 2021-03-26 NOTE — OCCUPATIONAL THERAPY NOTE
Occupational Therapy Evaluation     Patient Name: Mj Ball  [id number] Date: 3/26/2021  Problem List  Principal Problem:    Sepsis ([facility] )  Active Problems:    Pneumonia of right lower lobe due to infectious organism    CAD (coronary artery disease)    Hypokalemia    Pleural effusion on right    GERD (gastroesophageal reflux disease)    HTN (hypertension)    Acute respiratory failure with hypoxia (HCC)    Supratherapeutic INR    Coagulopathy (HCC)    Delirium    Thrombocytosis (HCC)    Severe protein-calorie malnutrition ([facility] )    Anemia    Valvular heart disease    Past Medical History  Past Medical History:   Diagnosis Date    Anemia     Cardiac disease     Chronic pain     Coronary artery disease     Hypertension     PVD (peripheral vascular disease) ([facility] )      Past Surgical History  Past Surgical History:   Procedure Laterality Date    ANKLE SURGERY      IR CHEST TUBE PLACEMENT  3/23/2021    IR NON-TUNNELED CENTRAL LINE PLACEMENT  3/24/2021             03/26/21 0900   OT Last Visit   OT Visit Date 03/26/21   Note Type   Note type Evaluation   Restrictions/Precautions   Weight Bearing Precautions Per Order No   Other Precautions Bed Alarm;Telemetry;O2;Fall Risk;Pain; Visual impairment   Pain Assessment   Pain Assessment Tool 0-10   Pain Score Worst Possible Pain   Pain Location/Orientation Location: Back  (chest tube site)   Patient's Stated Pain Goal No pain   Hospital Pain Intervention(s) Repositioned; Ambulation/increased activity; Emotional support   Home Living   Type of [address] [place] [address] Two level  (1+1 MARIANN )   Bathroom Accessibility Accessible  (Via RW or [facility] )   Home Equipment Walker;Cane  (, [facility] )   Prior Function   Lives With Spouse   Receives Help From Family   ADL Assistance Independent   IADLs Independent   Comments Per CM notes, daughter reports spouse is not very supportive  Spouse and pt fight often and threaten divorce  Daughter would like pt to be placed      Lifestyle Autonomy Per chart, pt is (I) w/ ADLs and IADLS  Ambulates w/ MI using RW and SPC  + drives, but should not be driving due to catract sx  Reciprocal Relationships Daughter   Intrinsic Gratification None stated    Psychosocial   Psychosocial (WDL) X   Patient Behaviors/Mood Anxious   Subjective   Subjective " they were so mean downstairs"    ADL   Eating Assistance 5  Supervision/Setup   Eating Deficit Setup   Grooming Assistance 4  Minimal Assistance   Grooming Deficit Setup   UB Bathing Assistance 4  Minimal Assistance   UB Bathing Deficit Setup   LB Bathing Assistance 3  Moderate Assistance   LB Bathing Deficit Setup   UB Dressing Assistance 4  Minimal Assistance   UB Dressing Deficit Setup   LB Dressing Assistance 3  Moderate Assistance   LB Dressing Deficit Setup   Toileting Assistance  4  Minimal Assistance   Additional Comments Pt hindered primarily by fatigue  Low tolerance  Bed Mobility   Supine to Sit 5  Supervision   Additional items HOB elevated; Bedrails; Increased time required   Sit to Supine 5  Supervision   Additional items HOB elevated; Increased time required   Additional Comments Remains in bed w/ all needs and alarm engaged    Transfers   Sit to Stand Unable to assess   Additional Comments Pt not appropriate due to fatigue and low tolerance    Balance   Static Sitting Fair   Dynamic Sitting Fair   Activity Tolerance   Activity Tolerance Patient limited by fatigue   Medical Staff Made Aware PT, Eileen    Nurse Made Aware RNSergio    RUE Assessment   RUE Assessment WFL  (Grossly at least 4-/5 t/o all planes)   LUE Assessment   LUE Assessment WFL  (Grossly at least 4-/5 t/o all planes)   Hand Function   Gross Motor Coordination Functional   Fine Motor Coordination Functional   Sensation   Light Touch No apparent deficits  (Denies numbness)   Vision-Basic Assessment   Current Vision Other (Comment)  (Recent catract sx)   Cognition   Arousal/Participation Cooperative; delirium improving   Attention Attends with cues to redirect   Orientation Level Unable to assess   Memory Decreased short term memory;Decreased recall of recent events;Decreased recall of precautions   Following Commands Follows one step commands with increased time or repetition   Comments Pt very soft spoke and difficult to understand  Assessment   Limitation Decreased ADL status; Decreased UE strength;Decreased endurance;Decreased self-care trans;Decreased high-level ADLs   Prognosis Fair   Assessment Pt admit 3/24/21 with sepsis  Dx: R lower lobe pneumonia parapneumonic effusion and significant coagulopathy  OT completed extensive review of pt's medical and social history  Pt with h/o anemia, cardiac disease, chronic pain, HTN, PVD, and ankle sx  Prior to admit was living w/ spouse in [address] [place] [address] w/ 1 + 1 MARIANN  Per chart, pt is (I) w/ ADLS and IADLS  Ambulates using RW or SPC  Daughter reported to CM that spouse is not supportive and they fight often  Therefore, would like pt to be placed  Pt presents to OT below baseline due to the following performance deficits: strength; pain; balance; functional mobility; problem solving; awareness; coping; community integration; self care; and IADLs  Therefore, pt would benefit from OT services to achieve optimal level of performance  Occupational performance areas to be addressed include: grooming, bathing, toileting, dressing, activity tolerance, functional mobility, and clothing management  Pt currently has chest tube and numerous lines for monitoring  Able to follow simple 1 step commands w/ increased time  Difficulties assessing orientation due to low whispers to communicate; anxious  Pt is (S) for bed mobility and tolerated sitting for approx 3 mins then needed to return to bed  Activity tolerance remains low  Pt not appropriate for mobility attempt at this time due to fatigue levels  Remains below functional baseline  Decreased home situation  Based on findings, pt is of high complexity   The patient's raw score on the AM-PAC Daily Activity inpatient short form is 17, standardized score is 37 26, less than 39 4  Patients at this level are likely to benefit from discharge to post-acute rehabilitation services  Recommend STR  Goals   Patient Goals To call her daughter   Plan   Treatment Interventions ADL retraining;Functional transfer training;UE strengthening/ROM; Endurance training;Patient/family training;Equipment evaluation/education; Energy conservation; Activityengagement   Goal Expiration Date 04/09/21   OT Treatment Day 0   OT Frequency 3-5x/wk   Recommendation   OT Discharge Recommendation Post-Acute Rehabilitation Services   [facility] Daily Activity Inpatient   Lower Body Dressing 2   Bathing 2   Toileting 3   Upper Body Dressing 3   Grooming 3   Eating 4   Daily Activity Raw Score 17   Daily Activity Standardized Score (Calc for Raw Score >=11) 37 26   AM-PAC Applied Cognition Inpatient   Following a Speech/Presentation 3   Understanding Ordinary Conversation 3   Taking Medications 2   Remembering Where Things Are Placed or Put Away 3   Remembering List of 4-5 Errands 2   Taking Care of Complicated Tasks 2   Applied Cognition Raw Score 15   Applied Cognition Standardized Score 33 54      GOALS:      Pt will complete UB ADL's w/ MI      Pt will complete LB ADL's w/ MI using AE PRN     Pt will complete toileting including hygiene and clothing management w/ MI      Pt will complete functional transfers w/ MI using AD     Pt will complete dynamic and unsupported stand balance w/ F+ for safe clothing management      Pt will improve stand tolerance to 4-6 mins for safety w/ ADL tasks      Pt will improve activity tolerance to F+ for 20- 30 min tx session for increased engagement in functional tasks      Pt will achieve UE MMT 4+/5 to increase independence w/ ADL txfs       After education, pt will verbalize 3 energy conservation techs to utilize to increase activity tolerance during functional tasks  Pt will participate in ongoing cognitive assessment to determine level of safety for discharge disposition     Pt will perform simulated IADLS w/ F+ balance     Carlene Crawford MS, OTR/L

## 2021-03-26 NOTE — ASSESSMENT & PLAN NOTE
Acute delirium, for which she received 16mg ativan overnight based off CIWA protocol  patient's family denies heavy alcohol abuse, she takes maybe a glass of wine occasionally, so it is unlikely she is in active alcohol withdrawal - more like severe delirium    CT head - No acute intracranial anomaly,  CIWA protocol discontinued 3/24 as patient is being overmedicated  Trial of phenobarb x 1  Patient received only one dose of fentanyl overnight, Mentation improving - more interactive this am  C/w haldol Prn  QTC interval - 468  continue with IV antibiotics for pneumonia treatment

## 2021-03-27 ENCOUNTER — APPOINTMENT (INPATIENT)
Dept: RADIOLOGY | Facility: HOSPITAL | Age: 71
DRG: 871 | End: 2021-03-27
Payer: MEDICARE

## 2021-03-27 PROBLEM — I38 VALVULAR HEART DISEASE: Chronic | Status: ACTIVE | Noted: 2021-03-26

## 2021-03-27 PROBLEM — D64.9 ANEMIA: Chronic | Status: ACTIVE | Noted: 2021-03-26

## 2021-03-27 PROBLEM — J18.9 PARAPNEUMONIC EFFUSION: Status: ACTIVE | Noted: 2021-03-22

## 2021-03-27 PROBLEM — E43 SEVERE PROTEIN-CALORIE MALNUTRITION (HCC): Chronic | Status: ACTIVE | Noted: 2021-03-24

## 2021-03-27 PROBLEM — D68.9 COAGULOPATHY (HCC): Status: RESOLVED | Noted: 2021-03-22 | Resolved: 2021-03-27

## 2021-03-27 PROBLEM — R13.10 DYSPHAGIA: Status: ACTIVE | Noted: 2021-03-27

## 2021-03-27 PROBLEM — J91.8 PARAPNEUMONIC EFFUSION: Status: ACTIVE | Noted: 2021-03-22

## 2021-03-27 PROBLEM — I25.10 CAD (CORONARY ARTERY DISEASE): Chronic | Status: ACTIVE | Noted: 2021-03-22

## 2021-03-27 PROBLEM — K21.9 GERD (GASTROESOPHAGEAL REFLUX DISEASE): Chronic | Status: ACTIVE | Noted: 2021-03-22

## 2021-03-27 LAB
ANION GAP SERPL CALCULATED.3IONS-SCNC: 10 MMOL/L (ref 4–13)
BACTERIA BLD CULT: NORMAL
BACTERIA BLD CULT: NORMAL
BUN SERPL-MCNC: 10 MG/DL (ref 5–25)
CALCIUM SERPL-MCNC: 7.7 MG/DL (ref 8.3–10.1)
CHLORIDE SERPL-SCNC: 104 MMOL/L (ref 100–108)
CO2 SERPL-SCNC: 23 MMOL/L (ref 21–32)
CREAT SERPL-MCNC: 0.75 MG/DL (ref 0.6–1.3)
ERYTHROCYTE [DISTWIDTH] IN BLOOD BY AUTOMATED COUNT: 16.5 % (ref 11.6–15.1)
FACT VII ACT/NOR PPP: 68 % (ref 51–186)
GFR SERPL CREATININE-BSD FRML MDRD: 80 ML/MIN/1.73SQ M
GLUCOSE SERPL-MCNC: 100 MG/DL (ref 65–140)
HCT VFR BLD AUTO: 23.2 % (ref 34.8–46.1)
HGB BLD-MCNC: 7.6 G/DL (ref 11.5–15.4)
MAGNESIUM SERPL-MCNC: 1.8 MG/DL (ref 1.6–2.6)
MCH RBC QN AUTO: 29.1 PG (ref 26.8–34.3)
MCHC RBC AUTO-ENTMCNC: 32.8 G/DL (ref 31.4–37.4)
MCV RBC AUTO: 89 FL (ref 82–98)
PLATELET # BLD AUTO: 685 THOUSANDS/UL (ref 149–390)
PMV BLD AUTO: 10.2 FL (ref 8.9–12.7)
POTASSIUM SERPL-SCNC: 3.2 MMOL/L (ref 3.5–5.3)
RBC # BLD AUTO: 2.61 MILLION/UL (ref 3.81–5.12)
SODIUM SERPL-SCNC: 137 MMOL/L (ref 136–145)
VANCOMYCIN TROUGH SERPL-MCNC: 21.3 UG/ML (ref 10–20)
WBC # BLD AUTO: 21.38 THOUSAND/UL (ref 4.31–10.16)

## 2021-03-27 PROCEDURE — 80048 BASIC METABOLIC PNL TOTAL CA: CPT | Performed by: PHYSICIAN ASSISTANT

## 2021-03-27 PROCEDURE — 99232 SBSQ HOSP IP/OBS MODERATE 35: CPT | Performed by: INTERNAL MEDICINE

## 2021-03-27 PROCEDURE — 94760 N-INVAS EAR/PLS OXIMETRY 1: CPT

## 2021-03-27 PROCEDURE — 83735 ASSAY OF MAGNESIUM: CPT | Performed by: INTERNAL MEDICINE

## 2021-03-27 PROCEDURE — 87081 CULTURE SCREEN ONLY: CPT | Performed by: NURSE PRACTITIONER

## 2021-03-27 PROCEDURE — 85027 COMPLETE CBC AUTOMATED: CPT | Performed by: INTERNAL MEDICINE

## 2021-03-27 PROCEDURE — 32562 LYSE CHEST FIBRIN SUBQ DAY: CPT | Performed by: NURSE PRACTITIONER

## 2021-03-27 PROCEDURE — 71045 X-RAY EXAM CHEST 1 VIEW: CPT

## 2021-03-27 PROCEDURE — 3E0L3GC INTRODUCTION OF OTHER THERAPEUTIC SUBSTANCE INTO PLEURAL CAVITY, PERCUTANEOUS APPROACH: ICD-10-PCS | Performed by: INTERNAL MEDICINE

## 2021-03-27 PROCEDURE — 94668 MNPJ CHEST WALL SBSQ: CPT

## 2021-03-27 PROCEDURE — 80202 ASSAY OF VANCOMYCIN: CPT | Performed by: INTERNAL MEDICINE

## 2021-03-27 PROCEDURE — 94640 AIRWAY INHALATION TREATMENT: CPT

## 2021-03-27 RX ORDER — TRAMADOL HYDROCHLORIDE 50 MG/1
50 TABLET ORAL EVERY 6 HOURS PRN
Status: DISCONTINUED | OUTPATIENT
Start: 2021-03-27 | End: 2021-03-29 | Stop reason: HOSPADM

## 2021-03-27 RX ORDER — BUPROPION HYDROCHLORIDE 150 MG/1
150 TABLET ORAL DAILY
Status: DISCONTINUED | OUTPATIENT
Start: 2021-03-27 | End: 2021-03-29 | Stop reason: HOSPADM

## 2021-03-27 RX ORDER — POTASSIUM CHLORIDE 20 MEQ/1
40 TABLET, EXTENDED RELEASE ORAL ONCE
Status: COMPLETED | OUTPATIENT
Start: 2021-03-27 | End: 2021-03-27

## 2021-03-27 RX ORDER — KETOROLAC TROMETHAMINE 30 MG/ML
15 INJECTION, SOLUTION INTRAMUSCULAR; INTRAVENOUS ONCE
Status: COMPLETED | OUTPATIENT
Start: 2021-03-27 | End: 2021-03-27

## 2021-03-27 RX ORDER — FLUOXETINE HYDROCHLORIDE 20 MG/1
20 CAPSULE ORAL DAILY
Status: DISCONTINUED | OUTPATIENT
Start: 2021-03-27 | End: 2021-03-29 | Stop reason: HOSPADM

## 2021-03-27 RX ADMIN — ACETAMINOPHEN 975 MG: 650 SUSPENSION ORAL at 02:54

## 2021-03-27 RX ADMIN — ENOXAPARIN SODIUM 40 MG: 40 INJECTION SUBCUTANEOUS at 09:45

## 2021-03-27 RX ADMIN — Medication 1 TABLET: at 09:45

## 2021-03-27 RX ADMIN — METRONIDAZOLE 500 MG: 500 INJECTION, SOLUTION INTRAVENOUS at 02:54

## 2021-03-27 RX ADMIN — IPRATROPIUM BROMIDE AND ALBUTEROL SULFATE 3 ML: 2.5; .5 SOLUTION RESPIRATORY (INHALATION) at 08:38

## 2021-03-27 RX ADMIN — FLUOXETINE 20 MG: 20 CAPSULE ORAL at 09:45

## 2021-03-27 RX ADMIN — POTASSIUM CHLORIDE 40 MEQ: 1500 TABLET, EXTENDED RELEASE ORAL at 10:35

## 2021-03-27 RX ADMIN — FUROSEMIDE 20 MG: 20 TABLET ORAL at 09:45

## 2021-03-27 RX ADMIN — MAGNESIUM OXIDE TAB 400 MG (241.3 MG ELEMENTAL MG) 400 MG: 400 (241.3 MG) TAB at 17:11

## 2021-03-27 RX ADMIN — ALPRAZOLAM 0.25 MG: 0.25 TABLET ORAL at 21:20

## 2021-03-27 RX ADMIN — ACETAMINOPHEN 975 MG: 650 SUSPENSION ORAL at 09:45

## 2021-03-27 RX ADMIN — VANCOMYCIN HYDROCHLORIDE 1000 MG: 1 INJECTION, SOLUTION INTRAVENOUS at 11:23

## 2021-03-27 RX ADMIN — DORNASE ALFA 5 MG: 1 SOLUTION RESPIRATORY (INHALATION) at 10:28

## 2021-03-27 RX ADMIN — METRONIDAZOLE 500 MG: 500 INJECTION, SOLUTION INTRAVENOUS at 19:06

## 2021-03-27 RX ADMIN — IPRATROPIUM BROMIDE AND ALBUTEROL SULFATE 3 ML: 2.5; .5 SOLUTION RESPIRATORY (INHALATION) at 20:15

## 2021-03-27 RX ADMIN — MAGNESIUM OXIDE TAB 400 MG (241.3 MG ELEMENTAL MG) 400 MG: 400 (241.3 MG) TAB at 10:34

## 2021-03-27 RX ADMIN — BUPROPION HYDROCHLORIDE 150 MG: 150 TABLET, FILM COATED, EXTENDED RELEASE ORAL at 09:45

## 2021-03-27 RX ADMIN — ATORVASTATIN CALCIUM 40 MG: 40 TABLET, FILM COATED ORAL at 17:11

## 2021-03-27 RX ADMIN — LIDOCAINE 1 PATCH: 50 PATCH TOPICAL at 09:45

## 2021-03-27 RX ADMIN — ACETAMINOPHEN 975 MG: 650 SUSPENSION ORAL at 19:17

## 2021-03-27 RX ADMIN — ALTEPLASE 10 MG: 2.2 INJECTION, POWDER, LYOPHILIZED, FOR SOLUTION INTRAVENOUS at 10:28

## 2021-03-27 RX ADMIN — KETOROLAC TROMETHAMINE 15 MG: 30 INJECTION, SOLUTION INTRAMUSCULAR at 11:52

## 2021-03-27 RX ADMIN — CLOPIDOGREL BISULFATE 75 MG: 75 TABLET ORAL at 09:45

## 2021-03-27 RX ADMIN — ALPRAZOLAM 0.25 MG: 0.25 TABLET ORAL at 06:21

## 2021-03-27 RX ADMIN — MELATONIN 6 MG: at 21:20

## 2021-03-27 RX ADMIN — METRONIDAZOLE 500 MG: 500 INJECTION, SOLUTION INTRAVENOUS at 11:23

## 2021-03-27 RX ADMIN — IPRATROPIUM BROMIDE AND ALBUTEROL SULFATE 3 ML: 2.5; .5 SOLUTION RESPIRATORY (INHALATION) at 14:32

## 2021-03-27 RX ADMIN — PANTOPRAZOLE SODIUM 20 MG: 20 TABLET, DELAYED RELEASE ORAL at 05:48

## 2021-03-27 NOTE — ASSESSMENT & PLAN NOTE
Patietn with multiple valvular disease - Moderate AS, mild -mod MR, moderate TR  EF 67%, grade 1 diastolic dysfunction with pulmonary hypertension, 60mmhg

## 2021-03-27 NOTE — PROGRESS NOTES
Progress Note - Pulmonary   Sánchez Calamity 65 y o  female MRN: [id number]  Unit/Bed#: -01 Encounter: [id number]      Interval History:   Pt c/o pain in the R back near chest tube insertion site  Pt also c/o neck pain @ insertion site of IJ catheter  No f/c  Review of Systems:  Full 12 point review of systems negative other than previously mentioned    Objective:   Vitals: Blood pressure 116/53, pulse 86, temperature 97 7 °F (36 5 °C), temperature source Oral, resp  rate 18, height 5' 6" (1 676 m), weight 76 9 kg (169 lb 8 5 oz), SpO2 96 %, not currently breastfeeding , Body mass index is 27 36 kg/m²  Physical Exam  Gen: Awake, alert, oriented x 3, no acute distress  HEENT: Mucous membranes mildly dry, no oral lesions, no thrush  NECK: No accessory muscle use, JVP not elevated, IJ catheter in R neck  Cardiac: Regular, single S1, single S2, no gross murmurs, rubs, or gallops  Lungs: diminshed on R side, otherwise fiarly clear  Abdomen: normoactive bowel sounds, soft nontender, nondistended, no rebound or rigidity, no guarding  Extremities: no cyanosis, no clubbing    Labs: I have personally reviewed pertinent lab results  Results Reviewed     Procedure Component Value Units Date/Time    Blood culture #2 [[id number]] Collected: 03/21/21 2254    Lab Status: Final result Specimen: Blood from Arm, Right Updated: 03/27/21 0701     Blood Culture No Growth After 5 Days  Blood culture #1 [[id number]] Collected: 03/21/21 2254    Lab Status: Final result Specimen: Blood from Arm, Left Updated: 03/27/21 0701     Blood Culture No Growth After 5 Days      Procalcitonin with AM Reflex [[id number]]  (Abnormal) Collected: 03/21/21 2254    Lab Status: Final result Specimen: Blood from Arm, Right Updated: 03/22/21 0736     Procalcitonin 0 80 ng/ml     Ferritin [[id number]]  (Abnormal) Collected: 03/21/21 2256    Lab Status: Final result Specimen: Blood from Arm, Right Updated: 03/22/21 2989     Ferritin 1,334 ng/mL Lactic acid [[id number]]  (Normal) Collected: 03/22/21 0104    Lab Status: Final result Specimen: Blood from Arm, Right Updated: 03/22/21 0136     LACTIC ACID 1 4 mmol/L     Narrative:      Result may be elevated if tourniquet was used during collection  Protime-INR [[id number]]  (Abnormal) Collected: 03/21/21 2254    Lab Status: Final result Specimen: Blood from Arm, Right Updated: 03/22/21 0117     Protime 71 0 seconds      INR 8 77    APTT [[id number]]  (Abnormal) Collected: 03/21/21 2254    Lab Status: Final result Specimen: Blood from Arm, Right Updated: 03/22/21 0117      seconds     COVID19, Influenza A/B, RSV PCR, [facility] [[id number]]  (Normal) Collected: 03/22/21 0014    Lab Status: Final result Specimen: Nares from Nasopharyngeal Swab Updated: 03/22/21 0056     SARS-CoV-2 Negative     INFLUENZA A PCR Negative     INFLUENZA B PCR Negative     RSV PCR Negative    Narrative: This test has been authorized by FDA under an EUA (Emergency Use Assay) for use by authorized laboratories  Clinical caution and judgement should be used with the interpretation of these results with consideration of the clinical impression and other laboratory testing  Testing reported as "Positive" or "Negative" has been proven to be accurate according to standard laboratory validation requirements  All testing is performed with control materials showing appropriate reactivity at standard intervals      Ethanol [[id number]]  (Normal) Collected: 03/21/21 2256    Lab Status: Final result Specimen: Blood from Arm, Right Updated: 03/22/21 0001     Ethanol Lvl <3 mg/dL     CBC and differential [[id number]]  (Abnormal) Collected: 03/21/21 2254    Lab Status: Final result Specimen: Blood from Arm, Right Updated: 03/21/21 2346     WBC 36 08 Thousand/uL      RBC 3 56 Million/uL      Hemoglobin 10 1 g/dL      Hematocrit 31 6 %      MCV 89 fL      MCH 28 4 pg      MCHC 32 0 g/dL      RDW 16 6 %      MPV 10 9 fL      Platelets 034 Thousands/uL      nRBC 0 /100 WBCs     Manual Differential(PHLEBS Do Not Order) [[id number]]  (Abnormal) Collected: 03/21/21 2254    Lab Status: Final result Specimen: Blood from Arm, Right Updated: 03/21/21 2346     Segmented % 81 %      Bands % 4 %      Lymphocytes % 10 %      Monocytes % 5 %      Eosinophils, % 0 %      Basophils % 0 %      Absolute Neutrophils 30 67 Thousand/uL      Lymphocytes Absolute 3 61 Thousand/uL      Monocytes Absolute 1 80 Thousand/uL      Eosinophils Absolute 0 00 Thousand/uL      Basophils Absolute 0 00 Thousand/uL      Total Counted 100     RBC Morphology Normal     Platelet Estimate Increased     Large Platelet Present    D-dimer, quantitative [[id number]]  (Abnormal) Collected: 03/21/21 2254    Lab Status: Final result Specimen: Blood from Arm, Right Updated: 03/21/21 2345     D-Dimer, Quant 3 67 ug/ml FEU     C-reactive protein [[id number]]  (Abnormal) Collected: 03/21/21 2256    Lab Status: Final result Specimen: Blood from Arm, Right Updated: 03/21/21 2341      5 mg/L     NT-BNP PRO [[id number]]  (Abnormal) Collected: 03/21/21 2254    Lab Status: Final result Specimen: Blood from Arm, Right Updated: 03/21/21 2339     NT-proBNP 779 pg/mL     Magnesium [[id number]]  (Abnormal) Collected: 03/21/21 2254    Lab Status: Final result Specimen: Blood from Arm, Right Updated: 03/21/21 2339     Magnesium 1 5 mg/dL     Troponin I [[id number]]  (Normal) Collected: 03/21/21 2254    Lab Status: Final result Specimen: Blood from Arm, Right Updated: 03/21/21 2331     Troponin I <0 02 ng/mL     Comprehensive metabolic panel [[id number]]  (Abnormal) Collected: 03/21/21 2254    Lab Status: Final result Specimen: Blood from Arm, Right Updated: 03/21/21 2330     Sodium 134 mmol/L      Potassium 3 1 mmol/L      Chloride 95 mmol/L      CO2 21 mmol/L      ANION GAP 18 mmol/L      BUN 18 mg/dL      Creatinine 1 01 mg/dL      Glucose 108 mg/dL      Calcium 9 2 mg/dL      Corrected Calcium 11 0 mg/dL AST 29 U/L      ALT 25 U/L      Alkaline Phosphatase 259 U/L      Total Protein 7 4 g/dL      Albumin 1 8 g/dL      Total Bilirubin 0 50 mg/dL      eGFR 56 ml/min/1 73sq m     Narrative:      [place] guidelines for Chronic Kidney Disease (CKD):     Stage 1 with normal or high GFR (GFR > 90 mL/min/1 73 square meters)    Stage 2 Mild CKD (GFR = 60-89 mL/min/1 73 square meters)    Stage 3A Moderate CKD (GFR = 45-59 mL/min/1 73 square meters)    Stage 3B Moderate CKD (GFR = 30-44 mL/min/1 73 square meters)    Stage 4 Severe CKD (GFR = 15-29 mL/min/1 73 square meters)    Stage 5 End Stage CKD (GFR <15 mL/min/1 73 square meters)  Note: GFR calculation is accurate only with a steady state creatinine    CK (with reflex to MB) [[id number]]  (Normal) Collected: 03/21/21 2256    Lab Status: Final result Specimen: Blood from Arm, Right Updated: 03/21/21 2327     Total CK 56 U/L            Current Medications:    Current Facility-Administered Medications:     acetaminophen (TYLENOL) oral suspension 975 mg, 975 mg, Oral, Q6H PRN, NICKOLAS Martinez, 975 mg at 03/27/21 0254    ALPRAZolam (XANAX) tablet 0 25 mg, 0 25 mg, Oral, Q6H PRN, Roly Banks MD, 0 25 mg at 03/27/21 6121    atorvastatin (LIPITOR) tablet 40 mg, 40 mg, Oral, Daily With Boby Jensen PA-C, 40 mg at 03/26/21 1814    buPROPion (WELLBUTRIN XL) 24 hr tablet 150 mg, 150 mg, Oral, Daily, NICKOLAS Krause    clopidogrel (PLAVIX) tablet 75 mg, 75 mg, Oral, Daily, Aiyana Wolff PA-C, 75 mg at 03/26/21 0943    dextromethorphan-guaiFENesin (ROBITUSSIN DM) oral syrup 10 mL, 10 mL, Oral, Q4H PRN, Aiyana Wolff PA-C, 10 mL at 03/24/21 0318    enoxaparin (LOVENOX) subcutaneous injection 40 mg, 40 mg, Subcutaneous, Daily, Roly Banks MD, 40 mg at 03/26/21 0931    FLUoxetine (PROzac) capsule 20 mg, 20 mg, Oral, Daily, NICKOLAS Krause    folic acid 1 mg in sodium chloride 0 9 % 50 mL IVPB, 1 mg, Intravenous, Daily, NICKOLAS Song, Stopped at 03/26/21 1027    furosemide (LASIX) tablet 20 mg, 20 mg, Oral, Daily, Elane Dulce Maria, PA-C, 20 mg at 03/26/21 0943    ipratropium-albuterol (DUO-NEB) 0 5-2 5 mg/3 mL inhalation solution 3 mL, 3 mL, Nebulization, Q6H PRN, Elane Dulce Maria, PA-C    ipratropium-albuterol (DUO-NEB) 0 5-2 5 mg/3 mL inhalation solution 3 mL, 3 mL, Nebulization, TID, Elane Dulce Maria, PA-C, 3 mL at 03/27/21 0838    lidocaine (LIDODERM) 5 % patch 1 patch, 1 patch, Topical, Daily, NICKOLAS Song, 1 patch at 03/26/21 0941    melatonin tablet 6 mg, 6 mg, Oral, HS, Gisella Arriaga, CRNP, 6 mg at 03/26/21 2225    metroNIDAZOLE (FLAGYL) IVPB (premix) 500 mg 100 mL, 500 mg, Intravenous, Q8H, Elane Dulce Maria, PA-C, Stopped at 03/27/21 0400    multivitamin-minerals (CENTRUM ADULTS) tablet 1 tablet, 1 tablet, Oral, Daily, Elane Dulce Maria, PA-C, 1 tablet at 03/23/21 0838    ondansetron (ZOFRAN) injection 4 mg, 4 mg, Intravenous, Q6H PRN, Elane Dulce Maria, PA-C    pantoprazole (PROTONIX) EC tablet 20 mg, 20 mg, Oral, Early Morning, Elane Dulce Maria, PA-C, 20 mg at 03/27/21 0548    thiamine (VITAMIN B1) 100 mg in sodium chloride 0 9 % 50 mL IVPB, 100 mg, Intravenous, Daily, NICKOLAS Song, Stopped at 03/26/21 1013    vancomycin (VANCOCIN) IVPB (premix in dextrose) 1,000 mg 200 mL, 15 mg/kg, Intravenous, Q12H, NICKOLAS Hwang, Last Rate: 200 mL/hr at 03/26/21 2225, 1,000 mg at 03/26/21 2225     Microbiology:  Chest tube: 3/26/21 rare GPC    Imaging and other studies:   I personally viewed and interpreted the following imaging studies:  CXR improvement of R sided pleural effusion, persistent interstial>alveolar opacification, unclear if this is related to consolidation or persistent effusion    Assessment:  1  Complicated parapneumonic effusion vs empyema given +GS  2   Pneumonia      Plan:   Maintain chest tube to suction   Continue tpa dornase instillation for total of 6 doses   Will recheck CT Chest wo contrast in AM to evaluate for resolution of effusion  If residual effusion, should be evaluated for VATS/decortication   F/u pleural cx's   Cont  Abx per primary service and ID, will need extended course of abx given complicated nature of parapneumonic effusion   I personally discussed this patient in depth with the primary service      RICHARD Heard [facility]

## 2021-03-27 NOTE — QUICK NOTE
2135-instilled last dose of 10mg altepase in 30ml SW and Dornase 5 mg in 30 ml SW with additional 10 cc NS flush into right chest tube  Tube to be clamped for 1 hour and unclamped at 2235  Monitor drainage

## 2021-03-27 NOTE — PROGRESS NOTES
TPA/Dornase instilled to R CT and clamped for 1 hr  Dsg was saturated with serous drainage prior to instillation  CT dsg taken down and changed  Flushed easily, no kinks   Pt aware to attempt repositioning to right/left/back and upright during the hr      CT back to suction at 1130 1 hr post tpa/dornase administration

## 2021-03-27 NOTE — ASSESSMENT & PLAN NOTE
Acute hypoxic resp failure, POA, a/e/b sob, hypoxia 85% on 2L >8L, tachypnea 19-22 secondary to right lower lobe pneumonia, large right parapneumonic effusion and atelectasis  S/p IR chest tube placement 3/23/21  Oxygen requirements down to 4L NC  Wean oxygen as tolerated  Goal O2 sats > 92%

## 2021-03-27 NOTE — PROGRESS NOTES
Vancomycin Assessment    Mery Alberts is a 65 y o  female who is currently receiving vancomycin 1000 mg q12h (vanco trough 21 3) for Pneumonia     Relevant clinical data and objective history reviewed:  Creatinine   Date Value Ref Range Status   03/27/2021 0 75 0 60 - 1 30 mg/dL Final     Comment:     Standardized to IDMS reference method   03/26/2021 0 75 0 60 - 1 30 mg/dL Final     Comment:     Standardized to IDMS reference method   03/25/2021 0 72 0 60 - 1 30 mg/dL Final     Comment:     Standardized to IDMS reference method     /53   Pulse 86   Temp 97 7 °F (36 5 °C) (Oral)   Resp 18   Ht 5' 6" (1 676 m)   Wt 76 9 kg (169 lb 8 5 oz) Comment: no blankets/equpment on bed, 1 pillow for weight  SpO2 96%   BMI 27 36 kg/m²   I/O last 3 completed shifts: In: 2700 [P O :1660; I V :90; IV Piggyback:950]  Out: 9071 [Urine:2067; Chest Tube:690]  Lab Results   Component Value Date/Time    BUN 10 03/27/2021 05:54 AM    WBC 21 38 (H) 03/27/2021 05:54 AM    HGB 7 6 (L) 03/27/2021 05:54 AM    HCT 23 2 (L) 03/27/2021 05:54 AM    MCV 89 03/27/2021 05:54 AM     (H) 03/27/2021 05:54 AM     Temp Readings from Last 3 Encounters:   03/27/21 97 7 °F (36 5 °C) (Oral)   12/19/19 97 8 °F (36 6 °C) (Tympanic)   06/12/18 97 9 °F (36 6 °C) (Tympanic)     Vancomycin Days of Therapy: 3    Assessment/Plan  The patient is currently on vancomycin utilizing scheduled dosing  Baseline risks associated with therapy include: advanced age  The patient is receiving 1000 mg q12h (vanco trough 21 3) with the most recent vancomycin level being not at steady-state and supratherapeutic based on a goal of 15-20 (appropriate for most indications) ; therefore, after clinical evaluation will be changed to 750 mg q12h hours (starting 0330 on 3/28/21)   Pharmacy will continue to follow closely for s/sx of nephrotoxicity, infusion reactions, and appropriateness of therapy  BMP and CBC will be ordered per protocol    Plan for trough as patient approaches steady state, prior to the 4th  dose at approximately 1500 hrs on 3/29/21  Pharmacy will continue to follow the patients culture results and clinical progress daily      Guicho Moran, Pharmacist

## 2021-03-27 NOTE — ASSESSMENT & PLAN NOTE
Sepsis, poa, a/e/b leukocytosis 36k with 4% bands and tachycardia secondary to right sided pneumonia/large right parapneumonic effusion  INR 8 77 on admission  S/p Vit K, INR now 1 27  S/p IR chest tube placement 3/23/21 - now on tpa/dornase BID x 3 days  Fluid cultures - rare gram positive cocci  On IV antibiotics -  flagyl and vancomycin; cefepime discontinued  urine Legionella and Streptococcus - negative  Trend WBC and fever curve   Infectious disease on board  Blood cultures negative till date

## 2021-03-27 NOTE — PROGRESS NOTES
[place]  Progress Note - Keiry Cota 1950, 65 y o  female MRN: [id number]  Unit/Bed#: -01 Encounter: [id number]  Primary Care Provider: Bassem Mansfield MD   Date and time admitted to hospital: 3/21/2021 10:27 PM    Delirium  Assessment & Plan  Acute delirium, for which she received 16mg ativan overnight based off CIWA protocol  patient's family denies heavy alcohol abuse, she takes maybe a glass of wine occasionally, so it is unlikely she is in active alcohol withdrawal - more like severe delirium  CT head - No acute intracranial anomaly,  CIWA protocol discontinued 3/24 as patient is being overmedicated  Trial of phenobarb x 1  Now at baseline mental status AO x4  C/w haldol Prn  QTC interval - 468  continue with IV antibiotics for pneumonia treatment    * Sepsis [facility])  Assessment & Plan  Sepsis, poa, a/e/b leukocytosis 36k with 4% bands and tachycardia secondary to right sided pneumonia/large right parapneumonic effusion  INR 8 77 on admission  S/p Vit K, INR now 1 27  S/p IR chest tube placement 3/23/21 - now on tpa/dornase BID x 3 days  Fluid cultures - rare gram positive cocci  On IV antibiotics -  flagyl and vancomycin; cefepime discontinued  urine Legionella and Streptococcus - negative  Trend WBC and fever curve   Infectious disease on board  Blood cultures negative till date      Pneumonia of right lower lobe due to infectious organism  Assessment & Plan  Right lower lobe pneumonia with parapneumonic effusion  CT chest showed- No evidence of pulmonary artery embolus  Consolidation versus mass in the right lower lobe      On vancomycin, Flagyl  urine Legionella and Streptococcus negative  Oxygen supplementation and bronchodilators  Pulmonary on board    Parapneumonic effusion  Assessment & Plan  CT chest - large right-sided complicated parapenumonic pleural effusion  S/p IR chest tube placement 3/23/21  Chest tube drainage reduced  S/p chest tube tpa BID x 3days,last dose 3/26  For chest Xray check today  Pulmonary on board,driving care  Oxygen supplementation to keep sats > 92%    Supratherapeutic INR  Assessment & Plan  Patient with INR of 8 77 on admission, possibly secondary to severe malnutrition, Albumin was 1 8 on admission  Patient's family denies history of heavy alcohol abuse (CIWA discontinued)  Liver enzymes within normal limits, no history of liver disease  Not on any anticoagulation  S/p vitamin  K 10mg once - with rapid improvement in INR to 1 27  factor VII activity results pending      Dysphagia  Assessment & Plan  Dysphagia 2 diet  Aspiration precautions    Valvular heart disease  Assessment & Plan  Patietn with multiple valvular disease - Moderate AS, mild -mod MR, moderate TR  EF 73%, grade 1 diastolic dysfunction with pulmonary hypertension, 60mmhg    Anemia  Assessment & Plan  Hb 10 1 on admission - chronic normocytic normochromic anemia  Iron panel - Chronic anemia of inflammation  Hb downtrended to 7 2 today - no signs of overt bleeding seen  Keep hb > 7, transfuse if needed    Severe protein-calorie malnutrition (HCC)  Assessment & Plan  Malnutrition Findings:   Adult Malnutrition type: Acute illness  Adult Degree of Malnutrition: Other severe protein calorie malnutritionsevere protein calorie malnutrition as evidenced by hollowed orbitals, temple hollowing, clavicle protrusion, with prominent bone and low albumin levels 1 6> 1 8, and associated coagulapathy with high INR 8 on admission in the setting of right lower lobe pneumonia with complicated right parapneumonic effusion    BMI Findings: Body mass index is 27 36 kg/m²         Thrombocytosis ([facility] [address] )  Assessment & Plan  Most likely reactive, In the setting of coagulopathy/sepsis        Acute respiratory failure with hypoxia (HCC)  Assessment & Plan  Acute hypoxic resp failure, POA, a/e/b sob, hypoxia 85% on 2L >8L, tachypnea 19-22 secondary to right lower lobe pneumonia, large right parapneumonic effusion and atelectasis  S/p IR chest tube placement 3/23/21  Oxygen requirements down to 4L NC  Wean oxygen as tolerated  Goal O2 sats > 92%    GERD (gastroesophageal reflux disease)  Assessment & Plan  On Protonix    Hypokalemia  Assessment & Plan  Monitor and replace electrolytes as needed    CAD (coronary artery disease)  Assessment & Plan  Continue on Plavix and Lipitor        VTE Pharmacologic Prophylaxis:   Pharmacologic: Enoxaparin (Lovenox)  Mechanical VTE Prophylaxis in Place: Yes    Patient Centered Rounds: I have performed bedside rounds with nursing staff today  Discussions with Specialists or Other Care Team Provider:     Education and Discussions with Family / Patient: patient's daughter updated     Time Spent for Care: 30 minutes  More than 50% of total time spent on counseling and coordination of care as described above  Current Length of Stay: 5 day(s)    Current Patient Status: Inpatient   Certification Statement: The patient, who was admitted as an inpatient, is being discharged sooner than originally anticipated due to: as above    Discharge Plan: 2-3days    Code Status: Level 1 - Full Code      Subjective:   No overnight events, feeling tired this morning    Objective:     Vitals:   Temp (24hrs), Av 1 °F (36 7 °C), Min:97 7 °F (36 5 °C), Max:98 5 °F (36 9 °C)    Temp:  [97 7 °F (36 5 °C)-98 5 °F (36 9 °C)] 97 9 °F (36 6 °C)  HR:  [76-92] 76  Resp:  [16-30] 20  BP: ()/(53-72) 107/53  SpO2:  [93 %-100 %] 93 %  Body mass index is 27 36 kg/m²  Input and Output Summary (last 24 hours): Intake/Output Summary (Last 24 hours) at 3/27/2021 1408  Last data filed at 3/27/2021 1322  Gross per 24 hour   Intake 1324 ml   Output 2660 ml   Net -1336 ml       Physical Exam:     Physical Exam  Vitals signs reviewed  Constitutional:       General: She is not in acute distress  Appearance: Normal appearance  She is not ill-appearing, toxic-appearing or diaphoretic  Cardiovascular:      Rate and Rhythm: Normal rate and regular rhythm  Pulses: Normal pulses  Heart sounds: Murmur present  No gallop  Pulmonary:      Effort: Pulmonary effort is normal  No respiratory distress  Breath sounds: Normal breath sounds  No stridor  No wheezing, rhonchi or rales  Chest:      Chest wall: No tenderness  Abdominal:      General: Bowel sounds are normal  There is no distension  Palpations: Abdomen is soft  Tenderness: There is no abdominal tenderness  There is no right CVA tenderness, left CVA tenderness or guarding  Musculoskeletal: Normal range of motion  General: No swelling or deformity  Right lower leg: No edema  Left lower leg: No edema  Skin:     General: Skin is warm and dry  Capillary Refill: Capillary refill takes less than 2 seconds  Coloration: Skin is not jaundiced  Findings: No bruising, erythema, lesion or rash  Neurological:      General: No focal deficit present  Mental Status: She is alert  Mental status is at baseline  Cranial Nerves: No cranial nerve deficit  Psychiatric:         Mood and Affect: Mood normal        Additional Data:     Labs:    Results from last 7 days   Lab Units 03/27/21  0554  03/25/21  0435   WBC Thousand/uL 21 38*   < > 25 40*   HEMOGLOBIN g/dL 7 6*   < > 6 9*   HEMATOCRIT % 23 2*   < > 21 4*   PLATELETS Thousands/uL 685*   < > 605*   BANDS PCT %  --   --  1   LYMPHO PCT %  --   --  6*   MONO PCT %  --   --  7   EOS PCT %  --   --  1    < > = values in this interval not displayed       Results from last 7 days   Lab Units 03/27/21  0554  03/22/21  0401   SODIUM mmol/L 137   < > 133*   POTASSIUM mmol/L 3 2*   < > 3 9   CHLORIDE mmol/L 104   < > 97*   CO2 mmol/L 23   < > 23   CO2, I-STAT   --    < >  --    BUN mg/dL 10   < > 17   CREATININE mg/dL 0 75   < > 0 88   ANION GAP mmol/L 10   < > 13   CALCIUM mg/dL 7 7*   < > 8 5   ALBUMIN g/dL  --   --  1 6*   TOTAL BILIRUBIN mg/dL  --   --  0 40   ALK PHOS U/L  --   --  220*   ALT U/L  --   --  24   AST U/L  --   --  30   GLUCOSE RANDOM mg/dL 100   < > 118    < > = values in this interval not displayed  Results from last 7 days   Lab Units 03/26/21  0246   INR  1 30*     Results from last 7 days   Lab Units 03/26/21  0556 03/26/21  0014   POC GLUCOSE mg/dl 74 96         Results from last 7 days   Lab Units 03/23/21  0447 03/22/21  0104 03/21/21  2254   LACTIC ACID mmol/L  --  1 4  --    PROCALCITONIN ng/ml 1 12*  --  0 80*           * I Have Reviewed All Lab Data Listed Above  * Additional Pertinent Lab Tests Reviewed: All Labs Within Last 24 Hours Reviewed    Imaging:    Imaging Reports Reviewed Today Include:   Imaging Personally Reviewed by Myself Includes:      Recent Cultures (last 7 days):     Results from last 7 days   Lab Units 03/25/21  0757 03/23/21  1743 03/21/21  2254   BLOOD CULTURE   --   --  No Growth After 5 Days  No Growth After 5 Days     GRAM STAIN RESULT   --  1+ Polys*  Rare Gram positive cocci in pairs*  2+ Polys  No No bacteria seen  --    BODY FLUID CULTURE, STERILE   --  No growth  --    LEGIONELLA URINARY ANTIGEN  Negative  --   --        Last 24 Hours Medication List:   Current Facility-Administered Medications   Medication Dose Route Frequency Provider Last Rate    acetaminophen  975 mg Oral Q6H PRN NICKOLAS Zee      ALPRAZolam  0 25 mg Oral Q6H PRN Arnoldo Dwyer MD      atorvastatin  40 mg Oral Daily With Dieter Wiseman PA-C      buPROPion  150 mg Oral Daily NICKOLAS Berg      clopidogrel  75 mg Oral Daily [place], [place]      dextromethorphan-guaiFENesin  10 mL Oral Q4H PRN Corbin Verduzco PA-C      enoxaparin  40 mg Subcutaneous Daily Arnoldo Dwyer MD      FLUoxetine  20 mg Oral Daily NICKOLAS Berg      furosemide  20 mg Oral Daily [place], [place]      ipratropium-albuterol  3 mL Nebulization Q6H PRN Mansi Triana Wil Storm PA-C      ipratropium-albuterol  3 mL Nebulization TID De Johnson PA-C      lidocaine  1 patch Topical Daily NICKOLAS Hayes      magnesium oxide  400 mg Oral BID NICKOLAS Becker      melatonin  6 mg Oral HS NICKOLAS Miranda      metroNIDAZOLE  500 mg Intravenous Q8H De Johnson PA-C Stopped (03/27/21 1217)    multivitamin-minerals  1 tablet Oral Daily De Johnson PA-C      ondansetron  4 mg Intravenous Q6H PRN De Johnson PA-C      pantoprazole  20 mg Oral Early Morning De Johnson PA-C      traMADol  50 mg Oral Q6H PRN NICKOLAS Becker      [START ON 3/28/2021] vancomycin  10 mg/kg (Adjusted) Intravenous Q12H NICKOLAS Hayes          Today, Patient Was Seen By: Korey Carolina MD    ** Please Note: Dictation voice to text software may have been used in the creation of this document   **

## 2021-03-27 NOTE — ASSESSMENT & PLAN NOTE
Malnutrition Findings:   Adult Malnutrition type: Acute illness  Adult Degree of Malnutrition: Other severe protein calorie malnutritionsevere protein calorie malnutrition as evidenced by hollowed orbitals, temple hollowing, clavicle protrusion, with prominent bone and low albumin levels 1 6> 1 8, and associated coagulapathy with high INR 8 on admission in the setting of right lower lobe pneumonia with complicated right parapneumonic effusion    BMI Findings: Body mass index is 27 36 kg/m²

## 2021-03-27 NOTE — ASSESSMENT & PLAN NOTE
CT chest - large right-sided complicated parapenumonic pleural effusion  S/p IR chest tube placement 3/23/21  Chest tube drainage reduced  S/p chest tube tpa BID x 3days,last dose 3/26  For chest Xray check today  Pulmonary on board,driving care  Oxygen supplementation to keep sats > 92%

## 2021-03-27 NOTE — ASSESSMENT & PLAN NOTE
Acute delirium, for which she received 16mg ativan overnight based off CIWA protocol  patient's family denies heavy alcohol abuse, she takes maybe a glass of wine occasionally, so it is unlikely she is in active alcohol withdrawal - more like severe delirium    CT head - No acute intracranial anomaly,  CIWA protocol discontinued 3/24 as patient is being overmedicated  Trial of phenobarb x 1  Now at baseline mental status AO x4  C/w haldol Prn  QTC interval - 468  continue with IV antibiotics for pneumonia treatment

## 2021-03-27 NOTE — ASSESSMENT & PLAN NOTE
Right lower lobe pneumonia with parapneumonic effusion  CT chest showed- No evidence of pulmonary artery embolus  Consolidation versus mass in the right lower lobe      On vancomycin, Flagyl  urine Legionella and Streptococcus negative  Oxygen supplementation and bronchodilators  Pulmonary on board

## 2021-03-28 ENCOUNTER — APPOINTMENT (INPATIENT)
Dept: CT IMAGING | Facility: HOSPITAL | Age: 71
DRG: 871 | End: 2021-03-28
Payer: MEDICARE

## 2021-03-28 LAB
ANION GAP SERPL CALCULATED.3IONS-SCNC: 8 MMOL/L (ref 4–13)
BUN SERPL-MCNC: 8 MG/DL (ref 5–25)
CALCIUM SERPL-MCNC: 7.8 MG/DL (ref 8.3–10.1)
CHLORIDE SERPL-SCNC: 107 MMOL/L (ref 100–108)
CO2 SERPL-SCNC: 25 MMOL/L (ref 21–32)
CREAT SERPL-MCNC: 0.7 MG/DL (ref 0.6–1.3)
ERYTHROCYTE [DISTWIDTH] IN BLOOD BY AUTOMATED COUNT: 16.9 % (ref 11.6–15.1)
GFR SERPL CREATININE-BSD FRML MDRD: 87 ML/MIN/1.73SQ M
GLUCOSE SERPL-MCNC: 92 MG/DL (ref 65–140)
HCT VFR BLD AUTO: 24 % (ref 34.8–46.1)
HGB BLD-MCNC: 7.8 G/DL (ref 11.5–15.4)
MAGNESIUM SERPL-MCNC: 1.7 MG/DL (ref 1.6–2.6)
MCH RBC QN AUTO: 28.9 PG (ref 26.8–34.3)
MCHC RBC AUTO-ENTMCNC: 32.5 G/DL (ref 31.4–37.4)
MCV RBC AUTO: 89 FL (ref 82–98)
MRSA NOSE QL CULT: NORMAL
PLATELET # BLD AUTO: 725 THOUSANDS/UL (ref 149–390)
PMV BLD AUTO: 10.2 FL (ref 8.9–12.7)
POTASSIUM SERPL-SCNC: 4.1 MMOL/L (ref 3.5–5.3)
PROCALCITONIN SERPL-MCNC: 0.54 NG/ML
RBC # BLD AUTO: 2.7 MILLION/UL (ref 3.81–5.12)
SODIUM SERPL-SCNC: 140 MMOL/L (ref 136–145)
WBC # BLD AUTO: 22.51 THOUSAND/UL (ref 4.31–10.16)

## 2021-03-28 PROCEDURE — 80048 BASIC METABOLIC PNL TOTAL CA: CPT | Performed by: INTERNAL MEDICINE

## 2021-03-28 PROCEDURE — 83735 ASSAY OF MAGNESIUM: CPT | Performed by: INTERNAL MEDICINE

## 2021-03-28 PROCEDURE — 85027 COMPLETE CBC AUTOMATED: CPT | Performed by: INTERNAL MEDICINE

## 2021-03-28 PROCEDURE — 94664 DEMO&/EVAL PT USE INHALER: CPT

## 2021-03-28 PROCEDURE — 71250 CT THORAX DX C-: CPT

## 2021-03-28 PROCEDURE — 94640 AIRWAY INHALATION TREATMENT: CPT

## 2021-03-28 PROCEDURE — 99232 SBSQ HOSP IP/OBS MODERATE 35: CPT | Performed by: INTERNAL MEDICINE

## 2021-03-28 PROCEDURE — 94760 N-INVAS EAR/PLS OXIMETRY 1: CPT

## 2021-03-28 PROCEDURE — G1004 CDSM NDSC: HCPCS

## 2021-03-28 PROCEDURE — 84145 PROCALCITONIN (PCT): CPT | Performed by: INTERNAL MEDICINE

## 2021-03-28 RX ORDER — TRAZODONE HYDROCHLORIDE 50 MG/1
100 TABLET ORAL
Status: DISCONTINUED | OUTPATIENT
Start: 2021-03-28 | End: 2021-03-29 | Stop reason: HOSPADM

## 2021-03-28 RX ADMIN — IPRATROPIUM BROMIDE AND ALBUTEROL SULFATE 3 ML: 2.5; .5 SOLUTION RESPIRATORY (INHALATION) at 20:56

## 2021-03-28 RX ADMIN — TRAMADOL HYDROCHLORIDE 50 MG: 50 TABLET, FILM COATED ORAL at 07:45

## 2021-03-28 RX ADMIN — FLUOXETINE 20 MG: 20 CAPSULE ORAL at 07:53

## 2021-03-28 RX ADMIN — ATORVASTATIN CALCIUM 40 MG: 40 TABLET, FILM COATED ORAL at 16:35

## 2021-03-28 RX ADMIN — PANTOPRAZOLE SODIUM 20 MG: 20 TABLET, DELAYED RELEASE ORAL at 05:53

## 2021-03-28 RX ADMIN — ACETAMINOPHEN 975 MG: 650 SUSPENSION ORAL at 23:10

## 2021-03-28 RX ADMIN — Medication 1 TABLET: at 07:54

## 2021-03-28 RX ADMIN — MAGNESIUM OXIDE TAB 400 MG (241.3 MG ELEMENTAL MG) 400 MG: 400 (241.3 MG) TAB at 07:52

## 2021-03-28 RX ADMIN — IPRATROPIUM BROMIDE AND ALBUTEROL SULFATE 3 ML: 2.5; .5 SOLUTION RESPIRATORY (INHALATION) at 15:00

## 2021-03-28 RX ADMIN — TRAMADOL HYDROCHLORIDE 50 MG: 50 TABLET, FILM COATED ORAL at 13:52

## 2021-03-28 RX ADMIN — TRAZODONE HYDROCHLORIDE 100 MG: 50 TABLET ORAL at 21:19

## 2021-03-28 RX ADMIN — FUROSEMIDE 20 MG: 20 TABLET ORAL at 07:52

## 2021-03-28 RX ADMIN — ACETAMINOPHEN 975 MG: 650 SUSPENSION ORAL at 11:06

## 2021-03-28 RX ADMIN — MELATONIN 6 MG: at 21:19

## 2021-03-28 RX ADMIN — ALPRAZOLAM 0.25 MG: 0.25 TABLET ORAL at 04:50

## 2021-03-28 RX ADMIN — METRONIDAZOLE 500 MG: 500 INJECTION, SOLUTION INTRAVENOUS at 11:09

## 2021-03-28 RX ADMIN — TRAMADOL HYDROCHLORIDE 50 MG: 50 TABLET, FILM COATED ORAL at 01:15

## 2021-03-28 RX ADMIN — MAGNESIUM OXIDE TAB 400 MG (241.3 MG ELEMENTAL MG) 400 MG: 400 (241.3 MG) TAB at 16:35

## 2021-03-28 RX ADMIN — METRONIDAZOLE 500 MG: 500 INJECTION, SOLUTION INTRAVENOUS at 04:05

## 2021-03-28 RX ADMIN — BUPROPION HYDROCHLORIDE 150 MG: 150 TABLET, FILM COATED, EXTENDED RELEASE ORAL at 07:52

## 2021-03-28 RX ADMIN — TRAMADOL HYDROCHLORIDE 50 MG: 50 TABLET, FILM COATED ORAL at 20:05

## 2021-03-28 RX ADMIN — CLOPIDOGREL BISULFATE 75 MG: 75 TABLET ORAL at 07:53

## 2021-03-28 RX ADMIN — VANCOMYCIN HYDROCHLORIDE 750 MG: 750 INJECTION, SOLUTION INTRAVENOUS at 04:15

## 2021-03-28 RX ADMIN — LIDOCAINE 1 PATCH: 50 PATCH TOPICAL at 07:49

## 2021-03-28 RX ADMIN — METRONIDAZOLE 500 MG: 500 INJECTION, SOLUTION INTRAVENOUS at 20:06

## 2021-03-28 RX ADMIN — ENOXAPARIN SODIUM 40 MG: 40 INJECTION SUBCUTANEOUS at 07:54

## 2021-03-28 RX ADMIN — GUAIFENESIN AND DEXTROMETHORPHAN 10 ML: 100; 10 SYRUP ORAL at 23:10

## 2021-03-28 RX ADMIN — VANCOMYCIN HYDROCHLORIDE 750 MG: 750 INJECTION, SOLUTION INTRAVENOUS at 14:52

## 2021-03-28 RX ADMIN — IPRATROPIUM BROMIDE AND ALBUTEROL SULFATE 3 ML: 2.5; .5 SOLUTION RESPIRATORY (INHALATION) at 08:40

## 2021-03-28 NOTE — PROGRESS NOTES
Progress Note - Pulmonary   [place] 65 y o  female MRN: [id number]  Unit/Bed#: -01 Encounter: [id number]      Interval History:   Still c/o R back pain at site of CT insertion  No f/c    Review of Systems:  Full 12 point review of systems negative other than previously mentioned    Objective:   Vitals: Blood pressure 143/64, pulse 80, temperature 98 5 °F (36 9 °C), temperature source Oral, resp  rate 18, height 5' 6" (1 676 m), weight 73 8 kg (162 lb 11 2 oz), SpO2 96 %, not currently breastfeeding , Body mass index is 26 26 kg/m²  Physical Exam  Gen: Awake, alert, oriented x 3, no acute distress  HEENT: Mucous membranes mildly dry, no oral lesions, no thrush  NECK: No accessory muscle use, JVP not elevated, IJ catheter in R neck  Cardiac: Regular, single S1, single S2, no gross murmurs, rubs, or gallops  Lungs: diminshed on R side, otherwise fiarly clear  Abdomen: normoactive bowel sounds, soft nontender, nondistended, no rebound or rigidity, no guarding  Extremities: no cyanosis, no clubbing     Labs: I have personally reviewed pertinent lab results  Results Reviewed     Procedure Component Value Units Date/Time    Blood culture #2 [[id number]] Collected: 03/21/21 2254    Lab Status: Final result Specimen: Blood from Arm, Right Updated: 03/27/21 0701     Blood Culture No Growth After 5 Days  Blood culture #1 [[id number]] Collected: 03/21/21 2254    Lab Status: Final result Specimen: Blood from Arm, Left Updated: 03/27/21 0701     Blood Culture No Growth After 5 Days      Procalcitonin with AM Reflex [[id number]]  (Abnormal) Collected: 03/21/21 2254    Lab Status: Final result Specimen: Blood from Arm, Right Updated: 03/22/21 0736     Procalcitonin 0 80 ng/ml     Ferritin [[id number]]  (Abnormal) Collected: 03/21/21 2256    Lab Status: Final result Specimen: Blood from Arm, Right Updated: 03/22/21 0608     Ferritin 1,334 ng/mL     Lactic acid [[id number]]  (Normal) Collected: 03/22/21 0104    Lab Status: Final result Specimen: Blood from Arm, Right Updated: 03/22/21 0136     LACTIC ACID 1 4 mmol/L     Narrative:      Result may be elevated if tourniquet was used during collection  Protime-INR [[id number]]  (Abnormal) Collected: 03/21/21 2254    Lab Status: Final result Specimen: Blood from Arm, Right Updated: 03/22/21 0117     Protime 71 0 seconds      INR 8 77    APTT [[id number]]  (Abnormal) Collected: 03/21/21 2254    Lab Status: Final result Specimen: Blood from Arm, Right Updated: 03/22/21 0117      seconds     COVID19, Influenza A/B, RSV PCR, [facility] [[id number]]  (Normal) Collected: 03/22/21 0014    Lab Status: Final result Specimen: Nares from Nasopharyngeal Swab Updated: 03/22/21 0056     SARS-CoV-2 Negative     INFLUENZA A PCR Negative     INFLUENZA B PCR Negative     RSV PCR Negative    Narrative: This test has been authorized by FDA under an EUA (Emergency Use Assay) for use by authorized laboratories  Clinical caution and judgement should be used with the interpretation of these results with consideration of the clinical impression and other laboratory testing  Testing reported as "Positive" or "Negative" has been proven to be accurate according to standard laboratory validation requirements  All testing is performed with control materials showing appropriate reactivity at standard intervals      Ethanol [[id number]]  (Normal) Collected: 03/21/21 2256    Lab Status: Final result Specimen: Blood from Arm, Right Updated: 03/22/21 0001     Ethanol Lvl <3 mg/dL     CBC and differential [[id number]]  (Abnormal) Collected: 03/21/21 2254    Lab Status: Final result Specimen: Blood from Arm, Right Updated: 03/21/21 2346     WBC 36 08 Thousand/uL      RBC 3 56 Million/uL      Hemoglobin 10 1 g/dL      Hematocrit 31 6 %      MCV 89 fL      MCH 28 4 pg      MCHC 32 0 g/dL      RDW 16 6 %      MPV 10 9 fL      Platelets 980 Thousands/uL      nRBC 0 /100 WBCs     Manual Differential(PHLEBS Do Not Order) [[id number]]  (Abnormal) Collected: 03/21/21 2254    Lab Status: Final result Specimen: Blood from Arm, Right Updated: 03/21/21 2346     Segmented % 81 %      Bands % 4 %      Lymphocytes % 10 %      Monocytes % 5 %      Eosinophils, % 0 %      Basophils % 0 %      Absolute Neutrophils 30 67 Thousand/uL      Lymphocytes Absolute 3 61 Thousand/uL      Monocytes Absolute 1 80 Thousand/uL      Eosinophils Absolute 0 00 Thousand/uL      Basophils Absolute 0 00 Thousand/uL      Total Counted 100     RBC Morphology Normal     Platelet Estimate Increased     Large Platelet Present    D-dimer, quantitative [[id number]]  (Abnormal) Collected: 03/21/21 2254    Lab Status: Final result Specimen: Blood from Arm, Right Updated: 03/21/21 2345     D-Dimer, Quant 3 67 ug/ml FEU     C-reactive protein [[id number]]  (Abnormal) Collected: 03/21/21 2256    Lab Status: Final result Specimen: Blood from Arm, Right Updated: 03/21/21 2341      5 mg/L     NT-BNP PRO [[id number]]  (Abnormal) Collected: 03/21/21 2254    Lab Status: Final result Specimen: Blood from Arm, Right Updated: 03/21/21 2339     NT-proBNP 779 pg/mL     Magnesium [[id number]]  (Abnormal) Collected: 03/21/21 2254    Lab Status: Final result Specimen: Blood from Arm, Right Updated: 03/21/21 2339     Magnesium 1 5 mg/dL     Troponin I [[id number]]  (Normal) Collected: 03/21/21 2254    Lab Status: Final result Specimen: Blood from Arm, Right Updated: 03/21/21 2331     Troponin I <0 02 ng/mL     Comprehensive metabolic panel [[id number]]  (Abnormal) Collected: 03/21/21 2254    Lab Status: Final result Specimen: Blood from Arm, Right Updated: 03/21/21 2330     Sodium 134 mmol/L      Potassium 3 1 mmol/L      Chloride 95 mmol/L      CO2 21 mmol/L      ANION GAP 18 mmol/L      BUN 18 mg/dL      Creatinine 1 01 mg/dL      Glucose 108 mg/dL      Calcium 9 2 mg/dL      Corrected Calcium 11 0 mg/dL      AST 29 U/L      ALT 25 U/L      Alkaline Phosphatase 259 U/L      Total Protein 7 4 g/dL      Albumin 1 8 g/dL      Total Bilirubin 0 50 mg/dL      eGFR 56 ml/min/1 73sq m     Narrative:      [place] guidelines for Chronic Kidney Disease (CKD):     Stage 1 with normal or high GFR (GFR > 90 mL/min/1 73 square meters)    Stage 2 Mild CKD (GFR = 60-89 mL/min/1 73 square meters)    Stage 3A Moderate CKD (GFR = 45-59 mL/min/1 73 square meters)    Stage 3B Moderate CKD (GFR = 30-44 mL/min/1 73 square meters)    Stage 4 Severe CKD (GFR = 15-29 mL/min/1 73 square meters)    Stage 5 End Stage CKD (GFR <15 mL/min/1 73 square meters)  Note: GFR calculation is accurate only with a steady state creatinine    CK (with reflex to MB) [[id number]]  (Normal) Collected: 03/21/21 2256    Lab Status: Final result Specimen: Blood from Arm, Right Updated: 03/21/21 2327     Total CK 56 U/L            Current Medications:    Current Facility-Administered Medications:     acetaminophen (TYLENOL) oral suspension 975 mg, 975 mg, Oral, Q6H PRN, NICKOLAS Parra, 975 mg at 03/27/21 1917    ALPRAZolam (XANAX) tablet 0 25 mg, 0 25 mg, Oral, Q6H PRN, Huong Glynn MD, 0 25 mg at 03/28/21 0450    atorvastatin (LIPITOR) tablet 40 mg, 40 mg, Oral, Daily With Tashia Vargas PA-C, 40 mg at 03/27/21 1711    buPROPion (WELLBUTRIN XL) 24 hr tablet 150 mg, 150 mg, Oral, Daily, NICKOLAS Roe, 150 mg at 03/28/21 0111    clopidogrel (PLAVIX) tablet 75 mg, 75 mg, Oral, Daily, Cass Libman, PA-C, 75 mg at 03/28/21 0753    dextromethorphan-guaiFENesin (ROBITUSSIN DM) oral syrup 10 mL, 10 mL, Oral, Q4H PRN, Cass Libman, PA-C, 10 mL at 03/24/21 0318    enoxaparin (LOVENOX) subcutaneous injection 40 mg, 40 mg, Subcutaneous, Daily, Huong Glynn MD, 40 mg at 03/28/21 0754    FLUoxetine (PROzac) capsule 20 mg, 20 mg, Oral, Daily, NICKOLAS Roe, 20 mg at 03/28/21 0753    furosemide (LASIX) tablet 20 mg, 20 mg, Oral, Daily, Zelalem Hansen Arlin Regan PA-C, 20 mg at 03/28/21 0752    ipratropium-albuterol (DUO-NEB) 0 5-2 5 mg/3 mL inhalation solution 3 mL, 3 mL, Nebulization, Q6H PRN, Kumar White PA-C    ipratropium-albuterol (DUO-NEB) 0 5-2 5 mg/3 mL inhalation solution 3 mL, 3 mL, Nebulization, TID, Kumar White PA-C, 3 mL at 03/28/21 0840    lidocaine (LIDODERM) 5 % patch 1 patch, 1 patch, Topical, Daily, Vallery Ryan, CRNP, 1 patch at 03/28/21 0749    magnesium oxide (MAG-OX) tablet 400 mg, 400 mg, Oral, BID, Janel Eugene, CRNP, 400 mg at 03/28/21 2350    melatonin tablet 6 mg, 6 mg, Oral, HS, Gisella Arriaga, CRNP, 6 mg at 03/27/21 2120    metroNIDAZOLE (FLAGYL) IVPB (premix) 500 mg 100 mL, 500 mg, Intravenous, Q8H, Kumar White PA-C, Stopped at 03/28/21 2887    multivitamin-minerals (CENTRUM ADULTS) tablet 1 tablet, 1 tablet, Oral, Daily, Kumar White PA-C, 1 tablet at 03/28/21 0754    ondansetron (ZOFRAN) injection 4 mg, 4 mg, Intravenous, Q6H PRN, Kumar White PA-C    pantoprazole (PROTONIX) EC tablet 20 mg, 20 mg, Oral, Early Morning, Kumar White PA-C, 20 mg at 03/28/21 0553    traMADol (ULTRAM) tablet 50 mg, 50 mg, Oral, Q6H PRN, Caden Park CRNP, 50 mg at 03/28/21 0745    vancomycin (VANCOCIN) IVPB (premix in dextrose) 750 mg 150 mL, 10 mg/kg (Adjusted), Intravenous, Q12H, Jamie Ryan, CRNP, Stopped at 03/28/21 3741     Imaging and other studies:   I personally viewed and interpreted the following imaging studies:  CT Chest 3/28/2021 shows persistent R basilar effusion and adjacent atatlectasis with possible consolidation    Assessment:  1  Complicated parapneumonic effusion  2  pneumonia    Plan:   Keep chest tube to suction   S/p 6 doses of tpa/dornase   Recommend evaluation by thoracic surgery for VATS/decort   Cont   Abx per ID, will need extended course given complicated nature of parapeumonic   I personally discussed this patient in depth with the primary service      [facility]

## 2021-03-28 NOTE — ASSESSMENT & PLAN NOTE
Patient with INR of 8 77 on admission, possibly secondary to severe malnutrition, Albumin was 1 8 on admission  Patient and patient's family denies history of heavy alcohol abuse (CIWA discontinued)  Liver enzymes within normal limits, no history of liver disease  Not on any anticoagulation  S/p vitamin  K 10mg once - with rapid improvement in INR to 1 27

## 2021-03-28 NOTE — ASSESSMENT & PLAN NOTE
Sepsis, poa, a/e/b leukocytosis 36k with 4% bands and tachycardia secondary to right sided pneumonia/large right parapneumonic effusion  INR 8 77 on admission  S/p Vit K, INR now 1 27  S/p IR chest tube placement 3/23/21 - s/p tpa/dornase BID x 3 days - follow results of CT chest  Fluid cultures - rare gram positive cocci  On IV antibiotics -  flagyl and vancomycin; cefepime discontinued  urine Legionella and Streptococcus - negative  Trend WBC and fever curve   Infectious disease on board  Blood cultures negative till date

## 2021-03-28 NOTE — ASSESSMENT & PLAN NOTE
Right lower lobe pneumonia with parapneumonic effusion  CT chest showed- No evidence of pulmonary artery embolus  Consolidation versus mass in the right lower lobe      On vancomycin, Flagyl per ID recs  urine Legionella and Streptococcus negative  Oxygen supplementation and bronchodilators  Pulmonary on board

## 2021-03-28 NOTE — PROGRESS NOTES
Vancomycin IV Pharmacy-to-Dose Consultation    Jf Mcgee is a 65 y o  female who is currently receiving Vancomycin IV with management by the Pharmacy Consult service  Indication: pneumonia    Assessment/Plan:  The patient was reviewed  Renal function is stable and no signs or symptoms of nephrotoxicity and/or infusion reactions were documented in the chart  Based on todays assessment, continue current vancomycin (day 4) dosing of 750 mg q12h, with a plan for trough to be drawn at 1500 hours on 3/29/21  We will continue to follow the patients culture results and clinical progress daily      Brian Frias, Pharmacist

## 2021-03-28 NOTE — ASSESSMENT & PLAN NOTE
Hb 10 1 on admission - chronic normocytic normochromic anemia  Iron panel - Chronic anemia of inflammation  Hb 7 8 today, no signs of overt bleeding  Check FOBT  Keep hb > 7, transfuse if needed

## 2021-03-28 NOTE — ASSESSMENT & PLAN NOTE
Acute delirium, for which she received 16mg ativan overnight based off CIWA protocol  patient's family denies heavy alcohol abuse, she takes maybe a glass of wine occasionally, so it is unlikely she is in active alcohol withdrawal - more like severe delirium    CT head - No acute intracranial anomaly,  CIWA protocol discontinued 3/24 as patient was being overmedicated and does not have a history of heavy alcohol abuse  Trial of phenobarb x 1  Now at baseline mental status AO x4, delirium resolved

## 2021-03-28 NOTE — ASSESSMENT & PLAN NOTE
Patietn with multiple valvular disease - Moderate AS, mild -mod MR, moderate TR  EF 04%, grade 1 diastolic dysfunction with pulmonary hypertension, 60mmhg

## 2021-03-28 NOTE — ASSESSMENT & PLAN NOTE
Malnutrition Findings:   Adult Malnutrition type: Acute illness  Adult Degree of Malnutrition: Other severe protein calorie malnutritionsevere protein calorie malnutrition as evidenced by hollowed orbitals, temple hollowing, clavicle protrusion, with prominent bone and low albumin levels 1 6> 1 8, and associated coagulapathy with high INR 8 on admission in the setting of right lower lobe pneumonia with complicated right parapneumonic effusion    BMI Findings: Body mass index is 26 26 kg/m²

## 2021-03-28 NOTE — PLAN OF CARE
Problem: Potential for Falls  Goal: Patient will remain free of falls  Description: INTERVENTIONS:  - Assess patient frequently for physical needs  -  Identify cognitive and physical deficits and behaviors that affect risk of falls    -  [place] fall precautions as indicated by assessment   - Educate patient/family on patient safety including physical limitations  - Instruct patient to call for assistance with activity based on assessment  - Modify environment to reduce risk of injury  - Consider OT/PT consult to assist with strengthening/mobility  Outcome: Progressing     Problem: PAIN - ADULT  Goal: Verbalizes/displays adequate comfort level or baseline comfort level  Description: Interventions:  - Encourage patient to monitor pain and request assistance  - Assess pain using appropriate pain scale  - Administer analgesics based on type and severity of pain and evaluate response  - Implement non-pharmacological measures as appropriate and evaluate response  - Consider cultural and social influences on pain and pain management  - Notify physician/advanced practitioner if interventions unsuccessful or patient reports new pain  Outcome: Progressing     Problem: INFECTION - ADULT  Goal: Absence or prevention of progression during hospitalization  Description: INTERVENTIONS:  - Assess and monitor for signs and symptoms of infection  - Monitor lab/diagnostic results  - Monitor all insertion sites, i e  indwelling lines, tubes, and drains  - Monitor endotracheal if appropriate and nasal secretions for changes in amount and color  - [place] appropriate cooling/warming therapies per order  - Administer medications as ordered  - Instruct and encourage patient and family to use good hand hygiene technique  - Identify and instruct in appropriate isolation precautions for identified infection/condition  Outcome: Progressing  Goal: Absence of fever/infection during neutropenic period  Description: INTERVENTIONS:  - Monitor WBC    Outcome: Progressing     Problem: SAFETY ADULT  Goal: Patient will remain free of falls  Description: INTERVENTIONS:  - Assess patient frequently for physical needs  -  Identify cognitive and physical deficits and behaviors that affect risk of falls    -  [place] fall precautions as indicated by assessment   - Educate patient/family on patient safety including physical limitations  - Instruct patient to call for assistance with activity based on assessment  - Modify environment to reduce risk of injury  - Consider OT/PT consult to assist with strengthening/mobility  Outcome: Progressing  Goal: Maintain or return to baseline ADL function  Description: INTERVENTIONS:  -  Assess patient's ability to carry out ADLs; assess patient's baseline for ADL function and identify physical deficits which impact ability to perform ADLs (bathing, care of mouth/teeth, toileting, grooming, dressing, etc )  - Assess/evaluate cause of self-care deficits   - Assess range of motion  - Assess patient's mobility; develop plan if impaired  - Assess patient's need for assistive devices and provide as appropriate  - Encourage maximum independence but intervene and supervise when necessary  - Involve family in performance of ADLs  - Assess for home care needs following discharge   - Consider OT consult to assist with ADL evaluation and planning for discharge  - Provide patient education as appropriate  Outcome: Progressing  Goal: Maintain or return mobility status to optimal level  Description: INTERVENTIONS:  - Assess patient's baseline mobility status (ambulation, transfers, stairs, etc )    - Identify cognitive and physical deficits and behaviors that affect mobility  - Identify mobility aids required to assist with transfers and/or ambulation (gait belt, sit-to-stand, lift, walker, cane, etc )  - [place] fall precautions as indicated by assessment  - Record patient progress and toleration of activity level on Mobility SBAR; progress patient to next Phase/Stage  - Instruct patient to call for assistance with activity based on assessment  - Consider rehabilitation consult to assist with strengthening/weightbearing, etc   Outcome: Progressing     Problem: DISCHARGE PLANNING  Goal: Discharge to home or other facility with appropriate resources  Description: INTERVENTIONS:  - Identify barriers to discharge w/patient and caregiver  - Arrange for needed discharge resources and transportation as appropriate  - Identify discharge learning needs (meds, wound care, etc )  - Arrange for interpretive services to assist at discharge as needed  - Refer to Case Management Department for coordinating discharge planning if the patient needs post-hospital services based on physician/advanced practitioner order or complex needs related to functional status, cognitive ability, or social support system  Outcome: Progressing     Problem: Knowledge Deficit  Goal: Patient/family/caregiver demonstrates understanding of disease process, treatment plan, medications, and discharge instructions  Description: Complete learning assessment and assess knowledge base  Interventions:  - Provide teaching at level of understanding  - Provide teaching via preferred learning methods  Outcome: Progressing     Problem: Nutrition/Hydration-ADULT  Goal: Nutrient/Hydration intake appropriate for improving, restoring or maintaining nutritional needs  Description: Monitor and assess patient's nutrition/hydration status for malnutrition  Collaborate with interdisciplinary team and initiate plan and interventions as ordered  Monitor patient's weight and dietary intake as ordered or per policy  Utilize nutrition screening tool and intervene as necessary  Determine patient's food preferences and provide high-protein, high-caloric foods as appropriate       INTERVENTIONS:  - Monitor oral intake, urinary output, labs, and treatment plans  - Assess nutrition and hydration status and recommend course of action  - Evaluate amount of meals eaten  - Assist patient with eating if necessary   - Allow adequate time for meals  - Recommend/ encourage appropriate diets, oral nutritional supplements, and vitamin/mineral supplements  - Order, calculate, and assess calorie counts as needed  - Recommend, monitor, and adjust tube feedings and TPN/PPN based on assessed needs  - Assess need for intravenous fluids  - Provide specific nutrition/hydration education as appropriate  - Include patient/family/caregiver in decisions related to nutrition  Outcome: Progressing     Problem: Prexisting or High Potential for Compromised Skin Integrity  Goal: Skin integrity is maintained or improved  Description: INTERVENTIONS:  - Identify patients at risk for skin breakdown  - Assess and monitor skin integrity  - Assess and monitor nutrition and hydration status  - Monitor labs   - Assess for incontinence   - Turn and reposition patient  - Assist with mobility/ambulation  - Relieve pressure over bony prominences  - Avoid friction and shearing  - Provide appropriate hygiene as needed including keeping skin clean and dry  - Evaluate need for skin moisturizer/barrier cream  - Collaborate with interdisciplinary team   - Patient/family teaching  - Consider wound care consult   Outcome: Progressing   Updated Care Plan

## 2021-03-28 NOTE — ASSESSMENT & PLAN NOTE
CT chest - large right-sided complicated parapenumonic pleural effusion  S/p IR chest tube placement 3/23/21  Chest tube drainage reduced  S/p chest tube tpa BID x 3days,last dose 3/26  For chest CT today - follow results  Pulmonary on board,driving care  Oxygen supplementation to keep sats > 92%

## 2021-03-28 NOTE — PROGRESS NOTES
[place]  Progress Note - Harry Epps 1950, 65 y o  female MRN: [id number]  Unit/Bed#: -01 Encounter: [id number]  Primary Care Provider: Sylvain Adkins MD   Date and time admitted to hospital: 3/21/2021 10:27 PM    Delirium  Assessment & Plan  Acute delirium, for which she received 16mg ativan overnight based off CIWA protocol  patient's family denies heavy alcohol abuse, she takes maybe a glass of wine occasionally, so it is unlikely she is in active alcohol withdrawal - more like severe delirium  CT head - No acute intracranial anomaly,  CIWA protocol discontinued 3/24 as patient was being overmedicated and does not have a history of heavy alcohol abuse  Trial of phenobarb x 1  Now at baseline mental status AO x4, delirium resolved      * Sepsis ([facility] [address] )  Assessment & Plan  Sepsis, poa, a/e/b leukocytosis 36k with 4% bands and tachycardia secondary to right sided pneumonia/large right parapneumonic effusion  INR 8 77 on admission  S/p Vit K, INR now 1 27  S/p IR chest tube placement 3/23/21 - s/p tpa/dornase BID x 3 days - follow results of CT chest  Fluid cultures - rare gram positive cocci  On IV antibiotics -  flagyl and vancomycin; cefepime discontinued  urine Legionella and Streptococcus - negative  Trend WBC and fever curve   Infectious disease on board  Blood cultures negative till date      Pneumonia of right lower lobe due to infectious organism  Assessment & Plan  Right lower lobe pneumonia with parapneumonic effusion  CT chest showed- No evidence of pulmonary artery embolus  Consolidation versus mass in the right lower lobe      On vancomycin, Flagyl per ID recs  urine Legionella and Streptococcus negative  Oxygen supplementation and bronchodilators  Pulmonary on board    Parapneumonic effusion  Assessment & Plan  CT chest - large right-sided complicated parapenumonic pleural effusion  S/p IR chest tube placement 3/23/21  Chest tube drainage reduced  S/p chest tube tpa BID x 3days,last dose 3/26  For chest CT today - follow results  Pulmonary on board,driving care  Oxygen supplementation to keep sats > 92%    Supratherapeutic INR  Assessment & Plan  Patient with INR of 8 77 on admission, possibly secondary to severe malnutrition, Albumin was 1 8 on admission  Patient and patient's family denies history of heavy alcohol abuse (CIWA discontinued)  Liver enzymes within normal limits, no history of liver disease  Not on any anticoagulation  S/p vitamin  K 10mg once - with rapid improvement in INR to 1 27        Dysphagia  Assessment & Plan  Dysphagia 2 diet  Aspiration precautions    Valvular heart disease  Assessment & Plan  Patietn with multiple valvular disease - Moderate AS, mild -mod MR, moderate TR  EF 55%, grade 1 diastolic dysfunction with pulmonary hypertension, 60mmhg    Anemia  Assessment & Plan  Hb 10 1 on admission - chronic normocytic normochromic anemia  Iron panel - Chronic anemia of inflammation  Hb 7 8 today, no signs of overt bleeding  Check FOBT  Keep hb > 7, transfuse if needed    Severe protein-calorie malnutrition (HCC)  Assessment & Plan  Malnutrition Findings:   Adult Malnutrition type: Acute illness  Adult Degree of Malnutrition: Other severe protein calorie malnutritionsevere protein calorie malnutrition as evidenced by hollowed orbitals, temple hollowing, clavicle protrusion, with prominent bone and low albumin levels 1 6> 1 8, and associated coagulapathy with high INR 8 on admission in the setting of right lower lobe pneumonia with complicated right parapneumonic effusion    BMI Findings: Body mass index is 26 26 kg/m²         Thrombocytosis ([facility] [address] )  Assessment & Plan  Most likely reactive, In the setting of coagulopathy/sepsis        Acute respiratory failure with hypoxia (HCC)  Assessment & Plan  Acute hypoxic resp failure, POA, a/e/b sob, hypoxia 85% on 2L >8L, tachypnea 19-22 secondary to right lower lobe pneumonia, large right parapneumonic effusion and atelectasis  S/p IR chest tube placement 3/23/21  Oxygen requirements down to 4L NC  Wean oxygen as tolerated  Goal O2 sats > 92%    GERD (gastroesophageal reflux disease)  Assessment & Plan  On Protonix    Hypokalemia  Assessment & Plan  Monitor and replace electrolytes as needed    CAD (coronary artery disease)  Assessment & Plan  Continue on Plavix and Lipitor        VTE Pharmacologic Prophylaxis:   Pharmacologic: Enoxaparin (Lovenox)  Mechanical VTE Prophylaxis in Place: Yes    Patient Centered Rounds: I have performed bedside rounds with nursing staff today  Discussions with Specialists or Other Care Team Provider:     Education and Discussions with Family / Patient: patient's daughter called, voicemail left    Time Spent for Care: 30 minutes  More than 50% of total time spent on counseling and coordination of care as described above  Current Length of Stay: 6 day(s)    Current Patient Status: Inpatient   Certification Statement: The patient will continue to require additional inpatient hospital stay due to as above    Discharge Plan: 1-2days    Code Status: Level 1 - Full Code      Subjective:   No overnight events, no complaints this am    Objective:     Vitals:   Temp (24hrs), Av 5 °F (36 9 °C), Min:98 5 °F (36 9 °C), Max:98 5 °F (36 9 °C)    Temp:  [98 5 °F (36 9 °C)] 98 5 °F (36 9 °C)  HR:  [80-89] 80  Resp:  [18-20] 18  BP: (143)/(64) 143/64  SpO2:  [89 %-98 %] 96 %  Body mass index is 26 26 kg/m²  Input and Output Summary (last 24 hours): Intake/Output Summary (Last 24 hours) at 3/28/2021 1456  Last data filed at 3/28/2021 1035  Gross per 24 hour   Intake 1150 ml   Output 763 ml   Net 387 ml       Physical Exam:     Physical Exam  Vitals signs and nursing note reviewed  Constitutional:       General: She is not in acute distress  Appearance: Normal appearance  She is not ill-appearing, toxic-appearing or diaphoretic     Cardiovascular:      Rate and Rhythm: Normal rate and regular rhythm  Pulses: Normal pulses  Heart sounds: Murmur present  Pulmonary:      Effort: Pulmonary effort is normal  No respiratory distress  Breath sounds: Examination of the right-lower field reveals decreased breath sounds  Examination of the left-lower field reveals decreased breath sounds  Decreased breath sounds present  No wheezing, rhonchi or rales  Comments: Chest tube in place  Chest:      Chest wall: No tenderness  Abdominal:      General: Bowel sounds are normal  There is no distension  Palpations: Abdomen is soft  Tenderness: There is no abdominal tenderness  There is no right CVA tenderness, left CVA tenderness or guarding  Musculoskeletal: Normal range of motion  General: No swelling or deformity  Right lower leg: No edema  Left lower leg: No edema  Skin:     General: Skin is warm  Capillary Refill: Capillary refill takes less than 2 seconds  Coloration: Skin is not jaundiced  Findings: No bruising, erythema, lesion or rash  Neurological:      General: No focal deficit present  Mental Status: She is alert  Mental status is at baseline  Cranial Nerves: No cranial nerve deficit  Psychiatric:         Mood and Affect: Mood normal          Thought Content: Thought content normal            Additional Data:     Labs:    Results from last 7 days   Lab Units 03/28/21  0405  03/25/21  0435   WBC Thousand/uL 22 51*   < > 25 40*   HEMOGLOBIN g/dL 7 8*   < > 6 9*   HEMATOCRIT % 24 0*   < > 21 4*   PLATELETS Thousands/uL 725*   < > 605*   BANDS PCT %  --   --  1   LYMPHO PCT %  --   --  6*   MONO PCT %  --   --  7   EOS PCT %  --   --  1    < > = values in this interval not displayed       Results from last 7 days   Lab Units 03/28/21  0405  03/22/21  0401   SODIUM mmol/L 140   < > 133*   POTASSIUM mmol/L 4 1   < > 3 9   CHLORIDE mmol/L 107   < > 97*   CO2 mmol/L 25   < > 23   CO2, I-STAT   --    < >  -- BUN mg/dL 8   < > 17   CREATININE mg/dL 0 70   < > 0 88   ANION GAP mmol/L 8   < > 13   CALCIUM mg/dL 7 8*   < > 8 5   ALBUMIN g/dL  --   --  1 6*   TOTAL BILIRUBIN mg/dL  --   --  0 40   ALK PHOS U/L  --   --  220*   ALT U/L  --   --  24   AST U/L  --   --  30   GLUCOSE RANDOM mg/dL 92   < > 118    < > = values in this interval not displayed  Results from last 7 days   Lab Units 03/26/21  0246   INR  1 30*     Results from last 7 days   Lab Units 03/26/21  0556 03/26/21  0014   POC GLUCOSE mg/dl 74 96         Results from last 7 days   Lab Units 03/28/21  0405 03/23/21  0447 03/22/21  0104 03/21/21  2254   LACTIC ACID mmol/L  --   --  1 4  --    PROCALCITONIN ng/ml 0 54* 1 12*  --  0 80*           * I Have Reviewed All Lab Data Listed Above  * Additional Pertinent Lab Tests Reviewed: All Labs Within Last 24 Hours Reviewed    Imaging:    Imaging Reports Reviewed Today Include:   Imaging Personally Reviewed by Myself Includes:      Recent Cultures (last 7 days):     Results from last 7 days   Lab Units 03/25/21  0757 03/23/21  1743 03/21/21  2254   BLOOD CULTURE   --   --  No Growth After 5 Days  No Growth After 5 Days     GRAM STAIN RESULT   --  1+ Polys*  Rare Gram positive cocci in pairs*  2+ Polys  No No bacteria seen  --    BODY FLUID CULTURE, STERILE   --  No growth  --    LEGIONELLA URINARY ANTIGEN  Negative  --   --        Last 24 Hours Medication List:   Current Facility-Administered Medications   Medication Dose Route Frequency Provider Last Rate    acetaminophen  975 mg Oral Q6H PRN NICKOLAS Burgess      ALPRAZolam  0 25 mg Oral Q6H PRN Rachel Peng MD      atorvastatin  40 mg Oral Daily With Aly Das PA-C      buPROPion  150 mg Oral Daily NICKOLAS Ayala      clopidogrel  75 mg Oral Daily Zeina Liu [place]      dextromethorphan-guaiFENesin  10 mL Oral Q4H PRN Zeina Liu PA-C      enoxaparin  40 mg Subcutaneous Daily Carlos CONTRERAS Rajendra Waters MD      FLUoxetine  20 mg Oral Daily NICKOLAS Miramontes      furosemide  20 mg Oral Daily Zeina Noe [place]      ipratropium-albuterol  3 mL Nebulization Q6H PRN Zeina Ready, PA-CELIA      ipratropium-albuterol  3 mL Nebulization TID Zeina Ready, PA-CELIA      lidocaine  1 patch Topical Daily Brendia NICKOLAS Castillo      magnesium oxide  400 mg Oral BID NICKOLAS Miramontes      melatonin  6 mg Oral HS NICKOLAS Miranda      metroNIDAZOLE  500 mg Intravenous Q8H Zeina Ready, PA-C 500 mg (03/28/21 1109)    multivitamin-minerals  1 tablet Oral Daily Zeina Ready, HECTOR      ondansetron  4 mg Intravenous Q6H PRN Zeina Ready, PA-CELIA      pantoprazole  20 mg Oral Early Morning Zeina Ready, PA-CELIA      traMADol  50 mg Oral Q6H PRN NICKOLAS Miramontes      traZODone  100 mg Oral HS NICKOLAS Roe      vancomycin  10 mg/kg (Adjusted) Intravenous Q12H RONALDO BurgessNP 750 mg (03/28/21 4115)        Today, Patient Was Seen By: Rachel Peng MD    ** Please Note: Dictation voice to text software may have been used in the creation of this document   **

## 2021-03-29 ENCOUNTER — APPOINTMENT (INPATIENT)
Dept: RADIOLOGY | Facility: HOSPITAL | Age: 71
DRG: 853 | End: 2021-03-29
Payer: MEDICARE

## 2021-03-29 ENCOUNTER — HOSPITAL ENCOUNTER (INPATIENT)
Facility: HOSPITAL | Age: 71
LOS: 15 days | Discharge: NURSING, REHAB OR LONG-TERM CARE | DRG: 853 | End: 2021-04-13
Attending: INTERNAL MEDICINE | Admitting: INTERNAL MEDICINE
Payer: MEDICARE

## 2021-03-29 VITALS
BODY MASS INDEX: 26.08 KG/M2 | SYSTOLIC BLOOD PRESSURE: 117 MMHG | HEIGHT: 66 IN | TEMPERATURE: 97.3 F | HEART RATE: 89 BPM | OXYGEN SATURATION: 97 % | RESPIRATION RATE: 23 BRPM | DIASTOLIC BLOOD PRESSURE: 69 MMHG | WEIGHT: 162.26 LBS

## 2021-03-29 DIAGNOSIS — I25.10 CAD (CORONARY ARTERY DISEASE): Chronic | ICD-10-CM

## 2021-03-29 DIAGNOSIS — R65.20 SEPSIS WITH ACUTE HYPOXIC RESPIRATORY FAILURE WITHOUT SEPTIC SHOCK, DUE TO UNSPECIFIED ORGANISM (HCC): ICD-10-CM

## 2021-03-29 DIAGNOSIS — K21.9 GASTROESOPHAGEAL REFLUX DISEASE WITHOUT ESOPHAGITIS: Chronic | ICD-10-CM

## 2021-03-29 DIAGNOSIS — I38 VALVULAR HEART DISEASE: Chronic | ICD-10-CM

## 2021-03-29 DIAGNOSIS — J91.8 PARAPNEUMONIC EFFUSION: Primary | ICD-10-CM

## 2021-03-29 DIAGNOSIS — J18.9 PARAPNEUMONIC EFFUSION: Primary | ICD-10-CM

## 2021-03-29 DIAGNOSIS — J18.9 PNEUMONIA OF RIGHT LOWER LOBE DUE TO INFECTIOUS ORGANISM: ICD-10-CM

## 2021-03-29 DIAGNOSIS — F41.9 ANXIETY: Chronic | ICD-10-CM

## 2021-03-29 DIAGNOSIS — M25.462 EFFUSION OF LEFT KNEE: ICD-10-CM

## 2021-03-29 DIAGNOSIS — I10 ESSENTIAL HYPERTENSION: Chronic | ICD-10-CM

## 2021-03-29 DIAGNOSIS — J96.01 SEPSIS WITH ACUTE HYPOXIC RESPIRATORY FAILURE WITHOUT SEPTIC SHOCK, DUE TO UNSPECIFIED ORGANISM (HCC): ICD-10-CM

## 2021-03-29 DIAGNOSIS — A41.9 SEPSIS WITH ACUTE HYPOXIC RESPIRATORY FAILURE WITHOUT SEPTIC SHOCK, DUE TO UNSPECIFIED ORGANISM (HCC): ICD-10-CM

## 2021-03-29 PROBLEM — E87.6 HYPOKALEMIA: Status: RESOLVED | Noted: 2021-03-22 | Resolved: 2021-03-29

## 2021-03-29 PROBLEM — R91.1 PULMONARY NODULE: Status: ACTIVE | Noted: 2021-03-29

## 2021-03-29 PROBLEM — F10.10 ETOH ABUSE: Status: ACTIVE | Noted: 2021-03-29

## 2021-03-29 PROBLEM — R41.0 DELIRIUM: Status: RESOLVED | Noted: 2021-03-24 | Resolved: 2021-03-29

## 2021-03-29 PROBLEM — D75.839 THROMBOCYTOSIS: Chronic | Status: ACTIVE | Noted: 2021-03-24

## 2021-03-29 PROBLEM — F10.10 ETOH ABUSE: Chronic | Status: ACTIVE | Noted: 2021-03-29

## 2021-03-29 PROBLEM — R91.1 PULMONARY NODULE: Chronic | Status: ACTIVE | Noted: 2021-03-29

## 2021-03-29 PROBLEM — R79.1 SUPRATHERAPEUTIC INR: Status: RESOLVED | Noted: 2021-03-22 | Resolved: 2021-03-29

## 2021-03-29 LAB
ANION GAP SERPL CALCULATED.3IONS-SCNC: 7 MMOL/L (ref 4–13)
BUN SERPL-MCNC: 8 MG/DL (ref 5–25)
CALCIUM SERPL-MCNC: 7.6 MG/DL (ref 8.3–10.1)
CHLORIDE SERPL-SCNC: 107 MMOL/L (ref 100–108)
CO2 SERPL-SCNC: 25 MMOL/L (ref 21–32)
CREAT SERPL-MCNC: 0.67 MG/DL (ref 0.6–1.3)
ERYTHROCYTE [DISTWIDTH] IN BLOOD BY AUTOMATED COUNT: 17.2 % (ref 11.6–15.1)
GFR SERPL CREATININE-BSD FRML MDRD: 88 ML/MIN/1.73SQ M
GLUCOSE SERPL-MCNC: 96 MG/DL (ref 65–140)
HCT VFR BLD AUTO: 22.4 % (ref 34.8–46.1)
HGB BLD-MCNC: 7.3 G/DL (ref 11.5–15.4)
MCH RBC QN AUTO: 28.6 PG (ref 26.8–34.3)
MCHC RBC AUTO-ENTMCNC: 32.6 G/DL (ref 31.4–37.4)
MCV RBC AUTO: 88 FL (ref 82–98)
PLATELET # BLD AUTO: 684 THOUSANDS/UL (ref 149–390)
PMV BLD AUTO: 10.1 FL (ref 8.9–12.7)
POTASSIUM SERPL-SCNC: 3.7 MMOL/L (ref 3.5–5.3)
PROCALCITONIN SERPL-MCNC: 0.32 NG/ML
RBC # BLD AUTO: 2.55 MILLION/UL (ref 3.81–5.12)
SODIUM SERPL-SCNC: 139 MMOL/L (ref 136–145)
VANCOMYCIN TROUGH SERPL-MCNC: 21.2 UG/ML (ref 10–20)
WBC # BLD AUTO: 16.77 THOUSAND/UL (ref 4.31–10.16)

## 2021-03-29 PROCEDURE — 94640 AIRWAY INHALATION TREATMENT: CPT

## 2021-03-29 PROCEDURE — 99238 HOSP IP/OBS DSCHRG MGMT 30/<: CPT | Performed by: INTERNAL MEDICINE

## 2021-03-29 PROCEDURE — 99223 1ST HOSP IP/OBS HIGH 75: CPT | Performed by: INTERNAL MEDICINE

## 2021-03-29 PROCEDURE — 71046 X-RAY EXAM CHEST 2 VIEWS: CPT

## 2021-03-29 PROCEDURE — 92526 ORAL FUNCTION THERAPY: CPT

## 2021-03-29 PROCEDURE — 94669 MECHANICAL CHEST WALL OSCILL: CPT

## 2021-03-29 PROCEDURE — 84145 PROCALCITONIN (PCT): CPT | Performed by: INTERNAL MEDICINE

## 2021-03-29 PROCEDURE — 94664 DEMO&/EVAL PT USE INHALER: CPT

## 2021-03-29 PROCEDURE — 80048 BASIC METABOLIC PNL TOTAL CA: CPT | Performed by: INTERNAL MEDICINE

## 2021-03-29 PROCEDURE — 94760 N-INVAS EAR/PLS OXIMETRY 1: CPT

## 2021-03-29 PROCEDURE — 94668 MNPJ CHEST WALL SBSQ: CPT

## 2021-03-29 PROCEDURE — 80202 ASSAY OF VANCOMYCIN: CPT | Performed by: INTERNAL MEDICINE

## 2021-03-29 PROCEDURE — 85027 COMPLETE CBC AUTOMATED: CPT | Performed by: INTERNAL MEDICINE

## 2021-03-29 RX ORDER — ACETAMINOPHEN 160 MG/5ML
975 SUSPENSION, ORAL (FINAL DOSE FORM) ORAL EVERY 6 HOURS PRN
Status: CANCELLED | OUTPATIENT
Start: 2021-03-29

## 2021-03-29 RX ORDER — OXYCODONE HYDROCHLORIDE 5 MG/1
5 TABLET ORAL ONCE
Status: DISCONTINUED | OUTPATIENT
Start: 2021-03-29 | End: 2021-03-29

## 2021-03-29 RX ORDER — LIDOCAINE 50 MG/G
1 PATCH TOPICAL DAILY
Status: CANCELLED | OUTPATIENT
Start: 2021-03-30

## 2021-03-29 RX ORDER — BUPROPION HYDROCHLORIDE 150 MG/1
150 TABLET ORAL DAILY
Status: DISCONTINUED | OUTPATIENT
Start: 2021-03-30 | End: 2021-04-13 | Stop reason: HOSPADM

## 2021-03-29 RX ORDER — ATORVASTATIN CALCIUM 40 MG/1
40 TABLET, FILM COATED ORAL
Status: CANCELLED | OUTPATIENT
Start: 2021-03-29

## 2021-03-29 RX ORDER — TRAMADOL HYDROCHLORIDE 50 MG/1
50 TABLET ORAL EVERY 6 HOURS PRN
Status: CANCELLED | OUTPATIENT
Start: 2021-03-29

## 2021-03-29 RX ORDER — VANCOMYCIN HYDROCHLORIDE 500 MG/100ML
500 INJECTION, SOLUTION INTRAVENOUS EVERY 12 HOURS
Status: DISCONTINUED | OUTPATIENT
Start: 2021-03-30 | End: 2021-03-30

## 2021-03-29 RX ORDER — ACETAMINOPHEN 160 MG/5ML
975 SUSPENSION, ORAL (FINAL DOSE FORM) ORAL EVERY 6 HOURS PRN
Status: DISCONTINUED | OUTPATIENT
Start: 2021-03-29 | End: 2021-03-29

## 2021-03-29 RX ORDER — ALPRAZOLAM 0.25 MG/1
0.25 TABLET ORAL EVERY 6 HOURS PRN
Status: CANCELLED | OUTPATIENT
Start: 2021-03-29

## 2021-03-29 RX ORDER — ALBUTEROL SULFATE 2.5 MG/3ML
2.5 SOLUTION RESPIRATORY (INHALATION) EVERY 4 HOURS PRN
Status: DISCONTINUED | OUTPATIENT
Start: 2021-03-29 | End: 2021-04-02

## 2021-03-29 RX ORDER — PANTOPRAZOLE SODIUM 20 MG/1
20 TABLET, DELAYED RELEASE ORAL
Status: DISCONTINUED | OUTPATIENT
Start: 2021-03-30 | End: 2021-04-13 | Stop reason: HOSPADM

## 2021-03-29 RX ORDER — ALPRAZOLAM 0.25 MG/1
0.25 TABLET ORAL EVERY 6 HOURS PRN
Status: DISCONTINUED | OUTPATIENT
Start: 2021-03-29 | End: 2021-04-05

## 2021-03-29 RX ORDER — IPRATROPIUM BROMIDE AND ALBUTEROL SULFATE 2.5; .5 MG/3ML; MG/3ML
3 SOLUTION RESPIRATORY (INHALATION) EVERY 6 HOURS PRN
Status: CANCELLED | OUTPATIENT
Start: 2021-03-29

## 2021-03-29 RX ORDER — LIDOCAINE 50 MG/G
1 PATCH TOPICAL DAILY
Status: DISCONTINUED | OUTPATIENT
Start: 2021-03-30 | End: 2021-04-13 | Stop reason: HOSPADM

## 2021-03-29 RX ORDER — IPRATROPIUM BROMIDE AND ALBUTEROL SULFATE 2.5; .5 MG/3ML; MG/3ML
3 SOLUTION RESPIRATORY (INHALATION)
Status: DISCONTINUED | OUTPATIENT
Start: 2021-03-29 | End: 2021-03-29

## 2021-03-29 RX ORDER — MULTIVITAMIN/IRON/FOLIC ACID 18MG-0.4MG
1 TABLET ORAL DAILY
Status: CANCELLED | OUTPATIENT
Start: 2021-03-30

## 2021-03-29 RX ORDER — MULTIVITAMIN/IRON/FOLIC ACID 18MG-0.4MG
1 TABLET ORAL DAILY
Status: DISCONTINUED | OUTPATIENT
Start: 2021-03-30 | End: 2021-04-13 | Stop reason: HOSPADM

## 2021-03-29 RX ORDER — LANOLIN ALCOHOL/MO/W.PET/CERES
6 CREAM (GRAM) TOPICAL
Status: CANCELLED | OUTPATIENT
Start: 2021-03-29

## 2021-03-29 RX ORDER — ATORVASTATIN CALCIUM 40 MG/1
40 TABLET, FILM COATED ORAL
Status: DISCONTINUED | OUTPATIENT
Start: 2021-03-29 | End: 2021-04-02

## 2021-03-29 RX ORDER — IPRATROPIUM BROMIDE AND ALBUTEROL SULFATE 2.5; .5 MG/3ML; MG/3ML
3 SOLUTION RESPIRATORY (INHALATION)
Status: CANCELLED | OUTPATIENT
Start: 2021-03-29

## 2021-03-29 RX ORDER — TRAMADOL HYDROCHLORIDE 50 MG/1
50 TABLET ORAL EVERY 6 HOURS PRN
Status: DISCONTINUED | OUTPATIENT
Start: 2021-03-29 | End: 2021-03-31

## 2021-03-29 RX ORDER — FLUOXETINE HYDROCHLORIDE 20 MG/1
20 CAPSULE ORAL DAILY
Status: DISCONTINUED | OUTPATIENT
Start: 2021-03-30 | End: 2021-04-13 | Stop reason: HOSPADM

## 2021-03-29 RX ORDER — BUPROPION HYDROCHLORIDE 150 MG/1
150 TABLET ORAL DAILY
Status: CANCELLED | OUTPATIENT
Start: 2021-03-30

## 2021-03-29 RX ORDER — HYDROMORPHONE HCL/PF 1 MG/ML
0.5 SYRINGE (ML) INJECTION ONCE
Status: COMPLETED | OUTPATIENT
Start: 2021-03-29 | End: 2021-03-29

## 2021-03-29 RX ORDER — LANOLIN ALCOHOL/MO/W.PET/CERES
6 CREAM (GRAM) TOPICAL
Status: DISCONTINUED | OUTPATIENT
Start: 2021-03-29 | End: 2021-04-13 | Stop reason: HOSPADM

## 2021-03-29 RX ORDER — PANTOPRAZOLE SODIUM 20 MG/1
20 TABLET, DELAYED RELEASE ORAL
Status: CANCELLED | OUTPATIENT
Start: 2021-03-30

## 2021-03-29 RX ORDER — ONDANSETRON 2 MG/ML
4 INJECTION INTRAMUSCULAR; INTRAVENOUS EVERY 6 HOURS PRN
Status: CANCELLED | OUTPATIENT
Start: 2021-03-29

## 2021-03-29 RX ORDER — FLUOXETINE HYDROCHLORIDE 20 MG/1
20 CAPSULE ORAL DAILY
Status: CANCELLED | OUTPATIENT
Start: 2021-03-30

## 2021-03-29 RX ORDER — GUAIFENESIN/DEXTROMETHORPHAN 100-10MG/5
10 SYRUP ORAL EVERY 4 HOURS PRN
Status: DISCONTINUED | OUTPATIENT
Start: 2021-03-29 | End: 2021-04-13 | Stop reason: HOSPADM

## 2021-03-29 RX ORDER — TRAZODONE HYDROCHLORIDE 50 MG/1
100 TABLET ORAL
Status: CANCELLED | OUTPATIENT
Start: 2021-03-29

## 2021-03-29 RX ORDER — GUAIFENESIN/DEXTROMETHORPHAN 100-10MG/5
10 SYRUP ORAL EVERY 4 HOURS PRN
Status: CANCELLED | OUTPATIENT
Start: 2021-03-29

## 2021-03-29 RX ORDER — IPRATROPIUM BROMIDE AND ALBUTEROL SULFATE 2.5; .5 MG/3ML; MG/3ML
3 SOLUTION RESPIRATORY (INHALATION) EVERY 6 HOURS PRN
Status: DISCONTINUED | OUTPATIENT
Start: 2021-03-29 | End: 2021-03-29

## 2021-03-29 RX ORDER — FUROSEMIDE 20 MG/1
20 TABLET ORAL DAILY
Status: DISCONTINUED | OUTPATIENT
Start: 2021-03-30 | End: 2021-04-02

## 2021-03-29 RX ORDER — ONDANSETRON 2 MG/ML
4 INJECTION INTRAMUSCULAR; INTRAVENOUS EVERY 6 HOURS PRN
Status: DISCONTINUED | OUTPATIENT
Start: 2021-03-29 | End: 2021-04-02

## 2021-03-29 RX ORDER — FUROSEMIDE 20 MG/1
20 TABLET ORAL DAILY
Status: CANCELLED | OUTPATIENT
Start: 2021-03-30

## 2021-03-29 RX ORDER — TRAZODONE HYDROCHLORIDE 100 MG/1
100 TABLET ORAL
Status: DISCONTINUED | OUTPATIENT
Start: 2021-03-29 | End: 2021-03-30

## 2021-03-29 RX ORDER — ACETAMINOPHEN 160 MG/5ML
975 SUSPENSION, ORAL (FINAL DOSE FORM) ORAL EVERY 6 HOURS PRN
Status: DISCONTINUED | OUTPATIENT
Start: 2021-03-29 | End: 2021-03-30

## 2021-03-29 RX ADMIN — FUROSEMIDE 20 MG: 20 TABLET ORAL at 08:21

## 2021-03-29 RX ADMIN — ATORVASTATIN CALCIUM 40 MG: 40 TABLET, FILM COATED ORAL at 17:05

## 2021-03-29 RX ADMIN — TRAMADOL HYDROCHLORIDE 50 MG: 50 TABLET, FILM COATED ORAL at 19:17

## 2021-03-29 RX ADMIN — VANCOMYCIN HYDROCHLORIDE 750 MG: 750 INJECTION, SOLUTION INTRAVENOUS at 19:17

## 2021-03-29 RX ADMIN — METRONIDAZOLE 500 MG: 500 INJECTION, SOLUTION INTRAVENOUS at 11:05

## 2021-03-29 RX ADMIN — ALPRAZOLAM 0.25 MG: 0.25 TABLET ORAL at 11:03

## 2021-03-29 RX ADMIN — METRONIDAZOLE 500 MG: 500 INJECTION, SOLUTION INTRAVENOUS at 03:54

## 2021-03-29 RX ADMIN — TRAZODONE HYDROCHLORIDE 100 MG: 100 TABLET ORAL at 21:10

## 2021-03-29 RX ADMIN — LIDOCAINE 1 PATCH: 50 PATCH TOPICAL at 08:21

## 2021-03-29 RX ADMIN — IPRATROPIUM BROMIDE AND ALBUTEROL SULFATE 3 ML: 2.5; .5 SOLUTION RESPIRATORY (INHALATION) at 08:47

## 2021-03-29 RX ADMIN — HYDROMORPHONE HYDROCHLORIDE 0.5 MG: 1 INJECTION, SOLUTION INTRAMUSCULAR; INTRAVENOUS; SUBCUTANEOUS at 15:45

## 2021-03-29 RX ADMIN — ACETAMINOPHEN 975 MG: 650 SUSPENSION ORAL at 23:39

## 2021-03-29 RX ADMIN — TRAMADOL HYDROCHLORIDE 50 MG: 50 TABLET, FILM COATED ORAL at 04:40

## 2021-03-29 RX ADMIN — MELATONIN TAB 3 MG 6 MG: 3 TAB at 21:09

## 2021-03-29 RX ADMIN — Medication 1 TABLET: at 08:20

## 2021-03-29 RX ADMIN — ACETAMINOPHEN 650 MG: 650 SUSPENSION ORAL at 08:18

## 2021-03-29 RX ADMIN — CLOPIDOGREL BISULFATE 75 MG: 75 TABLET ORAL at 08:20

## 2021-03-29 RX ADMIN — FLUOXETINE 20 MG: 20 CAPSULE ORAL at 08:20

## 2021-03-29 RX ADMIN — ENOXAPARIN SODIUM 40 MG: 40 INJECTION SUBCUTANEOUS at 08:22

## 2021-03-29 RX ADMIN — CEFEPIME HYDROCHLORIDE 2000 MG: 2 INJECTION, POWDER, FOR SOLUTION INTRAVENOUS at 17:38

## 2021-03-29 RX ADMIN — ALPRAZOLAM 0.25 MG: 0.25 TABLET ORAL at 23:39

## 2021-03-29 RX ADMIN — METRONIDAZOLE 500 MG: 500 INJECTION, SOLUTION INTRAVENOUS at 20:16

## 2021-03-29 RX ADMIN — BUPROPION HYDROCHLORIDE 150 MG: 150 TABLET, FILM COATED, EXTENDED RELEASE ORAL at 08:21

## 2021-03-29 RX ADMIN — TRAMADOL HYDROCHLORIDE 50 MG: 50 TABLET, FILM COATED ORAL at 11:04

## 2021-03-29 RX ADMIN — VANCOMYCIN HYDROCHLORIDE 750 MG: 750 INJECTION, SOLUTION INTRAVENOUS at 05:01

## 2021-03-29 RX ADMIN — PANTOPRAZOLE SODIUM 20 MG: 20 TABLET, DELAYED RELEASE ORAL at 04:41

## 2021-03-29 RX ADMIN — ONDANSETRON 4 MG: 2 INJECTION INTRAMUSCULAR; INTRAVENOUS at 22:19

## 2021-03-29 NOTE — CONSULTS
Consultation -Thoracic Surgery   Hiren Mccloud 65 y o  female MRN: [id number]  Unit/Bed#: [facility] 408-01 Encounter: [id number]      Assessment/Plan      Assessment:  The patient is here with right sided parapneumonic effusion for which she has had a right pigtail catheter placed by IR on 3/23  Cultures are awaited and Gram stain from the effusion is positive for Gram positive cocci  She has a leukocytosis and is on antibiotics for the same  Imaging reveals a large right sided pleural effusion,likely empyema  She has been on clopidogrel until today  Plan:  CXR to check chest tube position  Chest tube to -20 cm suction on pleurevac  OR on 4/2 for R VATS washout with decortication  Hold clopidogrel  Continue antibiotics cefepime, metronidazole and vancomycin  Diet as tolerated    History of Present Illness   Physician Requesting Consult: Julissa Mclaughlin DO  Reason for Consult / Principal Problem: R empyema    HPI: Hiren Mccloud is a 65y o  year old female who presents with a right sided parapneumonic effusion s/p IR placement of a chest pigtail on 3/23  She has Gram positive cocci seen on Gram stain from the fluid  She c/o some shortness of breath but no other complaints at present  She has a h/o ankle surgery as well as CAD, HTN, PVD, anemia  She has a leukocytosis and imaging reveals a large R sided pleural effusion, likely empyema  She is on antibiotics for the same and was [facility] to [facility] for Thoracic Surgery evaluation  She is on clopidogrel and has been on it until today  Inpatient consult to Thoracic Surgery     Performed by  Beryle Drones, MD     Authorized by Mariya Rowe MD              Review of Systems   Constitutional: Negative for activity change, diaphoresis, fever and unexpected weight change  HENT: Negative for congestion, ear discharge, hearing loss, nosebleeds, sinus pain, sneezing, tinnitus and voice change      Eyes: Negative for discharge, redness, itching and visual disturbance  Respiratory: Positive for shortness of breath  Negative for apnea, cough, choking, chest tightness, wheezing and stridor  Cardiovascular: Negative for chest pain, palpitations and leg swelling  Gastrointestinal: Negative for abdominal distention, abdominal pain, anal bleeding, constipation, diarrhea, nausea, rectal pain and vomiting  Endocrine: Negative for cold intolerance, heat intolerance, polyphagia and polyuria  Genitourinary: Negative for difficulty urinating, flank pain and frequency  Musculoskeletal: Negative for arthralgias, back pain, joint swelling, neck pain and neck stiffness  Skin: Negative for color change and wound  Allergic/Immunologic: Negative for environmental allergies  Neurological: Negative for dizziness, speech difficulty, weakness and light-headedness  Hematological: Negative for adenopathy  Does not bruise/bleed easily  Psychiatric/Behavioral: Negative for agitation, behavioral problems and hallucinations  The patient is not hyperactive          Historical Information   Past Medical History:   Diagnosis Date    Anemia     Cardiac disease     Chronic pain     Coronary artery disease     Hypertension     PVD (peripheral vascular disease) ([facility] [address] )      Past Surgical History:   Procedure Laterality Date    ANKLE SURGERY      IR CHEST TUBE PLACEMENT  3/23/2021    IR NON-TUNNELED CENTRAL LINE PLACEMENT  3/24/2021     Social History   Social History     Substance and Sexual Activity   Alcohol Use Yes    Frequency: 2-4 times a month    Drinks per session: 1 or 2    Comment: social     Social History     Substance and Sexual Activity   Drug Use No     E-Cigarette/Vaping     E-Cigarette/Vaping Substances     Social History     Tobacco Use   Smoking Status Former Smoker    Packs/day: 1 00    Years: 0 10    Pack years: 0 10    Types: Cigarettes   Smokeless Tobacco Never Used   Tobacco Comment    pt states she stopped smoking 3 weeks ago Family History: non-contributory    Meds/Allergies   all current active meds have been reviewed  Allergies   Allergen Reactions    Digoxin Hives     HA    Gadolinium Derivatives        Objective   Vitals: Blood pressure 133/65, pulse 101, temperature 97 5 °F (36 4 °C), temperature source Oral, resp  rate 20, SpO2 98 %, not currently breastfeeding  ,There is no height or weight on file to calculate BMI  Intake/Output Summary (Last 24 hours) at 3/29/2021 1840  Last data filed at 3/29/2021 1801  Gross per 24 hour   Intake 480 ml   Output 290 ml   Net 190 ml     Invasive Devices     Central Venous Catheter Line            CVC Central Lines 03/24/21 Triple Left Internal jugular 5 days          Drain            Chest Tube Right Other (Comment) 12 Fr  6 days                Physical Exam  Constitutional:       General: She is not in acute distress  Appearance: Normal appearance  She is not ill-appearing, toxic-appearing or diaphoretic  HENT:      Head: Normocephalic and atraumatic  Right Ear: Ear canal and external ear normal       Left Ear: Ear canal and external ear normal       Nose: No congestion or rhinorrhea  Mouth/Throat:      Pharynx: No oropharyngeal exudate or posterior oropharyngeal erythema  Eyes:      General: No scleral icterus  Right eye: No discharge  Left eye: No discharge  Neck:      Musculoskeletal: No neck rigidity or muscular tenderness  Vascular: No carotid bruit  Cardiovascular:      Rate and Rhythm: Normal rate and regular rhythm  Pulses: Normal pulses  Heart sounds: Normal heart sounds  No murmur  No friction rub  No gallop  Pulmonary:      Effort: Pulmonary effort is normal  No respiratory distress  Breath sounds: Normal breath sounds  No stridor  No wheezing, rhonchi or rales  Comments: R chest pigtail in place, to -20 cm suction on pleurevac with serous output   Dressing was removed and pigtail was seen to be securely sutured and knotted in place  New dressing was placed  Chest:      Chest wall: No tenderness  Abdominal:      General: Abdomen is flat  Bowel sounds are normal  There is no distension  Palpations: Abdomen is soft  There is no mass  Tenderness: There is no abdominal tenderness  There is no right CVA tenderness, left CVA tenderness, guarding or rebound  Hernia: No hernia is present  Musculoskeletal:         General: No swelling, tenderness, deformity or signs of injury  Right lower leg: No edema  Left lower leg: No edema  Lymphadenopathy:      Cervical: No cervical adenopathy  Skin:     Coloration: Skin is not jaundiced or pale  Findings: No bruising, erythema, lesion or rash  Neurological:      General: No focal deficit present  Mental Status: She is alert and oriented to person, place, and time  Cranial Nerves: No cranial nerve deficit  Sensory: No sensory deficit  Motor: No weakness  Coordination: Coordination normal       Gait: Gait normal       Deep Tendon Reflexes: Reflexes normal    Psychiatric:         Mood and Affect: Mood normal          Behavior: Behavior normal          Thought Content: Thought content normal          Judgment: Judgment normal          Lab Results: I have personally reviewed pertinent reports  Imaging Studies: I have personally reviewed pertinent reports  EKG, Pathology, and Other Studies: I have personally reviewed pertinent reports  Code Status: Level 1 - Full Code  Advance Directive and Living Will:      Power of :    POLST:      Counseling / Coordination of Care  Counseling/Coordination of Care: Total floor / unit time spent today 15 minutes  Greater than 50% of total time was spent with the patient and / or family counseling and / or coordination of care   A description of the counseling / coordination of care: New consult

## 2021-03-29 NOTE — EMTALA/ACUTE CARE TRANSFER
[facility] INTENSIVE CARE UNIT  [address] [facility] [address]  Dept: [phone]      ACUTE CARE TRANSFER CONSENT    NAME Rafa POTTER 1950                              MRN [id number]    I have been informed of my rights regarding examination, treatment, and transfer   by Dr Favian Ashford MD    Benefits:      Risks:        Consent for Transfer:  I acknowledge that my medical condition has been evaluated and explained to me by the treating physician or other qualified medical person and/or my attending physician, who has recommended that I be transferred to the service of    at    The above potential benefits of such transfer, the potential risks associated with such transfer, and the probable risks of not being transferred have been explained to me, and I fully understand them  The doctor has explained that, in my case, the benefits of transfer outweigh the risks  I agree to be transferred  I authorize the performance of emergency medical procedures and treatments upon me in both transit and upon arrival at the receiving facility  Additionally, I authorize the release of any and all medical records to the receiving facility and request they be transported with me, if possible  I understand that the safest mode of transportation during a medical emergency is an ambulance and that the Hospital advocates the use of this mode of transport  Risks of traveling to the receiving facility by car, including absence of medical control, life sustaining equipment, such as oxygen, and medical personnel has been explained to me and I fully understand them  (QUE CORRECT BOX BELOW)  [x  ]  I consent to the stated transfer and to be transported by ambulance/helicopter  [  ]  I consent to the stated transfer, but refuse transportation by ambulance and accept full responsibility for my transportation by car    I understand the risks of non-ambulance transfers and I exonerate the Hospital and its staff from any deterioration in my condition that results from this refusal     X___________________________________________    DATE  21  TIME________  Signature of patient or legally responsible individual signing on patient behalf           RELATIONSHIP TO PATIENT_________________________          Provider Certification    NAME Michelle Enrique                                         1950                              MRN [id number]    A medical screening exam was performed on the above named patient  Based on the examination:    Condition Necessitating Transfer slb for vats decortication    Patient Condition:      Reason for Transfer:      Transfer Requirements: Facility     · Space available and qualified personnel available for treatment as acknowledged by    · Agreed to accept transfer and to provide appropriate medical treatment as acknowledged by          · Appropriate medical records of the examination and treatment of the patient are provided at the time of transfer   [address] [place] [address], [address] _______  · Transfer will be performed by qualified personnel from    and appropriate transfer equipment as required, including the use of necessary and appropriate life support measures  Provider Certification: I have examined the patient and explained the following risks and benefits of being transferred/refusing transfer to the patient/family:         Based on these reasonable risks and benefits to the patient and/or the unborn child(amelia), and based upon the information available at the time of the patients examination, I certify that the medical benefits reasonably to be expected from the provision of appropriate medical treatments at another medical facility outweigh the increasing risks, if any, to the individuals medical condition, and in the case of labor to the unborn child, from effecting the transfer      X______kota robles middleton______________________________________ DATE 03/29/21        TIME__1251_____      ORIGINAL - SEND TO MEDICAL RECORDS   COPY - SEND WITH PATIENT DURING TRANSFER

## 2021-03-29 NOTE — ASSESSMENT & PLAN NOTE
Supratherapeutic INR  Presented with INR of 8 7    On admission, possibly secondary to severe malnutrition, albumin was 1 8 on admission  No history of heavy alcohol use per family  Liver enzymes are within normal limits  Received a 10 mg of vitamin K with improvement of INR to 1 27  Follow INR daily

## 2021-03-29 NOTE — PLAN OF CARE
Problem: Potential for Falls  Goal: Patient will remain free of falls  Description: INTERVENTIONS:  - Assess patient frequently for physical needs  -  Identify cognitive and physical deficits and behaviors that affect risk of falls    -  [place] fall precautions as indicated by assessment   - Educate patient/family on patient safety including physical limitations  - Instruct patient to call for assistance with activity based on assessment  - Modify environment to reduce risk of injury  - Consider OT/PT consult to assist with strengthening/mobility  Outcome: Progressing     Problem: PAIN - ADULT  Goal: Verbalizes/displays adequate comfort level or baseline comfort level  Description: Interventions:  - Encourage patient to monitor pain and request assistance  - Assess pain using appropriate pain scale  - Administer analgesics based on type and severity of pain and evaluate response  - Implement non-pharmacological measures as appropriate and evaluate response  - Consider cultural and social influences on pain and pain management  - Notify physician/advanced practitioner if interventions unsuccessful or patient reports new pain  Outcome: Progressing     Problem: INFECTION - ADULT  Goal: Absence or prevention of progression during hospitalization  Description: INTERVENTIONS:  - Assess and monitor for signs and symptoms of infection  - Monitor lab/diagnostic results  - Monitor all insertion sites, i e  indwelling lines, tubes, and drains  - Monitor endotracheal if appropriate and nasal secretions for changes in amount and color  - [place] appropriate cooling/warming therapies per order  - Administer medications as ordered  - Instruct and encourage patient and family to use good hand hygiene technique  - Identify and instruct in appropriate isolation precautions for identified infection/condition  Outcome: Progressing  Goal: Absence of fever/infection during neutropenic period  Description: INTERVENTIONS:  - Monitor WBC    Outcome: Progressing     Problem: SAFETY ADULT  Goal: Patient will remain free of falls  Description: INTERVENTIONS:  - Assess patient frequently for physical needs  -  Identify cognitive and physical deficits and behaviors that affect risk of falls    -  [place] fall precautions as indicated by assessment   - Educate patient/family on patient safety including physical limitations  - Instruct patient to call for assistance with activity based on assessment  - Modify environment to reduce risk of injury  - Consider OT/PT consult to assist with strengthening/mobility  Outcome: Progressing  Goal: Maintain or return to baseline ADL function  Description: INTERVENTIONS:  -  Assess patient's ability to carry out ADLs; assess patient's baseline for ADL function and identify physical deficits which impact ability to perform ADLs (bathing, care of mouth/teeth, toileting, grooming, dressing, etc )  - Assess/evaluate cause of self-care deficits   - Assess range of motion  - Assess patient's mobility; develop plan if impaired  - Assess patient's need for assistive devices and provide as appropriate  - Encourage maximum independence but intervene and supervise when necessary  - Involve family in performance of ADLs  - Assess for home care needs following discharge   - Consider OT consult to assist with ADL evaluation and planning for discharge  - Provide patient education as appropriate  Outcome: Progressing  Goal: Maintain or return mobility status to optimal level  Description: INTERVENTIONS:  - Assess patient's baseline mobility status (ambulation, transfers, stairs, etc )    - Identify cognitive and physical deficits and behaviors that affect mobility  - Identify mobility aids required to assist with transfers and/or ambulation (gait belt, sit-to-stand, lift, walker, cane, etc )  - [place] fall precautions as indicated by assessment  - Record patient progress and toleration of activity level on Mobility SBAR; progress patient to next Phase/Stage  - Instruct patient to call for assistance with activity based on assessment  - Consider rehabilitation consult to assist with strengthening/weightbearing, etc   Outcome: Progressing     Problem: DISCHARGE PLANNING  Goal: Discharge to home or other facility with appropriate resources  Description: INTERVENTIONS:  - Identify barriers to discharge w/patient and caregiver  - Arrange for needed discharge resources and transportation as appropriate  - Identify discharge learning needs (meds, wound care, etc )  - Arrange for interpretive services to assist at discharge as needed  - Refer to Case Management Department for coordinating discharge planning if the patient needs post-hospital services based on physician/advanced practitioner order or complex needs related to functional status, cognitive ability, or social support system  Outcome: Progressing     Problem: Knowledge Deficit  Goal: Patient/family/caregiver demonstrates understanding of disease process, treatment plan, medications, and discharge instructions  Description: Complete learning assessment and assess knowledge base  Interventions:  - Provide teaching at level of understanding  - Provide teaching via preferred learning methods  Outcome: Progressing     Problem: Nutrition/Hydration-ADULT  Goal: Nutrient/Hydration intake appropriate for improving, restoring or maintaining nutritional needs  Description: Monitor and assess patient's nutrition/hydration status for malnutrition  Collaborate with interdisciplinary team and initiate plan and interventions as ordered  Monitor patient's weight and dietary intake as ordered or per policy  Utilize nutrition screening tool and intervene as necessary  Determine patient's food preferences and provide high-protein, high-caloric foods as appropriate       INTERVENTIONS:  - Monitor oral intake, urinary output, labs, and treatment plans  - Assess nutrition and hydration status and recommend course of action  - Evaluate amount of meals eaten  - Assist patient with eating if necessary   - Allow adequate time for meals  - Recommend/ encourage appropriate diets, oral nutritional supplements, and vitamin/mineral supplements  - Order, calculate, and assess calorie counts as needed  - Recommend, monitor, and adjust tube feedings and TPN/PPN based on assessed needs  - Assess need for intravenous fluids  - Provide specific nutrition/hydration education as appropriate  - Include patient/family/caregiver in decisions related to nutrition  Outcome: Progressing     Problem: Prexisting or High Potential for Compromised Skin Integrity  Goal: Skin integrity is maintained or improved  Description: INTERVENTIONS:  - Identify patients at risk for skin breakdown  - Assess and monitor skin integrity  - Assess and monitor nutrition and hydration status  - Monitor labs   - Assess for incontinence   - Turn and reposition patient  - Assist with mobility/ambulation  - Relieve pressure over bony prominences  - Avoid friction and shearing  - Provide appropriate hygiene as needed including keeping skin clean and dry  - Evaluate need for skin moisturizer/barrier cream  - Collaborate with interdisciplinary team   - Patient/family teaching  - Consider wound care consult   Outcome: Progressing   Updated Care Plan

## 2021-03-29 NOTE — ASSESSMENT & PLAN NOTE
Delirium  Probably related to her alcoholism she received 16 mg of Ativan based on the [facility] protocol  Continue with the [facility] protocol  Family denies any heavy alcohol use, maybe she takes a glass of wine occasionally so this unlikely active alcohol use more like severe delirium in the setting of parapneumonic effusion    CT head was negative for intracranial anomaly  She received a trial of phenobarb x1  Now back to baseline mental status  Delirium has resolved

## 2021-03-29 NOTE — PROGRESS NOTES
Progress Note - Infectious Disease   Sophia Oro 65 y o  female MRN: [id number]  Unit/Bed#: -01 Encounter: [id number]    77-year-old woman with right lower lobe pneumonia complicated by empyema, leukocytosis, history of heavy ethanol use, altered mental status, leukocytosis, fever     Plan:  1,2) Pneumonia, empyema - Pt  remains afebrile and her leukocytosis continues to improve, however unfortunately Ms Chaz Briones has a persistent large right pleural effusion  Await thoracic surgery evaluation, and agree with same  Cultures from pleural fluid are negative as mentioned previously, suspect Streptococcal and/or anaerobic infection  For now, will continue current broad Gram-positive and anaerobic infection  Anticipate that patient will be able to be discharged on oral antibiotics, eventually     ===> Will CONTINUE VANCOMYCIN, METRONIDAZOLE  Will follow BMP, CBC to assess for toxicity while patient is on these agents      ===> Await results of pending thoracic surgery evaluation     3) AMS - Resolved  Patient now baseline     4) Leukocytosis - 2/2 #1, will follow as marker of clinical improvement  Improving       5) Fever - 2/2 #1, will follow as marker of clinical improvement  Improving      ===> D/W 1'  ===> Will follow       Subjective/Objective   No acute events overnight  Patient denies chills, vomiting, nausea, fever  Complains of some intermittent chest discomfort, exacerbated by coughing  Repeat CT scan done yesterday (3/28) shows persisting large right pleural effusion  Thoracic surgery evaluation pending  Cultures remain without growth  Fortunately, white blood cell count continues to improve, and patient remains afebrile      Temp:  [97 °F (36 1 °C)-97 5 °F (36 4 °C)] 97 5 °F (36 4 °C)  HR:  [89-94] 91  Resp:  [18-24] 24  BP: (120-125)/(56-59) 120/59  SpO2:  [96 %-100 %] 96 %  Temp (24hrs), Av 3 °F (36 3 °C), Min:97 °F (36 1 °C), Max:97 5 °F (36 4 °C)  Current: Temperature: 97 5 °F (36 4 °C)    Physical Exam:   Gen: AA, NAD, VS reviewed  ENT: MMM  CV: No m/r/g, regular rate  Pulm: Lungs with decreased breath sounds on the right, no respiratory distress  ABD: S/NT/DT  Skin: No rash on exposed skin  Psych: Oriented, nl  affect     Invasive Devices     Central Venous Catheter Line            CVC Central Lines 03/24/21 Triple Left Internal jugular 4 days          Drain            Chest Tube Right Other (Comment) 12 Fr  5 days    External Urinary Catheter 2 days                Lab, Imaging and other studies: I have personally reviewed pertinent reports     and I have personally reviewed pertinent films in PACS

## 2021-03-29 NOTE — ASSESSMENT & PLAN NOTE
Sepsis secondary to parapneumonic effusion  Presented with sepsis POA tachycardia, elevated white count to 36 K with 4% bands, found to have right-sided large parapneumonic effusion  Patient says did not have any fever prior to presentation  Subsequent g stain fluid cultures revealed Gram-positive cocci in pairs  She has chest tube placed by IR on 03/23 at [facility]  Six doses of tPA/dornase have completed on 03/27  Repeat CT scan showed mid size residual right pleural effusion slightly decreased  New ground-glass opacity in the left anterior upper lobe, of 5 centimetre left pulmonary nodule noted  After discussion between Pulmonary and thoracic surgery decision was made to transfer patient to [place] for decortication and VATS procedure  She is scheduled for procedure on Friday  Continue with the Flagyl day 8, vancomycin day 5 with pharmacy consult  Patient is having chest to with 35 cc of fluid last night  Lidocaine all tram for p r n  Chest to site pain  One dose of Dilaudid given, will give p r n   Due to history of delirium

## 2021-03-29 NOTE — SPEECH THERAPY NOTE
Speech Language/Pathology    Speech/Language Pathology Progress Note    Patient Name: Tamica James  UAFSD[facility]H Date: 3/29/2021     Problem List  Principal Problem:    Sepsis ([facility] [address] )  Active Problems:    Pneumonia of right lower lobe due to infectious organism    CAD (coronary artery disease)    Hypokalemia    Parapneumonic effusion    GERD (gastroesophageal reflux disease)    Acute respiratory failure with hypoxia (HCC)    Supratherapeutic INR    Delirium    Thrombocytosis (HCC)    Severe protein-calorie malnutrition (HCC)    Anemia    Valvular heart disease    Dysphagia       Past Medical History  Past Medical History:   Diagnosis Date    Anemia     Cardiac disease     Chronic pain     Coronary artery disease     Hypertension     [facility]         Past Surgical History  Past Surgical History:   Procedure Laterality Date    ANKLE SURGERY      IR CHEST TUBE PLACEMENT  3/23/2021    IR NON-TUNNELED CENTRAL LINE PLACEMENT  3/24/2021         Subjective:  Pt seen for dysphagia tx  Pt sitting mostly upright in bed, c/o pain at chest tube sight  Pt calm and pleasant  Objective:  Reviewed chart for recent events and spoke with RN  RN aware of pain, cannot have pain meds for a couple hours  Attempted to reposition pt, but she didn't want to move  A dysphagia 2 diet was recommended by ST last week, but diet was not changed in computer, pt still on pureed diet  Pt much calmer, not tearful today, better able to focus and attend  Pt's breakfast tray was in room; she had taken only one bite, and declined to take more, due to poor appetite and pain  She was willing to eat kimberlee crackers and drink water by straw  Mastication, bolus formation and transfer was mildly prolonged, with no significant residue  Pharyngeal swallows appeared timely, and there were no s/s of aspiration, no coughing at all during tx  Sats in mid 90s       Sent a tiger text to physician requesting diet changed, discussed with RN and pt ie diet change, aspiration precautions  Assessment:  Pt is calmer, more attentive today  Tolerated kimberlee crackers and thin liquids well, no s/s of aspiration  Plan/Recommendations:  Change diet to dysphagia 2 and thin liquids  Aspiration and reflux precautions  Continue with ST for dysphagia tx  Trials of more advanced solids if pt's calm affect and improve attention continue

## 2021-03-29 NOTE — ASSESSMENT & PLAN NOTE
Malnutrition Findings:   Adult Malnutrition type: Acute illness  Adult Degree of Malnutrition: Other severe protein calorie malnutritionsevere protein calorie malnutrition as evidenced by hollowed orbitals, temple hollowing, clavicle protrusion, with prominent bone and low albumin levels 1 6> 1 8, and associated coagulapathy with high INR 8 on admission in the setting of right lower lobe pneumonia with complicated right parapneumonic effusion    BMI Findings: Body mass index is 26 19 kg/m²       Nutrition following

## 2021-03-29 NOTE — H&P
[facility]  H&P- Daycristin Dies 1950, 65 y o  female MRN: [id number]  Unit/Bed#: [facility] 408-01 Encounter: [id number]  Primary Care Provider: Carlota Méndez MD   Date and time admitted to hospital: 3/29/2021  2:37 PM    Pulmonary nodule  Assessment & Plan  5 centimeter nodule noted   Would monitor closely   Pulmonary follow up     [facility] with Xanax and trazodone    Dysphagia  Assessment & Plan  Patient has dysphagia to thin liquids  Speech therapy consult    Valvular heart disease  Assessment & Plan    Valvular disease  Aortic valve is 1 39 with moderate tricuspid and PA pressures of 60, echo shows ejection fraction of 60% with grade 1 diastolic dysfunction    Anemia  Assessment & Plan  Chronic normocytic normochromic anemia  Iron panel suggested chronic anemia of inflammation  Keep hemoglobin greater than 7, transfuse if needed    Severe protein-calorie malnutrition (HCC)  Assessment & Plan  Malnutrition Findings:        Severe protein calorie malnutrition  Adult malnutrition type:  Acute illness  Adult degree of malnutrition:  Other severe protein calorie malnutrition severe protein calorie malnutrition as evidenced by [employer] [place] orbits, temp temple following, clavicle protrusion, with prominent bone and low albumin levels 1 6 greater than 1 8, and associated with coagulopathy with high INR 8 on admission in the setting of right lower lobe pneumonia with complicated right parapneumonic effusion  Also has a pulmonary nodule needs to be worked up  BMI finding  Body mass index is 26 19  Nutrition is following      BMI Findings:             Thrombocytosis (HCC)  Assessment & Plan  Thrombocytosis  Chronic due to alcoholism   follow daily CBC      Delirium  Assessment & Plan  Delirium  Probably related to her alcoholism she received 16 mg of Ativan based on the CIWA protocol  Continue with the CIWA protocol  Family denies any heavy alcohol use, maybe [employer] orientation, pt somnolent. No indicators of pain. Comfort cares maintained. DD1+honey thick liquid diet. Aguilar patent, adequate output. Repositioned q2h. Family at bedside. Appeared comfortable overnight.    she takes a glass of wine occasionally so this unlikely active alcohol use more like severe delirium in the setting of parapneumonic effusion  CT head was negative for intracranial anomaly  She received a trial of phenobarb x1  Now back to baseline mental status  Delirium has resolved      Supratherapeutic INR  Assessment & Plan  Supratherapeutic INR  Presented with INR of 8 7  On admission, possibly secondary to severe malnutrition, albumin was 1 8 on admission  No history of heavy alcohol use per family  Liver enzymes are within normal limits  Received a 10 mg of vitamin K with improvement of INR to 1 27  Follow INR daily    GERD (gastroesophageal reflux disease)  Assessment & Plan  Continue with proton pump inhibitor    CAD (coronary artery disease)  Assessment & Plan  Coronary artery disease  Hold Plavix for VATS procedure  Continue Lipitor    * Sepsis secondary to parapneumonic effusion/pna  Assessment & Plan  Sepsis secondary to parapneumonic effusion  Presented with sepsis POA tachycardia, elevated white count to 36 K with 4% bands, found to have right-sided large parapneumonic effusion  Patient says did not have any fever prior to presentation  Subsequent g stain fluid cultures revealed Gram-positive cocci in pairs  She has chest tube placed by IR on 03/23 at [facility]  Six doses of tPA/dornase have completed on 03/27  Repeat CT scan showed mid size residual right pleural effusion slightly decreased  New ground-glass opacity in the left anterior upper lobe, of 5 centimetre left pulmonary nodule noted  After discussion between Pulmonary and thoracic surgery decision was made to transfer patient to [place] for decortication and VATS procedure  She is scheduled for procedure on Friday  Continue with the Flagyl day 8, vancomycin day 5 with pharmacy consult  Patient is having chest to with 35 cc of fluid last night  Lidocaine all tram for p r n   Chest to site pain  One dose of Dilaudid given, will give p r n  Due to history of delirium        cefepime was added to the antibiotics per thoracic surgery request  ID consulted    VTE Prophylaxis: Enoxaparin (Lovenox)  / sequential compression device   Code Status: full code   POLST: POLST is not applicable to this patient  Discussion with family: daughters at bedside     Anticipated Length of Stay:  Patient will be admitted on an Inpatient basis with an anticipated length of stay of  More than 2 midnights  Justification for Hospital Stay: due to parapneumonia effusion     Total Time for Visit, including Counseling / Coordination of Care: 45 minutes  Greater than 50% of this total time spent on direct patient counseling and coordination of care  Chief Complaint:   sob    History of Present Illness:    Jorje Cruz is a 65 y o  female who presents with sepsis POA tachycardia, elevated white count to 36 K with 4% bands, found to have right-sided large parapneumonic effusion   Patient says did not have any fever prior to presentation   Subsequent g stain fluid cultures revealed Gram-positive cocci in pairs   She has chest tube placed by IR on 03/23 [place]   Six doses of tPA/dornase have completed on 03/27   Repeat CT scan showed mid size residual right pleural effusion slightly decreased   New ground-glass opacity in the left anterior upper lobe, of 5 centimetre left pulmonary nodule noted  After discussion between Pulmonary and thoracic surgery decision was made to transfer patient to [place] for decortication and VATS procedure  Review of Systems:    Review of Systems   Constitutional: Positive for activity change, appetite change and fatigue  HENT: Negative  Respiratory: Positive for cough and shortness of breath  Cardiovascular: Negative for chest pain  Gastrointestinal: Negative  Endocrine: Negative  Genitourinary: Negative  Musculoskeletal: Negative  Skin: Negative  Neurological: Negative  Hematological: Negative  Psychiatric/Behavioral: Negative  Past Medical and Surgical History:     Past Medical History:   Diagnosis Date    Anemia     Cardiac disease     Chronic pain     Coronary artery disease     Hypertension     PVD (peripheral vascular disease) ([facility] [address] )        Past Surgical History:   Procedure Laterality Date    ANKLE SURGERY      IR CHEST TUBE PLACEMENT  3/23/2021    IR NON-TUNNELED CENTRAL LINE PLACEMENT  3/24/2021       Meds/Allergies:    Prior to Admission medications    Medication Sig Start Date End Date Taking? Authorizing Provider   ALPRAZolam [employer] Buys) 0 25 mg tablet Take 0 25 mg by mouth every 6 (six) hours as needed for anxiety  Yes Historical Provider, MD   atorvastatin (LIPITOR) 40 mg tablet Take 40 mg by mouth daily  Yes Historical Provider, MD   buPROPion [facility] SR) 150 mg 12 hr tablet Take 150 mg by mouth daily   Yes Historical Provider, MD   clopidogrel (PLAVIX) 75 mg tablet Take 75 mg by mouth daily  Yes Historical Provider, MD   FLUoxetine (PROzac) 20 mg capsule Take 20 mg by mouth daily   Yes Historical Provider, MD   omeprazole (PriLOSEC) 20 mg delayed release capsule Take 20 mg by mouth daily  Yes Historical Provider, MD   traZODone (DESYREL) 100 mg tablet Take 100 mg by mouth daily at bedtime   Yes Historical Provider, MD   furosemide (LASIX) 20 mg tablet Take 20 mg by mouth as needed  Historical Provider, MD   zolpidem (AMBIEN) 10 mg tablet Take 10 mg by mouth daily at bedtime as needed for sleep  Historical Provider, MD     I have reviewed home medications with patient personally  Allergies:    Allergies   Allergen Reactions    Digoxin Hives     HA    Gadolinium Derivatives        Social History:     Marital Status: /Civil Union      Substance Use History:   Social History     Substance and Sexual Activity   Alcohol Use Yes    Frequency: 2-4 times a month    Drinks per session: 1 or 2    Comment: social     Social History Tobacco Use   Smoking Status Former Smoker    Packs/day: 1 00    Years: 0 10    Pack years: 0 10    Types: Cigarettes   Smokeless Tobacco Never Used   Tobacco Comment    pt states she stopped smoking 3 weeks ago     Social History     Substance and Sexual Activity   Drug Use No       Family History:    Family History   Problem Relation Age of Onset    No Known Problems Mother     No Known Problems Father        Physical Exam:     Vitals:   Blood Pressure: 133/65 (03/29/21 1446)  Pulse: 101 (03/29/21 1446)  Temperature: 97 5 °F (36 4 °C) (03/29/21 1446)  Temp Source: Oral (03/29/21 1446)  Respirations: 20 (03/29/21 1446)  SpO2: 98 % (03/29/21 1446)    Physical Exam  Vitals signs and nursing note reviewed  HENT:      Head: Normocephalic and atraumatic  Mouth/Throat:      Mouth: Mucous membranes are moist       Pharynx: Oropharynx is clear  Eyes:      Extraocular Movements: Extraocular movements intact  Pupils: Pupils are equal, round, and reactive to light  Neck:      Musculoskeletal: Normal range of motion and neck supple  No neck rigidity or muscular tenderness  Vascular: No carotid bruit  Cardiovascular:      Rate and Rhythm: Tachycardia present  Pulmonary:      Effort: Pulmonary effort is normal       Breath sounds: Wheezing and rhonchi present  Chest:      Chest wall: Tenderness present  Abdominal:      General: Abdomen is flat  Bowel sounds are normal  There is no distension  Palpations: There is no mass  Tenderness: There is no abdominal tenderness  Musculoskeletal: Normal range of motion  General: No swelling, tenderness or deformity  Lymphadenopathy:      Cervical: No cervical adenopathy  Skin:     General: Skin is warm and dry  Coloration: Skin is not jaundiced or pale  Findings: No bruising or erythema  Neurological:      General: No focal deficit present  Mental Status: She is alert and oriented to person, place, and time  Psychiatric:         Mood and Affect: Mood normal          Behavior: Behavior normal          Thought Content: Thought content normal           Additional Data:     Lab Results: I have personally reviewed pertinent reports  Results from last 7 days   Lab Units 03/29/21  0502  03/25/21  0435   WBC Thousand/uL 16 77*   < > 25 40*   HEMOGLOBIN g/dL 7 3*   < > 6 9*   HEMATOCRIT % 22 4*   < > 21 4*   PLATELETS Thousands/uL 684*   < > 605*   BANDS PCT %  --   --  1   LYMPHO PCT %  --   --  6*   MONO PCT %  --   --  7   EOS PCT %  --   --  1    < > = values in this interval not displayed  Results from last 7 days   Lab Units 03/29/21  0502   SODIUM mmol/L 139   POTASSIUM mmol/L 3 7   CHLORIDE mmol/L 107   CO2 mmol/L 25   BUN mg/dL 8   CREATININE mg/dL 0 67   ANION GAP mmol/L 7   CALCIUM mg/dL 7 6*   GLUCOSE RANDOM mg/dL 96     Results from last 7 days   Lab Units 03/26/21  0246   INR  1 30*     Results from last 7 days   Lab Units 03/26/21  0556 03/26/21  0014   POC GLUCOSE mg/dl 74 96         Results from last 7 days   Lab Units 03/29/21  0502 03/28/21  0405 03/23/21  0447   PROCALCITONIN ng/ml 0 32* 0 54* 1 12*       Imaging: I have personally reviewed pertinent reports  XR chest pa & lateral    (Results Pending)       EKG, Pathology, and Other Studies Reviewed on Admission:   · EKG:  NSR, low voltage QRS    Allscripts / Epic Records Reviewed: Yes     ** Please Note: This note has been constructed using a voice recognition system   **

## 2021-03-29 NOTE — DISCHARGE SUMMARY
[place]  Discharge- Harry Epps 1950, 65 y o  female MRN: [id number]  Unit/Bed#: -01 Encounter: [id number]  Primary Care Provider: Sylvain Adkins MD   Date and time admitted to hospital: 3/21/2021 10:27 PM    * Sepsis secondary to parapneumonic effusion/pna  Assessment & Plan  · Sepsis, poa, a/e/b leukocytosis 36k with 4% bands and tachycardia   · secondary to right sided pneumonia/large right parapneumonic effusion  · G stain/fluid cultures with 2+ polys rare Gram-positive cocci in pairs  · Blood cultures no growth  · Status post IR chest tube 3/23  · Six doses tPA/dornase completed 3/7 am  · Repeat CT of the chest revealed midsize residual right pleural effusion slightly decreased  New ground-glass opacity in the left anterior upper lobe  5 cm left pulmonary nodule  · Pulmonary following and spoke with Thoracics today will transfer to [facility] for VATS/decortication  · Plavix discontinued will need to hold prior to surgery  · Continue Flagyl day 8/vancomycin day 5 per infectious disease  · Chest tube with only 35 cc out overnight  Disconnected and large fixed ring removed  Now draining serous fluid continue 20 cm of suction  · Lidocaine/Ultram p r n   For chest tube site pain      Pneumonia of right lower lobe due to infectious organism  Assessment & Plan  As above    Parapneumonic effusion  Assessment & Plan  As above    Dysphagia  Assessment & Plan  · Dysphagia to thin liquids  · Speech following    Thrombocytosis (HCC)  Assessment & Plan  Chronic        ETOH abuse  Assessment & Plan  · Cessation education    CAD (coronary artery disease)  Assessment & Plan  · Plavix held for VATS/decortication  · Continue Lipitor      GERD (gastroesophageal reflux disease)  Assessment & Plan  · On Protonix    Severe protein-calorie malnutrition ([facility] [address] )  Assessment & Plan  Malnutrition Findings:   Adult Malnutrition type: Acute illness  Adult Degree of Malnutrition: Other severe protein calorie malnutritionsevere protein calorie malnutrition as evidenced by hollowed orbitals, temple hollowing, clavicle protrusion, with prominent bone and low albumin levels 1 6> 1 8, and associated coagulapathy with high INR 8 on admission in the setting of right lower lobe pneumonia with complicated right parapneumonic effusion    BMI Findings: Body mass index is 26 19 kg/m²  Nutrition following      Anemia  Assessment & Plan  chronic normocytic normochromic anemia  Iron panel - Chronic anemia of inflammation  Keep hb > 7, transfuse if needed    Valvular heart disease  Assessment & Plan  · Echo with an EF of 21% grade 1 diastolic dysfunction aortic valve area of 1 39 with moderate TR and PA pressure of 60    Anxiety  Assessment & Plan  · Continue home Xanax and trazodone      Discharging Physician / Practitioner: NICKOLAS San  PCP: Pia Dawson MD  Admission Date:   Admission Orders (From admission, onward)     Ordered        03/22/21 0113  Inpatient Admission  Once                   Discharge Date: 03/29/21    Resolved Problems  Date Reviewed: 3/29/2021          Resolved    Hypokalemia 3/29/2021     Resolved by  NICKOLAS San    Hyponatremia 3/24/2021     Resolved by  Jonny Aguilar MD    Acute respiratory failure with hypoxia ([facility] [address] ) 3/29/2021     Resolved by  NICKOLAS San    Supratherapeutic INR 3/29/2021     Resolved by  NICKOLAS San    Coagulopathy ([facility] [address] ) 3/27/2021     Resolved by  NICKOLAS San    Delirium 3/29/2021     Resolved by  Hailey Burgess, [facility] Stay:  · Pulmonary    Procedures Performed:   · 3/23 IR chest tube placement on the right    Significant Findings / Test Results:   · 3/22 CTA of the chest PE study-No evidence of pulmonary artery embolus  Consolidation versus mass in the right lower lobe     · 3/28 CT of the chest-Modest sized residual right pleural effusion, slightly decreased from previous study   Locules of air within the pleural fluid which could be related to previous chest tube placement   There is some loculation of fluid and pleural thickening suggested in spite of absence of IV contrast  At least some component of right lower lobe compressive atelectasis and suspected pneumonia with overall slight improvement in right lower lobe aeration suggested  Limited evaluation of the right hilum without IV contrast  New groundglass opacity within the anterior left upper lobe   Although this is not a typical location, aspiration may be considered  Mild to moderate COPD   A 5 mm left lower lobe pulmonary nodule, unchanged in retrospect  Based on current Fleischner Society 2017 Guidelines on incidental pulmonary nodule, given patient is considered high risk for lung cancer, 12 month follow-up   non-contrast chest CT is recommended  Large hiatal hernia  New small left pleural effusion  · 3/21 blood cultures no growth  · 3/21 COVID negative  · 3/23 g stain 2+ polys  · 3/23 fluid culture 1+ polys rare Gram-positive cocci in pairs  · 3/25 urine Legionella strep negative  · 3/27 MRSA negative    Incidental Findings:   · Pulmonary nodule     Test Results Pending at Discharge (will require follow up):   · na Douglas id to follow     Outpatient Tests Requested:  · Na    Complications:  None    Reason for Admission:  Sepsis secondary to right parapneumonic effusion/pneumonia    Hospital Course:     Janiya George is a 65 y o  female patient who originally presented to the hospital on 3/21/2021 due to cough/shortness of breath/chest pain  with past medical history of CAD, PVD, hyperlipidemia, tobacco abuse, alcohol abuse, GERD, diastolic CHF, anxiety who presented to [facility] on 03/22 with complaints of cough/right-sided chest pain/shortness of breath    She was found to have a large right pleural effusion in which IR was consulted for chest tube placement and was started on antibiotics  Fluid results positive for exudate of fluid  Cultures remain with 1 to 2+ polys with rare Gram-positive cocci in pairs  Id was consulted  Patient remains on Flagyl/vancomycin  She received 6 doses of tPA/DNase and then CT of the chest was repeated in which there remained a modest size right pleural effusion with new ground-glass opacity in the left upper lobe  Pulmonary spoke with thoracic son decision was made to transfer for VATS decortication  The patient was on Plavix which was discontinued today and will need to be held prior to surgical intervention  Please see above list of diagnoses and related plan for additional information  Condition at Discharge: stable     Discharge Day Visit / Exam:     Subjective:  Patient states she is anxious  She wants to go home  Continues to complain of pain at chest tube site  Vitals: Blood Pressure: 117/69 (03/29/21 1111)  Pulse: 89 (03/29/21 1111)  Temperature: (!) 97 3 °F (36 3 °C) (03/29/21 1111)  Temp Source: Oral (03/29/21 1111)  Respirations: (!) 23 (03/29/21 1111)  Height: 5' 6" (167 6 cm) (03/22/21 0241)  Weight - Scale: 73 6 kg (162 lb 4 1 oz) (03/29/21 0548)  SpO2: 97 % (03/29/21 1111)  Exam:   Physical Exam  Constitutional:       General: She is not in acute distress  Appearance: She is underweight  HENT:      Head: Normocephalic and atraumatic  Mouth/Throat:      Mouth: Mucous membranes are moist    Eyes:      General: No scleral icterus  Pupils: Pupils are equal, round, and reactive to light  Neck:      Musculoskeletal: Neck supple  Cardiovascular:      Rate and Rhythm: Normal rate and regular rhythm  Pulses: Normal pulses  Heart sounds: Normal heart sounds  No murmur  Pulmonary:      Effort: Pulmonary effort is normal  No respiratory distress  Breath sounds: Normal breath sounds        Comments: Right chest tube with serous drainage  Abdominal:      General: Bowel sounds are normal  There is no distension  Palpations: Abdomen is soft  Tenderness: There is no abdominal tenderness  Musculoskeletal: Normal range of motion  General: Swelling present  Right lower leg: No edema  Left lower leg: No edema  Comments: Trace pedal edema   Skin:     General: Skin is warm and dry  Capillary Refill: Capillary refill takes less than 2 seconds  Coloration: Skin is not pale  Findings: No erythema  Neurological:      General: No focal deficit present  Mental Status: She is alert and oriented to person, place, and time  Cranial Nerves: No cranial nerve deficit  Psychiatric:      Comments: Anxious         Discussion with Family:  Decision for transfer discussed with patient's daughter  Left message on  cell phone also  Discharge instructions/Information to patient and family:   See after visit summary for information provided to patient and family  Provisions for Follow-Up Care:  See after visit summary for information related to follow-up care and any pertinent home health orders  Disposition:     Other: Will to [facility] SNF:   · Not Applicable to this Patient - Not Applicable to this Patient    Planned Readmission:  Yes to [facility] for VATS decortication     Discharge Statement:  I spent 30 minutes discharging the patient  This time was spent on the day of discharge  I had direct contact with the patient on the day of discharge  Greater than 50% of the total time was spent examining patient, answering all patient questions, arranging and discussing plan of care with patient as well as directly providing post-discharge instructions  Additional time then spent on discharge activities  Discharge Medications:  See after visit summary for reconciled discharge medications provided to patient and family        ** Please Note: This note has been constructed using a voice recognition system **

## 2021-03-29 NOTE — ASSESSMENT & PLAN NOTE
· Echo with an EF of 90% grade 1 diastolic dysfunction aortic valve area of 1 39 with moderate TR and PA pressure of 60

## 2021-03-29 NOTE — PROGRESS NOTES
Vancomycin Assessment    Nate Carey is a 65 y o  female who is currently receiving vancomycin 750 mg IV q12h for Pneumonia     Relevant clinical data and objective history reviewed:  Creatinine   Date Value Ref Range Status   03/29/2021 0 67 0 60 - 1 30 mg/dL Final     Comment:     Standardized to IDMS reference method   03/28/2021 0 70 0 60 - 1 30 mg/dL Final     Comment:     Standardized to IDMS reference method   03/27/2021 0 75 0 60 - 1 30 mg/dL Final     Comment:     Standardized to IDMS reference method     /58 (BP Location: Left arm)   Pulse 97   Temp 98 9 °F (37 2 °C) (Oral)   Resp 19   SpO2 95%   I/O last 3 completed shifts: In: 480 [P O :480]  Out: 290 [Urine:200; Chest Tube:90]  Lab Results   Component Value Date/Time    BUN 8 03/29/2021 05:02 AM    WBC 16 77 (H) 03/29/2021 05:02 AM    HGB 7 3 (L) 03/29/2021 05:02 AM    HCT 22 4 (L) 03/29/2021 05:02 AM    MCV 88 03/29/2021 05:02 AM     (H) 03/29/2021 05:02 AM     Temp Readings from Last 3 Encounters:   03/29/21 98 9 °F (37 2 °C) (Oral)   03/29/21 (!) 97 3 °F (36 3 °C) (Oral)   12/19/19 97 8 °F (36 6 °C) (Tympanic)     Vancomycin Days of Therapy: 5    Assessment/Plan  The patient is currently on vancomycin utilizing scheduled dosing  Baseline risks associated with therapy include: advanced age  The patient is receiving 750 mg IV q12h with the most recent vancomycin level being at steady-state and supratherapeutic based on a goal of 15-20 (appropriate for most indications) ; therefore, after clinical evaluation will be changed to 500 mg IV q12h   Pharmacy will continue to follow closely for s/sx of nephrotoxicity, infusion reactions, and appropriateness of therapy  BMP and CBC will be ordered per protocol  Plan for trough as patient approaches steady state, prior to the 4th  dose at approximately 1830 on 3/31  Pharmacy will continue to follow the patients culture results and clinical progress daily      Kameron Mcrae Pharmacist

## 2021-03-29 NOTE — ASSESSMENT & PLAN NOTE
· Sepsis, poa, a/e/b leukocytosis 36k with 4% bands and tachycardia   · secondary to right sided pneumonia/large right parapneumonic effusion  · G stain/fluid cultures with 2+ polys rare Gram-positive cocci in pairs  · Blood cultures no growth  · Status post IR chest tube 3/23  · Six doses tPA/dornase completed 3/7 am  · Repeat CT of the chest revealed midsize residual right pleural effusion slightly decreased  New ground-glass opacity in the left anterior upper lobe  5 cm left pulmonary nodule  · Pulmonary following and spoke with Thoracics today will transfer to [facility] for VATS/decortication  · Plavix discontinued will need to hold prior to surgery  · Continue Flagyl day 8/vancomycin day 5 per infectious disease  · Chest tube with only 35 cc out overnight  Disconnected and large fixed ring removed  Now draining serous fluid continue 20 cm of suction  · Lidocaine/Ultram p r n   For chest tube site pain

## 2021-03-29 NOTE — ASSESSMENT & PLAN NOTE
chronic normocytic normochromic anemia  Iron panel - Chronic anemia of inflammation  Keep hb > 7, transfuse if needed

## 2021-03-29 NOTE — ASSESSMENT & PLAN NOTE
Valvular disease  Aortic valve is 1 39 with moderate tricuspid and PA pressures of 60, echo shows ejection fraction of 60% with grade 1 diastolic dysfunction

## 2021-03-29 NOTE — ASSESSMENT & PLAN NOTE
Malnutrition Findings:        Severe protein calorie malnutrition  Adult malnutrition type:  Acute illness  Adult degree of malnutrition:  Other severe protein calorie malnutrition severe protein calorie malnutrition as evidenced by [employer] orbits, temp temple following, clavicle protrusion, with prominent bone and low albumin levels 1 6 greater than 1 8, and associated with coagulopathy with high INR 8 on admission in the setting of right lower lobe pneumonia with complicated right parapneumonic effusion  Also has a pulmonary nodule needs to be worked up       BMI finding  Body mass index is 26 19  Nutrition is following      BMI Findings:

## 2021-03-29 NOTE — ASSESSMENT & PLAN NOTE
Chronic normocytic normochromic anemia  Iron panel suggested chronic anemia of inflammation  Keep hemoglobin greater than 7, transfuse if needed

## 2021-03-30 LAB
ABO GROUP BLD: NORMAL
ABO GROUP BLD: NORMAL
ANION GAP SERPL CALCULATED.3IONS-SCNC: 7 MMOL/L (ref 4–13)
BLD GP AB SCN SERPL QL: NEGATIVE
BUN SERPL-MCNC: 7 MG/DL (ref 5–25)
CALCIUM SERPL-MCNC: 8.1 MG/DL (ref 8.3–10.1)
CHLORIDE SERPL-SCNC: 107 MMOL/L (ref 100–108)
CO2 SERPL-SCNC: 25 MMOL/L (ref 21–32)
CREAT SERPL-MCNC: 0.56 MG/DL (ref 0.6–1.3)
ERYTHROCYTE [DISTWIDTH] IN BLOOD BY AUTOMATED COUNT: 17.6 % (ref 11.6–15.1)
GFR SERPL CREATININE-BSD FRML MDRD: 93 ML/MIN/1.73SQ M
GLUCOSE SERPL-MCNC: 89 MG/DL (ref 65–140)
HCT VFR BLD AUTO: 22.2 % (ref 34.8–46.1)
HGB BLD-MCNC: 7 G/DL (ref 11.5–15.4)
MCH RBC QN AUTO: 28.7 PG (ref 26.8–34.3)
MCHC RBC AUTO-ENTMCNC: 31.5 G/DL (ref 31.4–37.4)
MCV RBC AUTO: 91 FL (ref 82–98)
PLATELET # BLD AUTO: 649 THOUSANDS/UL (ref 149–390)
PMV BLD AUTO: 10.4 FL (ref 8.9–12.7)
POTASSIUM SERPL-SCNC: 4 MMOL/L (ref 3.5–5.3)
RBC # BLD AUTO: 2.44 MILLION/UL (ref 3.81–5.12)
RH BLD: POSITIVE
RH BLD: POSITIVE
SODIUM SERPL-SCNC: 139 MMOL/L (ref 136–145)
SPECIMEN EXPIRATION DATE: NORMAL
WBC # BLD AUTO: 16.1 THOUSAND/UL (ref 4.31–10.16)

## 2021-03-30 PROCEDURE — 80048 BASIC METABOLIC PNL TOTAL CA: CPT | Performed by: INTERNAL MEDICINE

## 2021-03-30 PROCEDURE — 97116 GAIT TRAINING THERAPY: CPT

## 2021-03-30 PROCEDURE — 94664 DEMO&/EVAL PT USE INHALER: CPT

## 2021-03-30 PROCEDURE — 86920 COMPATIBILITY TEST SPIN: CPT

## 2021-03-30 PROCEDURE — 99232 SBSQ HOSP IP/OBS MODERATE 35: CPT | Performed by: INTERNAL MEDICINE

## 2021-03-30 PROCEDURE — 86850 RBC ANTIBODY SCREEN: CPT | Performed by: INTERNAL MEDICINE

## 2021-03-30 PROCEDURE — 99223 1ST HOSP IP/OBS HIGH 75: CPT | Performed by: THORACIC SURGERY (CARDIOTHORACIC VASCULAR SURGERY)

## 2021-03-30 PROCEDURE — 97167 OT EVAL HIGH COMPLEX 60 MIN: CPT

## 2021-03-30 PROCEDURE — 92610 EVALUATE SWALLOWING FUNCTION: CPT

## 2021-03-30 PROCEDURE — 85027 COMPLETE CBC AUTOMATED: CPT | Performed by: INTERNAL MEDICINE

## 2021-03-30 PROCEDURE — NC001 PR NO CHARGE: Performed by: SURGERY

## 2021-03-30 PROCEDURE — 94760 N-INVAS EAR/PLS OXIMETRY 1: CPT

## 2021-03-30 PROCEDURE — 94668 MNPJ CHEST WALL SBSQ: CPT

## 2021-03-30 PROCEDURE — 86900 BLOOD TYPING SEROLOGIC ABO: CPT | Performed by: INTERNAL MEDICINE

## 2021-03-30 PROCEDURE — 86901 BLOOD TYPING SEROLOGIC RH(D): CPT | Performed by: INTERNAL MEDICINE

## 2021-03-30 PROCEDURE — 99223 1ST HOSP IP/OBS HIGH 75: CPT | Performed by: INTERNAL MEDICINE

## 2021-03-30 PROCEDURE — 97163 PT EVAL HIGH COMPLEX 45 MIN: CPT

## 2021-03-30 RX ORDER — TRAZODONE HYDROCHLORIDE 100 MG/1
200 TABLET ORAL
Status: DISCONTINUED | OUTPATIENT
Start: 2021-03-30 | End: 2021-04-13 | Stop reason: HOSPADM

## 2021-03-30 RX ORDER — ACETAMINOPHEN 325 MG/1
975 TABLET ORAL EVERY 8 HOURS SCHEDULED
Status: DISCONTINUED | OUTPATIENT
Start: 2021-03-30 | End: 2021-04-13 | Stop reason: HOSPADM

## 2021-03-30 RX ADMIN — Medication 1 TABLET: at 09:02

## 2021-03-30 RX ADMIN — ACETAMINOPHEN 975 MG: 650 SUSPENSION ORAL at 09:45

## 2021-03-30 RX ADMIN — METRONIDAZOLE 500 MG: 500 INJECTION, SOLUTION INTRAVENOUS at 10:46

## 2021-03-30 RX ADMIN — ENOXAPARIN SODIUM 40 MG: 40 INJECTION SUBCUTANEOUS at 09:04

## 2021-03-30 RX ADMIN — BUPROPION HYDROCHLORIDE 150 MG: 150 TABLET, FILM COATED, EXTENDED RELEASE ORAL at 09:02

## 2021-03-30 RX ADMIN — TRAMADOL HYDROCHLORIDE 50 MG: 50 TABLET, FILM COATED ORAL at 20:22

## 2021-03-30 RX ADMIN — PANTOPRAZOLE SODIUM 20 MG: 20 TABLET, DELAYED RELEASE ORAL at 06:21

## 2021-03-30 RX ADMIN — METRONIDAZOLE 500 MG: 500 INJECTION, SOLUTION INTRAVENOUS at 02:26

## 2021-03-30 RX ADMIN — ACETAMINOPHEN 975 MG: 325 TABLET, FILM COATED ORAL at 14:00

## 2021-03-30 RX ADMIN — VANCOMYCIN HYDROCHLORIDE 500 MG: 500 INJECTION, SOLUTION INTRAVENOUS at 06:13

## 2021-03-30 RX ADMIN — TRAZODONE HYDROCHLORIDE 200 MG: 100 TABLET ORAL at 21:14

## 2021-03-30 RX ADMIN — ACETAMINOPHEN 975 MG: 325 TABLET, FILM COATED ORAL at 21:14

## 2021-03-30 RX ADMIN — CEFEPIME HYDROCHLORIDE 2000 MG: 2 INJECTION, POWDER, FOR SOLUTION INTRAVENOUS at 05:31

## 2021-03-30 RX ADMIN — METRONIDAZOLE 500 MG: 500 INJECTION, SOLUTION INTRAVENOUS at 20:14

## 2021-03-30 RX ADMIN — FLUOXETINE 20 MG: 20 CAPSULE ORAL at 09:02

## 2021-03-30 RX ADMIN — LIDOCAINE 1 PATCH: 50 PATCH TOPICAL at 09:03

## 2021-03-30 RX ADMIN — TRAMADOL HYDROCHLORIDE 50 MG: 50 TABLET, FILM COATED ORAL at 04:32

## 2021-03-30 RX ADMIN — FUROSEMIDE 20 MG: 20 TABLET ORAL at 08:58

## 2021-03-30 RX ADMIN — TRAMADOL HYDROCHLORIDE 50 MG: 50 TABLET, FILM COATED ORAL at 16:35

## 2021-03-30 RX ADMIN — TRAMADOL HYDROCHLORIDE 50 MG: 50 TABLET, FILM COATED ORAL at 10:44

## 2021-03-30 RX ADMIN — MELATONIN TAB 3 MG 6 MG: 3 TAB at 21:14

## 2021-03-30 RX ADMIN — CEFTRIAXONE SODIUM 2000 MG: 10 INJECTION, POWDER, FOR SOLUTION INTRAVENOUS at 17:44

## 2021-03-30 RX ADMIN — ATORVASTATIN CALCIUM 40 MG: 40 TABLET, FILM COATED ORAL at 16:36

## 2021-03-30 NOTE — RESTORATIVE TECHNICIAN NOTE
Restorative Specialist Mobility Note       Activity: Ambulate in room(back to bed per pt's request)     Assistive Device: Front wheel walker

## 2021-03-30 NOTE — PROGRESS NOTES
Vancomycin IV Pharmacy-to-Dose Consultation    Samy Harrington is a 65 y o  female who is currently receiving Vancomycin IV with management by the Pharmacy Consult service  Assessment/Plan:    The patient's chart was reviewed  Renal function is stable  There are no signs or symptoms of nephrotoxicity and/or infusion reactions documented  The following nephrotoxicity factors are present:  Medications: diuretics  Patient-Factors: elderly    Based on today's assessment, will continue current vancomycin (Day # 6) dosing of 500 mg q 12 h, with a plan for trough to be drawn at 1830 on 3/31 (prior to 4th dose)  We will continue to follow the patient's culture results and clinical progress daily      Rosa Elena Tatum, Pharmacist

## 2021-03-30 NOTE — PHYSICAL THERAPY NOTE
Physical Therapy Evaluation    Patient's Name: Jose E Cohn    Admitting Diagnosis  Sepsis [facility]) [A41 [id number]]    Problem List  Patient Active Problem List   Diagnosis    Pneumonia of right lower lobe due to infectious organism    CAD (coronary artery disease)    Sepsis secondary to parapneumonic effusion/pna    Parapneumonic effusion    GERD (gastroesophageal reflux disease)    HTN (hypertension)    Supratherapeutic INR    Delirium    Thrombocytosis (HCC)    Severe protein-calorie malnutrition (HCC)    Anemia    Valvular heart disease    Dysphagia    ETOH abuse    Anxiety    Pulmonary nodule       Past Medical History  Past Medical History:   Diagnosis Date    Anemia     Cardiac disease     Chronic pain     Coronary artery disease     Hypertension     PVD (peripheral vascular disease) ([facility] [address] )        Past Surgical History  Past Surgical History:   Procedure Laterality Date    ANKLE SURGERY      IR CHEST TUBE PLACEMENT  3/23/2021    IR NON-TUNNELED CENTRAL LINE PLACEMENT  3/24/2021        03/30/21 1004   PT Last Visit   PT Visit Date 03/30/21   Note Type   Note type Evaluation   Pain Assessment   Pain Assessment Tool 0-10   Pain Score 7   Pain Location/Orientation Orientation: Right;Location: Chest   Hospital Pain Intervention(s) Repositioned; Ambulation/increased activity; Other (Comment)  (RN notified, meds provided)   Home Living   Type of [address] [place] [address] Two level;Stairs to enter with rails   Home Equipment Walker;Cane   Additional Comments Pt reports living in a [facility] with 1 MARIANN   Was using a SPC for community ambulation but no AD in her house   Prior Function   Level of [place] Independent with ADLs and functional mobility   Lives With Spouse   Receives Help From Family   ADL Assistance Independent   IADLs Independent   Falls in the last 6 months 0   Restrictions/Precautions   Weight Bearing Precautions Per Order No   Other Precautions Multiple lines;O2;Fall Risk;Pain  (CT) Cognition   Attention Attends with cues to redirect   Orientation Level Oriented X4   Memory Decreased recall of precautions   Following Commands Follows one step commands with increased time or repetition   Comments Pt pleasant, but requires frequent VCs for safety during mobility  RLE Assessment   RLE Assessment WFL   LLE Assessment   LLE Assessment WFL   Bed Mobility   Supine to Sit 5  Supervision   Additional items HOB elevated; Increased time required   Additional Comments Pt ended session seated in recliner with all needs in reach  Transfers   Sit to Stand 4  Minimal assistance   Additional items Assist x 1; Increased time required;Verbal cues   Stand to Sit 4  Minimal assistance   Additional items Assist x 1; Increased time required;Verbal cues   Additional Comments Transfers with RW  VCs for proper hand placement with limited carryover throughout session  Ambulation/Elevation   Gait pattern Excessively slow; Foward flexed;Decreased foot clearance   Gait Assistance 4  Minimal assist   Additional items Assist x 1;Verbal cues  (SBA fo 2nd for chair follow)   Assistive Device Rolling walker   Distance 10ft + 5ft+ 10ft with seated rest breaks inbetween 2* fatigue  Pt SpO2 97% during ambulation  Balance   Static Sitting Fair +   Dynamic Sitting Fair   Static Standing Fair -   Dynamic Standing Poor +   Ambulatory Poor +   Endurance Deficit   Endurance Deficit Yes   Endurance Deficit Description SOB, fatigue   Activity Tolerance   Activity Tolerance Patient limited by fatigue;Patient limited by pain   Medical Staff [facility]   Nurse Made Aware ok to see per RN   Assessment   Problem List Decreased strength;Decreased endurance; Impaired balance;Decreased mobility; Decreased cognition;Decreased safety awareness;Pain   Assessment Pt is a 65 y o  female seen for PT evaluation s/p admit [facility] on 3/29/[facility]21   Pt was admitted with a primary dx of: sepsis secondary to parapneumonic effusion/pna  Pt was transferred from [address] [place] [address] for decortication and VATs procedure  Pt is s/p pigtail placement in R chest on 3/23  PT now consulted for assessment of mobility and d/c needs  Pt with active PT evaluation orders  Pts current comorbidities effecting treatment include: anemia, cardiac disease, chronic pain, CAD, HTN, PVD  Pts current clinical presentation is Unstable/ Unpredictable (high complexity) due to Ongoing medical management for primary dx, Increased reliance on more restrictive AD compared to baseline, Decreased activity tolerance compared to baseline, Fall risk, Increased assistance needed from caregiver at current time, Ongoing telemetry monitoring, Increased O2 via NC from pts baseline, s/p surgical intervention  Prior to admission, pt was independent with ADLs and ambulating w/o AD  Pt lives with her  in a [facility] with 1 MARIANN  Upon evaluation, pt currently is requiring supervision for bed mobility; Laney for transfers and Laney for ambulation 10ft +5 ft +10ft w/ RW  Pt presents at PT eval functioning below baseline and currently w/ overall mobility deficits 2* to: BLE weakness, impaired balance, decreased endurance, gait deviations, pain, decreased activity tolerance compared to baseline, decreased functional mobility tolerance compared to baseline, fall risk, decreased cognition  Pt currently at a fall risk 2* to impairments listed above  Pt will continue to benefit from skilled acute PT interventions to address stated impairments; to maximize functional mobility; for ongoing pt/ family training; and DME needs  At conclusion of PT session pt returned back in chair and chair alarm engaged with phone and call bell within reach  Pt denies any further questions at this time  The patient's AM-PAC Basic Mobility Inpatient Short Form Raw Score is 17, Standardized Score is 39 67   A standardized score less than 42 9 suggests the patient may benefit from discharge to post-acute rehabilitation services  Please also refer to the recommendation of the Physical Therapist for safe discharge planning  Recommend rehab pending progress upon hospital D/C  Goals   Patient Goals to have less pain   STG Expiration Date 04/13/21   Short Term Goal #1 In 10-14 days pt will be able to: 1  Demonstrate ability to perform all aspects of bed mobility independently to increase functional independence  2  Perform functional transfers with LRAD at mod I level to facilitate safe return to previous living environment  3   Ambulate 200 ft with LRAD at mod I level with stable vitals to improve safety with household distances and reduce fall risk  4  Climb 12 steps with HR independently to simulate access to 2nd floor of home  5  Improve LE strength grades by 1 to increase ease of functional mobility with transfers and gait  6  Pt will demonstrate improved balance by one grade order to decrease risk of falls  PT Treatment Day 0   Plan   Treatment/Interventions Functional transfer training;LE strengthening/ROM; Elevations; Therapeutic exercise; Endurance training;Bed mobility;Gait training;Spoke to nursing;Spoke to case management;OT   PT Frequency Other (Comment)  (3-5x/wk)   Recommendation   PT Discharge Recommendation Post-Acute Rehabilitation Services  (pending progess)   Jennifer Ji 435   Turning in Bed Without Bedrails 3   Lying on Back to Sitting on Edge of Flat Bed 3   Moving Bed to Chair 3   Standing Up From Chair 3   Walk in Room 3   Climb 3-5 Stairs 2   Basic Mobility Inpatient Raw Score 17   Basic Mobility Standardized Score 39 67   Modified Carlisle Scale   Modified [place] Scale 4     Follow Up Treatment (9:54-10:04)    S: "I need to use the bathroom"    O: Pt ambulated an additional 8ft x2 to get to and from toilet with RW and Laney  For STS transfer from chair pt required Laney and for STS from toiler pt required modA w/ use of grab bar       A: Pt continues to require VCs for proper hand placement when standing, as well as when reaching back for armrests upon sitting with limited carryover  During gait, pt requires VCs for staying centered within walker as well as proximity to RW      P: Pt would benefit from continued acute skilled PT to address above deficits and allow for improved functional mobility     [place] PT, DPT

## 2021-03-30 NOTE — OCCUPATIONAL THERAPY NOTE
Occupational Therapy Evaluation     Patient Name: Kristen Puentes  XSFBF'[id number] Date: 3/30/2021  Problem List  Principal Problem:    Sepsis secondary to parapneumonic effusion/pna  Active Problems:    CAD (coronary artery disease)    GERD (gastroesophageal reflux disease)    HTN (hypertension)    Supratherapeutic INR    Delirium    Thrombocytosis (HCC)    Severe protein-calorie malnutrition (HCC)    Anemia    Valvular heart disease    Dysphagia    ETOH abuse    Anxiety    Pulmonary nodule    Past Medical History  Past Medical History:   Diagnosis Date    Anemia     Cardiac disease     Chronic pain     Coronary artery disease     Hypertension     PVD (peripheral vascular disease) ([address] )      Past Surgical History  Past Surgical History:   Procedure Laterality Date    ANKLE SURGERY      IR CHEST TUBE PLACEMENT  3/23/2021    IR NON-TUNNELED CENTRAL LINE PLACEMENT  3/24/2021           03/30/21 0953   OT Last Visit   OT Visit Date 03/30/21   Note Type   Note type Evaluation   Restrictions/Precautions   Weight Bearing Precautions Per Order No   Other Precautions Chair Alarm;Multiple lines;Telemetry;O2;Fall Risk;Pain  (CT)   Pain Assessment   Pain Assessment Tool 0-10   Pain Score 7   Pain Location/Orientation Location: Chest;Location: Head   Hospital Pain Intervention(s) Repositioned; Ambulation/increased activity; Emotional support   Home Living   Type of [address] [facility] Two level;Stairs to enter with rails   Bathroom Shower/Tub Tub/shower unit   Bathroom Toilet Standard   Bathroom Equipment Shower chair   [address] Walker;Cane   Additional Comments Pt reports living in a 2 story home with 1 MARIANN      Prior Function   Level of [place] Independent with ADLs and functional mobility   Lives With Spouse   Receives Help From Family   ADL Assistance Independent   IADLs Independent   Falls in the last 6 months 0   Vocational Retired   Lifestyle   Autonomy Pt reports being I with Cheryle Hughes and mobility with SPC in the community PTA  (+)     Reciprocal Relationships Pt lives with her  who she reports is starting dialysis and can only provide some assistance   Service to Others Retired   [address] Enjoys being with family   Subjective   Subjective "My  can help me but he doesn't want to"    ADL   Where Assessed Edge of bed   Eating Assistance 7  Independent   Grooming Assistance 5  Supervision/Setup   UB Bathing Assistance 4  Minimal Assistance   LB [address] 3  Moderate Assistance   UB Dressing Assistance 4  Minimal Assistance    [facility] [address] 3  Moderate [address]  3  Moderate Assistance   Bed Mobility   Supine to Sit 5  Supervision   Additional items Assist x 1; Increased time required   Additional Comments After OT session pt in chair with alarm on and all needs within reach  Transfers   Sit to Stand 4  Minimal assistance   Additional items Assist x 1; Increased time required   Stand to Sit 4  Minimal assistance   Additional items Assist x 1; Increased time required   Functional Mobility   Functional Mobility 4  Minimal assistance   Additional Comments Pt demonstrated very short household mobility with two seated rest breaks  Additional items Rolling walker   Balance   Static Sitting Fair +   Dynamic Sitting Fair   Static Standing Fair -   Dynamic Standing Poor +   Ambulatory Poor +   Activity Tolerance   Activity Tolerance Patient limited by pain; Patient limited by fatigue   Medical Staff Made Aware PT Jocelyne   Nurse Made Aware RN confirmed okay to see pt   Cognition   Overall Cognitive Status [facility]   Arousal/Participation Alert; Cooperative   Attention Attends with cues to redirect   Orientation Level Oriented X4   Memory Decreased recall of precautions   Following Commands Follows one step commands inconsistently   Comments Pt requires VC for safety and correct technique, but is overall pleasant and cooperative  Assessment   Limitation Decreased ADL status; Decreased endurance;Decreased self-care trans;Decreased high-level ADLs   Prognosis Good   Assessment Pt is a 65 y o  female admitted to [facility] on 3/29/2021 w/ sepsis 2* parapneumonic effusuion  Comorbidities include a h/o Anemia, Cardiac disease, Chronic pain, Coronary artery disease, Hypertension, and PVD (peripheral vascular disease) ([facility] [address] )  Pt with active OT orders and up and OOB as tolerated orders  Pt resides in a 2 story home with 1 MARIANN  Pt lives with her  who she reports is able to provide some assistance  Pt was I w/  ADLS and IADLS, (+) drove, & required use of SPC in the community PTA  Currently pt is min A for functional transfers and functional mobility, min A for UB ADLS and mod A for LB ADLS  Pt is limited at this time 2*: pain, endurance, activity tolerance, functional mobility, forward functional reach, functional standing tolerance and decreased I w/ ADLS/IADLS  The following Occupational Performance Areas to address include: grooming, bathing/shower, toilet hygiene, dressing, functional mobility and clothing management  Based on the aforementioned OT evaluation, functional performance deficits, and assessments, pt has been identified as a high complexity evaluation  From OT standpoint, anticipate d/c STR pending progress and available support  Pt to continue to benefit from acute immediate OT services to address the following goals 3-5x/week to  w/in 10-14 days: Trinh Soto Goals   Patient Goals To feel better and return home    LTG Time Frame 10-14   Long Term Goal #1 See goals below   Plan   Treatment Interventions ADL retraining;Functional transfer training;UE strengthening/ROM; Endurance training;Cognitive reorientation; Fine motor coordination activities; Compensatory technique education;Continued evaluation; Energy conservation; Activityengagement   Goal Expiration Date 21   OT Frequency 3-5x/wk   Recommendation   OT Discharge Recommendation Post-Acute Rehabilitation Services  (pending progress)   OT - OK to Discharge Yes  (When medically appropriate)   AM-PAC Daily Activity Inpatient   Lower Body Dressing 2   Bathing 2   Toileting 2   Upper Body Dressing 3   Grooming 4   Eating 4   Daily Activity Raw Score 17   Daily Activity Standardized Score (Calc for Raw Score >=11) 37 26   AM-PAC Applied Cognition Inpatient   Following a Speech/Presentation 3   Understanding Ordinary Conversation 4   Taking Medications 4   Remembering Where Things Are Placed or Put Away 3   Remembering List of 4-5 Errands 3   Taking Care of Complicated Tasks 3   Applied Cognition Raw Score 20   Applied Cognition Standardized Score 41 76   Modified Gove Scale   Modified Gove Scale 4     GOALS    1) Pt will increase activity tolerance to G for 30 min txment sessions    2) Pt will complete UB/LB dressing/self care w/ mod I using adaptive device and DME as needed    3) Pt will complete bathing w/ Mod I w/ use of AE and DME as needed    4) Pt will complete toileting w/ mod I w/ G hygiene/thoroughness using DME as needed    5) Pt will improve functional transfers to Mod I on/off all surfaces using DME as needed w/ G balance/safety     6) Pt will improve functional mobility during ADL/IADL/leisure tasks to Mod I using DME as needed w/ G balance/safety     7) Pt will participate in simulated IADL management task with DME as needed to increase independence to  w/ G safety and endurance    8) Pt will demonstrate G carryover of pt/caregiver education and training as appropriate  9) Pt will demonstrate 100% carryover of energy conservation techniques t/o functional I/ADL/leisure tasks w/o cues s/p skilled education    10) Pt will independently identify and utilize 2-3 coping strategies to increase positive affect and promote overall well-being      11) Pt will engage in ongoing cognitive assessment w/ G participation to assist w/ safe d/c planning/recommendations (as needed)    Veryjovany Clayton, MOT, OTR/L

## 2021-03-30 NOTE — PROGRESS NOTES
[facility]  Progress Note - Jose E Cohn 1950, 65 y o  female MRN: [id number]  Unit/Bed#: [facility] 408-01 Encounter: [id number]  Primary Care Provider: Alyssa Florez MD   Date and time admitted to hospital: 3/29/2021  2:37 PM    * Sepsis secondary to parapneumonic effusion/pna  Assessment & Plan  · Sepsis secondary to parapneumonic effusion  · Presented with sepsis POA tachycardia, elevated white count to 36 K with 4% bands, found to have right-sided large parapneumonic effusion  Patient says did not have any fever prior to presentation  Subsequent g stain fluid cultures revealed Gram-positive cocci in pairs  She has chest tube placed by IR on 03/23 at upper buttocks [place]  Six doses of tPA/dornase have completed on 03/27  Repeat CT scan showed mid size residual right pleural effusion slightly decreased  New ground-glass opacity in the left anterior upper lobe, of 5 centimetre left pulmonary nodule noted  · After discussion between Pulmonary and thoracic surgery decision was made to transfer patient to [facility] for decortication and VATS procedure  · She is scheduled for procedure on Friday  · On ceftriaxone and flagyl per ID recomendtions  · Continue analgesics        Anemia  Assessment & Plan  · Chronic normocytic normochromic anemia  · Iron panel suggested chronic anemia of inflammation  · Keep hemoglobin greater than 7, transfuse if needed    Delirium  Assessment & Plan  · Delirium has resolved  · Probably related to her alcoholism she received 16 mg of Ativan based on the CIWA protocol  · Continue with the CIWA protocol  · Family denies any heavy alcohol use, maybe she takes a glass of wine occasionally so this unlikely active alcohol use more like severe delirium in the setting of parapneumonic effusion    · CT head was negative for intracranial anomaly  · She received a trial of phenobarb x1  · Now back to baseline mental status  · Delirium has resolved      Dysphagia  Assessment & Plan  · Resolved, placed back on regular diet    HTN (hypertension)  Assessment & Plan  · Bps are acceptable  · Continue lasix po        VTE Pharmacologic Prophylaxis:   Pharmacologic: Heparin  Mechanical VTE Prophylaxis in Place: Yes    Patient Centered Rounds: I have performed bedside rounds with nursing staff today  Discussions with Specialists or Other Care Team Provider: ID    Education and Discussions with Family / Patient: patient, plan of care    Time Spent for Care: 20 minutes  More than 50% of total time spent on counseling and coordination of care as described above  Current Length of Stay: 1 day(s)    Current Patient Status: Inpatient   Certification Statement: The patient will continue to require additional inpatient hospital stay due to surgery on friday    Discharge Plan: TBD    Code Status: Level 1 - Full Code      Subjective:   Reports pain at chest tube site, denies any new complaints  Reports that she has insomnia and is not receiving her home dose of trazodone 200mg    Objective:     Vitals:   Temp (24hrs), Av 3 °F (36 8 °C), Min:97 9 °F (36 6 °C), Max:98 9 °F (37 2 °C)    Temp:  [97 9 °F (36 6 °C)-98 9 °F (37 2 °C)] 98 1 °F (36 7 °C)  HR:  [83-97] 88  Resp:  [16-19] 16  BP: ()/(51-58) 99/56  SpO2:  [94 %-100 %] 99 %  Body mass index is 23 81 kg/m²  Input and Output Summary (last 24 hours): Intake/Output Summary (Last 24 hours) at 3/30/2021 1620  Last data filed at 3/30/2021 1439  Gross per 24 hour   Intake 1040 ml   Output 1200 ml   Net -160 ml       Physical Exam:     Physical Exam  Constitutional:       Appearance: Normal appearance  HENT:      Head: Normocephalic and atraumatic  Nose: Nose normal       Mouth/Throat:      Mouth: Mucous membranes are moist       Pharynx: Oropharynx is clear  Eyes:      Extraocular Movements: Extraocular movements intact     Cardiovascular:      Rate and Rhythm: Normal rate and regular rhythm  Pulmonary:      Effort: Pulmonary effort is normal       Comments: Chest tube on right  Neurological:      General: No focal deficit present  Mental Status: She is alert and oriented to person, place, and time  Psychiatric:         Mood and Affect: Mood normal          Behavior: Behavior normal            Additional Data:     Labs:    Results from last 7 days   Lab Units 03/30/21  0530  03/25/21  0435   WBC Thousand/uL 16 10*   < > 25 40*   HEMOGLOBIN g/dL 7 0*   < > 6 9*   HEMATOCRIT % 22 2*   < > 21 4*   PLATELETS Thousands/uL 649*   < > 605*   BANDS PCT %  --   --  1   LYMPHO PCT %  --   --  6*   MONO PCT %  --   --  7   EOS PCT %  --   --  1    < > = values in this interval not displayed  Results from last 7 days   Lab Units 03/30/21  0530   SODIUM mmol/L 139   POTASSIUM mmol/L 4 0   CHLORIDE mmol/L 107   CO2 mmol/L 25   BUN mg/dL 7   CREATININE mg/dL 0 56*   ANION GAP mmol/L 7   CALCIUM mg/dL 8 1*   GLUCOSE RANDOM mg/dL 89     Results from last 7 days   Lab Units 03/26/21  0246   INR  1 30*     Results from last 7 days   Lab Units 03/26/21  0556 03/26/21  0014   POC GLUCOSE mg/dl 74 96         Results from last 7 days   Lab Units 03/29/21  0502 03/28/21  0405   PROCALCITONIN ng/ml 0 32* 0 54*           * I Have Reviewed All Lab Data Listed Above  * Additional Pertinent Lab Tests Reviewed:  All Labs Within Last 24 Hours Reviewed      Recent Cultures (last 7 days):     Results from last 7 days   Lab Units 03/25/21  0757 03/23/21  1743   GRAM STAIN RESULT   --  1+ Polys*  Rare Gram positive cocci in pairs*  2+ Polys  No No bacteria seen   BODY FLUID CULTURE, STERILE   --  No growth   LEGIONELLA URINARY ANTIGEN  Negative  --        Last 24 Hours Medication List:   Current Facility-Administered Medications   Medication Dose Route Frequency Provider Last Rate    acetaminophen  975 mg Oral Q8H [facility]      albuterol  2 5 mg Nebulization Q4H PRN Yanni Ellison DO      ALPRAZolam 0 25 mg Oral Q6H PRN Sumaya Acevedo MD      atorvastatin  40 mg Oral Daily With Jadyn Mcdaniels MD      buPROPion  150 mg Oral Daily Sumaya Acevedo MD      cefTRIAXone  2,000 mg Intravenous Q24H Bakarisánchez Valencia MD      dextromethorphan-guaiFENesin  10 mL Oral Q4H PRN Sumaya Acevedo MD      enoxaparin  40 mg Subcutaneous Daily Sumaya Acevedo MD      FLUoxetine  20 mg Oral Daily Sumaya Acevedo MD      furosemide  20 mg Oral Daily Sumaya Acevedo MD      lidocaine  1 patch Topical Daily Sumaya Acevedo MD      melatonin  6 mg Oral HS Sumaya Acevedo MD      metroNIDAZOLE  500 mg Intravenous Q8H Sumaya Acevedo  mg (03/30/21 1046)    multivitamin-minerals  1 tablet Oral Daily Sumaya Acevedo MD      ondansetron  4 mg Intravenous Q6H PRN Sumaya Acevedo MD      pantoprazole  20 mg Oral Early Morning Sumaya Acevedo MD      traMADol  50 mg Oral Q6H PRN Sumaya Acevedo MD      traZODone  200 mg Oral HS Seferino Rick MD          Today, Patient Was Seen By: Hugo Stafford MD    ** Please Note: Dictation voice to text software may have been used in the creation of this document   **

## 2021-03-30 NOTE — ASSESSMENT & PLAN NOTE
· Delirium has resolved  · Probably related to her alcoholism she received 16 mg of Ativan based on the CIWA protocol  · Continue with the CIWA protocol  · Family denies any heavy alcohol use, maybe she takes a glass of wine occasionally so this unlikely active alcohol use more like severe delirium in the setting of parapneumonic effusion    · CT head was negative for intracranial anomaly  · She received a trial of phenobarb x1  · Now back to baseline mental status  · Delirium has resolved

## 2021-03-30 NOTE — PLAN OF CARE
Problem: PHYSICAL THERAPY ADULT  Goal: Performs mobility at highest level of function for planned discharge setting  See evaluation for individualized goals  Description: Treatment/Interventions: Functional transfer training, LE strengthening/ROM, Elevations, Therapeutic exercise, Endurance training, Bed mobility, Gait training, Spoke to nursing, Spoke to case management, OT          See flowsheet documentation for full assessment, interventions and recommendations  Note:    Problem List: Decreased strength, Decreased endurance, Impaired balance, Decreased mobility, Decreased cognition, Decreased safety awareness, Pain  Assessment: Pt is a 65 y o  female seen for PT evaluation s/p admit to [facility] on 3/29/2021  Pt was admitted with a primary dx of: sepsis secondary to parapneumonic effusion/pna  Pt was transferred from [address] [place] [address] for decortication and VATs procedure  Pt is s/p pigtail placement in R chest on 3/23  PT now consulted for assessment of mobility and d/c needs  Pt with active PT evaluation orders  Pts current comorbidities effecting treatment include: anemia, cardiac disease, chronic pain, CAD, HTN, PVD  Pts current clinical presentation is Unstable/ Unpredictable (high complexity) due to Ongoing medical management for primary dx, Increased reliance on more restrictive AD compared to baseline, Decreased activity tolerance compared to baseline, Fall risk, Increased assistance needed from caregiver at current time, Ongoing telemetry monitoring, Increased O2 via NC from pts baseline, s/p surgical intervention  Prior to admission, pt was independent with ADLs and ambulating w/o AD  Pt lives with her  in a [facility] with 1 MARIANN  Upon evaluation, pt currently is requiring supervision for bed mobility; [place] for transfers and [place] for ambulation 10ft +5 ft +10ft w/ RW   Pt presents at PT eval functioning below baseline and currently w/ overall mobility deficits 2* to: BLE weakness, impaired balance, decreased endurance, gait deviations, pain, decreased activity tolerance compared to baseline, decreased functional mobility tolerance compared to baseline, fall risk, decreased cognition  Pt currently at a fall risk 2* to impairments listed above  Pt will continue to benefit from skilled acute PT interventions to address stated impairments; to maximize functional mobility; for ongoing pt/ family training; and DME needs  At conclusion of PT session pt returned back in chair and chair alarm engaged with phone and call bell within reach  Pt denies any further questions at this time  The patient's AM-PAC Basic Mobility Inpatient Short Form Raw Score is 17, Standardized Score is 39 67  A standardized score less than 42 9 suggests the patient may benefit from discharge to post-acute rehabilitation services  Please also refer to the recommendation of the Physical Therapist for safe discharge planning  Recommend rehab pending progress upon hospital D/C  PT Discharge Recommendation: Post-Acute Rehabilitation Services(pending progess)          See flowsheet documentation for full assessment

## 2021-03-30 NOTE — PLAN OF CARE
Problem: OCCUPATIONAL THERAPY ADULT  Goal: Performs self-care activities at highest level of function for planned discharge setting  See evaluation for individualized goals  Description: Treatment Interventions: ADL retraining, Functional transfer training, UE strengthening/ROM, Endurance training, Cognitive reorientation, Fine motor coordination activities, Compensatory technique education, Continued evaluation, Energy conservation, Activityengagement          See flowsheet documentation for full assessment, interventions and recommendations  Note: Limitation: Decreased ADL status, Decreased endurance, Decreased self-care trans, Decreased high-level ADLs  Prognosis: Good  Assessment: Pt is a 65 y o  female admitted to [facility] on 3/29/2021 w/ sepsis 2* parapneumonic effusuion  Comorbidities include a h/o Anemia, Cardiac disease, Chronic pain, Coronary artery disease, Hypertension, and PVD [facility]peripheral vascular disease) ([facility] )  Pt with active OT orders and up and OOB as tolerated orders  Pt resides in a 2 story home with 1 MARIANN  Pt lives with her  who she reports is able to provide some assistance  Pt was I w/  ADLS and IADLS, (+) drove, & required use of SPC in the community PTA  Currently pt is min A for functional transfers and functional mobility, min A for UB ADLS and mod A for LB ADLS  Pt is limited at this time 2*: pain, endurance, activity tolerance, functional mobility, forward functional reach, functional standing tolerance and decreased I w/ ADLS/IADLS  The following Occupational Performance Areas to address include: grooming, bathing/shower, toilet hygiene, dressing, functional mobility and clothing management  Based on the aforementioned OT evaluation, functional performance deficits, and assessments, pt has been identified as a high complexity evaluation  From OT standpoint, anticipate d/c STR pending progress and available support   Pt to continue to benefit from acute immediate OT services to address the following goals 3-5x/week to  w/in 10-14 days:        OT Discharge Recommendation: Post-Acute Rehabilitation Services(pending progress)  OT - OK to Discharge: Yes(When medically appropriate)

## 2021-03-30 NOTE — PLAN OF CARE
Problem: Potential for Falls  Goal: Patient will remain free of falls  Description: INTERVENTIONS:  - Assess patient frequently for physical needs  -  Identify cognitive and physical deficits and behaviors that affect risk of falls    -  [place] fall precautions as indicated by assessment   - Educate patient/family on patient safety including physical limitations  - Instruct patient to call for assistance with activity based on assessment  - Modify environment to reduce risk of injury  - Consider OT/PT consult to assist with strengthening/mobility  Outcome: Progressing     Problem: Prexisting or High Potential for Compromised Skin Integrity  Goal: Skin integrity is maintained or improved  Description: INTERVENTIONS:  - Identify patients at risk for skin breakdown  - Assess and monitor skin integrity  - Assess and monitor nutrition and hydration status  - Monitor labs   - Assess for incontinence   - Turn and reposition patient  - Assist with mobility/ambulation  - Relieve pressure over bony prominences  - Avoid friction and shearing  - Provide appropriate hygiene as needed including keeping skin clean and dry  - Evaluate need for skin moisturizer/barrier cream  - Collaborate with interdisciplinary team   - Patient/family teaching  - Consider wound care consult   Outcome: Progressing

## 2021-03-30 NOTE — PROGRESS NOTES
Progress Note - Thoracic Surgery   Gaurav Dean 65 y o  female MRN: [id number]  Unit/Bed#: [facility] 408-01 Encounter: [id number]    Assessment:  Patient is here with right sided empyema  S/p pigtail placement in right chest by IR on 3/23    R CT to -20 cm suction on pleurevac with 180 cc serous output, no air leak  On 2 L oxygen      Plan:  OR 4/2 for VATS decortication of R empyema  R CT to suction at -20 on pleurevac   Continue antibiotics  Respiratory therapy and chest PT    Subjective/Objective     Chief Complaint: none    Subjective: comfortable this am      Objective:     Vitals: Blood pressure 117/58, pulse 86, temperature 98 6 °F (37 °C), temperature source Oral, resp  rate 19, SpO2 94 %, not currently breastfeeding  ,There is no height or weight on file to calculate BMI  I/O       03/28 0701 - 03/29 0700 03/29 0701 - 03/30 0700    P  O   1040    Total Intake  1040    Urine  450    Chest Tube  180    Total Output  630    Net  +410              Physical Exam:   GENERAL APPEARANCE: in no acute distress  NEURO: stable  HEENT: normocephalic, atraumatic  CV: regular rate and rhythm, no tachycardia,   LUNGS: clear to auscultation bilaterally, on 2 L oxygen  R CT to -20 cm suction on pleurevac with 180 cc serous output  No air leak  GI: soft, nontender, nondistended  : no abnormality detected  MSK: no abnormality detected   SKIN: no abnormality detected

## 2021-03-30 NOTE — CASE MANAGEMENT
PT IS A READMISSION  PT IS NOT IDENTIFIED AS A BUNDLE  PT'S READMISSION RISK SCORE OF 16     Pt was transferred from [facility] by Pulmonary and Thoracic Surgery for decortication and VATS procedure  Pt resides with spouse Hunter Yo in a 2 story home with the use of a cane, roller walker and 1 step to enter the home  Pt was reported being independent with ADLs with no hx of [facility] services, SNF, mental health or drug/alcohol placements  Pt was reported to be receiving [facility] treatment from a doctor in [place] for medication management  Pt reported not having a living will, uses [employer] in [facility] and has Phu Urban as a PCP  Pt's family normal provides transportation  It was documented that prior to the Pt's transfer from [facility], she was recommended for SNF and a list was provided to the daughter Cayden Steele for review  CM reviewed d/c planning process including the following: identifying help at home, patient preference for d/c planning needs, Discharge Lounge, [facility] Meds to Bed program, availability of treatment team to discuss questions or concerns patient and/or family may have regarding understanding medications and recognizing signs and symptoms once discharged  CM also encouraged patient to follow up with all recommended appointments after discharge  Patient advised of importance for patient and family to participate in managing patients medical well being

## 2021-03-30 NOTE — SPEECH THERAPY NOTE
Speech-Language Pathology Bedside Swallow Evaluation      Patient Name: Cruz Farooq    TGHAZ'K Date: 3/30/2021     Problem List  Principal Problem:    Sepsis secondary to parapneumonic effusion/pna  Active Problems:    CAD (coronary artery disease)    GERD (gastroesophageal reflux disease)    HTN (hypertension)    Supratherapeutic INR    Delirium    Thrombocytosis (HCC)    Severe protein-calorie malnutrition (HCC)    Anemia    Valvular heart disease    Dysphagia    ETOH abuse    Anxiety    Pulmonary nodule      Past Medical History  Past Medical History:   Diagnosis Date    Anemia     Cardiac disease     Chronic pain     Coronary artery disease     Hypertension     PVD (peripheral vascular disease) ([address] )        Past Surgical History  Past Surgical History:   Procedure Laterality Date    ANKLE SURGERY      IR CHEST TUBE PLACEMENT  3/23/2021    IR NON-TUNNELED CENTRAL LINE PLACEMENT  3/24/2021       Summary   Pt presented with functional appearing pharyngeal stage swallowing skills with materials administered today  She presents with no upper dentition and demonstrated slow but adequate oral management of soft & hard cookies  Encephalopathy now resolved and food choices when offered regular ment reflect appropriate food choices (chose soft cooked food)        Recommended Diet: regular diet and thin liquids (pt to select soft cooked foods)  Recommended Form of Meds: as desired and tolerated        Current Medical Status  Cruz Farooq is a 64 yo F c PMH opf htn, CAD, PAD who presented to [facility] with sepsis, tachycardia, elevated white count to 36 K with 4% bands, found to have right-sided large parapneumonic effusion   Subsequent g stain fluid cultures revealed Gram-positive cocci in pairs   She has chest tube placed by IR on 03/23 at [facility]   Six doses of tPA/dornase have completed on 03/27   Repeat CT scan showed mid size residual right pleural effusion slightly decreased   New ground-glass opacity in the left anterior upper lobe, of 5 centimetre left pulmonary nodule noted  After discussion between Pulmonary and thoracic surgery decision was made to transfer patient 3/29 [facility] for decortication and VATS procedure (planned for 4/2)  [facility] Swallowing Evaluation ordered at this time (pt reportedly unhappy with current food texture of mechanical soft)        Current Precautions:  Fall & Aspiration     Allergies:  No known food allergies    Past medical history:  Please see H&P for details    Special Studies:  3/29 CXR: Right perihilar opacity and effusion, improving  Pigtail drain slightly changed position since prior examination  Large hiatal hernia  3/28 CT of chest:    1  Modest sized residual right pleural effusion, slightly decreased from previous study  Locules of air within the pleural fluid which could be related to previous chest tube placement  There is some loculation of fluid and pleural thickening suggested   in spite of absence of IV contrast   2  At least some component of right lower lobe compressive atelectasis and suspected pneumonia with overall slight improvement in right lower lobe aeration suggested  Limited evaluation of the right hilum without IV contrast   3  New groundglass opacity within the anterior left upper lobe  Although this is not a typical location, aspiration may be considered  4  Mild to moderate COPD  A 5 mm left lower lobe pulmonary nodule, unchanged in retrospect  Based on current Fleischner Society 2017 Guidelines on incidental pulmonary nodule, given patient is considered high risk for lung cancer, 12 month follow-up   non-contrast chest CT is recommended  5  Large hiatal hernia  6  New small left pleural effusion      Social/Education/Vocational Hx:  Pt lives with family/ in [address] [place] [address]   Current Diet: mechanically altered/level 2 diet and thin liquids   Baseline Diet: regular diet and thin liquids      Baseline Assessment Behavior/Cognition: alert & O  Speech/Language Status: able to participate in conversation  Patient Positioning: upright in bed  Pain Status/Interventions/Response to Interventions:  No report of or nonverbal indications of pain  Swallow Mechanism Exam  Facial: symmetrical  Labial: WFL  Lingual: WFL  Velum: symmetrical  Mandible: adequate ROM  Dentition: limited dentition (eg lower incisors)  Vocal quality:clear/adequate   Volitional Cough: strong     Consistencies Assessed and Performance   Materials administered included puree, thin liquid, kimberlee crackers, Anh Doone cookies    Oral Stage:   Mastication was adequate with the materials administered today (appropriately prolonged with Anh Doone cookies)  Bolus formation and transfer were functional with no significant oral residue noted  No overt s/s reduced oral control  Pharyngeal Stage: WFL  Swallow Mechanics:  Swallowing initiation appeared prompt  Laryngeal rise was palpated and judged to be within functional limits  No coughing, throat clearing, change in vocal quality or respiratory status noted today       Esophageal Concerns: known h/o large hiatal hernia    Summary and Recommendations (see above)    Results Reviewed with: patient and MD     Treatment Recommended: None at this time

## 2021-03-30 NOTE — CONSULTS
Consultation - Infectious Disease   Madison Odonnell 65 y o  female MRN: [id number]  Unit/Bed#: [facility] 408-01 Encounter: [id number]      IMPRESSION & RECOMMENDATIONS:     64-year-old female patient with coronary artery disease, tobacco dependence, admitted for cough and shortness of breath, found to have right lower lobe pneumonia, with a complicated right-sided pleural effusion now status post chest tube insertion and tPA treatment  1- sepsis, POA:  Tachypnea, severe leukocytosis, tachycardia  Sepsis is secondary to right-sided pneumonia complicated by right-sided empyema  No other infectious focus identified based on review of system and physical exam; blood culture remains negative, strep and Legionella antigen negative, patient denying urinary symptoms, denying abdominal pain or diarrhea  - antibiotics as below  - CT surgery follow-up for VATS and subsequent source control  - repeat CBC and CMP in a m   - supportive treatment as per primary service team    2- right-sided pneumonia:  Possible aspiration event leading to right-sided pneumonia and subsequent empyema  Patient denies binge drinking or daily drinking  Strep pneumoniae and Legionella antigen negative  Blood culture remains negative so far  - antibiotics as below  - close monitoring of respiratory status    3- right-sided empyema:  Pleural fluid analyzed on 03/23/2021, at time of IR insertion of right-sided chest tube  Fluid was described as serosanguineous, pH 6 6, LDH 2300, glucose of 4, Gram stain with GPC in pairs, culture negative though specimen was collected after 2-3 days of antibiotic treatment  - pleural fluid analysis is consistent with empyema  - suspect secondary to problem 2    - MRSA screen is negative  - stop cefepime, vancomycin  - continue Flagyl  - start ceftriaxone IV  - patient planned for VATS around 04/02/2021 as per review of CT surgery note    - patient will likely benefit from 3 weeks of post VATS antibiotic treatment; will likely be able to transition to p o  Antibiotic once VATS is done  - speech and swallow follow-up    4- coronary artery disease:  -continue close monitoring of hemodynamics status    5- tobacco dependence:  She reports a gradual smoking cessation; she has not smoked over the past 2 weeks prior to presentation  - advise patient to continue with smoking cessation    6- moderate aortic stenosis:  Noted on TTE from 03/23/2021  - cardiology follow-up    Have discussed the above management plan in detail with the primary service    Extensive review of the medical records in epic including review of the notes, radiographs, and laboratory results     HISTORY OF PRESENT ILLNESS:  Reason for Consult:  Empyema  HPI: Maria R Mann is a 65y o  year old female with history of coronary artery disease, tobacco dependence, aortic stenosis, admitted 03/29/2021 as transfer from [address] [place], [place] [address]  Patient admitted to [address] [place] [address] on 03/23/2021 for cough and shortness of breath for 2 weeks prior to presentation  She denies fever prior to presentation  On admission, patient had severe leukocytosis, WBC 06571  Soon after admission she was noted to be febrile with T-max 100 8°  Patient was found on admission to have right lower lobe consolidation, and large right-sided pleural effusion  Patient was evaluated by Pulmonary, id, and intervention Radiology  She had chest tube placed on 03/23/2021 by IR  She received multiple tPA treatment  Despite this, repeat CT scan chest on 03/28/2021 shows persistent right pleural effusion with some loculations  Based on this patient was transferred to [facility] for CT surgery evaluation and possible VATS  Upon my evaluation today, patient afebrile, with no signs of respiratory distress, she still have a right-sided chest tube in place  She reports improvement in her shortness of breath since admission    Based on currently available documentation, her Plavix has been on hold for now, and she is planned for VATS on 2021  Patient denies similar previous episodes in the past   She denies binge drinking or daily alcohol use  She denies previous aspiration events or choking on food  REVIEW OF SYSTEMS:  A complete review of systems is negative other than that noted in the HPI  PAST MEDICAL HISTORY:  Past Medical History:   Diagnosis Date    Anemia     Cardiac disease     Chronic pain     Coronary artery disease     Hypertension     PVD (peripheral vascular disease) ([address] )      Past Surgical History:   Procedure Laterality Date    ANKLE SURGERY      IR CHEST TUBE PLACEMENT  3/23/2021    IR NON-TUNNELED CENTRAL LINE PLACEMENT  3/24/2021       FAMILY HISTORY:  Non-contributory    SOCIAL HISTORY:  Social History   Social History     Substance and Sexual Activity   Alcohol Use Yes    Frequency: 2-4 times a month    Drinks per session: 1 or 2    Comment: social     Social History     Substance and Sexual Activity   Drug Use No     Social History     Tobacco Use   Smoking Status Former Smoker    Packs/day: 1 00    Years: 0 10    Pack years: 0 10    Types: Cigarettes   Smokeless Tobacco Never Used   Tobacco Comment    pt states she stopped smoking 3 weeks ago       ALLERGIES:  Allergies   Allergen Reactions    Digoxin Hives     HA    Gadolinium Derivatives        MEDICATIONS:  All current active medications have been reviewed      PHYSICAL EXAM:  Temp:  [97 9 °F (36 6 °C)-98 9 °F (37 2 °C)] 98 1 °F (36 7 °C)  HR:  [83-97] 88  Resp:  [16-19] 16  BP: ()/(51-58) 99/56  SpO2:  [94 %-100 %] 99 %  Temp (24hrs), Av 3 °F (36 8 °C), Min:97 9 °F (36 6 °C), Max:98 9 °F (37 2 °C)  Current: Temperature: 98 1 °F (36 7 °C)    Intake/Output Summary (Last 24 hours) at 3/30/2021 1544  Last data filed at 3/30/2021 1439  Gross per 24 hour   Intake 1040 ml   Output 1200 ml   Net -160 ml       General Appearance:  Appearing well, nontoxic, and in no distress   Head:  Normocephalic, without obvious abnormality, atraumatic   Eyes:  Conjunctiva pink and sclera anicteric, both eyes   Nose: Nares normal, mucosa normal, no drainage   Throat: Oropharynx moist without lesions   Neck: Supple, symmetrical, no adenopathy, no tenderness/mass/nodules   Back:   Symmetric, no curvature, ROM normal, no CVA tenderness   Lungs:   Decreased air entry over mid and lower right lung field, respirations unlabored   Chest Wall:  No tenderness or deformity  Right-sided chest tube in place   Heart:  RRR; no murmur, rub or gallop   Abdomen:   Soft, non-tender, non-distended, positive bowel sounds    Extremities: No cyanosis, clubbing or edema   Skin: No rashes or lesions  No draining wounds noted  Lymph nodes: Cervical, supraclavicular nodes normal   Neurologic: Alert and oriented times 3, extremity strength 5/5 and symmetric       LABS, IMAGING, & OTHER STUDIES:  Lab Results:  I have personally reviewed pertinent labs  Results from last 7 days   Lab Units 03/30/21  0530 03/29/21  0502 03/28/21  0405   WBC Thousand/uL 16 10* 16 77* 22 51*   HEMOGLOBIN g/dL 7 0* 7 3* 7 8*   PLATELETS Thousands/uL 649* 684* 725*     Results from last 7 days   Lab Units 03/30/21  0530 03/29/21  0502 03/28/21  0405  03/24/21  1236   SODIUM mmol/L 139 139 140   < >  --    POTASSIUM mmol/L 4 0 3 7 4 1   < >  --    CHLORIDE mmol/L 107 107 107   < >  --    CO2 mmol/L 25 25 25   < >  --    CO2, I-STAT mmol/L  --   --   --   --  25   BUN mg/dL 7 8 8   < >  --    CREATININE mg/dL 0 56* 0 67 0 70   < >  --    EGFR ml/min/1 73sq m 93 88 87   < >  --    GLUCOSE, ISTAT mg/dl  --   --   --   --  94   CALCIUM mg/dL 8 1* 7 6* 7 8*   < >  --     < > = values in this interval not displayed       Results from last 7 days   Lab Units 03/27/21  1030 03/25/21  0757 03/23/21  1743   GRAM STAIN RESULT   --   --  1+ Polys*  Rare Gram positive cocci in pairs*  2+ Polys  No No bacteria seen   BODY FLUID CULTURE, STERILE   --   --  No growth   MRSA CULTURE ONLY  No Methicillin Resistant Stapylococcus aureus (MRSA) after 24 hours  --   --    LEGIONELLA URINARY ANTIGEN   --  Negative  --      Results from last 7 days   Lab Units 03/29/21  0502 03/28/21  0405   PROCALCITONIN ng/ml 0 32* 0 54*         Results from last 7 days   Lab Units 03/25/21  0435   FERRITIN ng/mL 914*     Results from last 7 days   Lab Units 03/25/21  0435   D-DIMER QUANTITATIVE ug/ml FEU 5 19*       Imaging Studies:   I have personally reviewed pertinent imaging study reports and images in PACS  Chest x-ray from 03/29/2021 right perihilar opacity and effusion  CT chest done on admission showed consolidation right lower lobe with large right-sided pleural effusion  IR chest tube insertion on 03/23/2021  CT chest repeated 03/28/2021 shows persistent right-sided pleural effusion with some loculations    Other Studies:   I have personally reviewed pertinent reports    No relevant previous microbiological data upon review of [facility] everywhere chart except as mentioned above

## 2021-03-30 NOTE — ASSESSMENT & PLAN NOTE
· Chronic normocytic normochromic anemia  · Iron panel suggested chronic anemia of inflammation  · Keep hemoglobin greater than 7, transfuse if needed

## 2021-03-30 NOTE — ASSESSMENT & PLAN NOTE
· Sepsis secondary to parapneumonic effusion  · Presented with sepsis POA tachycardia, elevated white count to 36 K with 4% bands, found to have right-sided large parapneumonic effusion  Patient says did not have any fever prior to presentation  Subsequent g stain fluid cultures revealed Gram-positive cocci in pairs  She has chest tube placed by IR on 03/23 at [facility]  Six doses of tPA/dornase have completed on 03/27  Repeat CT scan showed mid size residual right pleural effusion slightly decreased  New ground-glass opacity in the left anterior upper lobe, of 5 centimetre left pulmonary nodule noted  · After discussion between Pulmonary and thoracic surgery decision was made to transfer patient to [facility] for decortication and VATS procedure     · She is scheduled for procedure on Friday  · On ceftriaxone and flagyl per ID recomendtions  · Continue analgesics

## 2021-03-31 LAB
ANION GAP SERPL CALCULATED.3IONS-SCNC: 3 MMOL/L (ref 4–13)
APTT PPP: 34 SECONDS (ref 23–37)
BUN SERPL-MCNC: 8 MG/DL (ref 5–25)
CALCIUM SERPL-MCNC: 7.8 MG/DL (ref 8.3–10.1)
CHLORIDE SERPL-SCNC: 109 MMOL/L (ref 100–108)
CO2 SERPL-SCNC: 27 MMOL/L (ref 21–32)
CREAT SERPL-MCNC: 0.6 MG/DL (ref 0.6–1.3)
ERYTHROCYTE [DISTWIDTH] IN BLOOD BY AUTOMATED COUNT: 17.8 % (ref 11.6–15.1)
GFR SERPL CREATININE-BSD FRML MDRD: 91 ML/MIN/1.73SQ M
GLUCOSE SERPL-MCNC: 79 MG/DL (ref 65–140)
HCT VFR BLD AUTO: 21.4 % (ref 34.8–46.1)
HCT VFR BLD AUTO: 21.7 % (ref 34.8–46.1)
HGB BLD-MCNC: 6.6 G/DL (ref 11.5–15.4)
HGB BLD-MCNC: 6.6 G/DL (ref 11.5–15.4)
INR PPP: 1.31 (ref 0.84–1.19)
MCH RBC QN AUTO: 28.2 PG (ref 26.8–34.3)
MCHC RBC AUTO-ENTMCNC: 30.8 G/DL (ref 31.4–37.4)
MCV RBC AUTO: 92 FL (ref 82–98)
PLATELET # BLD AUTO: 595 THOUSANDS/UL (ref 149–390)
PMV BLD AUTO: 10.3 FL (ref 8.9–12.7)
POTASSIUM SERPL-SCNC: 3.9 MMOL/L (ref 3.5–5.3)
PROTHROMBIN TIME: 16.3 SECONDS (ref 11.6–14.5)
RBC # BLD AUTO: 2.34 MILLION/UL (ref 3.81–5.12)
SODIUM SERPL-SCNC: 139 MMOL/L (ref 136–145)
WBC # BLD AUTO: 12.5 THOUSAND/UL (ref 4.31–10.16)

## 2021-03-31 PROCEDURE — 85018 HEMOGLOBIN: CPT | Performed by: NURSE PRACTITIONER

## 2021-03-31 PROCEDURE — 94760 N-INVAS EAR/PLS OXIMETRY 1: CPT

## 2021-03-31 PROCEDURE — 85610 PROTHROMBIN TIME: CPT | Performed by: SURGERY

## 2021-03-31 PROCEDURE — P9040 RBC LEUKOREDUCED IRRADIATED: HCPCS

## 2021-03-31 PROCEDURE — 30233N1 TRANSFUSION OF NONAUTOLOGOUS RED BLOOD CELLS INTO PERIPHERAL VEIN, PERCUTANEOUS APPROACH: ICD-10-PCS | Performed by: INTERNAL MEDICINE

## 2021-03-31 PROCEDURE — 85730 THROMBOPLASTIN TIME PARTIAL: CPT | Performed by: SURGERY

## 2021-03-31 PROCEDURE — 99232 SBSQ HOSP IP/OBS MODERATE 35: CPT | Performed by: INTERNAL MEDICINE

## 2021-03-31 PROCEDURE — 99233 SBSQ HOSP IP/OBS HIGH 50: CPT | Performed by: INTERNAL MEDICINE

## 2021-03-31 PROCEDURE — 85014 HEMATOCRIT: CPT | Performed by: NURSE PRACTITIONER

## 2021-03-31 PROCEDURE — 80048 BASIC METABOLIC PNL TOTAL CA: CPT | Performed by: INTERNAL MEDICINE

## 2021-03-31 PROCEDURE — 94668 MNPJ CHEST WALL SBSQ: CPT

## 2021-03-31 PROCEDURE — 85027 COMPLETE CBC AUTOMATED: CPT | Performed by: INTERNAL MEDICINE

## 2021-03-31 RX ORDER — BUTALBITAL, ACETAMINOPHEN AND CAFFEINE 50; 325; 40 MG/1; MG/1; MG/1
1 TABLET ORAL ONCE
Status: DISCONTINUED | OUTPATIENT
Start: 2021-03-31 | End: 2021-04-03

## 2021-03-31 RX ORDER — OXYCODONE HYDROCHLORIDE 5 MG/1
5 TABLET ORAL EVERY 4 HOURS PRN
Status: DISCONTINUED | OUTPATIENT
Start: 2021-03-31 | End: 2021-04-03

## 2021-03-31 RX ORDER — DIPHENHYDRAMINE HCL 25 MG
25 TABLET ORAL ONCE
Status: COMPLETED | OUTPATIENT
Start: 2021-03-31 | End: 2021-03-31

## 2021-03-31 RX ORDER — OXYCODONE HYDROCHLORIDE 10 MG/1
10 TABLET ORAL EVERY 4 HOURS PRN
Status: DISCONTINUED | OUTPATIENT
Start: 2021-03-31 | End: 2021-04-03

## 2021-03-31 RX ORDER — FUROSEMIDE 10 MG/ML
20 INJECTION INTRAMUSCULAR; INTRAVENOUS ONCE
Status: COMPLETED | OUTPATIENT
Start: 2021-03-31 | End: 2021-03-31

## 2021-03-31 RX ADMIN — ENOXAPARIN SODIUM 40 MG: 40 INJECTION SUBCUTANEOUS at 09:07

## 2021-03-31 RX ADMIN — FUROSEMIDE 20 MG: 20 TABLET ORAL at 09:07

## 2021-03-31 RX ADMIN — DIPHENHYDRAMINE HCL 25 MG: 25 TABLET ORAL at 12:39

## 2021-03-31 RX ADMIN — METRONIDAZOLE 500 MG: 500 INJECTION, SOLUTION INTRAVENOUS at 18:30

## 2021-03-31 RX ADMIN — ACETAMINOPHEN 975 MG: 325 TABLET, FILM COATED ORAL at 05:00

## 2021-03-31 RX ADMIN — OXYCODONE HYDROCHLORIDE 10 MG: 10 TABLET ORAL at 21:21

## 2021-03-31 RX ADMIN — MELATONIN TAB 3 MG 6 MG: 3 TAB at 21:14

## 2021-03-31 RX ADMIN — PANTOPRAZOLE SODIUM 20 MG: 20 TABLET, DELAYED RELEASE ORAL at 05:01

## 2021-03-31 RX ADMIN — CEFTRIAXONE SODIUM 2000 MG: 10 INJECTION, POWDER, FOR SOLUTION INTRAVENOUS at 17:09

## 2021-03-31 RX ADMIN — TRAMADOL HYDROCHLORIDE 50 MG: 50 TABLET, FILM COATED ORAL at 04:54

## 2021-03-31 RX ADMIN — OXYCODONE HYDROCHLORIDE 10 MG: 10 TABLET ORAL at 17:09

## 2021-03-31 RX ADMIN — OXYCODONE HYDROCHLORIDE 10 MG: 10 TABLET ORAL at 09:08

## 2021-03-31 RX ADMIN — ACETAMINOPHEN 975 MG: 325 TABLET, FILM COATED ORAL at 21:14

## 2021-03-31 RX ADMIN — LIDOCAINE 1 PATCH: 50 PATCH TOPICAL at 09:08

## 2021-03-31 RX ADMIN — OXYCODONE HYDROCHLORIDE 10 MG: 10 TABLET ORAL at 13:09

## 2021-03-31 RX ADMIN — METRONIDAZOLE 500 MG: 500 INJECTION, SOLUTION INTRAVENOUS at 11:10

## 2021-03-31 RX ADMIN — METRONIDAZOLE 500 MG: 500 INJECTION, SOLUTION INTRAVENOUS at 04:00

## 2021-03-31 RX ADMIN — BUPROPION HYDROCHLORIDE 150 MG: 150 TABLET, FILM COATED, EXTENDED RELEASE ORAL at 08:58

## 2021-03-31 RX ADMIN — TRAZODONE HYDROCHLORIDE 200 MG: 100 TABLET ORAL at 21:14

## 2021-03-31 RX ADMIN — Medication 1 TABLET: at 09:07

## 2021-03-31 RX ADMIN — FLUOXETINE 20 MG: 20 CAPSULE ORAL at 09:08

## 2021-03-31 RX ADMIN — FUROSEMIDE 20 MG: 10 INJECTION, SOLUTION INTRAMUSCULAR; INTRAVENOUS at 16:20

## 2021-03-31 RX ADMIN — ATORVASTATIN CALCIUM 40 MG: 40 TABLET, FILM COATED ORAL at 16:21

## 2021-03-31 RX ADMIN — ACETAMINOPHEN 975 MG: 325 TABLET, FILM COATED ORAL at 13:04

## 2021-03-31 NOTE — PROGRESS NOTES
[address] [facility]  Progress Note - Deon Hansen 1950, 65 y o  female MRN: [id number]  Unit/Bed#: [facility] 408-01 Encounter: [id number]  Primary Care Provider: Leda Villanueva MD   Date and time admitted to hospital: 3/29/2021  2:37 PM    * Sepsis secondary to parapneumonic effusion/pna  Assessment & Plan  · Sepsis secondary to parapneumonic effusion  · Presented with sepsis POA tachycardia, elevated white count to 36 K with 4% bands, found to have right-sided large parapneumonic effusion  Patient says did not have any fever prior to presentation  Subsequent g stain fluid cultures revealed Gram-positive cocci in pairs  She has chest tube placed by IR on 03/23 at [facility]  Six doses of tPA/dornase have completed on 03/27  Repeat CT scan showed mid size residual right pleural effusion slightly decreased  New ground-glass opacity in the left anterior upper lobe, of 5 centimetre left pulmonary nodule noted  · After discussion between Pulmonary and thoracic surgery decision was made to transfer patient to [facility] for decortication and VATS procedure  · She is scheduled for procedure on Friday  · On ceftriaxone and flagyl per ID recomendtions  · Continue analgesics        Anemia  Assessment & Plan  · Chronic normocytic normochromic anemia  · Iron panel suggested chronic anemia of inflammation  · Due to worsening anemia will transfuse 1 unit of PRBCs    Delirium  Assessment & Plan  · Delirium has resolved  · Probably related to her alcoholism she received 16 mg of Ativan based on the CIWA protocol  · Continue with the CIWA protocol  · Family denies any heavy alcohol use, maybe she takes a glass of wine occasionally so this unlikely active alcohol use more like severe delirium in the setting of parapneumonic effusion    · CT head was negative for intracranial anomaly  · She received a trial of phenobarb x1  · Now back to baseline mental status  · Delirium has resolved      Dysphagia  Assessment & Plan  · Resolved, placed back on regular diet    HTN (hypertension)  Assessment & Plan  · Bps are acceptable  · Continue lasix po    Anxiety  Assessment & Plan  Continue with Xanax and trazodone    Valvular heart disease  Assessment & Plan    Valvular disease  Aortic valve is 1 39 with moderate tricuspid and PA pressures of 60, echo shows ejection fraction of 60% with grade 1 diastolic dysfunction    Supratherapeutic INR  Assessment & Plan  Supratherapeutic INR  Presented with INR of 8 7  On admission, possibly secondary to severe malnutrition, albumin was 1 8 on admission  No history of heavy alcohol use per family  Liver enzymes are within normal limits  Received a 10 mg of vitamin K with improvement of INR to 1 27  Follow INR daily        VTE Pharmacologic Prophylaxis:   Pharmacologic: Heparin  Mechanical VTE Prophylaxis in Place: Yes    Patient Centered Rounds: I have performed bedside rounds with nursing staff today  Education and Discussions with Family / Patient: patient and , plan of care    Time Spent for Care: 20 minutes  More than 50% of total time spent on counseling and coordination of care as described above  Current Length of Stay: 2 day(s)    Current Patient Status: Inpatient   Certification Statement: The patient will continue to require additional inpatient hospital stay due to transfusion today    Discharge Plan: TBD    Code Status: Level 1 - Full Code      Subjective:   Denies any new complaints, continues to have chest pain from the chest tube  Objective:     Vitals:   Temp (24hrs), Av 9 °F (36 6 °C), Min:97 5 °F (36 4 °C), Max:98 5 °F (36 9 °C)    Temp:  [97 5 °F (36 4 °C)-98 5 °F (36 9 °C)] 98 5 °F (36 9 °C)  HR:  [82-89] 85  Resp:  [16-18] 17  BP: ()/(51-60) 106/51  SpO2:  [95 %-99 %] 99 %  Body mass index is 23 63 kg/m²  Input and Output Summary (last 24 hours):        Intake/Output Summary (Last 24 hours) at 3/31/2021 [address] filed at 3/31/2021 1426  Gross per 24 hour   Intake 1020 ml   Output 1090 ml   Net -70 ml       Physical Exam:     Physical Exam  Constitutional:       Appearance: Normal appearance  HENT:      Head: Normocephalic and atraumatic  Nose: Nose normal       Mouth/Throat:      Mouth: Mucous membranes are moist       Pharynx: Oropharynx is clear  Eyes:      Extraocular Movements: Extraocular movements intact  Cardiovascular:      Rate and Rhythm: Normal rate and regular rhythm  Pulmonary:      Effort: Pulmonary effort is normal       Breath sounds: No wheezing or rales  Comments: rigth sided chest tube  Abdominal:      General: Abdomen is flat  Palpations: Abdomen is soft  Neurological:      General: No focal deficit present  Mental Status: She is alert and oriented to person, place, and time  Psychiatric:         Mood and Affect: Mood normal          Behavior: Behavior normal            Additional Data:     Labs:    Results from last 7 days   Lab Units 03/31/21  0554 03/31/21  0435  03/25/21  0435   WBC Thousand/uL  --  12 50*   < > 25 40*   HEMOGLOBIN g/dL 6 6* 6 6*   < > 6 9*   HEMATOCRIT % 21 7* 21 4*   < > 21 4*   PLATELETS Thousands/uL  --  595*   < > 605*   BANDS PCT %  --   --   --  1   LYMPHO PCT %  --   --   --  6*   MONO PCT %  --   --   --  7   EOS PCT %  --   --   --  1    < > = values in this interval not displayed       Results from last 7 days   Lab Units 03/31/21  0435   SODIUM mmol/L 139   POTASSIUM mmol/L 3 9   CHLORIDE mmol/L 109*   CO2 mmol/L 27   BUN mg/dL 8   CREATININE mg/dL 0 60   ANION GAP mmol/L 3*   CALCIUM mg/dL 7 8*   GLUCOSE RANDOM mg/dL 79     Results from last 7 days   Lab Units 03/31/21  0435   INR  1 31*     Results from last 7 days   Lab Units 03/26/21  0556 03/26/21  0014   POC GLUCOSE mg/dl 74 96         Results from last 7 days   Lab Units 03/29/21  0502 03/28/21  0405   PROCALCITONIN ng/ml 0 32* 0 54*           * I Have Reviewed All Lab Data Listed Above  * Additional Pertinent Lab Tests Reviewed: All Labs Within Last 24 Hours Reviewed      Recent Cultures (last 7 days):     Results from last 7 days   Lab Units 03/25/21  0757   LEGIONELLA URINARY ANTIGEN  Negative       Last 24 Hours Medication List:   Current Facility-Administered Medications   Medication Dose Route Frequency Provider Last Rate    acetaminophen  975 mg Oral Q8H Jennifer Urias MD      albuterol  2 5 mg Nebulization Q4H PRN Yanni Ellison DO      ALPRAZolam  0 25 mg Oral Q6H PRN Alexandria Hurst MD      atorvastatin  40 mg Oral Daily With Mary Jane Romano MD      buPROPion  150 mg Oral Daily Alexandria Hurst MD      butalbital-acetaminophen-caffeine  1 tablet Oral Once Serene Jackson MD      cefTRIAXone  2,000 mg Intravenous Q24H Sandip Parker MD Stopped (03/30/21 1814)    dextromethorphan-guaiFENesin  10 mL Oral Q4H PRN Alexandria Hurts MD      enoxaparin  40 mg Subcutaneous Daily Alexandria Hurst MD      FLUoxetine  20 mg Oral Daily Alexandria Hurst MD      furosemide  20 mg Oral Daily Alexandria Hurst MD      lidocaine  1 patch Topical Daily Alexandria Hurst MD      melatonin  6 mg Oral HS Alexandria Hurst MD      metroNIDAZOLE  500 mg Intravenous Q8H Alexandria Hurst MD Stopped (03/31/21 1140)    multivitamin-minerals  1 tablet Oral Daily Alexandria Hurst MD      ondansetron  4 mg Intravenous Q6H PRN Alexandria Hurst MD      oxyCODONE  10 mg Oral Q4H PRN Serene Jackson MD      oxyCODONE  5 mg Oral Q4H PRN Serene Jackson MD      pantoprazole  20 mg Oral Early Morning Alexandria Hurst MD      traZODone  200 mg Oral HS Serene Jackson MD          Today, Patient Was Seen By: Sue Spain MD    ** Please Note: Dictation voice to text software may have been used in the creation of this document   **

## 2021-03-31 NOTE — CONSULTS
Darek Bey is a 65 y o  female who was receiving Vancomycin IV with management by the Pharmacy Consult service for treatment of Pneumonia    The patient's Vancomycin therapy has been completed / discontinued  Thank you for allowing us to take part in this patient's care  Pharmacy will sign-off now; please call or re-consult if there are any questions  Anatoly Sultana, PharmD   [address] [facility]

## 2021-03-31 NOTE — PROGRESS NOTES
Progress Note - Infectious Disease   Dayne Bautista 65 y o  female MRN: [id number]  Unit/Bed#: [facility] 408-01 Encounter: [id number]      Impression/Plan:    1- sepsis, POA:  Tachypnea, severe leukocytosis, tachycardia  Sepsis is secondary to right-sided pneumonia complicated by right-sided empyema  No other infectious focus identified based on review of system and physical exam; blood culture remains negative, strep and Legionella antigen negative, patient denying urinary symptoms, denying abdominal pain or diarrhea  - antibiotics as below  - CT surgery follow-up for VATS and subsequent source control  - repeat CBC and CMP in a m   - supportive treatment as per primary service team     2- right-sided pneumonia:  Possible aspiration event leading to right-sided pneumonia and subsequent empyema  Patient denies binge drinking or daily drinking  Strep pneumoniae and Legionella antigen negative  Blood culture remains negative so far  - antibiotics as below  - close monitoring of respiratory status     3- right-sided empyema:  Pleural fluid analyzed on 03/23/2021, at time of IR insertion of right-sided chest tube  Fluid was described as serosanguineous, pH 6 6, LDH 2300, glucose of 4, Gram stain with GPC in pairs, culture negative though specimen was collected after 2-3 days of antibiotic treatment  pleural fluid analysis is consistent with empyema  suspect secondary to problem 2  MRSA screen is negative  - continue ceftriaxone and Flagyl  - patient planned for VATS around 04/02/2021 as per review of CT surgery note  - patient will likely benefit from 3 weeks of post VATS antibiotic treatment; will likely be able to transition to p o   Antibiotic once VATS is done  - speech and swallow follow-up     4- coronary artery disease:  -continue close monitoring of hemodynamics status     5- tobacco dependence:  She reports  gradual smoking cessation; she has not smoked over the past 2 weeks prior to presentation  - advised patient to continue with smoking cessation     6- moderate aortic stenosis:  Noted on TTE from 2021  - cardiology follow-up     7- anemia:  Patient receiving PRBC transfusion; she reports episode of shortness of breath overnight after receiving 1 transfusion  She reports improvement after diuresis  - transfusion as per primary service team  - monitor for signs and symptoms of volume overload post transfusion and diurese accordingly  Have discussed the above management plan in detail with the primary service       Subjective:  She reports an episode of shortness of breath overnight that occurred after receiving PRBC transfusion; She reports that her shortness of breath improved after diuresis  She denies fever or chills  Denies pain today  She also denies cough or phlegm production    Objective:  Vitals:  Temp:  [97 7 °F (36 5 °C)-98 3 °F (36 8 °C)] 98 3 °F (36 8 °C)  HR:  [82-88] 87  Resp:  [16-18] 18  BP: ()/(55-60) 122/60  SpO2:  [96 %-99 %] 98 %  Temp (24hrs), Av °F (36 7 °C), Min:97 7 °F (36 5 °C), Max:98 3 °F (36 8 °C)  Current: Temperature: 98 3 °F (36 8 °C)    Physical Exam:   General Appearance:  Alert, interactive, nontoxic, no acute distress  Throat: Oropharynx moist without lesions  Lungs:   Clear to auscultation bilaterally; no wheezes, rhonchi or rales; respirations unlabored  Right-sided chest tube remains in place   Heart:  RRR; no murmur, rub or gallop   Abdomen:   Soft, non-tender, non-distended, positive bowel sounds  Extremities: No clubbing, cyanosis or edema   Skin: No new rashes or lesions  No draining wounds noted         Labs, Imaging, & Other studies:   All pertinent labs and imaging studies were personally reviewed  Results from last 7 days   Lab Units 21  0554 21  0435 21  0530 21  0502   WBC Thousand/uL  --  12 50* 16 10* 16 77*   HEMOGLOBIN g/dL 6 6* 6 6* 7 0* 7 3*   PLATELETS Thousands/uL  --  595* 649* 684*     Results from last 7 days   Lab Units 03/31/21  0435 03/30/21  0530 03/29/21  0502  03/24/21  1236   SODIUM mmol/L 139 139 139   < >  --    POTASSIUM mmol/L 3 9 4 0 3 7   < >  --    CHLORIDE mmol/L 109* 107 107   < >  --    CO2 mmol/L 27 25 25   < >  --    CO2, I-STAT mmol/L  --   --   --   --  25   BUN mg/dL 8 7 8   < >  --    CREATININE mg/dL 0 60 0 56* 0 67   < >  --    EGFR ml/min/1 73sq m 91 93 88   < >  --    GLUCOSE, ISTAT mg/dl  --   --   --   --  94   CALCIUM mg/dL 7 8* 8 1* 7 6*   < >  --     < > = values in this interval not displayed       Results from last 7 days   Lab Units 03/27/21  1030 03/25/21  0757   MRSA CULTURE ONLY  No Methicillin Resistant Stapylococcus aureus (MRSA) after 24 hours  --    LEGIONELLA URINARY ANTIGEN   --  Negative     Results from last 7 days   Lab Units 03/29/21  0502 03/28/21  0405   PROCALCITONIN ng/ml 0 32* 0 54*         Results from last 7 days   Lab Units 03/25/21  0435   FERRITIN ng/mL 914*     Results from last 7 days   Lab Units 03/25/21  0435   D-DIMER QUANTITATIVE ug/ml FEU 5 19*

## 2021-03-31 NOTE — ASSESSMENT & PLAN NOTE
· Sepsis secondary to parapneumonic effusion  · Presented with sepsis POA tachycardia, elevated white count to 36 K with 4% bands, found to have right-sided large parapneumonic effusion  Patient says did not have any fever prior to presentation  Subsequent g stain fluid cultures revealed Gram-positive cocci in pairs  She has chest tube placed by IR on 03/23 at [facility] [place]  Six doses of tPA/dornase have completed on 03/27  Repeat CT scan showed mid size residual right pleural effusion slightly decreased  New ground-glass opacity in the left anterior upper lobe, of 5 centimetre left pulmonary nodule noted  · After discussion between Pulmonary and thoracic surgery decision was made to transfer patient to [facility] for decortication and VATS procedure     · She is scheduled for procedure on Friday  · On ceftriaxone and flagyl per ID recomendtions  · Continue analgesics

## 2021-03-31 NOTE — RESTORATIVE TECHNICIAN NOTE
Restorative Specialist Mobility Note       Activity: Ambulate in vee, [place] privileges, Chair     Assistive Device: Front wheel walker

## 2021-03-31 NOTE — ASSESSMENT & PLAN NOTE
· Chronic normocytic normochromic anemia  · Iron panel suggested chronic anemia of inflammation  · Due to worsening anemia will transfuse 1 unit of PRBCs

## 2021-04-01 ENCOUNTER — ANESTHESIA EVENT (INPATIENT)
Dept: PERIOP | Facility: HOSPITAL | Age: 71
DRG: 853 | End: 2021-04-01
Payer: MEDICARE

## 2021-04-01 PROBLEM — Z72.0 TOBACCO ABUSE: Status: ACTIVE | Noted: 2021-04-01

## 2021-04-01 PROBLEM — I73.9 PERIPHERAL VASCULAR DISEASE (HCC): Status: ACTIVE | Noted: 2021-04-01

## 2021-04-01 PROBLEM — I27.20 PULMONARY HYPERTENSION (HCC): Status: ACTIVE | Noted: 2021-04-01

## 2021-04-01 LAB
ABO GROUP BLD BPU: NORMAL
ANION GAP SERPL CALCULATED.3IONS-SCNC: 4 MMOL/L (ref 4–13)
BPU ID: NORMAL
BUN SERPL-MCNC: 7 MG/DL (ref 5–25)
CALCIUM SERPL-MCNC: 8 MG/DL (ref 8.3–10.1)
CHLORIDE SERPL-SCNC: 107 MMOL/L (ref 100–108)
CO2 SERPL-SCNC: 28 MMOL/L (ref 21–32)
CREAT SERPL-MCNC: 0.66 MG/DL (ref 0.6–1.3)
CROSSMATCH: NORMAL
ERYTHROCYTE [DISTWIDTH] IN BLOOD BY AUTOMATED COUNT: 17.8 % (ref 11.6–15.1)
GFR SERPL CREATININE-BSD FRML MDRD: 89 ML/MIN/1.73SQ M
GLUCOSE SERPL-MCNC: 78 MG/DL (ref 65–140)
HCT VFR BLD AUTO: 26.2 % (ref 34.8–46.1)
HGB BLD-MCNC: 8.3 G/DL (ref 11.5–15.4)
MCH RBC QN AUTO: 28.4 PG (ref 26.8–34.3)
MCHC RBC AUTO-ENTMCNC: 31.7 G/DL (ref 31.4–37.4)
MCV RBC AUTO: 90 FL (ref 82–98)
PA ADP BLD-ACNC: 348 PRU (ref 194–418)
PLATELET # BLD AUTO: 624 THOUSANDS/UL (ref 149–390)
PMV BLD AUTO: 10.3 FL (ref 8.9–12.7)
POTASSIUM SERPL-SCNC: 4.2 MMOL/L (ref 3.5–5.3)
RBC # BLD AUTO: 2.92 MILLION/UL (ref 3.81–5.12)
SODIUM SERPL-SCNC: 139 MMOL/L (ref 136–145)
UNIT DISPENSE STATUS: NORMAL
UNIT PRODUCT CODE: NORMAL
UNIT RH: NORMAL
WBC # BLD AUTO: 14.47 THOUSAND/UL (ref 4.31–10.16)

## 2021-04-01 PROCEDURE — 80048 BASIC METABOLIC PNL TOTAL CA: CPT | Performed by: INTERNAL MEDICINE

## 2021-04-01 PROCEDURE — 99233 SBSQ HOSP IP/OBS HIGH 50: CPT | Performed by: INTERNAL MEDICINE

## 2021-04-01 PROCEDURE — 97116 GAIT TRAINING THERAPY: CPT

## 2021-04-01 PROCEDURE — NC001 PR NO CHARGE: Performed by: THORACIC SURGERY (CARDIOTHORACIC VASCULAR SURGERY)

## 2021-04-01 PROCEDURE — 85576 BLOOD PLATELET AGGREGATION: CPT | Performed by: SURGERY

## 2021-04-01 PROCEDURE — 94760 N-INVAS EAR/PLS OXIMETRY 1: CPT

## 2021-04-01 PROCEDURE — 97110 THERAPEUTIC EXERCISES: CPT

## 2021-04-01 PROCEDURE — 85027 COMPLETE CBC AUTOMATED: CPT | Performed by: INTERNAL MEDICINE

## 2021-04-01 RX ORDER — DEXTROSE, SODIUM CHLORIDE, AND POTASSIUM CHLORIDE 5; .45; .15 G/100ML; G/100ML; G/100ML
50 INJECTION INTRAVENOUS CONTINUOUS
Status: DISCONTINUED | OUTPATIENT
Start: 2021-04-02 | End: 2021-04-02

## 2021-04-01 RX ORDER — CEFAZOLIN SODIUM 1 G/50ML
1000 SOLUTION INTRAVENOUS
Status: COMPLETED | OUTPATIENT
Start: 2021-04-02 | End: 2021-04-02

## 2021-04-01 RX ORDER — HYDROMORPHONE HCL IN WATER/PF 6 MG/30 ML
0.2 PATIENT CONTROLLED ANALGESIA SYRINGE INTRAVENOUS
Status: DISCONTINUED | OUTPATIENT
Start: 2021-04-01 | End: 2021-04-03

## 2021-04-01 RX ADMIN — OXYCODONE HYDROCHLORIDE 10 MG: 10 TABLET ORAL at 04:50

## 2021-04-01 RX ADMIN — Medication 1 TABLET: at 09:06

## 2021-04-01 RX ADMIN — BUPROPION HYDROCHLORIDE 150 MG: 150 TABLET, FILM COATED, EXTENDED RELEASE ORAL at 09:05

## 2021-04-01 RX ADMIN — OXYCODONE HYDROCHLORIDE 10 MG: 10 TABLET ORAL at 09:01

## 2021-04-01 RX ADMIN — ATORVASTATIN CALCIUM 40 MG: 40 TABLET, FILM COATED ORAL at 17:49

## 2021-04-01 RX ADMIN — ACETAMINOPHEN 975 MG: 325 TABLET, FILM COATED ORAL at 05:18

## 2021-04-01 RX ADMIN — METRONIDAZOLE 500 MG: 500 INJECTION, SOLUTION INTRAVENOUS at 02:53

## 2021-04-01 RX ADMIN — CEFTRIAXONE SODIUM 2000 MG: 10 INJECTION, POWDER, FOR SOLUTION INTRAVENOUS at 17:52

## 2021-04-01 RX ADMIN — OXYCODONE HYDROCHLORIDE 5 MG: 5 TABLET ORAL at 23:22

## 2021-04-01 RX ADMIN — METRONIDAZOLE 500 MG: 500 INJECTION, SOLUTION INTRAVENOUS at 18:26

## 2021-04-01 RX ADMIN — ONDANSETRON 4 MG: 2 INJECTION INTRAMUSCULAR; INTRAVENOUS at 19:06

## 2021-04-01 RX ADMIN — PANTOPRAZOLE SODIUM 20 MG: 20 TABLET, DELAYED RELEASE ORAL at 05:18

## 2021-04-01 RX ADMIN — ALPRAZOLAM 0.25 MG: 0.25 TABLET ORAL at 19:06

## 2021-04-01 RX ADMIN — TRAZODONE HYDROCHLORIDE 200 MG: 100 TABLET ORAL at 21:49

## 2021-04-01 RX ADMIN — FLUOXETINE 20 MG: 20 CAPSULE ORAL at 09:06

## 2021-04-01 RX ADMIN — ACETAMINOPHEN 975 MG: 325 TABLET, FILM COATED ORAL at 21:49

## 2021-04-01 RX ADMIN — HYDROMORPHONE HYDROCHLORIDE 0.2 MG: 0.2 INJECTION, SOLUTION INTRAMUSCULAR; INTRAVENOUS; SUBCUTANEOUS at 11:36

## 2021-04-01 RX ADMIN — MELATONIN TAB 3 MG 6 MG: 3 TAB at 21:49

## 2021-04-01 RX ADMIN — OXYCODONE HYDROCHLORIDE 10 MG: 10 TABLET ORAL at 13:19

## 2021-04-01 RX ADMIN — ENOXAPARIN SODIUM 40 MG: 40 INJECTION SUBCUTANEOUS at 11:37

## 2021-04-01 RX ADMIN — LIDOCAINE 1 PATCH: 50 PATCH TOPICAL at 09:07

## 2021-04-01 RX ADMIN — OXYCODONE HYDROCHLORIDE 10 MG: 10 TABLET ORAL at 17:48

## 2021-04-01 RX ADMIN — METRONIDAZOLE 500 MG: 500 INJECTION, SOLUTION INTRAVENOUS at 11:37

## 2021-04-01 RX ADMIN — ACETAMINOPHEN 975 MG: 325 TABLET, FILM COATED ORAL at 13:19

## 2021-04-01 RX ADMIN — FUROSEMIDE 20 MG: 20 TABLET ORAL at 09:06

## 2021-04-01 NOTE — PROGRESS NOTES
Progress Note - Thoracic Surgery   Elizabeth Poster 65 y o  female MRN: [id number]  Unit/Bed#: [facility] 408-01 Encounter: [id number]    Assessment:  Patient is here with right sided empyema  S/p pigtail placement in right chest by IR on 3/23    R CT to -20 cm, no AL, 58 cc serous    Plan:  OR 4/2 for VATS decortication of R empyema  Keep Npo and IVF at mid night  Type and screen for tomorrow  R CT to suction at -20 on pleurevac   Continue antibiotics  Respiratory therapy and chest PT  DVT prophylaxis    Subjective/Objective   Subjective: Patient having some neck pain, otherwise doing well  No chest pain/SOB  No fever/chills  No N/V  Objective:     Vitals: Blood pressure 110/59, pulse 86, temperature 98 5 °F (36 9 °C), temperature source Oral, resp  rate 19, weight 66 4 kg (146 lb 6 2 oz), SpO2 97 %, not currently breastfeeding  ,Body mass index is 23 63 kg/m²  I/O       03/28 0701 - 03/29 0700 03/29 0701 - 03/30 0700    P  O   1040    Total Intake  1040    Urine  450    Chest Tube  180    Total Output  630    Net  +410              Physical Exam:   General: AAOx3, NAD  HEENT: atraumatic, non-icteric sclera  Card: RRR, not tachycardic  Pulm: on 1 L NC, not tachypnic  No subQ emphysema  CT as above  Abd: Soft, non-tender, non distended  LE: No pitting edema  Integumentary: no rashes

## 2021-04-01 NOTE — ASSESSMENT & PLAN NOTE
· Sepsis secondary to parapneumonic effusion  · Presented with sepsis POA tachycardia, elevated white count to 36 K with 4% bands, found to have right-sided large parapneumonic effusion  Patient says did not have any fever prior to presentation  Subsequent g stain fluid cultures revealed Gram-positive cocci in pairs  She has chest tube placed by IR on 03/23 at [facility] [place]  Six doses of tPA/dornase have completed on 03/27  Repeat CT scan showed mid size residual right pleural effusion slightly decreased  New ground-glass opacity in the left anterior upper lobe, of 5 centimetre left pulmonary nodule noted    · For VATs decortication tomorrow  · On ceftriaxone and flagyl per ID recomendtions, will likely need 3 weeks of treatment  · Continue analgesics

## 2021-04-01 NOTE — ASSESSMENT & PLAN NOTE
Malnutrition Findings:        Severe protein calorie malnutrition  Adult malnutrition type:  Acute illness  Adult degree of malnutrition:  Other severe protein calorie malnutrition severe protein calorie malnutrition as evidenced by [place] orbits, temp temple following, clavicle protrusion, with prominent bone and low albumin levels 1 6 greater than 1 8, and associated with coagulopathy with high INR 8 on admission in the setting of right lower lobe pneumonia with complicated right parapneumonic effusion  Also has a pulmonary nodule needs to be worked up       BMI finding  Body mass index is 26 19  Nutrition is following      BMI Findings:

## 2021-04-01 NOTE — CASE MANAGEMENT
PT/OT recommending STR  Pt at this time is refusing rehab  Plan OR tomorrow and will then have PT/OT re-evaluate

## 2021-04-01 NOTE — PROGRESS NOTES
[facility]  Progress Note - Lucila Salcedo 1950, 65 y o  female MRN: [id number]  Unit/Bed#: [facility] 408-01 Encounter: [id number]  Primary Care Provider: Magen Fields MD   Date and time admitted to hospital: 3/29/2021  2:37 PM    * Sepsis secondary to parapneumonic effusion/pna  Assessment & Plan  · Sepsis secondary to parapneumonic effusion  · Presented with sepsis POA tachycardia, elevated white count to 36 K with 4% bands, found to have right-sided large parapneumonic effusion  Patient says did not have any fever prior to presentation  Subsequent g stain fluid cultures revealed Gram-positive cocci in pairs  She has chest tube placed by IR on 03/23 at upper buttocks [place]  Six doses of tPA/dornase have completed on 03/27  Repeat CT scan showed mid size residual right pleural effusion slightly decreased  New ground-glass opacity in the left anterior upper lobe, of 5 centimetre left pulmonary nodule noted  · For VATs decortication tomorrow  · On ceftriaxone and flagyl per ID recomendtions, will likely need 3 weeks of treatment  · Continue analgesics        Anemia  Assessment & Plan  · Chronic normocytic normochromic anemia  · Iron panel suggested chronic anemia of inflammation  · S/p 1 unit of RBCs    Delirium  Assessment & Plan  · Delirium has resolved  · Probably related to her alcoholism she received 16 mg of Ativan based on the CIWA protocol  · Continue with the CIWA protocol  · Family denies any heavy alcohol use, maybe she takes a glass of wine occasionally so this unlikely active alcohol use more like severe delirium in the setting of parapneumonic effusion    · CT head was negative for intracranial anomaly  · She received a trial of phenobarb x1  · Now back to baseline mental status  · Delirium has resolved      Dysphagia  Assessment & Plan  · Resolved, placed back on regular diet    HTN (hypertension)  Assessment & Plan  · Bps are acceptable  · Continue lasix po    Pulmonary nodule  Assessment & Plan  5 centimeter nodule noted   Pulmonary follow up outpatient    Anxiety  Assessment & Plan  Continue with Xanax and trazodone    Valvular heart disease  Assessment & Plan    Valvular disease  Aortic valve is 1 39 with moderate tricuspid and PA pressures of 60, echo shows ejection fraction of 60% with grade 1 diastolic dysfunction    Severe protein-calorie malnutrition (HCC)  Assessment & Plan  Malnutrition Findings:        Severe protein calorie malnutrition  Adult malnutrition type:  Acute illness  Adult degree of malnutrition:  Other severe protein calorie malnutrition severe protein calorie malnutrition as evidenced by Allen Negro orbits, temp temple following, clavicle protrusion, with prominent bone and low albumin levels 1 6 greater than 1 8, and associated with coagulopathy with high INR 8 on admission in the setting of right lower lobe pneumonia with complicated right parapneumonic effusion  Also has a pulmonary nodule needs to be worked up  BMI finding  Body mass index is 26 19  Nutrition is following      BMI Findings:            Supratherapeutic INR  Assessment & Plan  Supratherapeutic INR  Presented with INR of 8 7  On admission, possibly secondary to severe malnutrition, albumin was 1 8 on admission  No history of heavy alcohol use per family  Liver enzymes are within normal limits  Received a 10 mg of vitamin K with improvement of INR to 1 27  Follow INR daily    CAD (coronary artery disease)  Assessment & Plan  Coronary artery disease  Hold Plavix for VATS procedure  Continue Lipitor        VTE Pharmacologic Prophylaxis:   Pharmacologic: Heparin  Mechanical VTE Prophylaxis in Place: Yes    Patient Centered Rounds: I have performed bedside rounds with nursing staff today  Education and Discussions with Family / Patient: patient, plan of care    Time Spent for Care: 20 minutes    More than 50% of total time spent on counseling and coordination of care as described above  Current Length of Stay: 3 day(s)    Current Patient Status: Inpatient   Certification Statement: The patient will continue to require additional inpatient hospital stay due to surgery tomorrow    Discharge Plan: TBD    Code Status: Level 1 - Full Code      Subjective:   Reports that she continues to have pain at the chest tube site, otherwise no new complaints  Objective:     Vitals:   Temp (24hrs), Av 3 °F (36 8 °C), Min:97 6 °F (36 4 °C), Max:98 6 °F (37 °C)    Temp:  [97 6 °F (36 4 °C)-98 6 °F (37 °C)] 97 6 °F (36 4 °C)  HR:  [86-91] 91  Resp:  [18-19] 18  BP: (107-119)/(56-59) 107/58  SpO2:  [94 %-99 %] 94 %  Body mass index is 23 84 kg/m²  Input and Output Summary (last 24 hours): Intake/Output Summary (Last 24 hours) at 2021 1846  Last data filed at 2021 1810  Gross per 24 hour   Intake 1930 ml   Output 1495 ml   Net 435 ml       Physical Exam:     Physical Exam  Constitutional:       Appearance: Normal appearance  HENT:      Head: Normocephalic and atraumatic  Nose: Nose normal       Mouth/Throat:      Mouth: Mucous membranes are moist       Pharynx: Oropharynx is clear  Eyes:      Extraocular Movements: Extraocular movements intact  Cardiovascular:      Rate and Rhythm: Normal rate and regular rhythm  Pulmonary:      Breath sounds: No wheezing or rales  Comments: Chest tube  Neurological:      General: No focal deficit present  Mental Status: She is alert and oriented to person, place, and time             Additional Data:     Labs:    Results from last 7 days   Lab Units 21  0455   WBC Thousand/uL 14 47*   HEMOGLOBIN g/dL 8 3*   HEMATOCRIT % 26 2*   PLATELETS Thousands/uL 624*     Results from last 7 days   Lab Units 21  0455   SODIUM mmol/L 139   POTASSIUM mmol/L 4 2   CHLORIDE mmol/L 107   CO2 mmol/L 28   BUN mg/dL 7   CREATININE mg/dL 0 66   ANION GAP mmol/L 4   CALCIUM mg/dL 8 0*   GLUCOSE RANDOM mg/dL 78     Results from last 7 days   Lab Units 03/31/21  0435   INR  1 31*     Results from last 7 days   Lab Units 03/26/21  0556 03/26/21  0014   POC GLUCOSE mg/dl 74 96         Results from last 7 days   Lab Units 03/29/21  0502 03/28/21  0405   PROCALCITONIN ng/ml 0 32* 0 54*           * I Have Reviewed All Lab Data Listed Above  * Additional Pertinent Lab Tests Reviewed:  All Labs Within Last 24 Hours Reviewed      Recent Cultures (last 7 days):           Last 24 Hours Medication List:   Current Facility-Administered Medications   Medication Dose Route Frequency Provider Last Rate    acetaminophen  975 mg Oral Q8H Nae Kent MD      albuterol  2 5 mg Nebulization Q4H PRN Yanni Ellison DO      ALPRAZolam  0 25 mg Oral Q6H PRN Tori Crowe MD      atorvastatin  40 mg Oral Daily With Brandi Snider MD      buPROPion  150 mg Oral Daily Tori Crowe MD      butalbital-acetaminophen-caffeine  1 tablet Oral Once MD Gunjan Jeong [START ON 4/2/2021] cefazolin  1,000 mg Intravenous On Call To Chelsey Shafer MD      cefTRIAXone  2,000 mg Intravenous Q24H Bakari ZeMD vahe 2,000 mg (04/01/21 1752)    dextromethorphan-guaiFENesin  10 mL Oral Q4H PRN Tori Crowe MD      [START ON 4/2/2021] dextrose 5 % and sodium chloride 0 45 % with KCl 20 mEq/L  50 mL/hr Intravenous Continuous Shree Rivas MD      enoxaparin  40 mg Subcutaneous Daily Tori Crowe MD      FLUoxetine  20 mg Oral Daily Tori Crowe MD      furosemide  20 mg Oral Daily Tori Crowe MD      HYDROmorphone  0 2 mg Intravenous Q3H PRN Shree Rivas MD      lidocaine  1 patch Topical Daily Tori Crowe MD      melatonin  6 mg Oral HS Tori Crowe MD      metroNIDAZOLE  500 mg Intravenous Q8H Tori Crowe  mg (04/01/21 1826)    multivitamin-minerals  1 tablet Oral Daily Tori Crowe MD      ondansetron  4 mg Intravenous Q6H PRN MD Gunjan Estes oxyCODONE  10 mg Oral Q4H PRN Mayra Baltazar MD      oxyCODONE  5 mg Oral Q4H PRN Mayra Baltazar MD      pantoprazole  20 mg Oral Early Morning Kvng Smith MD      traZODone  200 mg Oral HS Mayra Baltazar MD          Today, Patient Was Seen By: Vielka Mitchell MD    ** Please Note: Dictation voice to text software may have been used in the creation of this document   **

## 2021-04-01 NOTE — PLAN OF CARE
Problem: PHYSICAL THERAPY ADULT  Goal: Performs mobility at highest level of function for planned discharge setting  See evaluation for individualized goals  Description: Treatment/Interventions: Functional transfer training, LE strengthening/ROM, Elevations, Therapeutic exercise, Endurance training, Bed mobility, Gait training, Spoke to nursing, Spoke to case management, OT          See flowsheet documentation for full assessment, interventions and recommendations  Outcome: Progressing  Note: Prognosis: Good  Problem List: Decreased strength, Decreased endurance, Impaired balance, Decreased mobility, Pain  Assessment: Pt continues to present with weakness, pain, and decreased activity tolerance  Pt requires extensive time to complete mobility tasks at this time 2* pain and fatigue  Pt still only able to tolerate short distance ambulation (up to 20ft) before needing rest break  Pt also requires frequent rest breaks during seated TE  Pt would benefit from continued acute skilled PT to address above deficits and allow for improved functional mobility  Continuing to recommend rehab at this time  PT Discharge Recommendation: Post-Acute Rehabilitation Services(pending progress)          See flowsheet documentation for full assessment

## 2021-04-01 NOTE — ASSESSMENT & PLAN NOTE
· Chronic normocytic normochromic anemia  · Iron panel suggested chronic anemia of inflammation  · S/p 1 unit of RBCs

## 2021-04-01 NOTE — PROGRESS NOTES
Progress Note - Infectious Disease   Jose E Cohn 65 y o  female MRN: [id number]  Unit/Bed#: [facility] 408-01 Encounter: [id number]      Impression/Plan:       1- Sepsis, POA:  Suspected secondary to right-sided pneumonia complicated by right-sided empyema  No other infectious focus identified based on review of system and physical exam; blood culture remains negative, strep and Legionella antigen negative, patient denying urinary symptoms, denying abdominal pain or diarrhea   - antibiotics as below  - CT surgery follow-up for VATS and subsequent source control  - repeat CBC and CMP in a m   - supportive treatment as per primary service team     2- Right-sided pneumonia:  Possible aspiration event leading to right-sided pneumonia and subsequent empyema   Patient denies binge drinking or daily drinking    Strep pneumoniae and Legionella antigen negative  Blood culture remains negative so far  - antibiotics as below  - close monitoring of respiratory status     3- Right-sided empyema:  Pleural fluid analyzed on 03/23/2021, at time of IR insertion of right-sided chest tube  Fluid was described as serosanguineous, pH 6 6, LDH 2300, glucose of 4, Gram stain with GPC in pairs, culture negative though specimen was collected after 2-3 days of antibiotic treatment  pleural fluid analysis is consistent with empyema  suspect secondary to problem 2    MRSA screen is negative  - continue ceftriaxone and Flagyl  - patient planned for VATS around 04/02/2021 as per review of CT surgery note  - patient will likely benefit from 3 weeks of post VATS antibiotic treatment; will likely be able to transition to p o   Antibiotic once VATS is done  - speech and swallow follow-up     4- coronary artery disease:  -continue close monitoring of hemodynamics status     5- tobacco dependence:  She reports  gradual smoking cessation; she has not smoked over the past 2 weeks prior to presentation  - advised patient to continue with smoking cessation     6- moderate aortic stenosis:  Noted on TTE from 2021  - cardiology follow-up        Plan discussed with patient and with slim provider    Subjective:  Patient has no fever, chills, sweats; no nausea, vomiting, diarrhea; no cough, shortness of breath; no pain  No new symptoms  Objective:  Vitals:  Temp:  [97 7 °F (36 5 °C)-98 6 °F (37 °C)] 98 4 °F (36 9 °C)  HR:  [68-89] 86  Resp:  [15-19] 18  BP: (106-119)/(51-62) 108/56  SpO2:  [97 %-99 %] 99 %  Temp (24hrs), Av 2 °F (36 8 °C), Min:97 7 °F (36 5 °C), Max:98 6 °F (37 °C)  Current: Temperature: 98 4 °F (36 9 °C)    Physical Exam:   General Appearance:  Alert, interactive, nontoxic, no acute distress; seen while sitting comfortably in chair   Throat: Oropharynx moist without lesions  Lungs:   Clear to auscultation bilaterally; no wheezes, rhonchi or rales; respirations unlabored  Right chest tube in place   Heart:  RRR; no murmur, rub or gallop   Abdomen:   Soft, non-tender, non-distended, positive bowel sounds  Extremities: No clubbing, cyanosis or edema   Skin: No new rashes or lesions  No draining wounds noted         Labs, Imaging, & Other studies:   All pertinent labs and imaging studies were personally reviewed  Results from last 7 days   Lab Units 21  0554 21  0435 21  0530   WBC Thousand/uL 14 47*  --  12 50* 16 10*   HEMOGLOBIN g/dL 8 3* 6 6* 6 6* 7 0*   PLATELETS Thousands/uL 624*  --  595* 649*     Results from last 7 days   Lab Units 21  0435 21  0530   SODIUM mmol/L 139 139 139   POTASSIUM mmol/L 4 2 3 9 4 0   CHLORIDE mmol/L 107 109* 107   CO2 mmol/L 28 27 25   BUN mg/dL 7 8 7   CREATININE mg/dL 0 66 0 60 0 56*   EGFR ml/min/1 73sq m 89 91 93   CALCIUM mg/dL 8 0* 7 8* 8 1*     Results from last 7 days   Lab Units 21  1030   MRSA CULTURE ONLY  No Methicillin Resistant Stapylococcus aureus (MRSA) after 24 hours     Results from last 7 days   Lab Units 03/29/21  0502 03/28/21  0405   PROCALCITONIN ng/ml 0 32* 0 54*
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
## 2021-04-01 NOTE — PHYSICAL THERAPY NOTE
Physical Therapy Treatment Note    Patient's Name: Hany Mcconnell  : [id number]       21 0949   PT Last Visit   PT Visit Date 21   Note Type   Note Type Treatment   Pain Assessment   Pain Assessment Tool 0-10   Pain Score 9   Pain Location/Orientation Location: Back   Hospital Pain Intervention(s) Repositioned; Ambulation/increased activity   Restrictions/Precautions   Weight Bearing Precautions Per Order No   Other Precautions Multiple lines;O2;Fall Risk;Pain  (CT)   General   Chart Reviewed Yes   Family/Caregiver Present No   Cognition   Overall Cognitive Status WFL   Attention Attends with cues to redirect   Orientation Level Oriented X4   Memory Decreased recall of precautions   Following Commands Follows one step commands without difficulty   Bed Mobility   Additional Comments OOB upon arrival, not assessed   Transfers   Sit to Stand 4  Minimal assistance   Additional items Assist x 1; Increased time required;Verbal cues   Stand to Sit 4  Minimal assistance   Additional items Assist x 1; Increased time required;Verbal cues   Additional Comments Transfers with RW, VCs for proper hand placement as well as keeping walker close when turning to sit  Ambulation/Elevation   Gait pattern Excessively slow; Short stride; Foward flexed   Gait Assistance 4  Minimal assist   Additional items Assist x 1;Verbal cues   Assistive Device Rolling walker   Distance 20ft limited by fatigue   Balance   Static Sitting Fair +   Dynamic Sitting Fair   Static Standing Fair -   Dynamic Standing Poor +   Ambulatory Poor +   Endurance Deficit   Endurance Deficit Yes   Endurance Deficit Description fatigue, deconditioning   Activity Tolerance   Activity Tolerance Patient limited by fatigue;Patient limited by pain   Nurse Made Aware ok to see per RN   Exercises   Knee AROM Long Arc [employer]; Bilateral  (3x10)   Ankle Pumps Sitting;25 reps;AROM; Bilateral   Marching Sitting;AROM; Bilateral  (3x10)   Assessment Prognosis Good   Problem List Decreased strength;Decreased endurance; Impaired balance;Decreased mobility;Pain   Assessment Pt continues to present with weakness, pain, and decreased activity tolerance  Pt requires extensive time to complete mobility tasks at this time 2* pain and fatigue  Pt still only able to tolerate short distance ambulation (up to 20ft) before needing rest break  Pt also requires frequent rest breaks during seated TE  Pt would benefit from continued acute skilled PT to address above deficits and allow for improved functional mobility  Continuing to recommend rehab at this time  Goals   Patient Goals to have less pain   STG Expiration Date 04/13/21   PT Treatment Day 1   Plan   Treatment/Interventions Functional transfer training;LE strengthening/ROM; Elevations; Therapeutic exercise; Endurance training;Bed mobility;Gait training;Spoke to nursing;Spoke to case management;OT   PT Frequency Other (Comment)  (3-5x/wk)   Recommendation   PT Discharge Recommendation Post-Acute Rehabilitation Services  (pending progress)   AM-PAC Basic Mobility Inpatient   Turning in Bed Without Bedrails 3   Lying on Back to Sitting on Edge of Flat Bed 3   Moving Bed to Chair 3   Standing Up From Chair 3   Walk in Room 3   Climb 3-5 Stairs 2   Basic Mobility Inpatient Raw Score 17   Basic Mobility Standardized Score 39 67       Huong Garcia, PT, DPT

## 2021-04-02 ENCOUNTER — APPOINTMENT (INPATIENT)
Dept: RADIOLOGY | Facility: HOSPITAL | Age: 71
DRG: 853 | End: 2021-04-02
Payer: MEDICARE

## 2021-04-02 ENCOUNTER — ANESTHESIA (INPATIENT)
Dept: PERIOP | Facility: HOSPITAL | Age: 71
DRG: 853 | End: 2021-04-02
Payer: MEDICARE

## 2021-04-02 LAB
ANION GAP SERPL CALCULATED.3IONS-SCNC: 6 MMOL/L (ref 4–13)
BASE EXCESS BLDA CALC-SCNC: -1 MMOL/L (ref -2–3)
BUN SERPL-MCNC: 6 MG/DL (ref 5–25)
CA-I BLD-SCNC: 1.13 MMOL/L (ref 1.12–1.32)
CALCIUM SERPL-MCNC: 7.8 MG/DL (ref 8.3–10.1)
CHLORIDE SERPL-SCNC: 106 MMOL/L (ref 100–108)
CO2 SERPL-SCNC: 27 MMOL/L (ref 21–32)
CREAT SERPL-MCNC: 0.61 MG/DL (ref 0.6–1.3)
ERYTHROCYTE [DISTWIDTH] IN BLOOD BY AUTOMATED COUNT: 17.4 % (ref 11.6–15.1)
ERYTHROCYTE [DISTWIDTH] IN BLOOD BY AUTOMATED COUNT: 17.7 % (ref 11.6–15.1)
GFR SERPL CREATININE-BSD FRML MDRD: 91 ML/MIN/1.73SQ M
GLUCOSE SERPL-MCNC: 114 MG/DL (ref 65–140)
GLUCOSE SERPL-MCNC: 122 MG/DL (ref 65–140)
HCO3 BLDA-SCNC: 26 MMOL/L (ref 22–28)
HCT VFR BLD AUTO: 24 % (ref 34.8–46.1)
HCT VFR BLD AUTO: 26 % (ref 34.8–46.1)
HCT VFR BLD CALC: 25 % (ref 34.8–46.1)
HGB BLD-MCNC: 7.6 G/DL (ref 11.5–15.4)
HGB BLD-MCNC: 8 G/DL (ref 11.5–15.4)
HGB BLDA-MCNC: 8.5 G/DL (ref 11.5–15.4)
INR PPP: 1.45 (ref 0.84–1.19)
MAGNESIUM SERPL-MCNC: 1.7 MG/DL (ref 1.6–2.6)
MCH RBC QN AUTO: 28.1 PG (ref 26.8–34.3)
MCH RBC QN AUTO: 28.6 PG (ref 26.8–34.3)
MCHC RBC AUTO-ENTMCNC: 30.8 G/DL (ref 31.4–37.4)
MCHC RBC AUTO-ENTMCNC: 31.7 G/DL (ref 31.4–37.4)
MCV RBC AUTO: 90 FL (ref 82–98)
MCV RBC AUTO: 91 FL (ref 82–98)
PCO2 BLD: 28 MMOL/L (ref 21–32)
PCO2 BLD: 53.5 MM HG (ref 36–44)
PH BLD: 7.29 [PH] (ref 7.35–7.45)
PLATELET # BLD AUTO: 571 THOUSANDS/UL (ref 149–390)
PLATELET # BLD AUTO: 572 THOUSANDS/UL (ref 149–390)
PMV BLD AUTO: 10.4 FL (ref 8.9–12.7)
PMV BLD AUTO: 9.6 FL (ref 8.9–12.7)
PO2 BLD: 83 MM HG (ref 75–129)
POTASSIUM BLD-SCNC: 3.5 MMOL/L (ref 3.5–5.3)
POTASSIUM SERPL-SCNC: 3.4 MMOL/L (ref 3.5–5.3)
PROTHROMBIN TIME: 17.6 SECONDS (ref 11.6–14.5)
RBC # BLD AUTO: 2.66 MILLION/UL (ref 3.81–5.12)
RBC # BLD AUTO: 2.85 MILLION/UL (ref 3.81–5.12)
SAO2 % BLD FROM PO2: 95 % (ref 60–85)
SODIUM BLD-SCNC: 138 MMOL/L (ref 136–145)
SODIUM SERPL-SCNC: 139 MMOL/L (ref 136–145)
SPECIMEN SOURCE: ABNORMAL
WBC # BLD AUTO: 13.47 THOUSAND/UL (ref 4.31–10.16)
WBC # BLD AUTO: 21.3 THOUSAND/UL (ref 4.31–10.16)

## 2021-04-02 PROCEDURE — 87070 CULTURE OTHR SPECIMN AEROBIC: CPT | Performed by: THORACIC SURGERY (CARDIOTHORACIC VASCULAR SURGERY)

## 2021-04-02 PROCEDURE — 84295 ASSAY OF SERUM SODIUM: CPT

## 2021-04-02 PROCEDURE — 32652 THORACOSCOPY REM TOTL CORTEX: CPT | Performed by: PHYSICIAN ASSISTANT

## 2021-04-02 PROCEDURE — 99232 SBSQ HOSP IP/OBS MODERATE 35: CPT | Performed by: INTERNAL MEDICINE

## 2021-04-02 PROCEDURE — 87075 CULTR BACTERIA EXCEPT BLOOD: CPT | Performed by: THORACIC SURGERY (CARDIOTHORACIC VASCULAR SURGERY)

## 2021-04-02 PROCEDURE — 82947 ASSAY GLUCOSE BLOOD QUANT: CPT

## 2021-04-02 PROCEDURE — 85027 COMPLETE CBC AUTOMATED: CPT | Performed by: INTERNAL MEDICINE

## 2021-04-02 PROCEDURE — 85014 HEMATOCRIT: CPT

## 2021-04-02 PROCEDURE — 99024 POSTOP FOLLOW-UP VISIT: CPT | Performed by: SURGERY

## 2021-04-02 PROCEDURE — 99233 SBSQ HOSP IP/OBS HIGH 50: CPT | Performed by: INTERNAL MEDICINE

## 2021-04-02 PROCEDURE — 82803 BLOOD GASES ANY COMBINATION: CPT

## 2021-04-02 PROCEDURE — 85610 PROTHROMBIN TIME: CPT | Performed by: INTERNAL MEDICINE

## 2021-04-02 PROCEDURE — 83735 ASSAY OF MAGNESIUM: CPT | Performed by: PHYSICIAN ASSISTANT

## 2021-04-02 PROCEDURE — 87205 SMEAR GRAM STAIN: CPT | Performed by: THORACIC SURGERY (CARDIOTHORACIC VASCULAR SURGERY)

## 2021-04-02 PROCEDURE — 80048 BASIC METABOLIC PNL TOTAL CA: CPT | Performed by: INTERNAL MEDICINE

## 2021-04-02 PROCEDURE — 87116 MYCOBACTERIA CULTURE: CPT | Performed by: THORACIC SURGERY (CARDIOTHORACIC VASCULAR SURGERY)

## 2021-04-02 PROCEDURE — 0BCK4ZZ EXTIRPATION OF MATTER FROM RIGHT LUNG, PERCUTANEOUS ENDOSCOPIC APPROACH: ICD-10-PCS | Performed by: THORACIC SURGERY (CARDIOTHORACIC VASCULAR SURGERY)

## 2021-04-02 PROCEDURE — 87102 FUNGUS ISOLATION CULTURE: CPT | Performed by: THORACIC SURGERY (CARDIOTHORACIC VASCULAR SURGERY)

## 2021-04-02 PROCEDURE — 84132 ASSAY OF SERUM POTASSIUM: CPT

## 2021-04-02 PROCEDURE — NC001 PR NO CHARGE: Performed by: THORACIC SURGERY (CARDIOTHORACIC VASCULAR SURGERY)

## 2021-04-02 PROCEDURE — 71045 X-RAY EXAM CHEST 1 VIEW: CPT

## 2021-04-02 PROCEDURE — 32652 THORACOSCOPY REM TOTL CORTEX: CPT | Performed by: THORACIC SURGERY (CARDIOTHORACIC VASCULAR SURGERY)

## 2021-04-02 PROCEDURE — C9290 INJ, BUPIVACAINE LIPOSOME: HCPCS | Performed by: THORACIC SURGERY (CARDIOTHORACIC VASCULAR SURGERY)

## 2021-04-02 PROCEDURE — 85027 COMPLETE CBC AUTOMATED: CPT | Performed by: PHYSICIAN ASSISTANT

## 2021-04-02 PROCEDURE — 82330 ASSAY OF CALCIUM: CPT

## 2021-04-02 PROCEDURE — 87206 SMEAR FLUORESCENT/ACID STAI: CPT | Performed by: THORACIC SURGERY (CARDIOTHORACIC VASCULAR SURGERY)

## 2021-04-02 PROCEDURE — 87176 TISSUE HOMOGENIZATION CULTR: CPT | Performed by: THORACIC SURGERY (CARDIOTHORACIC VASCULAR SURGERY)

## 2021-04-02 RX ORDER — FUROSEMIDE 20 MG/1
20 TABLET ORAL DAILY
Status: DISCONTINUED | OUTPATIENT
Start: 2021-04-03 | End: 2021-04-13 | Stop reason: HOSPADM

## 2021-04-02 RX ORDER — FENTANYL CITRATE/PF 50 MCG/ML
25 SYRINGE (ML) INJECTION
Status: DISCONTINUED | OUTPATIENT
Start: 2021-04-02 | End: 2021-04-02 | Stop reason: HOSPADM

## 2021-04-02 RX ORDER — PROPOFOL 10 MG/ML
INJECTION, EMULSION INTRAVENOUS AS NEEDED
Status: DISCONTINUED | OUTPATIENT
Start: 2021-04-02 | End: 2021-04-02

## 2021-04-02 RX ORDER — ATORVASTATIN CALCIUM 40 MG/1
40 TABLET, FILM COATED ORAL
Status: DISCONTINUED | OUTPATIENT
Start: 2021-04-03 | End: 2021-04-13 | Stop reason: HOSPADM

## 2021-04-02 RX ORDER — ROCURONIUM BROMIDE 10 MG/ML
INJECTION, SOLUTION INTRAVENOUS AS NEEDED
Status: DISCONTINUED | OUTPATIENT
Start: 2021-04-02 | End: 2021-04-02

## 2021-04-02 RX ORDER — POTASSIUM CHLORIDE 20 MEQ/1
40 TABLET, EXTENDED RELEASE ORAL ONCE
Status: COMPLETED | OUTPATIENT
Start: 2021-04-02 | End: 2021-04-02

## 2021-04-02 RX ORDER — BUPIVACAINE HYDROCHLORIDE 2.5 MG/ML
INJECTION, SOLUTION EPIDURAL; INFILTRATION; INTRACAUDAL AS NEEDED
Status: DISCONTINUED | OUTPATIENT
Start: 2021-04-02 | End: 2021-04-02 | Stop reason: HOSPADM

## 2021-04-02 RX ORDER — SODIUM CHLORIDE, SODIUM LACTATE, POTASSIUM CHLORIDE, CALCIUM CHLORIDE 600; 310; 30; 20 MG/100ML; MG/100ML; MG/100ML; MG/100ML
50 INJECTION, SOLUTION INTRAVENOUS CONTINUOUS
Status: DISCONTINUED | OUTPATIENT
Start: 2021-04-02 | End: 2021-04-03

## 2021-04-02 RX ORDER — NEOSTIGMINE METHYLSULFATE 1 MG/ML
INJECTION INTRAVENOUS AS NEEDED
Status: DISCONTINUED | OUTPATIENT
Start: 2021-04-02 | End: 2021-04-02

## 2021-04-02 RX ORDER — MAGNESIUM SULFATE 1 G/100ML
1 INJECTION INTRAVENOUS ONCE
Status: COMPLETED | OUTPATIENT
Start: 2021-04-02 | End: 2021-04-02

## 2021-04-02 RX ORDER — FENTANYL CITRATE 50 UG/ML
INJECTION, SOLUTION INTRAMUSCULAR; INTRAVENOUS AS NEEDED
Status: DISCONTINUED | OUTPATIENT
Start: 2021-04-02 | End: 2021-04-02

## 2021-04-02 RX ORDER — LORAZEPAM 2 MG/ML
0.5 INJECTION INTRAMUSCULAR ONCE
Status: COMPLETED | OUTPATIENT
Start: 2021-04-02 | End: 2021-04-02

## 2021-04-02 RX ORDER — KETAMINE HCL IN NACL, ISO-OSM 100MG/10ML
SYRINGE (ML) INJECTION AS NEEDED
Status: DISCONTINUED | OUTPATIENT
Start: 2021-04-02 | End: 2021-04-02

## 2021-04-02 RX ORDER — LEVALBUTEROL 1.25 MG/.5ML
1.25 SOLUTION, CONCENTRATE RESPIRATORY (INHALATION) ONCE
Status: COMPLETED | OUTPATIENT
Start: 2021-04-02 | End: 2021-04-02

## 2021-04-02 RX ORDER — GLYCOPYRROLATE 0.2 MG/ML
INJECTION INTRAMUSCULAR; INTRAVENOUS AS NEEDED
Status: DISCONTINUED | OUTPATIENT
Start: 2021-04-02 | End: 2021-04-02

## 2021-04-02 RX ORDER — SODIUM CHLORIDE 9 MG/ML
INJECTION INTRAVENOUS AS NEEDED
Status: DISCONTINUED | OUTPATIENT
Start: 2021-04-02 | End: 2021-04-02 | Stop reason: HOSPADM

## 2021-04-02 RX ORDER — SODIUM CHLORIDE 9 MG/ML
INJECTION, SOLUTION INTRAVENOUS CONTINUOUS PRN
Status: DISCONTINUED | OUTPATIENT
Start: 2021-04-02 | End: 2021-04-02

## 2021-04-02 RX ORDER — MAGNESIUM HYDROXIDE 1200 MG/15ML
LIQUID ORAL AS NEEDED
Status: DISCONTINUED | OUTPATIENT
Start: 2021-04-02 | End: 2021-04-02 | Stop reason: HOSPADM

## 2021-04-02 RX ORDER — DEXAMETHASONE SODIUM PHOSPHATE 10 MG/ML
INJECTION, SOLUTION INTRAMUSCULAR; INTRAVENOUS AS NEEDED
Status: DISCONTINUED | OUTPATIENT
Start: 2021-04-02 | End: 2021-04-02

## 2021-04-02 RX ORDER — ONDANSETRON 2 MG/ML
4 INJECTION INTRAMUSCULAR; INTRAVENOUS ONCE AS NEEDED
Status: DISCONTINUED | OUTPATIENT
Start: 2021-04-02 | End: 2021-04-02 | Stop reason: HOSPADM

## 2021-04-02 RX ORDER — HYDROMORPHONE HCL 110MG/55ML
PATIENT CONTROLLED ANALGESIA SYRINGE INTRAVENOUS AS NEEDED
Status: DISCONTINUED | OUTPATIENT
Start: 2021-04-02 | End: 2021-04-02

## 2021-04-02 RX ORDER — SODIUM CHLORIDE, SODIUM LACTATE, POTASSIUM CHLORIDE, CALCIUM CHLORIDE 600; 310; 30; 20 MG/100ML; MG/100ML; MG/100ML; MG/100ML
INJECTION, SOLUTION INTRAVENOUS CONTINUOUS PRN
Status: DISCONTINUED | OUTPATIENT
Start: 2021-04-02 | End: 2021-04-02

## 2021-04-02 RX ORDER — ONDANSETRON 2 MG/ML
INJECTION INTRAMUSCULAR; INTRAVENOUS AS NEEDED
Status: DISCONTINUED | OUTPATIENT
Start: 2021-04-02 | End: 2021-04-02

## 2021-04-02 RX ADMIN — OXYCODONE HYDROCHLORIDE 5 MG: 5 TABLET ORAL at 03:17

## 2021-04-02 RX ADMIN — SUGAMMADEX 133 MG: 100 INJECTION, SOLUTION INTRAVENOUS at 11:35

## 2021-04-02 RX ADMIN — ROCURONIUM BROMIDE 10 MG: 50 INJECTION, SOLUTION INTRAVENOUS at 09:38

## 2021-04-02 RX ADMIN — DEXAMETHASONE SODIUM PHOSPHATE 10 MG: 10 INJECTION, SOLUTION INTRAMUSCULAR; INTRAVENOUS at 09:27

## 2021-04-02 RX ADMIN — MAGNESIUM SULFATE HEPTAHYDRATE 1 G: 1 INJECTION, SOLUTION INTRAVENOUS at 20:17

## 2021-04-02 RX ADMIN — SODIUM CHLORIDE, SODIUM LACTATE, POTASSIUM CHLORIDE, AND CALCIUM CHLORIDE 50 ML/HR: .6; .31; .03; .02 INJECTION, SOLUTION INTRAVENOUS at 14:10

## 2021-04-02 RX ADMIN — PHENYLEPHRINE HYDROCHLORIDE 50 MCG: 10 INJECTION INTRAVENOUS at 09:07

## 2021-04-02 RX ADMIN — CEFAZOLIN SODIUM 1000 MG: 1 SOLUTION INTRAVENOUS at 07:50

## 2021-04-02 RX ADMIN — ENOXAPARIN SODIUM 40 MG: 40 INJECTION SUBCUTANEOUS at 14:29

## 2021-04-02 RX ADMIN — Medication 25 MCG: at 12:21

## 2021-04-02 RX ADMIN — METRONIDAZOLE 500 MG: 500 INJECTION, SOLUTION INTRAVENOUS at 03:20

## 2021-04-02 RX ADMIN — DEXTROSE, SODIUM CHLORIDE, AND POTASSIUM CHLORIDE 50 ML/HR: 5; .45; .15 INJECTION INTRAVENOUS at 12:53

## 2021-04-02 RX ADMIN — ALPRAZOLAM 0.25 MG: 0.25 TABLET ORAL at 05:57

## 2021-04-02 RX ADMIN — CEFTRIAXONE SODIUM 2000 MG: 10 INJECTION, POWDER, FOR SOLUTION INTRAVENOUS at 17:25

## 2021-04-02 RX ADMIN — ACETAMINOPHEN 975 MG: 325 TABLET, FILM COATED ORAL at 14:30

## 2021-04-02 RX ADMIN — PHENYLEPHRINE HYDROCHLORIDE 100 MCG: 10 INJECTION INTRAVENOUS at 09:50

## 2021-04-02 RX ADMIN — SODIUM CHLORIDE, SODIUM LACTATE, POTASSIUM CHLORIDE, AND CALCIUM CHLORIDE: .6; .31; .03; .02 INJECTION, SOLUTION INTRAVENOUS at 07:52

## 2021-04-02 RX ADMIN — NEOSTIGMINE METHYLSULFATE 5 MG: 1 INJECTION INTRAVENOUS at 11:02

## 2021-04-02 RX ADMIN — Medication 25 MCG: at 12:07

## 2021-04-02 RX ADMIN — FENTANYL CITRATE 100 MCG: 50 INJECTION INTRAMUSCULAR; INTRAVENOUS at 07:59

## 2021-04-02 RX ADMIN — HYDROMORPHONE HYDROCHLORIDE 0.5 MG: 2 INJECTION, SOLUTION INTRAMUSCULAR; INTRAVENOUS; SUBCUTANEOUS at 10:45

## 2021-04-02 RX ADMIN — ACETAMINOPHEN 975 MG: 325 TABLET, FILM COATED ORAL at 05:07

## 2021-04-02 RX ADMIN — Medication 20 MG: at 10:07

## 2021-04-02 RX ADMIN — HYDROMORPHONE HYDROCHLORIDE 0.2 MG: 0.2 INJECTION, SOLUTION INTRAMUSCULAR; INTRAVENOUS; SUBCUTANEOUS at 16:03

## 2021-04-02 RX ADMIN — ONDANSETRON 4 MG: 2 INJECTION INTRAMUSCULAR; INTRAVENOUS at 09:27

## 2021-04-02 RX ADMIN — GLYCOPYRROLATE 0.8 MG: 0.2 INJECTION, SOLUTION INTRAMUSCULAR; INTRAVENOUS at 11:02

## 2021-04-02 RX ADMIN — BUPROPION HYDROCHLORIDE 150 MG: 150 TABLET, FILM COATED, EXTENDED RELEASE ORAL at 14:28

## 2021-04-02 RX ADMIN — METRONIDAZOLE 500 MG: 500 INJECTION, SOLUTION INTRAVENOUS at 18:26

## 2021-04-02 RX ADMIN — Medication 25 MCG: at 12:14

## 2021-04-02 RX ADMIN — FLUOXETINE 20 MG: 20 CAPSULE ORAL at 14:28

## 2021-04-02 RX ADMIN — Medication 25 MCG: at 12:00

## 2021-04-02 RX ADMIN — ROCURONIUM BROMIDE 50 MG: 50 INJECTION, SOLUTION INTRAVENOUS at 07:59

## 2021-04-02 RX ADMIN — OXYCODONE HYDROCHLORIDE 10 MG: 10 TABLET ORAL at 18:26

## 2021-04-02 RX ADMIN — LIDOCAINE 1 PATCH: 50 PATCH TOPICAL at 14:29

## 2021-04-02 RX ADMIN — Medication 1 TABLET: at 14:27

## 2021-04-02 RX ADMIN — PANTOPRAZOLE SODIUM 20 MG: 20 TABLET, DELAYED RELEASE ORAL at 05:07

## 2021-04-02 RX ADMIN — LEVALBUTEROL HYDROCHLORIDE 1.25 MG: 1.25 SOLUTION, CONCENTRATE RESPIRATORY (INHALATION) at 12:11

## 2021-04-02 RX ADMIN — ROCURONIUM BROMIDE 15 MG: 50 INJECTION, SOLUTION INTRAVENOUS at 09:51

## 2021-04-02 RX ADMIN — PHENYLEPHRINE HYDROCHLORIDE 200 MCG: 10 INJECTION INTRAVENOUS at 09:55

## 2021-04-02 RX ADMIN — SODIUM CHLORIDE: 0.9 INJECTION, SOLUTION INTRAVENOUS at 08:04

## 2021-04-02 RX ADMIN — OXYCODONE HYDROCHLORIDE 10 MG: 10 TABLET ORAL at 14:23

## 2021-04-02 RX ADMIN — HYDROMORPHONE HYDROCHLORIDE 0.2 MG: 0.2 INJECTION, SOLUTION INTRAMUSCULAR; INTRAVENOUS; SUBCUTANEOUS at 20:17

## 2021-04-02 RX ADMIN — Medication 20 MG: at 10:45

## 2021-04-02 RX ADMIN — PHENYLEPHRINE HYDROCHLORIDE 40 MCG/MIN: 10 INJECTION INTRAVENOUS at 08:21

## 2021-04-02 RX ADMIN — LORAZEPAM 0.5 MG: 2 INJECTION INTRAMUSCULAR; INTRAVENOUS at 12:34

## 2021-04-02 RX ADMIN — DEXTROSE, SODIUM CHLORIDE, AND POTASSIUM CHLORIDE 50 ML/HR: 5; .45; .15 INJECTION INTRAVENOUS at 00:01

## 2021-04-02 RX ADMIN — PROPOFOL 150 MG: 10 INJECTION, EMULSION INTRAVENOUS at 07:59

## 2021-04-02 RX ADMIN — HYDROMORPHONE HYDROCHLORIDE 0.5 MG: 2 INJECTION, SOLUTION INTRAMUSCULAR; INTRAVENOUS; SUBCUTANEOUS at 09:41

## 2021-04-02 RX ADMIN — POTASSIUM CHLORIDE 40 MEQ: 1500 TABLET, EXTENDED RELEASE ORAL at 18:27

## 2021-04-02 NOTE — ASSESSMENT & PLAN NOTE
· Chronic normocytic normochromic anemia  · Iron panel suggested chronic anemia of inflammation  · S/p 1 unit of RBCs  · Continue to monitor hb post op

## 2021-04-02 NOTE — ASSESSMENT & PLAN NOTE
· Delirium has resolved  · Probably related alcoholism, although pt denies this, she received 16 mg of Ativan based on the CIWA protocol earlier this admission    · Family denies any heavy alcohol use, maybe she takes a glass of wine occasionally so this unlikely active alcohol use more like severe delirium in the setting of sepsis  · CT head was negative for intracranial anomaly  · Now back to baseline mental status  · Delirium has resolved

## 2021-04-02 NOTE — PROGRESS NOTES
Postoperative Progress Note - Thoracic Surgery   Deon Hansen 65 y o  female MRN: [id number]  Unit/Bed#: [facility] 408-01 Encounter: [id number]    Assessment:  Patient is here with right sided empyema  S/p pigtail placement in right chest by IR on 3/23 and s/p Right VATS decortication 4/2  P2Y12 platelet inhibitor assay negative for effect of P2Y12 platelet inhibitor    R CT to -20 cm, + AL, 110 cc sanguineous drainage    Plan:  Diet as toelrated   R CT to suction at -20 on pleurevac   Continue antibiotics  Respiratory therapy and chest PT  DVT prophylaxis  Pain control  Incentive spirometry    Subjective/Objective   Subjective: Patient doing well  No chest pain/SOB  No fever/chills  No N/V  Objective:     Vitals: Blood pressure 113/56, pulse 86, temperature 98 °F (36 7 °C), temperature source Oral, resp  rate 16, weight 66 3 kg (146 lb 2 6 oz), SpO2 92 %, not currently breastfeeding  ,Body mass index is 23 59 kg/m²  I/O       03/28 0701 - 03/29 0700 03/29 0701 - 03/30 0700    P  O   1040    Total Intake  1040    Urine  450    Chest Tube  180    Total Output  630    Net  +410              Physical Exam:   GENERAL APPEARANCE: in no acute distress  NEURO: stable  HEENT: normocephalic, atraumatic  CV: regular rate and rhythm, no tachycardia,   LUNGS: clear to auscultation bilaterally  R CT to suction -20 with air leak, sanguineous output 110 cc  GI: soft, nontender, nondistended  : no abnormality detected  MSK: no abnormality detected   SKIN: no abnormality detected

## 2021-04-02 NOTE — RESPIRATORY THERAPY NOTE
resp care      04/02/21 1458   Respiratory Protocol   Protocol Initiated? Yes   Protocol Selection Airway Clearance; Respiratory   Language Barrier? No   Medical & Social History Reviewed? Yes   Diagnostic Studies Reviewed? Yes   Physical Assessment Performed? Yes   Respiratory Plan No distress/Pulmonary history   Airway Clearance Plan Incentive Spirometer   Respiratory Assessment   Assessment Type Assess only   General Appearance Awake   Respiratory Pattern Normal   Chest Assessment Chest expansion symmetrical   Bilateral Breath Sounds Clear   Resp Comments Pt assessed for respiratory and acp protocol  Pt w/ sepsis secondary parapneumonic effusion/pna  Pt instructed on IS and achieved 500ml  Per pt - pt has no pulm hx nor takes any breathing medications at home  Pt is c/o of pain howeer, no sob   will cont to follow pt via acp

## 2021-04-02 NOTE — PROGRESS NOTES
Progress Note - Thoracic Surgery   Amairani Funes 65 y o  female MRN: [id number]  Unit/Bed#: [facility] 408-01 Encounter: [id number]    Assessment:  Patient is here with right sided empyema  S/p pigtail placement in right chest by IR on 3/23  P2Y12 platelet inhibitor assay negative for effect of P2Y12 platelet inhibitor    R CT to -20 cm, no AL, 270 cc serous    Plan:  OR 4/2 for VATS decortication of R empyema  Npo and IVF   Type and screen   R CT to suction at -20 on pleurevac   Continue antibiotics  Respiratory therapy and chest PT  DVT prophylaxis    Subjective/Objective   Subjective: Patient doing well  No chest pain/SOB  No fever/chills  No N/V  Objective:     Vitals: Blood pressure 126/60, pulse 60, temperature 98 7 °F (37 1 °C), temperature source Oral, resp  rate 18, weight 67 kg (147 lb 11 3 oz), SpO2 90 %, not currently breastfeeding  ,Body mass index is 23 84 kg/m²  I/O       03/28 0701 - 03/29 0700 03/29 0701 - 03/30 0700    P  O   1040    Total Intake  1040    Urine  450    Chest Tube  180    Total Output  630    Net  +410              Physical Exam:   GENERAL APPEARANCE: in no acute distress  NEURO: stable  HEENT: normocephalic, atraumatic  CV: regular rate and rhythm, no tachycardia,   LUNGS: clear to auscultation bilaterally  R CT to suction -20 with 270 cc serous output, no air leak  GI: soft, nontender, nondistended  : no abnormality detected  MSK: no abnormality detected   SKIN: no abnormality detected

## 2021-04-02 NOTE — ANESTHESIA POSTPROCEDURE EVALUATION
Post-Op Assessment Note    CV Status:  Stable    Pain management: adequate     Mental Status:  Sleepy   Hydration Status:  Stable   PONV Controlled:  None   Airway Patency:  Patent       Staff: Anesthesiologist         No complications documented      /92 (04/02/21 1142)    Temp (!) 97 1 °F (36 2 °C) (04/02/21 1142)    Pulse 87 (04/02/21 1142)   Resp 18 (04/02/21 1142)    SpO2   100% on 6 lpm facemask

## 2021-04-02 NOTE — PROGRESS NOTES
[facility]  Progress Note - Halina Beal 1950, 65 y o  female MRN: [id number]  Unit/Bed#: [facility] 408-01 Encounter: [id number]  Primary Care Provider: Phu Urban MD   Date and time admitted to hospital: 3/29/2021  2:37 PM    * Sepsis secondary to parapneumonic effusion/pna  Assessment & Plan  · Sepsis secondary to empyema  · Presented with sepsis POA tachycardia, elevated white count to 36 K with 4% bands, found to have right-sided large parapneumonic effusion  Patient says did not have any fever prior to presentation  Subsequent g stain fluid cultures revealed Gram-positive cocci in pairs  She has chest tube placed by IR on 03/23 at upper buttocks [place]  Six doses of tPA/dornase have completed on 03/27  Repeat CT scan showed mid size residual right pleural effusion slightly decreased  New ground-glass opacity in the left anterior upper lobe, of 5 centimetre left pulmonary nodule noted  · On ceftriaxone and flagyl per ID recomendtions, will likely need 3 weeks of treatment  · S/p VATS decortication  · F/u surgical cultures and pathology  · Post op chest tube management per thoracic surgery        Anemia  Assessment & Plan  · Chronic normocytic normochromic anemia  · Iron panel suggested chronic anemia of inflammation  · S/p 1 unit of RBCs  · Continue to monitor hb post op    Delirium  Assessment & Plan  · Delirium has resolved  · Probably related alcoholism, although pt denies this, she received 16 mg of Ativan based on the CIWA protocol earlier this admission    · Family denies any heavy alcohol use, maybe she takes a glass of wine occasionally so this unlikely active alcohol use more like severe delirium in the setting of sepsis  · CT head was negative for intracranial anomaly  · Now back to baseline mental status  · Delirium has resolved      Dysphagia  Assessment & Plan  · Resolved, placed back on regular diet    HTN (hypertension)  Assessment & Plan  · Bps are acceptable  · Continue lasix po    Pulmonary nodule  Assessment & Plan  5 centimeter nodule noted   Pulmonary follow up outpatient    Thrombocytosis (HCC)  Assessment & Plan  · Thrombocytosis  · Likely reactive from her infection  · Will continue to monitor        VTE Pharmacologic Prophylaxis:   Pharmacologic: Heparin  Mechanical VTE Prophylaxis in Place: Yes    Patient Centered Rounds: I have performed bedside rounds with nursing staff today  Education and Discussions with Family / Patient: patient and family, plan of care    Time Spent for Care: 20 minutes  More than 50% of total time spent on counseling and coordination of care as described above  Current Length of Stay: 4 day(s)    Current Patient Status: Inpatient   Certification Statement: The patient will continue to require additional inpatient hospital stay due to chest tubes in place    Discharge Plan: home vs rehab, when stable    Code Status: Level 1 - Full Code      Subjective:   Reports pain at chest tube sites  Objective:     Vitals:   Temp (24hrs), Av 7 °F (36 5 °C), Min:97 1 °F (36 2 °C), Max:98 7 °F (37 1 °C)    Temp:  [97 1 °F (36 2 °C)-98 7 °F (37 1 °C)] 97 4 °F (36 3 °C)  HR:  [60-91] 84  Resp:  [14-23] 14  BP: ()/(52-92) 98/55  SpO2:  [90 %-98 %] 97 %  Body mass index is 23 59 kg/m²  Input and Output Summary (last 24 hours): Intake/Output Summary (Last 24 hours) at 2021 1429  Last data filed at 2021 1400  Gross per 24 hour   Intake 3773 34 ml   Output 1375 ml   Net 2398 34 ml       Physical Exam:     Physical Exam  Constitutional:       Appearance: Normal appearance  HENT:      Head: Normocephalic and atraumatic  Nose: Nose normal       Mouth/Throat:      Mouth: Mucous membranes are moist       Pharynx: Oropharynx is clear  Eyes:      Extraocular Movements: Extraocular movements intact  Cardiovascular:      Rate and Rhythm: Normal rate and regular rhythm     Pulmonary:      Effort: Pulmonary effort is normal       Breath sounds: No wheezing or rales  Comments: Chest tubes in place with serosanguinous fluid  Skin:     General: Skin is warm and dry  Neurological:      Mental Status: She is alert and oriented to person, place, and time  Mental status is at baseline  Psychiatric:      Comments: Jovial and slightly euphoric           Additional Data:     Labs:    Results from last 7 days   Lab Units 04/02/21  1207   WBC Thousand/uL 21 30*   HEMOGLOBIN g/dL 8 0*   HEMATOCRIT % 26 0*   PLATELETS Thousands/uL 571*     Results from last 7 days   Lab Units 04/02/21  0446   SODIUM mmol/L 139   POTASSIUM mmol/L 3 4*   CHLORIDE mmol/L 106   CO2 mmol/L 27   BUN mg/dL 6   CREATININE mg/dL 0 61   ANION GAP mmol/L 6   CALCIUM mg/dL 7 8*   GLUCOSE RANDOM mg/dL 114     Results from last 7 days   Lab Units 04/02/21  0446   INR  1 45*             Results from last 7 days   Lab Units 03/29/21  0502 03/28/21  0405   PROCALCITONIN ng/ml 0 32* 0 54*           * I Have Reviewed All Lab Data Listed Above  * Additional Pertinent Lab Tests Reviewed:  All Labs Within Last 24 Hours Reviewed    Imaging:    Imaging Reports Reviewed Today Include: CXR    Recent Cultures (last 7 days):           Last 24 Hours Medication List:   Current Facility-Administered Medications   Medication Dose Route Frequency Provider Last Rate    acetaminophen  975 mg Oral [facility] Rajni Parham PA-C      albuterol  2 5 mg Nebulization Q4H PRN KITA Flowers-CELIA      ALPRAZolam  0 25 mg Oral Q6H PRN Rajni Parham PA-C      [START ON 4/3/2021] atorvastatin  40 mg Oral Daily With KITA Johnson-CELIA      buPROPion  150 mg Oral Daily RajniKITA Mooney-CELIA      butalbital-acetaminophen-caffeine  1 tablet Oral Once Rajniricky Parham PA-C      cefTRIAXone  2,000 mg Intravenous Q24H KITA Flowers-C 2,000 mg (04/01/21 0042)    dextromethorphan-guaiFENesin  10 mL Oral Q4H PRN Rajniricky Parham PA-CELIA      enoxaparin  40 mg Subcutaneous Daily Loyd Wallace PA-C      FLUoxetine  20 mg Oral Daily Loyd Aabd [place], [place]      [START ON 4/3/2021] furosemide  20 mg Oral Daily Irving Blanco      HYDROmorphone  0 2 mg Intravenous Q3H PRN KITA Blanco-CELIA      lactated ringers  50 mL/hr Intravenous Continuous Ny Dye PA-C 50 mL/hr (04/02/21 1410)    lidocaine  1 patch Topical Daily Ny Dye PA-C      melatonin  6 mg Oral HS Ny Dye PA-C      metroNIDAZOLE  500 mg Intravenous 36288 [facility], PA-C 500 mg (04/02/21 0320)    multivitamin-minerals  1 tablet Oral Daily Irving Blanco      oxyCODONE  10 mg Oral Q4H PRN Ny Dye PA-C      oxyCODONE  5 mg Oral Q4H PRN Ny Dye PA-C      pantoprazole  20 mg Oral Early Morning yN Dye PA-C      traZODone  200 mg Oral HS Ny Dye PA-C          Today, Patient Was Seen By: Jo Austin MD    ** Please Note: Dictation voice to text software may have been used in the creation of this document   **

## 2021-04-02 NOTE — RESTORATIVE TECHNICIAN NOTE
Restorative Specialist Mobility Note       Activity: Ambulate in vee(to the stretcher)     Assistive Device: Front wheel walker

## 2021-04-02 NOTE — OCCUPATIONAL THERAPY NOTE
Occupational Therapy Cancellation Note        Patient Name: Jessi Ospina  [id number] Date: 4/2/2021 04/02/21 0748   OT Last Visit   OT Visit Date 04/02/21   Note Type   Note Type Treatment   Cancel Reasons Patient to operating room       Chart reviewed, noted that pt is currently in OR for VATS  OT will hold at this time and continue to follow and see as medically appropriate and able       KRISTEN Carmona, OTR/L

## 2021-04-02 NOTE — OP NOTE
OPERATIVE REPORT  PATIENT NAME: Hiren Mccloud    :  [id number]  MRN: [id number]  Pt Location: BE OR ROOM 08    SURGERY DATE: 2021    Surgeon(s) and Role:     * Christine Mendieta MD - Primary     * Harsha Lawson PA-C - Assisting     * Talisha Huerta PA-C - Assisting    Preop Diagnosis:  Parapneumonic effusion [J18 9, J91 8]    Post-Op Diagnosis Codes:     * Parapneumonic effusion [J18 9, J91 8]    Procedure(s) (LRB):  THORACOSCOPY VIDEO ASSISTED SURGERY (VATS); DECORTICATION (Right), total decortication    Specimen(s):  ID Type Source Tests Collected by Time Destination   A : RIGHT PLEURAL RIND Tissue Pleural, Right ANAEROBIC CULTURE AND GRAM STAIN, FUNGAL CULTURE, CULTURE, TISSUE AND GRAM STAIN, AFB CULTURE WITH STAIN Christine Mendieta MD 2021 2751        Estimated Blood Loss:   Minimal    Drains:  Chest Tube 1 Right Pleural 28 Fr  (Active)   Number of days: 0       Chest Tube 2 Right Pleural 28 Fr  (Active)   Number of days: 0       [REMOVED] Chest Tube Right Other (Comment) 12 Fr  (Removed)   Function -20 cm H2O 21   Chest Tube Air Leak No 21   Patency Intervention Tip/tilt 21   Drainage Description Serous 21   Dressing Status Clean;Dry; Intact 21   Site Assessment Unable to assess 21   Surrounding Skin Unable to view 21   Output (mL) 60 mL 21 0600   Number of days: 10       [REMOVED] External Urinary Catheter (Removed)   Collection Container Canister and suction tubing (For Female) 21 0800   Suction Pressure (mmHg) 160 mmHg 21 0800   Interventions Suction canister changed 21 0800   Output (mL) 200 mL 21 8553   Number of days: 2       [REMOVED] External Urinary Catheter (Removed)   Collection Container Canister and suction tubing (For Female) 21 0600   Suction Pressure (mmHg) 120 mmHg 21 0600   Interventions Device changed;Pericare performed;Removed and skin assessed;Suction canister changed 04/01/21 0600   Output (mL) 550 mL 04/01/21 1201   Number of days: 2       Anesthesia Type:   General    Operative Indications:  Parapneumonic effusion [J18 9, J91 8]  Empyema    Operative Findings: Thick loculated purulent empyema  Lung fully expanded after decortication    Complications:   None    Procedure and Technique:  OPERATION IN DETAIL:   The patient was correctly identified by name and medical record number in the holding area and brought to the operative suite, where he was placed supine on the operative table  After satisfactory induction of general endotracheal anesthesia, a flexible bronchoscope was passed through the endotracheal tube visualizing the distal trachea, nico, right and left main stem bronchi, including all of the primary and secondary divisions  No endobronchial tumor was identified  No suspicion or identified risk for TB or other airborne infectious disease; bronchoscopy procedure being performed for diagnostic purposes  Flexible bronchoscopy was then terminated and the scope was withdrawn  The patient was positioned in the left lateral decubitus position  A 1 5cm skin incision was then made in the 7th intercostal space in the posterior axillary line  Dissection was carried down through the subcutaneous tissue and the thoracic cavity was entered  A port was placed and the thoracoscope was introduced  Upon initial evaluation, there were extensive fibrinous and exudative adhesions  The dissection of the lung was started bluntly to release the lung from the chest wall  Once the lung was safely off the chest wall, a working port was placed in the 5th intercostal space in the anterior axillary line and one additional port posteriorly in line with the scapula tip  An intercostal nerve block of Exparel was then performed in rib spaces 3-10  The adhesions were then gently and bluntly dissected away from the wall   The rind on the lung was then fully decorticated with a combination of blunt and bovie dissection  The decortication was confirmed complete with test re-expansion of the lung  After this, the hemithorax was fully irrigated  Two 28F chest tubes (one straight and one angled - angled is the more anterior on the chest wall) were then introduced and advanced to the apex and placed along the diaphragm  The lung was then re-expanded with positive pressure and under direct visualization  The tubes were secured with #0 Prolene  The other incisions were closed with serial vicryl suture and subcuticular #4-0 Monocryl  The wound was dressed with Dermabond  The instrument and sponge count was correct throughout the case  The patient was delivered to PACU without complication  I was present for the entire procedure   There was no qualified resident and so a PA was needed for assistance with retraction, dissection, camera holding and sewing    Patient Disposition:  PACU     SIGNATURE: Cheyanne Hernandez MD  DATE: April 2, 2021  TIME: 11:09 AM

## 2021-04-02 NOTE — PROGRESS NOTES
Progress Note - Infectious Disease   Hiren Mccloud 65 y o  female MRN: [id number]  Unit/Bed#: [facility] 408-01 Encounter: [id number]      Impression/Plan:    1- Sepsis, POA:  Suspected secondary to right-sided pneumonia complicated by right-sided empyema  No other infectious focus identified based on review of system and physical exam; blood culture remains negative, strep and Legionella antigen negative, patient denying urinary symptoms, denying abdominal pain or diarrhea  Patient underwent today right-sided VATS for empyema as noted below  - antibiotics as below  - CT surgery follow-up  - repeat CBC and CMP in a m   - supportive treatment as per primary service team  - follow-up operative culture results collected today during VATS     2- Right-sided pneumonia:  Possible aspiration event leading to right-sided pneumonia and subsequent empyema   Patient denies binge drinking or daily drinking    Strep pneumoniae and Legionella antigen negative  Blood culture remains negative so far  - antibiotics as below  - close monitoring of respiratory status     3- Right-sided empyema:  Pleural fluid analyzed on 03/23/2021, at time of IR insertion of right-sided chest tube  Fluid was described as serosanguineous, pH 6 6, LDH 2300, glucose of 4, Gram stain with GPC in pairs, culture negative though specimen was collected after 2-3 days of antibiotic treatment  pleural fluid analysis is consistent with empyema  suspect secondary to problem 2    MRSA screen is negative  Patient underwent today right-sided VATS  Operative report reviewed, thick loculated purulent fluid was noted  Lung fully expanded after decortication  Operative cultures collected  - continue ceftriaxone and Flagyl  - follow-up operative culture result  - patient planned for VATS around 04/02/2021 as per review of CT surgery note  - patient will likely benefit from 3 weeks of post VATS antibiotic treatment; will likely be able to transition to p o  Antibiotic over the next 24-48 hours  - speech and swallow follow-up     4- coronary artery disease:  -continue close monitoring of hemodynamics status     5- tobacco dependence:  She reports  gradual smoking cessation; she has not smoked over the past 2 weeks prior to presentation  - advised patient to continue with smoking cessation     6- moderate aortic stenosis:  Noted on TTE from 2021  - cardiology follow-up        Plan discussed with patient and with slim provider       Subjective:  Patient has no fever, chills, sweats; no nausea, vomiting, diarrhea; no cough, shortness of breath; no pain  No new symptoms  Objective:  Vitals:  Temp:  [97 1 °F (36 2 °C)-98 7 °F (37 1 °C)] 97 4 °F (36 3 °C)  HR:  [60-91] 84  Resp:  [14-23] 14  BP: ()/(52-92) 98/55  SpO2:  [90 %-98 %] 97 %  Temp (24hrs), Av 7 °F (36 5 °C), Min:97 1 °F (36 2 °C), Max:98 7 °F (37 1 °C)  Current: Temperature: (!) 97 4 °F (36 3 °C)    Physical Exam:   General Appearance:  Alert, interactive, nontoxic, no acute distress  Throat: Oropharynx moist without lesions  Lungs:   Clear to auscultation bilaterally; no wheezes, rhonchi or rales; respirations unlabored   Heart:  RRR; no murmur, rub or gallop   Abdomen:   Soft, non-tender, non-distended, positive bowel sounds  Extremities: No clubbing, cyanosis or edema   Skin: No new rashes or lesions  No draining wounds noted         Labs, Imaging, & Other studies:   All pertinent labs and imaging studies were personally reviewed  Results from last 7 days   Lab Units 21  1207 21  0446 21  0455   WBC Thousand/uL 21 30* 13 47* 14 47*   HEMOGLOBIN g/dL 8 0* 7 6* 8 3*   PLATELETS Thousands/uL 571* 572* 624*     Results from last 7 days   Lab Units 21  0446 21  0455 21  0435   SODIUM mmol/L 139 139 139   POTASSIUM mmol/L 3 4* 4 2 3 9   CHLORIDE mmol/L 106 107 109*   CO2 mmol/L 27 28 27   BUN mg/dL 6 7 8   CREATININE mg/dL 0 61 0 66 0 60   EGFR ml/min/1 73sq m 91 89 91   CALCIUM mg/dL 7 8* 8 0* 7 8*     Results from last 7 days   Lab Units 03/27/21  1030   MRSA CULTURE ONLY  No Methicillin Resistant Stapylococcus aureus (MRSA) after 24 hours     Results from last 7 days   Lab Units 03/29/21  0502 03/28/21  0405   PROCALCITONIN ng/ml 0 32* 0 54*

## 2021-04-02 NOTE — PHYSICAL THERAPY NOTE
Physical Therapy Cancellation Note    Patient's Name: Jessi Held        04/02/21 5947   PT Last Visit   PT Visit Date 04/02/21   Note Type   Note Type Treatment   Cancel Reasons Patient to operating room       Orders received, chart reviewed  Pt currently in OR for VATS  Will follow for PT treatment as medically appropriate  Thank you       Ciara Hu, PT, DPT

## 2021-04-02 NOTE — ANESTHESIA PREPROCEDURE EVALUATION
Procedure:  THORACOSCOPY VIDEO ASSISTED SURGERY (VATS) (Right Chest)    Relevant Problems   CARDIO   (+) CAD (coronary artery disease) (Hx of LAD and RCA stents in 2010)   (+) HTN (hypertension)   (+) Peripheral vascular disease (HCC) (Hx of right common iliac, right SFA and right popliteal PTA in 2015)   (+) Pulmonary hypertension (HCC)   (+) Valvular heart disease (Aortic stenosis - RENY 1 4 cm2)      GI/HEPATIC   (+) Dysphagia   (+) GERD (gastroesophageal reflux disease)      HEMATOLOGY   (+) Anemia      NEURO/PSYCH   (+) Anxiety      PULMONARY   (+) Parapneumonic effusion   (+) Pneumonia of right lower lobe due to infectious organism      Other   (+) Severe protein-calorie malnutrition (HCC)   (+) Thrombocytosis (HCC)   (+) Tobacco abuse   Ischemic cardiomyopathy with recovered EF, no regional wall motion abnormalities    Plavix held - P2Y12 inhibitor assay without evidence of effect    Transfused one unit 3/31/21, hemoglobin 8 3 immediately beforehand  from 6 6 one day prior    Confirmed NPO appropriate    Physical Exam    Airway    Mallampati score: II         Dental   Comment: Edentulous upper,     Cardiovascular      Pulmonary      Other Findings        3/23/21 TTE:  LEFT VENTRICLE:  Systolic function was normal  Ejection fraction was estimated to be 60 %  There were no regional wall motion abnormalities  Doppler parameters were consistent with abnormal left ventricular relaxation (grade 1 diastolic dysfunction)      LEFT ATRIUM:  The atrium was mildly dilated      MITRAL VALVE:  There was mild-moderate diffuse thickening  There was mild to moderate regurgitation      AORTIC VALVE:  The valve was trileaflet  Leaflets exhibited moderately increased thickness, moderate calcification, and moderately reduced cuspal separation  There was moderate stenosis  There was trace regurgitation  Valve mean gradient was 14 mmHg  Estimated aortic valve area (by VTI) was 1 39 cmï¾²    Estimated aortic valve area (by Vmax) was 1 39 cmï¾²     TRICUSPID VALVE:  There was moderate regurgitation  Pulmonary artery systolic pressure was moderately to markedly increased  Estimated peak PA pressure was 60 mmHg  Anesthesia Plan  ASA Score- 3     Anesthesia Type- general with ASA Monitors  Additional Monitors: arterial line  Airway Plan: ETT  Plan Factors-Exercise comment: Able to climb flight of stairs without cardiopulmonary limitation, speed limited since ankle fracture  Chart reviewed  EKG reviewed  Existing labs reviewed  Patient summary reviewed  Patient is not a current smoker (Quit 2 months ago)  Patient did not smoke on day of surgery  Induction- intravenous  Postoperative Plan- Plan for postoperative opioid use  Planned trial extubation    Informed Consent- Anesthetic plan and risks discussed with patient

## 2021-04-02 NOTE — ASSESSMENT & PLAN NOTE
· Sepsis secondary to empyema  · Presented with sepsis POA tachycardia, elevated white count to 36 K with 4% bands, found to have right-sided large parapneumonic effusion  Patient says did not have any fever prior to presentation  Subsequent g stain fluid cultures revealed Gram-positive cocci in pairs  She has chest tube placed by IR on 03/23 at upper buttocks [place]  Six doses of tPA/dornase have completed on 03/27  Repeat CT scan showed mid size residual right pleural effusion slightly decreased  New ground-glass opacity in the left anterior upper lobe, of 5 centimetre left pulmonary nodule noted    · On ceftriaxone and flagyl per ID recomendtions, will likely need 3 weeks of treatment  · S/p VATS decortication  · F/u surgical cultures and pathology  · Post op chest tube management per thoracic surgery

## 2021-04-03 LAB
ABO GROUP BLD BPU: NORMAL
ABO GROUP BLD BPU: NORMAL
ANION GAP SERPL CALCULATED.3IONS-SCNC: 5 MMOL/L (ref 4–13)
BPU ID: NORMAL
BPU ID: NORMAL
BUN SERPL-MCNC: 8 MG/DL (ref 5–25)
CALCIUM SERPL-MCNC: 8.1 MG/DL (ref 8.3–10.1)
CHLORIDE SERPL-SCNC: 110 MMOL/L (ref 100–108)
CO2 SERPL-SCNC: 25 MMOL/L (ref 21–32)
CREAT SERPL-MCNC: 0.66 MG/DL (ref 0.6–1.3)
CROSSMATCH: NORMAL
CROSSMATCH: NORMAL
ERYTHROCYTE [DISTWIDTH] IN BLOOD BY AUTOMATED COUNT: 17.6 % (ref 11.6–15.1)
GFR SERPL CREATININE-BSD FRML MDRD: 89 ML/MIN/1.73SQ M
GLUCOSE SERPL-MCNC: 111 MG/DL (ref 65–140)
HCT VFR BLD AUTO: 23.5 % (ref 34.8–46.1)
HGB BLD-MCNC: 7.4 G/DL (ref 11.5–15.4)
MCH RBC QN AUTO: 29 PG (ref 26.8–34.3)
MCHC RBC AUTO-ENTMCNC: 31.5 G/DL (ref 31.4–37.4)
MCV RBC AUTO: 92 FL (ref 82–98)
PLATELET # BLD AUTO: 548 THOUSANDS/UL (ref 149–390)
PMV BLD AUTO: 10.1 FL (ref 8.9–12.7)
POTASSIUM SERPL-SCNC: 4.8 MMOL/L (ref 3.5–5.3)
RBC # BLD AUTO: 2.55 MILLION/UL (ref 3.81–5.12)
SODIUM SERPL-SCNC: 140 MMOL/L (ref 136–145)
UNIT DISPENSE STATUS: NORMAL
UNIT DISPENSE STATUS: NORMAL
UNIT PRODUCT CODE: NORMAL
UNIT PRODUCT CODE: NORMAL
UNIT RH: NORMAL
UNIT RH: NORMAL
WBC # BLD AUTO: 22.55 THOUSAND/UL (ref 4.31–10.16)

## 2021-04-03 PROCEDURE — 99233 SBSQ HOSP IP/OBS HIGH 50: CPT | Performed by: INTERNAL MEDICINE

## 2021-04-03 PROCEDURE — 80048 BASIC METABOLIC PNL TOTAL CA: CPT | Performed by: INTERNAL MEDICINE

## 2021-04-03 PROCEDURE — 99024 POSTOP FOLLOW-UP VISIT: CPT | Performed by: SURGERY

## 2021-04-03 PROCEDURE — 94664 DEMO&/EVAL PT USE INHALER: CPT

## 2021-04-03 PROCEDURE — 85027 COMPLETE CBC AUTOMATED: CPT | Performed by: INTERNAL MEDICINE

## 2021-04-03 RX ORDER — HYDROMORPHONE HCL/PF 1 MG/ML
0.5 SYRINGE (ML) INJECTION
Status: DISCONTINUED | OUTPATIENT
Start: 2021-04-03 | End: 2021-04-07

## 2021-04-03 RX ORDER — OXYCODONE HYDROCHLORIDE 10 MG/1
10 TABLET ORAL EVERY 4 HOURS PRN
Status: DISCONTINUED | OUTPATIENT
Start: 2021-04-03 | End: 2021-04-05

## 2021-04-03 RX ADMIN — ACETAMINOPHEN 975 MG: 325 TABLET, FILM COATED ORAL at 21:00

## 2021-04-03 RX ADMIN — ATORVASTATIN CALCIUM 40 MG: 40 TABLET, FILM COATED ORAL at 17:21

## 2021-04-03 RX ADMIN — ACETAMINOPHEN 975 MG: 325 TABLET, FILM COATED ORAL at 06:19

## 2021-04-03 RX ADMIN — OXYCODONE HYDROCHLORIDE 10 MG: 10 TABLET ORAL at 00:26

## 2021-04-03 RX ADMIN — ACETAMINOPHEN 975 MG: 325 TABLET, FILM COATED ORAL at 00:29

## 2021-04-03 RX ADMIN — METRONIDAZOLE 500 MG: 500 INJECTION, SOLUTION INTRAVENOUS at 03:34

## 2021-04-03 RX ADMIN — LIDOCAINE 1 PATCH: 50 PATCH TOPICAL at 08:12

## 2021-04-03 RX ADMIN — HYDROMORPHONE HYDROCHLORIDE 0.5 MG: 1 INJECTION, SOLUTION INTRAMUSCULAR; INTRAVENOUS; SUBCUTANEOUS at 10:21

## 2021-04-03 RX ADMIN — FLUOXETINE 20 MG: 20 CAPSULE ORAL at 08:11

## 2021-04-03 RX ADMIN — MELATONIN TAB 3 MG 6 MG: 3 TAB at 00:25

## 2021-04-03 RX ADMIN — FUROSEMIDE 20 MG: 20 TABLET ORAL at 08:12

## 2021-04-03 RX ADMIN — HYDROMORPHONE HYDROCHLORIDE 0.2 MG: 0.2 INJECTION, SOLUTION INTRAMUSCULAR; INTRAVENOUS; SUBCUTANEOUS at 03:31

## 2021-04-03 RX ADMIN — SODIUM CHLORIDE, SODIUM LACTATE, POTASSIUM CHLORIDE, AND CALCIUM CHLORIDE 50 ML/HR: .6; .31; .03; .02 INJECTION, SOLUTION INTRAVENOUS at 10:43

## 2021-04-03 RX ADMIN — ENOXAPARIN SODIUM 40 MG: 40 INJECTION SUBCUTANEOUS at 08:12

## 2021-04-03 RX ADMIN — TRAZODONE HYDROCHLORIDE 200 MG: 100 TABLET ORAL at 00:25

## 2021-04-03 RX ADMIN — GUAIFENESIN AND DEXTROMETHORPHAN 10 ML: 100; 10 SYRUP ORAL at 17:28

## 2021-04-03 RX ADMIN — OXYCODONE HYDROCHLORIDE 15 MG: 10 TABLET ORAL at 17:21

## 2021-04-03 RX ADMIN — METRONIDAZOLE 500 MG: 500 INJECTION, SOLUTION INTRAVENOUS at 18:30

## 2021-04-03 RX ADMIN — PANTOPRAZOLE SODIUM 20 MG: 20 TABLET, DELAYED RELEASE ORAL at 06:18

## 2021-04-03 RX ADMIN — BUPROPION HYDROCHLORIDE 150 MG: 150 TABLET, FILM COATED, EXTENDED RELEASE ORAL at 08:11

## 2021-04-03 RX ADMIN — HYDROMORPHONE HYDROCHLORIDE 0.5 MG: 1 INJECTION, SOLUTION INTRAMUSCULAR; INTRAVENOUS; SUBCUTANEOUS at 15:53

## 2021-04-03 RX ADMIN — HYDROMORPHONE HYDROCHLORIDE 0.2 MG: 0.2 INJECTION, SOLUTION INTRAMUSCULAR; INTRAVENOUS; SUBCUTANEOUS at 10:00

## 2021-04-03 RX ADMIN — ACETAMINOPHEN 975 MG: 325 TABLET, FILM COATED ORAL at 13:45

## 2021-04-03 RX ADMIN — TRAZODONE HYDROCHLORIDE 200 MG: 100 TABLET ORAL at 21:00

## 2021-04-03 RX ADMIN — ALPRAZOLAM 0.25 MG: 0.25 TABLET ORAL at 06:30

## 2021-04-03 RX ADMIN — OXYCODONE HYDROCHLORIDE 15 MG: 10 TABLET ORAL at 12:17

## 2021-04-03 RX ADMIN — MELATONIN TAB 3 MG 6 MG: 3 TAB at 21:00

## 2021-04-03 RX ADMIN — CEFTRIAXONE SODIUM 2000 MG: 10 INJECTION, POWDER, FOR SOLUTION INTRAVENOUS at 17:21

## 2021-04-03 RX ADMIN — METRONIDAZOLE 500 MG: 500 INJECTION, SOLUTION INTRAVENOUS at 10:44

## 2021-04-03 RX ADMIN — OXYCODONE HYDROCHLORIDE 10 MG: 10 TABLET ORAL at 08:12

## 2021-04-03 RX ADMIN — Medication 1 TABLET: at 08:11

## 2021-04-03 NOTE — PROGRESS NOTES
Progress Note - Thoracic Surgery   Halina Beal 65 y o  female MRN: [id number]  Unit/Bed#: [facility] 408-01 Encounter: [id number]    Assessment:  S/p pigtail placement in right chest by IR on 3/23 and s/p Right VATS decortication 4/2  P2Y12 platelet inhibitor assay negative for effect of P2Y12 platelet inhibitor     R CT to -20 cm, + AL, 350 cc serosanguineous drainage    Plan:  Diet as toelrated   R CT to suction at -20 on pleurevac   Continue antibiotics  Respiratory therapy and chest PT  DVT prophylaxis  Pain control  Incentive spirometry    Subjective/Objective     Subjective:   Complains pain around CT  Denies nausea or vomting    Objective:    Blood pressure 102/53, pulse 80, temperature 98 2 °F (36 8 °C), temperature source Oral, resp  rate 17, height 5' 6" (1 676 m), weight 66 3 kg (146 lb 2 6 oz), SpO2 94 %, not currently breastfeeding  ,Body mass index is 23 59 kg/m²        Intake/Output Summary (Last 24 hours) at 4/3/2021 0137  Last data filed at 4/3/2021 9855  Gross per 24 hour   Intake 3400 ml   Output 815 ml   Net 2585 ml       Invasive Devices     Central Venous Catheter Line            CVC Central Lines 03/24/21 Triple Left Internal jugular 9 days          Peripheral Intravenous Line            Peripheral IV 04/02/21 Left Hand less than 1 day          Drain            Chest Tube 1 Right Pleural 28 Fr  less than 1 day    Chest Tube 2 Right Pleural 28 Fr  less than 1 day                Physical Exam:   Gen:  NAD  CV:  warm, well-perfused  Lungs: nl effort, R CT x2 in place, -20, 350ml ss, no AL  Abd:  soft, NT/ND  Ext:  no CCE  Neuro: A&Ox3     Results from last 7 days   Lab Units 04/02/21  1207 04/02/21  1004 04/02/21  0446 04/01/21  0455   WBC Thousand/uL 21 30*  --  13 47* 14 47*   HEMOGLOBIN g/dL 8 0*  --  7 6* 8 3*   I STAT HEMOGLOBIN g/dl  --  8 5*  --   --    HEMATOCRIT % 26 0*  --  24 0* 26 2*   HEMATOCRIT, ISTAT %  --  25*  --   --    PLATELETS Thousands/uL 571*  --  572* 624*     Results from last 7 days   Lab Units 04/02/21  1004 04/02/21  0446 04/01/21  0455 03/31/21  0435   POTASSIUM mmol/L  --  3 4* 4 2 3 9   CHLORIDE mmol/L  --  106 107 109*   CO2 mmol/L  --  27 28 27   CO2, I-STAT mmol/L 28  --   --   --    BUN mg/dL  --  6 7 8   CREATININE mg/dL  --  0 61 0 66 0 60   GLUCOSE, ISTAT mg/dl 122  --   --   --    CALCIUM mg/dL  --  7 8* 8 0* 7 8*     Results from last 7 days   Lab Units 04/02/21  0446 03/31/21  0435   INR  1 45* 1 31*   PTT seconds  --  34

## 2021-04-03 NOTE — PROGRESS NOTES
Pt had been sleeping soundly since she was given dilaudid around 2015  Awaken in frantic and yelling how excruciating her incisional pain under R breast  Managed to calm pt down and gave her pain meds and sleep pills that was overdue  After 15mins, she finally calmed down and was apologetic for being rude  Put on bed alarm and will monitor closely

## 2021-04-03 NOTE — PROGRESS NOTES
[facility]  Progress Note - Halina Beal 1950, 65 y o  female MRN: [id number]  Unit/Bed#: [facility] 408-01 Encounter: [id number]  Primary Care Provider: Phu Urban MD   Date and time admitted to hospital: 3/29/2021  2:37 PM    * Sepsis secondary to parapneumonic effusion/pna  Assessment & Plan  · Sepsis secondary to empyema  · Presented with sepsis POA tachycardia, elevated white count to 36 K with 4% bands, found to have right-sided large parapneumonic effusion  Patient says did not have any fever prior to presentation  Subsequent g stain fluid cultures revealed Gram-positive cocci in pairs  She has chest tube placed by IR on 03/23 at [facility] [place]  Six doses of tPA/dornase have completed on 03/27  Repeat CT scan showed mid size residual right pleural effusion slightly decreased  New ground-glass opacity in the left anterior upper lobe, of 5 centimetre left pulmonary nodule noted  · On ceftriaxone and flagyl per ID recomendtions, will likely need 3 weeks of treatment  · S/p VATS decortication  · F/u surgical cultures  · Post op chest tube management per thoracic surgery  · Reports her pain is uncontrolled, will uptitrate oxycodone and breakthrough dilaudid today        Anemia  Assessment & Plan  · Chronic normocytic normochromic anemia  · Iron panel suggested chronic anemia of inflammation  · S/p 1 unit of RBCs  · Continue to monitor hb post op    Delirium  Assessment & Plan  · Delirium has resolved  · Probably related alcoholism, although pt denies this, she received 16 mg of Ativan based on the CIWA protocol earlier this admission    · Family denies any heavy alcohol use, maybe she takes a glass of wine occasionally so this unlikely active alcohol use more like severe delirium in the setting of sepsis  · CT head was negative for intracranial anomaly  · Now back to baseline mental status  · Delirium has resolved      Dysphagia  Assessment & Plan  · Resolved, placed back on regular diet    HTN (hypertension)  Assessment & Plan  · Bps are acceptable  · Continue lasix po    Pulmonary nodule  Assessment & Plan  5 centimeter nodule noted   Pulmonary follow up outpatient    Thrombocytosis (HCC)  Assessment & Plan  · Thrombocytosis  · Likely reactive from her infection  · Will continue to monitor    Severe protein-calorie malnutrition (HCC)  Assessment & Plan  Malnutrition Findings:        Severe protein calorie malnutrition  Adult malnutrition type:  Acute illness  Adult degree of malnutrition:  Other severe protein calorie malnutrition severe protein calorie malnutrition as evidenced by [place] orbits, temp temple following, clavicle protrusion, with prominent bone and low albumin levels 1 6 greater than 1 8, and associated with coagulopathy with high INR 8 on admission in the setting of right lower lobe pneumonia with complicated right parapneumonic effusion  Also has a pulmonary nodule needs to be worked up  BMI finding  Body mass index is 26 19  Nutrition is following      BMI Findings:                VTE Pharmacologic Prophylaxis:   Pharmacologic: Heparin  Mechanical VTE Prophylaxis in Place: Yes    Patient Centered Rounds: I have performed bedside rounds with nursing staff today  Education and Discussions with Family / Patient: patient, plan of care    Time Spent for Care: 20 minutes  More than 50% of total time spent on counseling and coordination of care as described above  Current Length of Stay: 5 day(s)    Current Patient Status: Inpatient   Certification Statement: The patient will continue to require additional inpatient hospital stay due to post op care, chest tubes    Discharge Plan: TBD    Code Status: Level 1 - Full Code      Subjective:   Reports that her operative site pain is uncontrolled, can't focus on eating or urinating      Objective:     Vitals:   Temp (24hrs), Av 9 °F (36 6 °C), Min:97 4 °F (36 3 °C), Max:98 2 °F (36 8 °C)    Temp:  [97 4 °F (36 3 °C)-98 2 °F (36 8 °C)] 97 9 °F (36 6 °C)  HR:  [80-88] 88  Resp:  [14-18] 18  BP: ()/(52-58) 104/58  SpO2:  [92 %-97 %] 94 %  Body mass index is 23 59 kg/m²  Input and Output Summary (last 24 hours): Intake/Output Summary (Last 24 hours) at 4/3/2021 1244  Last data filed at 4/3/2021 1218  Gross per 24 hour   Intake 1435 ml   Output 1215 ml   Net 220 ml       Physical Exam:     Physical Exam  Constitutional:       General: She is in acute distress  Appearance: She is not toxic-appearing  HENT:      Head: Normocephalic and atraumatic  Mouth/Throat:      Mouth: Mucous membranes are moist       Pharynx: Oropharynx is clear  Eyes:      Extraocular Movements: Extraocular movements intact  Cardiovascular:      Rate and Rhythm: Normal rate and regular rhythm  Pulmonary:      Effort: Pulmonary effort is normal       Comments: Chest tube on right  Neurological:      Mental Status: She is alert and oriented to person, place, and time  Mental status is at baseline  Psychiatric:         Mood and Affect: Mood normal          Behavior: Behavior normal            Additional Data:     Labs:    Results from last 7 days   Lab Units 04/03/21  0618   WBC Thousand/uL 22 55*   HEMOGLOBIN g/dL 7 4*   HEMATOCRIT % 23 5*   PLATELETS Thousands/uL 548*     Results from last 7 days   Lab Units 04/03/21  0618   SODIUM mmol/L 140   POTASSIUM mmol/L 4 8   CHLORIDE mmol/L 110*   CO2 mmol/L 25   BUN mg/dL 8   CREATININE mg/dL 0 66   ANION GAP mmol/L 5   CALCIUM mg/dL 8 1*   GLUCOSE RANDOM mg/dL 111     Results from last 7 days   Lab Units 04/02/21  0446   INR  1 45*             Results from last 7 days   Lab Units 03/29/21  0502 03/28/21  0405   PROCALCITONIN ng/ml 0 32* 0 54*           * I Have Reviewed All Lab Data Listed Above  * Additional Pertinent Lab Tests Reviewed:  All Labs Within Last 24 Hours Reviewed      Recent Cultures (last 7 days):     Results from last 7 days Lab Units 04/02/21  0958   GRAM STAIN RESULT  Rare Polys  No bacteria seen       Last 24 Hours Medication List:   Current Facility-Administered Medications   Medication Dose Route Frequency Provider Last Rate    acetaminophen  975 mg Oral [facility] Frances Wallace PA-C      ALPRAZolam  0 25 mg Oral Q6H PRN KITA De La Rosa-CELIA      atorvastatin  40 mg Oral Daily With Cele Merna Menchaca PA-C      buPROPion  150 mg Oral Daily Bia Penn, [place]      cefTRIAXone  2,000 mg Intravenous Q24H Varun KITA Cruz-C 2,000 mg (04/02/21 1725)    dextromethorphan-guaiFENesin  10 mL Oral Q4H PRN KITA De La Rosa-CELIA      enoxaparin  40 mg Subcutaneous Daily Frances Wallace PA-C      FLUoxetine  20 mg Oral Daily KITA Marx-CELIA      furosemide  20 mg Oral Daily Bia Penn [place]      HYDROmorphone  0 5 mg Intravenous Q3H PRN Karen Davis MD      lactated ringers  50 mL/hr Intravenous Continuous KEISHA De La RosaC 50 mL/hr (04/03/21 1043)    lidocaine  1 patch Topical Daily Frances Wallace PA-C      melatonin  6 mg Oral HS Bia Penn PA-C      metroNIDAZOLE  500 mg Intravenous Q8H KITA Adams-C 500 mg (04/03/21 1044)    multivitamin-minerals  1 tablet Oral Daily Frances Wallace PA-C      oxyCODONE  10 mg Oral Q4H PRN Karen Davis MD      oxyCODONE  15 mg Oral Q4H PRN Karen Davis MD      pantoprazole  20 mg Oral Early Morning Bia Penn PA-C      traZODone  200 mg Oral HS Bia Penn PA-C          Today, Patient Was Seen By: Anjana Vazquez MD    ** Please Note: Dictation voice to text software may have been used in the creation of this document   **

## 2021-04-03 NOTE — PROGRESS NOTES
Attempted to explain to patient the importance of walking, staying upright and getting out of bed  She adamantly refused and said she was in too much pain  She stated that she would get out of bed when her pain was better  Contacted Dr Shiraz Garcia and pain medicine regimen adjusted  In late afternoon patient's pain seemed to be better controlled  Nurse reapproached patient about getting out of better  Patient again adamantly refused and said she would tomorrow  Will continue to monitor

## 2021-04-03 NOTE — ASSESSMENT & PLAN NOTE
· Sepsis secondary to empyema  · Presented with sepsis POA tachycardia, elevated white count to 36 K with 4% bands, found to have right-sided large parapneumonic effusion  Patient says did not have any fever prior to presentation  Subsequent g stain fluid cultures revealed Gram-positive cocci in pairs  She has chest tube placed by IR on 03/23 at [facility] [place]  Six doses of tPA/dornase have completed on 03/27  Repeat CT scan showed mid size residual right pleural effusion slightly decreased  New ground-glass opacity in the left anterior upper lobe, of 5 centimetre left pulmonary nodule noted    · On ceftriaxone and flagyl per ID recomendtions, will likely need 3 weeks of treatment  · S/p VATS decortication  · F/u surgical cultures  · Post op chest tube management per thoracic surgery  · Reports her pain is uncontrolled, will uptitrate oxycodone and breakthrough dilaudid today

## 2021-04-03 NOTE — ASSESSMENT & PLAN NOTE
Malnutrition Findings:        Severe protein calorie malnutrition  Adult malnutrition type:  Acute illness  Adult degree of malnutrition:  Other severe protein calorie malnutrition severe protein calorie malnutrition as evidenced by [place] orbits, temp temple following, clavicle protrusion, with prominent bone and low albumin levels 1 6 greater than 1 8, and associated with coagulopathy with high INR 8 on admission in the setting of right lower lobe pneumonia with complicated right parapneumonic effusion  Also has a pulmonary nodule needs to be worked up       BMI finding  Body mass index is 26 19  Nutrition is following      BMI Findings: no

## 2021-04-04 PROBLEM — R33.9 URINARY RETENTION: Status: ACTIVE | Noted: 2021-04-04

## 2021-04-04 LAB
ANION GAP SERPL CALCULATED.3IONS-SCNC: 5 MMOL/L (ref 4–13)
BASOPHILS # BLD AUTO: 0.08 THOUSANDS/ΜL (ref 0–0.1)
BASOPHILS NFR BLD AUTO: 0 % (ref 0–1)
BUN SERPL-MCNC: 9 MG/DL (ref 5–25)
CALCIUM SERPL-MCNC: 7.9 MG/DL (ref 8.3–10.1)
CHLORIDE SERPL-SCNC: 106 MMOL/L (ref 100–108)
CO2 SERPL-SCNC: 25 MMOL/L (ref 21–32)
CREAT SERPL-MCNC: 0.67 MG/DL (ref 0.6–1.3)
EOSINOPHIL # BLD AUTO: 0.08 THOUSAND/ΜL (ref 0–0.61)
EOSINOPHIL NFR BLD AUTO: 0 % (ref 0–6)
ERYTHROCYTE [DISTWIDTH] IN BLOOD BY AUTOMATED COUNT: 17.2 % (ref 11.6–15.1)
GFR SERPL CREATININE-BSD FRML MDRD: 88 ML/MIN/1.73SQ M
GLUCOSE SERPL-MCNC: 85 MG/DL (ref 65–140)
HCT VFR BLD AUTO: 24.4 % (ref 34.8–46.1)
HGB BLD-MCNC: 7.6 G/DL (ref 11.5–15.4)
IMM GRANULOCYTES # BLD AUTO: 0.36 THOUSAND/UL (ref 0–0.2)
IMM GRANULOCYTES NFR BLD AUTO: 1 % (ref 0–2)
LYMPHOCYTES # BLD AUTO: 1.78 THOUSANDS/ΜL (ref 0.6–4.47)
LYMPHOCYTES NFR BLD AUTO: 7 % (ref 14–44)
MAGNESIUM SERPL-MCNC: 1.9 MG/DL (ref 1.6–2.6)
MCH RBC QN AUTO: 28.7 PG (ref 26.8–34.3)
MCHC RBC AUTO-ENTMCNC: 31.1 G/DL (ref 31.4–37.4)
MCV RBC AUTO: 92 FL (ref 82–98)
MONOCYTES # BLD AUTO: 1.79 THOUSAND/ΜL (ref 0.17–1.22)
MONOCYTES NFR BLD AUTO: 7 % (ref 4–12)
NEUTROPHILS # BLD AUTO: 22.38 THOUSANDS/ΜL (ref 1.85–7.62)
NEUTS SEG NFR BLD AUTO: 85 % (ref 43–75)
NRBC BLD AUTO-RTO: 0 /100 WBCS
PHOSPHATE SERPL-MCNC: 2.8 MG/DL (ref 2.3–4.1)
PLATELET # BLD AUTO: 561 THOUSANDS/UL (ref 149–390)
PMV BLD AUTO: 10.5 FL (ref 8.9–12.7)
POTASSIUM SERPL-SCNC: 4.6 MMOL/L (ref 3.5–5.3)
RBC # BLD AUTO: 2.65 MILLION/UL (ref 3.81–5.12)
SODIUM SERPL-SCNC: 136 MMOL/L (ref 136–145)
WBC # BLD AUTO: 26.47 THOUSAND/UL (ref 4.31–10.16)

## 2021-04-04 PROCEDURE — 99233 SBSQ HOSP IP/OBS HIGH 50: CPT | Performed by: INTERNAL MEDICINE

## 2021-04-04 PROCEDURE — 99024 POSTOP FOLLOW-UP VISIT: CPT | Performed by: SURGERY

## 2021-04-04 PROCEDURE — 84100 ASSAY OF PHOSPHORUS: CPT | Performed by: INTERNAL MEDICINE

## 2021-04-04 PROCEDURE — 80048 BASIC METABOLIC PNL TOTAL CA: CPT | Performed by: INTERNAL MEDICINE

## 2021-04-04 PROCEDURE — 83735 ASSAY OF MAGNESIUM: CPT | Performed by: INTERNAL MEDICINE

## 2021-04-04 PROCEDURE — 85025 COMPLETE CBC W/AUTO DIFF WBC: CPT | Performed by: INTERNAL MEDICINE

## 2021-04-04 RX ORDER — FLUCONAZOLE 200 MG/1
200 TABLET ORAL ONCE
Status: COMPLETED | OUTPATIENT
Start: 2021-04-04 | End: 2021-04-04

## 2021-04-04 RX ORDER — BENZONATATE 100 MG/1
200 CAPSULE ORAL
Status: DISCONTINUED | OUTPATIENT
Start: 2021-04-04 | End: 2021-04-13 | Stop reason: HOSPADM

## 2021-04-04 RX ADMIN — ACETAMINOPHEN 975 MG: 325 TABLET, FILM COATED ORAL at 13:41

## 2021-04-04 RX ADMIN — MELATONIN TAB 3 MG 6 MG: 3 TAB at 21:40

## 2021-04-04 RX ADMIN — BUPROPION HYDROCHLORIDE 150 MG: 150 TABLET, FILM COATED, EXTENDED RELEASE ORAL at 08:42

## 2021-04-04 RX ADMIN — OXYCODONE HYDROCHLORIDE 15 MG: 10 TABLET ORAL at 00:48

## 2021-04-04 RX ADMIN — ATORVASTATIN CALCIUM 40 MG: 40 TABLET, FILM COATED ORAL at 17:04

## 2021-04-04 RX ADMIN — Medication 1 TABLET: at 08:41

## 2021-04-04 RX ADMIN — METRONIDAZOLE 500 MG: 500 INJECTION, SOLUTION INTRAVENOUS at 04:36

## 2021-04-04 RX ADMIN — OXYCODONE HYDROCHLORIDE 15 MG: 10 TABLET ORAL at 07:28

## 2021-04-04 RX ADMIN — FUROSEMIDE 20 MG: 20 TABLET ORAL at 08:41

## 2021-04-04 RX ADMIN — FLUOXETINE 20 MG: 20 CAPSULE ORAL at 08:41

## 2021-04-04 RX ADMIN — ACETAMINOPHEN 975 MG: 325 TABLET, FILM COATED ORAL at 05:04

## 2021-04-04 RX ADMIN — LIDOCAINE 1 PATCH: 50 PATCH TOPICAL at 08:41

## 2021-04-04 RX ADMIN — BENZONATATE 200 MG: 100 CAPSULE ORAL at 21:40

## 2021-04-04 RX ADMIN — GUAIFENESIN AND DEXTROMETHORPHAN 10 ML: 100; 10 SYRUP ORAL at 08:41

## 2021-04-04 RX ADMIN — PANTOPRAZOLE SODIUM 20 MG: 20 TABLET, DELAYED RELEASE ORAL at 05:04

## 2021-04-04 RX ADMIN — ENOXAPARIN SODIUM 40 MG: 40 INJECTION SUBCUTANEOUS at 08:42

## 2021-04-04 RX ADMIN — HYDROMORPHONE HYDROCHLORIDE 0.5 MG: 1 INJECTION, SOLUTION INTRAMUSCULAR; INTRAVENOUS; SUBCUTANEOUS at 10:24

## 2021-04-04 RX ADMIN — CEFTRIAXONE SODIUM 2000 MG: 10 INJECTION, POWDER, FOR SOLUTION INTRAVENOUS at 17:05

## 2021-04-04 RX ADMIN — OXYCODONE HYDROCHLORIDE 15 MG: 10 TABLET ORAL at 12:38

## 2021-04-04 RX ADMIN — GUAIFENESIN AND DEXTROMETHORPHAN 10 ML: 100; 10 SYRUP ORAL at 00:52

## 2021-04-04 RX ADMIN — OXYCODONE HYDROCHLORIDE 15 MG: 10 TABLET ORAL at 18:08

## 2021-04-04 RX ADMIN — METRONIDAZOLE 500 MG: 500 INJECTION, SOLUTION INTRAVENOUS at 10:54

## 2021-04-04 RX ADMIN — METRONIDAZOLE 500 MG: 500 INJECTION, SOLUTION INTRAVENOUS at 18:19

## 2021-04-04 RX ADMIN — GUAIFENESIN AND DEXTROMETHORPHAN 10 ML: 100; 10 SYRUP ORAL at 18:08

## 2021-04-04 RX ADMIN — HYDROMORPHONE HYDROCHLORIDE 0.5 MG: 1 INJECTION, SOLUTION INTRAMUSCULAR; INTRAVENOUS; SUBCUTANEOUS at 04:43

## 2021-04-04 RX ADMIN — FLUCONAZOLE 200 MG: 200 TABLET ORAL at 13:41

## 2021-04-04 RX ADMIN — SODIUM CHLORIDE 500 ML: 0.9 INJECTION, SOLUTION INTRAVENOUS at 10:21

## 2021-04-04 RX ADMIN — ACETAMINOPHEN 975 MG: 325 TABLET, FILM COATED ORAL at 21:38

## 2021-04-04 RX ADMIN — TRAZODONE HYDROCHLORIDE 200 MG: 100 TABLET ORAL at 21:40

## 2021-04-04 RX ADMIN — HYDROMORPHONE HYDROCHLORIDE 0.5 MG: 1 INJECTION, SOLUTION INTRAMUSCULAR; INTRAVENOUS; SUBCUTANEOUS at 15:57

## 2021-04-04 NOTE — PROGRESS NOTES
Progress Note - Thoracic Surgery   Nate Carey 65 y o  female MRN: [id number]  Unit/Bed#: [facility] 408-01 Encounter: [id number]    Assessment:  Nate Carey is a 65 y o  female s/p pigtail placement in right chest by IR on 3/23 and s/p Right VATS decortication 4/2  P2Y12 platelet inhibitor assay negative for effect of P2Y12 platelet inhibitor     R CT x2, y-connected to -20 sxn, -AL, 210 cc light serosanguineous drainage    Plan:  Diet as tolerated   R CT to suction at -20 on pleurevac   Continue antibiotics  Respiratory therapy and chest PT  DVT prophylaxis  Pain control  Incentive spirometry    Subjective/Objective     Subjective:   No acute events overnight  Complaining of pain around the chest tube, improved with her current pain regimen  Objective:    Blood pressure 109/51, pulse 95, temperature 98 7 °F (37 1 °C), temperature source Oral, resp  rate 20, height 5' 6" (1 676 m), weight 66 3 kg (146 lb 2 6 oz), SpO2 91 %, not currently breastfeeding  ,Body mass index is 23 59 kg/m²  Intake/Output Summary (Last 24 hours) at 4/4/2021 0622  Last data filed at 4/3/2021 2205  Gross per 24 hour   Intake 1445 ml   Output 1020 ml   Net 425 ml       Invasive Devices     Central Venous Catheter Line            CVC Central Lines 03/24/21 Triple Left Internal jugular 10 days          Peripheral Intravenous Line            Peripheral IV 04/02/21 Left Hand 1 day          Drain            Chest Tube 1 Right Pleural 28 Fr  1 day    Chest Tube 2 Right Pleural 28 Fr  1 day                Physical Exam:   GEN: NAD  HEENT: MMM, 3 L nasal cannula  CV:   Warm/well perfused  Lung: normal effort  Right chest tubes x2 Y connected, suction, no air leak  Light serosanguineous drainage  Ab: Soft, NT/ND  Extrem: No CCE  Neuro:  A+Ox3, motor and sensation grossly intact    Results from last 7 days   Lab Units 04/04/21  0449 04/03/21  0618 04/02/21  1207   WBC Thousand/uL 26 47* 22 55* 21 30*   HEMOGLOBIN g/dL 7 6* 7 4* 8 0* HEMATOCRIT % 24 4* 23 5* 26 0*   PLATELETS Thousands/uL 561* 548* 571*     Results from last 7 days   Lab Units 04/04/21  0449 04/03/21  0618 04/02/21  1004 04/02/21 0446   POTASSIUM mmol/L 4 6 4 8  --  3 4*   CHLORIDE mmol/L 106 110*  --  106   CO2 mmol/L 25 25  --  27   CO2, I-STAT mmol/L  --   --  28  --    BUN mg/dL 9 8  --  6   CREATININE mg/dL 0 67 0 66  --  0 61   GLUCOSE, ISTAT mg/dl  --   --  122  --    CALCIUM mg/dL 7 9* 8 1*  --  7 8*     Results from last 7 days   Lab Units 04/02/21 0446 03/31/21  0435   INR  1 45* 1 31*   PTT seconds  --  34

## 2021-04-04 NOTE — ASSESSMENT & PLAN NOTE
· Likely due to post op pain, opiate analgesics, functional due to chest tubes  · Woody catheter placed  · Removal protocol

## 2021-04-04 NOTE — PROGRESS NOTES
[facility]  Progress Note - Deon Hansen 1950, 65 y o  female MRN: [id number]  Unit/Bed#: [facility] 408-01 Encounter: [id number]  Primary Care Provider: Leda Villanueva MD   Date and time admitted to hospital: 3/29/2021  2:37 PM    * Sepsis due to Empyema  Assessment & Plan  · Presented with sepsis POA tachycardia, elevated white count to 36 K with 4% bands, found to have right-sided large parapneumonic effusion  Subsequent g stain fluid cultures revealed Gram-positive cocci in pairs  She has chest tube placed by IR on 03/23 at [facility]   Six doses of tPA/dornase have completed by 03/27  Repeat CT scan showed mid size residual right pleural effusion slightly decreased  New ground-glass opacity in the left anterior upper lobe, of 5 centimetre left pulmonary nodule noted  · On ceftriaxone and flagyl per ID recomendtions, will likely need 3 weeks of treatment  · S/p VATS decortication on 4/2  · F/u surgical cultures  · Post op chest tube management per thoracic surgery  · Better pain control today  · Add tessalon at HS to assist with sleep, avoid potent cough suppressants during the day      Anemia  Assessment & Plan  · Chronic normocytic normochromic anemia  · Iron panel suggested chronic anemia of inflammation  · S/p 1 unit of RBCs  · Continue to monitor hb post op    Delirium  Assessment & Plan  · Delirium has resolved  · Probably related alcoholism, although pt denies this, she received 16 mg of Ativan based on the CIWA protocol earlier this admission    · Family denies any heavy alcohol use, maybe she takes a glass of wine occasionally so this unlikely active alcohol use more like severe delirium in the setting of sepsis  · CT head was negative for intracranial anomaly  · Now back to baseline mental status  · Delirium has resolved      Dysphagia  Assessment & Plan  · Resolved, placed back on regular diet    HTN (hypertension)  Assessment & Plan  · Bps are acceptable  · Continue lasix po    Urinary retention  Assessment & Plan  · Likely due to post op pain, opiate analgesics, functional due to chest tubes  · Woody catheter placed  · Removal protocol    Pulmonary nodule  Assessment & Plan  · 5 centimeter nodule noted   · Pulmonary follow up outpatient    Thrombocytosis (HCC)  Assessment & Plan  · Thrombocytosis  · Likely reactive from her infection  · Will continue to monitor    Severe protein-calorie malnutrition ([address] )  Assessment & Plan  Malnutrition Findings:        Severe protein calorie malnutrition  Adult malnutrition type:  Acute illness  Adult degree of malnutrition:  Other severe protein calorie malnutrition severe protein calorie malnutrition as evidenced by [place] orbits, temp temple following, clavicle protrusion, with prominent bone and low albumin levels 1 6 greater than 1 8, and associated with coagulopathy with high INR 8 on admission in the setting of right lower lobe pneumonia with complicated right parapneumonic effusion  Also has a pulmonary nodule needs to be worked up  BMI finding  Body mass index is 26 19  Nutrition is following      Add ensure clear         Anxiety  Assessment & Plan  Continue with Xanax and trazodone    CAD (coronary artery disease)  Assessment & Plan  Coronary artery disease  Hold Plavix for VATS procedure  Continue Lipitor        VTE Pharmacologic Prophylaxis:   Pharmacologic: Heparin  Mechanical VTE Prophylaxis in Place: Yes    Patient Centered Rounds: I have performed bedside rounds with nursing staff today  Education and Discussions with Family / Patient: patient, plan of care    Time Spent for Care: 20 minutes  More than 50% of total time spent on counseling and coordination of care as described above      Current Length of Stay: 6 day(s)    Current Patient Status: Inpatient   Certification Statement: The patient will continue to require additional inpatient hospital stay due to chest tubes    Discharge Plan: SNF vs home    Code Status: Level 1 - Full Code      Subjective:   Reports that pain is better controlled but still present  Appetite is poor  UO is poor, constipation present  Objective:     Vitals:   Temp (24hrs), Av 2 °F (36 8 °C), Min:98 °F (36 7 °C), Max:98 7 °F (37 1 °C)    Temp:  [98 °F (36 7 °C)-98 7 °F (37 1 °C)] 98 °F (36 7 °C)  HR:  [89-95] 89  Resp:  [17-20] 19  BP: (101-109)/(50-55) 101/50  SpO2:  [91 %-96 %] 94 %  Body mass index is 24 37 kg/m²  Input and Output Summary (last 24 hours): Intake/Output Summary (Last 24 hours) at 2021 1330  Last data filed at 2021 1220  Gross per 24 hour   Intake 1370 ml   Output 1170 ml   Net 200 ml       Physical Exam:     Physical Exam  Constitutional:       General: She is in acute distress  Appearance: Normal appearance  She is not toxic-appearing  HENT:      Head: Normocephalic and atraumatic  Nose: Nose normal       Mouth/Throat:      Mouth: Mucous membranes are moist       Pharynx: Oropharynx is clear  Eyes:      Extraocular Movements: Extraocular movements intact  Cardiovascular:      Rate and Rhythm: Normal rate and regular rhythm  Pulmonary:      Breath sounds: Rales present  Comments: Chest tube  Skin:     General: Skin is warm and dry  Neurological:      General: No focal deficit present  Mental Status: She is alert and oriented to person, place, and time     Psychiatric:         Mood and Affect: Mood normal          Behavior: Behavior normal            Additional Data:     Labs:    Results from last 7 days   Lab Units 21  0449   WBC Thousand/uL 26 47*   HEMOGLOBIN g/dL 7 6*   HEMATOCRIT % 24 4*   PLATELETS Thousands/uL 561*   NEUTROS PCT % 85*   LYMPHS PCT % 7*   MONOS PCT % 7   EOS PCT % 0     Results from last 7 days   Lab Units 21  0449   SODIUM mmol/L 136   POTASSIUM mmol/L 4 6   CHLORIDE mmol/L 106   CO2 mmol/L 25   BUN mg/dL 9   CREATININE mg/dL 0 67   ANION GAP mmol/L 5   CALCIUM mg/dL 7 9* GLUCOSE RANDOM mg/dL 85     Results from last 7 days   Lab Units 04/02/21  0446   INR  1 45*             Results from last 7 days   Lab Units 03/29/21  0502   PROCALCITONIN ng/ml 0 32*           * I Have Reviewed All Lab Data Listed Above  * Additional Pertinent Lab Tests Reviewed:  All Labs Within Last 24 Hours Reviewed      Recent Cultures (last 7 days):     Results from last 7 days   Lab Units 04/02/21  0958   GRAM STAIN RESULT  Rare Polys  No bacteria seen       Last 24 Hours Medication List:   Current Facility-Administered Medications   Medication Dose Route Frequency Provider Last Rate    acetaminophen  975 mg Oral [facility] Frances Wallace PA-C      ALPRAZolam  0 25 mg Oral Q6H PRN Marilee Bañuelos PA-C      atorvastatin  40 mg Oral Daily With Juan Jose Quinones PA-C      benzonatate  200 mg Oral HS Charles Koo MD      buPROPion  150 mg Oral Daily Frances Wallace PA-C      cefTRIAXone  2,000 mg Intravenous Q24H KITA Hurd-C 2,000 mg (04/03/21 1721)    dextromethorphan-guaiFENesin  10 mL Oral Q4H PRN Álvaro Wallace PA-C      enoxaparin  40 mg Subcutaneous Daily Frances Wallace PA-C      fluconazole  200 mg Oral Once Charles Koo MD      FLUoxetine  20 mg Oral Daily Frances Wallace PA-C      furosemide  20 mg Oral Daily Korin Marx      HYDROmorphone  0 5 mg Intravenous Q3H PRN Charles Koo MD      lidocaine  1 patch Topical Daily Marilee Bañuelos PA-C      melatonin  6 mg Oral HS Marilee Bañuelos PA-C      metroNIDAZOLE  500 mg Intravenous [facility] KEISHA WallaceC 500 mg (04/04/21 1054)    multivitamin-minerals  1 tablet Oral Daily Frances Wallace PA-C      oxyCODONE  10 mg Oral Q4H PRN Charles Koo MD      oxyCODONE  15 mg Oral Q4H PRN Charles Koo MD      pantoprazole  20 mg Oral Early Morning Marilee Bañuelos PA-C      traZODone  200 mg Oral HS Marilee Bañuelos PA-C          Today, Patient Was Seen By: Ashlyn Bee MD    ** Please Note: Dictation voice to text software may have been used in the creation of this document   **

## 2021-04-04 NOTE — ASSESSMENT & PLAN NOTE
Malnutrition Findings:        Severe protein calorie malnutrition  Adult malnutrition type:  Acute illness  Adult degree of malnutrition:  Other severe protein calorie malnutrition severe protein calorie malnutrition as evidenced by [place] orbits, temp temple following, clavicle protrusion, with prominent bone and low albumin levels 1 6 greater than 1 8, and associated with coagulopathy with high INR 8 on admission in the setting of right lower lobe pneumonia with complicated right parapneumonic effusion  Also has a pulmonary nodule needs to be worked up       BMI finding  Body mass index is 26 19  Nutrition is following      Add ensure clear

## 2021-04-04 NOTE — ASSESSMENT & PLAN NOTE
· Presented with sepsis POA tachycardia, elevated white count to 36 K with 4% bands, found to have right-sided large parapneumonic effusion  Subsequent g stain fluid cultures revealed Gram-positive cocci in pairs  She has chest tube placed by IR on 03/23 at [facility]   Six doses of tPA/dornase have completed by 03/27  Repeat CT scan showed mid size residual right pleural effusion slightly decreased  New ground-glass opacity in the left anterior upper lobe, of 5 centimetre left pulmonary nodule noted  · On ceftriaxone and flagyl per ID recomendtions, will likely need 3 weeks of treatment  · S/p VATS decortication on 4/2  · F/u surgical cultures  · Post op chest tube management per thoracic surgery  · Better pain control today    · Add tessalon at HS to assist with sleep, avoid potent cough suppressants during the day

## 2021-04-05 ENCOUNTER — APPOINTMENT (INPATIENT)
Dept: RADIOLOGY | Facility: HOSPITAL | Age: 71
DRG: 853 | End: 2021-04-05
Payer: MEDICARE

## 2021-04-05 LAB
ANION GAP SERPL CALCULATED.3IONS-SCNC: 5 MMOL/L (ref 4–13)
BACTERIA SPEC ANAEROBE CULT: NO GROWTH
BACTERIA TISS AEROBE CULT: NO GROWTH
BUN SERPL-MCNC: 10 MG/DL (ref 5–25)
CALCIUM SERPL-MCNC: 7.8 MG/DL (ref 8.3–10.1)
CHLORIDE SERPL-SCNC: 104 MMOL/L (ref 100–108)
CO2 SERPL-SCNC: 27 MMOL/L (ref 21–32)
CREAT SERPL-MCNC: 0.5 MG/DL (ref 0.6–1.3)
ERYTHROCYTE [DISTWIDTH] IN BLOOD BY AUTOMATED COUNT: 17 % (ref 11.6–15.1)
GFR SERPL CREATININE-BSD FRML MDRD: 97 ML/MIN/1.73SQ M
GLUCOSE SERPL-MCNC: 93 MG/DL (ref 65–140)
GLUCOSE SERPL-MCNC: 94 MG/DL (ref 65–140)
GRAM STN SPEC: NORMAL
GRAM STN SPEC: NORMAL
HCT VFR BLD AUTO: 23 % (ref 34.8–46.1)
HGB BLD-MCNC: 7 G/DL (ref 11.5–15.4)
MCH RBC QN AUTO: 28.1 PG (ref 26.8–34.3)
MCHC RBC AUTO-ENTMCNC: 30.4 G/DL (ref 31.4–37.4)
MCV RBC AUTO: 92 FL (ref 82–98)
PLATELET # BLD AUTO: 490 THOUSANDS/UL (ref 149–390)
PMV BLD AUTO: 10.1 FL (ref 8.9–12.7)
POTASSIUM SERPL-SCNC: 3.9 MMOL/L (ref 3.5–5.3)
RBC # BLD AUTO: 2.49 MILLION/UL (ref 3.81–5.12)
SODIUM SERPL-SCNC: 136 MMOL/L (ref 136–145)
WBC # BLD AUTO: 19.71 THOUSAND/UL (ref 4.31–10.16)

## 2021-04-05 PROCEDURE — 82948 REAGENT STRIP/BLOOD GLUCOSE: CPT

## 2021-04-05 PROCEDURE — 97530 THERAPEUTIC ACTIVITIES: CPT

## 2021-04-05 PROCEDURE — 85027 COMPLETE CBC AUTOMATED: CPT | Performed by: INTERNAL MEDICINE

## 2021-04-05 PROCEDURE — 99024 POSTOP FOLLOW-UP VISIT: CPT | Performed by: SURGERY

## 2021-04-05 PROCEDURE — 94668 MNPJ CHEST WALL SBSQ: CPT

## 2021-04-05 PROCEDURE — 97164 PT RE-EVAL EST PLAN CARE: CPT

## 2021-04-05 PROCEDURE — 94669 MECHANICAL CHEST WALL OSCILL: CPT

## 2021-04-05 PROCEDURE — 80048 BASIC METABOLIC PNL TOTAL CA: CPT | Performed by: INTERNAL MEDICINE

## 2021-04-05 PROCEDURE — 99233 SBSQ HOSP IP/OBS HIGH 50: CPT | Performed by: INTERNAL MEDICINE

## 2021-04-05 PROCEDURE — 97168 OT RE-EVAL EST PLAN CARE: CPT

## 2021-04-05 PROCEDURE — 71045 X-RAY EXAM CHEST 1 VIEW: CPT

## 2021-04-05 RX ORDER — ALPRAZOLAM 0.25 MG/1
0.25 TABLET ORAL EVERY 12 HOURS PRN
Status: DISCONTINUED | OUTPATIENT
Start: 2021-04-05 | End: 2021-04-13 | Stop reason: HOSPADM

## 2021-04-05 RX ORDER — METRONIDAZOLE 500 MG/1
500 TABLET ORAL EVERY 8 HOURS SCHEDULED
Status: DISCONTINUED | OUTPATIENT
Start: 2021-04-05 | End: 2021-04-12

## 2021-04-05 RX ORDER — OXYCODONE HYDROCHLORIDE 10 MG/1
10 TABLET ORAL EVERY 4 HOURS PRN
Status: DISCONTINUED | OUTPATIENT
Start: 2021-04-05 | End: 2021-04-13 | Stop reason: HOSPADM

## 2021-04-05 RX ORDER — OXYCODONE HYDROCHLORIDE 5 MG/1
5 TABLET ORAL EVERY 4 HOURS PRN
Status: DISCONTINUED | OUTPATIENT
Start: 2021-04-05 | End: 2021-04-13 | Stop reason: HOSPADM

## 2021-04-05 RX ADMIN — BENZONATATE 200 MG: 100 CAPSULE ORAL at 21:15

## 2021-04-05 RX ADMIN — FUROSEMIDE 20 MG: 20 TABLET ORAL at 09:37

## 2021-04-05 RX ADMIN — PANTOPRAZOLE SODIUM 20 MG: 20 TABLET, DELAYED RELEASE ORAL at 05:52

## 2021-04-05 RX ADMIN — MELATONIN TAB 3 MG 6 MG: 3 TAB at 21:15

## 2021-04-05 RX ADMIN — BUPROPION HYDROCHLORIDE 150 MG: 150 TABLET, FILM COATED, EXTENDED RELEASE ORAL at 09:37

## 2021-04-05 RX ADMIN — METRONIDAZOLE 500 MG: 500 INJECTION, SOLUTION INTRAVENOUS at 03:15

## 2021-04-05 RX ADMIN — GUAIFENESIN AND DEXTROMETHORPHAN 10 ML: 100; 10 SYRUP ORAL at 03:13

## 2021-04-05 RX ADMIN — METRONIDAZOLE 500 MG: 500 TABLET ORAL at 19:14

## 2021-04-05 RX ADMIN — OXYCODONE HYDROCHLORIDE 10 MG: 10 TABLET ORAL at 09:44

## 2021-04-05 RX ADMIN — ACETAMINOPHEN 975 MG: 325 TABLET, FILM COATED ORAL at 14:31

## 2021-04-05 RX ADMIN — TRAZODONE HYDROCHLORIDE 200 MG: 100 TABLET ORAL at 21:15

## 2021-04-05 RX ADMIN — ACETAMINOPHEN 975 MG: 325 TABLET, FILM COATED ORAL at 21:15

## 2021-04-05 RX ADMIN — GUAIFENESIN AND DEXTROMETHORPHAN 10 ML: 100; 10 SYRUP ORAL at 09:36

## 2021-04-05 RX ADMIN — GUAIFENESIN AND DEXTROMETHORPHAN 10 ML: 100; 10 SYRUP ORAL at 14:31

## 2021-04-05 RX ADMIN — ENOXAPARIN SODIUM 40 MG: 40 INJECTION SUBCUTANEOUS at 09:36

## 2021-04-05 RX ADMIN — ATORVASTATIN CALCIUM 40 MG: 40 TABLET, FILM COATED ORAL at 17:58

## 2021-04-05 RX ADMIN — Medication 1 TABLET: at 09:37

## 2021-04-05 RX ADMIN — OXYCODONE HYDROCHLORIDE 10 MG: 10 TABLET ORAL at 14:40

## 2021-04-05 RX ADMIN — ALPRAZOLAM 0.25 MG: 0.25 TABLET ORAL at 09:44

## 2021-04-05 RX ADMIN — METRONIDAZOLE 500 MG: 500 INJECTION, SOLUTION INTRAVENOUS at 12:49

## 2021-04-05 RX ADMIN — FLUOXETINE 20 MG: 20 CAPSULE ORAL at 09:36

## 2021-04-05 RX ADMIN — ACETAMINOPHEN 975 MG: 325 TABLET, FILM COATED ORAL at 05:52

## 2021-04-05 RX ADMIN — OXYCODONE HYDROCHLORIDE 10 MG: 10 TABLET ORAL at 03:13

## 2021-04-05 RX ADMIN — CEFTRIAXONE SODIUM 2000 MG: 10 INJECTION, POWDER, FOR SOLUTION INTRAVENOUS at 17:58

## 2021-04-05 RX ADMIN — LIDOCAINE 1 PATCH: 50 PATCH TOPICAL at 09:44

## 2021-04-05 RX ADMIN — OXYCODONE HYDROCHLORIDE 10 MG: 10 TABLET ORAL at 19:14

## 2021-04-05 RX ADMIN — GUAIFENESIN AND DEXTROMETHORPHAN 10 ML: 100; 10 SYRUP ORAL at 19:14

## 2021-04-05 NOTE — ASSESSMENT & PLAN NOTE
· Chronic normocytic normochromic anemia  · Iron panel suggested chronic anemia of inflammation  · S/p 2 units of RBCs - Today given a unit  · Continue to monitor hb post op - Suspect anemia related to VATS

## 2021-04-05 NOTE — PROGRESS NOTES
Progress Note - Thoracic Surgery   Jorje Cruz 65 y o  female MRN: [id number]  Unit/Bed#: [facility] 408-01 Encounter: [id number]    CT x2 on Y-connector: 48 ss on WS, -AL  400 IS, poor effort    Assessment:  65 y o  female with empyema POD3 s/p VATS decort       Plan:  Diet as tolerated  CT to suction  Abx per IM/ID  Patient must use IS more aggressively  DVT ppx    Subjective/Objective     Subjective: Endorses discomfort at surgical site      Objective:     Vitals: Temp:  [98 °F (36 7 °C)-98 3 °F (36 8 °C)] 98 °F (36 7 °C)  HR:  [83-89] 86  Resp:  [19] 19  BP: (101-105)/(50-54) 105/52  Body mass index is 24 73 kg/m²  I/O       04/03 0701 - 04/04 0700 04/04 0701 - 04/05 0700    P  O  255 60    I V  (mL/kg) 1040 (15 2)     IV Piggyback 150 600    Total Intake(mL/kg) 1445 (21 1) 660 (9 5)    Urine (mL/kg/hr) 700 (0 4) 1175 (0 7)    Chest Tube 320 50    Total Output 1020 1225    Net +425 -565                Physical Exam:  GEN: NAD  HEENT: MMM  CV: RRR  Lung: Normal effort  Ab: Soft, ND/NT   Extrem: No CCE   Neuro: A+Ox3     CT dressings with some SS drainage, incisions CDI    Lab, Imaging and other studies: I have personally reviewed pertinent reports    , CBC with diff:   Lab Results   Component Value Date    WBC 19 71 (H) 04/05/2021    HGB 7 0 (L) 04/05/2021    HCT 23 0 (L) 04/05/2021    MCV 92 04/05/2021     (H) 04/05/2021    MCH 28 1 04/05/2021    MCHC 30 4 (L) 04/05/2021    RDW 17 0 (H) 04/05/2021    MPV 10 1 04/05/2021   , BMP/CMP: No results found for: SODIUM, K, CL, CO2, ANIONGAP, BUN, CREATININE, GLUCOSE, CALCIUM, AST, ALT, ALKPHOS, PROT, BILITOT, EGFR  VTE Pharmacologic Prophylaxis: Enoxaparin (Lovenox)  VTE Mechanical Prophylaxis: sequential compression device        Carlos Tom MD  4/5/2021 6:05 AM

## 2021-04-05 NOTE — PROGRESS NOTES
Progress Note - Infectious Disease   Amairani Funes 65 y o  female MRN: [id number]  Unit/Bed#: [facility] 408-01 Encounter: [id number]      Impression/Plan:     1- Sepsis, POA:  Secondary to #2 and #3  Negative blood cultures  Clinically improved  No fevers  WBC count is trending down  - antibiotics as below  - monitor CBC  - monitor temperatures and hemodynamics  - supportive care     2- Right-sided pneumonia:  Possible aspiration event leading to right-sided pneumonia and subsequent empyema   Patient denies binge drinking or daily drinking   Negative strep/Legionella antigen   - antibiotics as below  - close monitoring of respiratory status     3- Right-sided empyema:  Pleural fluid analyzed on 03/23/2021, at time of IR insertion of right-sided chest tube  Gram stain did show GPCs in pairs  Suspect strep  Consider polymicrobial infection  Now status post right-sided VATS with thick loculated purulent fluid noted intraoperatively  OR cultures are negative  - continue IV ceftriaxone  - continue Flagyl  Change to oral formulation  - chest tube management per CT surgery  - plan for 3 week postop course of antibiotics     4- coronary artery disease:  -continue close monitoring of hemodynamics status     5- tobacco dependence:  She reports  gradual smoking cessation; she has not smoked over the past 2 weeks prior to presentation  - advised patient to continue with smoking cessation     6- moderate aortic stenosis:  Noted on TTE from 03/23/2021  - cardiology follow-up      I discussed above plan with patient and her daughter at bedside  Subjective:  Patient is overall doing much better  Denies shortness of breath or cough  One chest tube was removed today and she has some pain at the site  Tolerating oral intake  No fevers        Objective:  Vitals:  Temp:  [98 °F (36 7 °C)-98 5 °F (36 9 °C)] 98 5 °F (36 9 °C)  HR:  [82-91] 91  Resp:  [19-20] 20  BP: (105-120)/(51-55) 120/51  SpO2:  [95 %-96 %] 95 %  Temp (24hrs), Av 3 °F (36 8 °C), Min:98 °F (36 7 °C), Max:98 5 °F (36 9 °C)  Current: Temperature: 98 5 °F (36 9 °C)    Physical Exam:   General:  No acute distress, nontoxic, sitting comfortably in chair  HEENT:  Atraumatic normocephalic  Psychiatric:  Awake and alert  Pulmonary:  Normal respiratory excursion without accessory muscle use  One chest tube in place  Abdomen:  Soft, nontender  Woody in place with clear yellow urine  Extremities:  No edema  Skin:  No rashes    Lab Results:  I have personally reviewed pertinent labs  Results from last 7 days   Lab Units 21  0501 21  0449 21  0618 21  1004   POTASSIUM mmol/L 3 9 4 6 4 8  --    CHLORIDE mmol/L 104 106 110*  --    CO2 mmol/L 27 25 25  --    CO2, I-STAT mmol/L  --   --   --  28   BUN mg/dL 10 9 8  --    CREATININE mg/dL 0 50* 0 67 0 66  --    EGFR ml/min/1 73sq m 97 88 89  --    GLUCOSE, ISTAT mg/dl  --   --   --  122   CALCIUM mg/dL 7 8* 7 9* 8 1*  --      Results from last 7 days   Lab Units 21  0501 21  0618   WBC Thousand/uL 19 71* 26 47* 22 55*   HEMOGLOBIN g/dL 7 0* 7 6* 7 4*   PLATELETS Thousands/uL 490* 561* 548*     Results from last 7 days   Lab Units 21  0958   GRAM STAIN RESULT  Rare Polys  No bacteria seen       Imaging Studies:   I have personally reviewed pertinent imaging study reports and images in PACS  EKG, Pathology, and Other Studies:   I have personally reviewed pertinent reports

## 2021-04-05 NOTE — PLAN OF CARE
Problem: OCCUPATIONAL THERAPY ADULT  Goal: Performs self-care activities at highest level of function for planned discharge setting  See evaluation for individualized goals  Description: Treatment Interventions: ADL retraining, Functional transfer training, UE strengthening/ROM, Endurance training, Cognitive reorientation, Fine motor coordination activities, Compensatory technique education, Continued evaluation, Energy conservation, Activityengagement          See flowsheet documentation for full assessment, interventions and recommendations  Note: Limitation: Decreased ADL status, Decreased endurance, Decreased self-care trans, Decreased high-level ADLs  Prognosis: Good  Assessment: Pt is a 65 y o  female who was admitted to [facility] on 3/29/2021 with Sepsis ([address] )   Pt seen for reeval 2* VATS procedure 3 days prior  Pt has had a chest tube removed since procedure, but still has one in place  See initial eval for PMH and home s/u  Currently pt requires mod A for overall ADLS and mod Ax1 for functional mobility/transfers w/ RW  Pt currently presents with impairments in the following categories -difficulty performing ADLS, difficulty performing IADLS,  decreased initiation and engagement,  activity tolerance, endurance and standing balance/tolerance  These impairments, as well as pt's fatigue, pain and risk for falls  limit pt's ability to safely engage in all baseline areas of occupation, including grooming, bathing, dressing, toileting, functional mobility/transfers and leisure activities  The patient's raw score on the AM-PAC Daily Activity inpatient short form is 13, standardized score is 32 03, less than 39 4  Patients at this level are likely to benefit from discharge to post-acute rehabilitation services  Please refer to the recommendation of the Occupational Therapist for safe discharge planning  From OT standpoint, recommend inpatient rehab upon D/C   OT will continue to follow to address the below stated goals        OT Discharge Recommendation: Post-Acute Rehabilitation Services  OT - OK to Discharge: (yes when medically stable to STR)

## 2021-04-05 NOTE — PLAN OF CARE
Problem: PHYSICAL THERAPY ADULT  Goal: Performs mobility at highest level of function for planned discharge setting  See evaluation for individualized goals  Description: Treatment/Interventions: Functional transfer training, LE strengthening/ROM, Elevations, Therapeutic exercise, Endurance training, Bed mobility, Gait training, Spoke to nursing, Spoke to case management, OT          See flowsheet documentation for full assessment, interventions and recommendations  Outcome: Progressing  Note: Prognosis: Guarded  Problem List: Decreased strength, Decreased endurance, Impaired balance, Decreased mobility, Pain  Assessment: Pt seen for re-evaluation today s/p VATS procedure on 4/2  Pt was initially admitted to [facility] on 3/29 with a primary dx of sepsis secondary to parapneumonic effusion/pna  Pt PMH includes: anemia, cardiac disease, chronic pain, CAD, HTN, PVD  PTA, pt reports living with her  in a [address] with 1 MARIANN  Upon initial evaluation, pt was requiring supervision for bed mobility; Laney for transfers and [place] for ambulation 10ft +5 ft +10ft w/ RW  Upon re-evaluation pt is requiring modAx2 for transfers and modAx1 for standing marches at [employer]  Pt unable to ambulate at this time 2* pain, weakness, and likely self-limiting behaviors  Pt presents at re-evaluation functioning below baseline and currently w/ overall mobility deficits 2* to: BLE weakness, impaired balance, decreased endurance, gait deviations, pain, decreased activity tolerance compared to baseline, decreased functional mobility tolerance compared to baseline, fall risk  Pt would benefit from continued acute skilled PT to address above deficits   At this time, recommending rehab upon d/c    Barriers to Discharge: Inaccessible home environment, Decreased caregiver support     PT Discharge Recommendation: [address]     PT - OK to Discharge: Yes(to rehab when medically appropriate)    See flowsheet documentation for full assessment

## 2021-04-05 NOTE — ASSESSMENT & PLAN NOTE
· Presented with sepsis POA tachycardia, elevated white count to 36 K with 4% bands, found to have right-sided large parapneumonic effusion  Subsequent g stain fluid cultures revealed Gram-positive cocci in pairs  She has chest tube placed by IR on 03/23 at [facility]   Six doses of tPA/dornase have completed by 03/27  Repeat CT scan showed mid size residual right pleural effusion slightly decreased  New ground-glass opacity in the left anterior upper lobe, of 5 centimetre left pulmonary nodule noted  · On ceftriaxone and flagyl per ID recomendtions, will likely need 3 weeks of treatment  · Can go on Augmentin per ID at d/c  · S/p VATS decortication on 4/2  · surgical cultures neg  · Post op chest tube management per thoracic surgery - Today chest tube removed  · F/u CXR post-removal  Right post apical chest tube removal today, with follow up chest xray- Removal of right chest tube with no effusion or pneumothorax  Persistent right base atelectasis    · Add tessalon at HS to assist with sleep, avoid potent cough suppressants during the day  · Reduce Oxycodone to 10 mg Q4HPRN for severe pain and 5 mg Q4H PRN for moderate pain

## 2021-04-05 NOTE — ASSESSMENT & PLAN NOTE
· Likely due to post op pain, opiate analgesics, functional due to chest tubes  · Woody catheter placed and removed 4/5 for void trial

## 2021-04-05 NOTE — OCCUPATIONAL THERAPY NOTE
Occupational Therapy Re-Evaluation & Treatment     Patient Name: Jorje Cruz  FGZWL'O Date: 4/5/2021  Problem List  Principal Problem:    Sepsis due to Empyema  Active Problems:    CAD (coronary artery disease)    HTN (hypertension)    Supratherapeutic INR    Delirium    Thrombocytosis (HCC)    Severe protein-calorie malnutrition (HCC)    Anemia    Valvular heart disease    Dysphagia    ETOH abuse    Anxiety    Pulmonary nodule    Pulmonary hypertension (HCC)    Peripheral vascular disease ([facility] )    Tobacco abuse    Urinary retention    Past Medical History  Past Medical History:   Diagnosis Date    Anemia     Cardiac disease     Chronic pain     Coronary artery disease     Hypertension     PVD (peripheral vascular disease) ([facility] )      Past Surgical History  Past Surgical History:   Procedure Laterality Date    ANKLE SURGERY      IR CHEST TUBE PLACEMENT  3/23/2021    IR NON-TUNNELED CENTRAL LINE PLACEMENT  3/24/2021    THORACOSCOPY VIDEO ASSISTED SURGERY (VATS) Right 4/2/2021    Procedure: THORACOSCOPY VIDEO ASSISTED SURGERY (VATS); DECORTICATION;  Surgeon: Avel Marx MD;  [facility]: [facility];  Service: Thoracic        04/05/21 1020   OT Last Visit   OT Visit Date 04/05/21   Note Type   Note type Re-Evaluation  (& treatment)   Restrictions/Precautions   Weight Bearing Precautions Per Order No   Other Precautions Multiple lines;O2;Fall Risk;Pain   Pain Assessment   Pain Assessment Tool 0-10   Pain Score   (reports only pain when she coughs)   Home Living   Type of [address] [place] [address] Two level   Bathroom Shower/Tub Tub/shower unit   Bathroom Toilet Standard   Bathroom Equipment Shower chair   Bathroom Accessibility Accessible   Home Equipment Walker;Cane   Additional Comments Lives in [address] [place] [address] w/ 1 MARIANN   Prior Function   Level of Flasher Independent with ADLs and functional mobility   Lives With Spouse   Receives Help From Family   ADL Assistance Independent   IADLs Independent Falls in the last 6 months 0   Vocational Retired   Lifestyle   Autonomy Pt reports being I with ADLS, IADLS and mobility with SPC in the community PTA  (+)     Reciprocal Relationships Pt lives with her  who she reports is starting dialysis and can only provide some assistance   Service to Others Retired   [address] Enjoys being with family   Psychosocial   Psychosocial (WDL) WDL   Subjective   Subjective "I need my pain patch"   ADL   Where Assessed Chair   Eating Assistance 5  Supervision/Setup   Grooming Assistance 4  Minimal Assistance   UB Bathing Assistance 3  Moderate Assistance   LB [address] 3  Moderate Assistance   UB Dressing Assistance 3  Moderate Assistance   LB Dressing Assistance 3  Moderate [address]  3  Moderate Assistance   Functional Assistance 3  Moderate Assistance   Bed Mobility   Additional Comments Pt OOB in [address] [facility] at start and end of session   Transfers   Sit to Stand 3  Moderate assistance   Additional items Assist x 2   Stand to Sit 3  Moderate assistance   Additional items Assist x 2   Additional Comments Pt completed sit<>stand x4 w/ Ax2  On 4th attempt, she marched in place ~5 times  Pt required vc for standing posture 2* retropulsion in standing  Balance   Static Sitting Fair   Dynamic Sitting Fair -   Static Standing Poor   Dynamic Standing Poor -   Activity Tolerance   Activity Tolerance Patient limited by fatigue;Patient limited by pain   Medical Staff Made Aware Yes, PT Jocelyne   Nurse Made Aware Yes   RUE Assessment   RUE Assessment WFL   LUE Assessment   LUE Assessment WFL   Hand Function   Gross Motor Coordination Functional   Fine Motor Coordination Functional   Cognition   Arousal/Participation Alert; Responsive; Cooperative   Attention Within functional limits   Orientation Level Oriented X4   Memory Decreased recall of precautions   Following Commands Follows one step commands without difficulty   Comments Pt agreeable to session and overall pleasant w/ therapy, however she was unhappy about not receiving her pain patch  Pt seemed to be self limiting in her functional mobility  Assessment   Limitation Decreased ADL status; Decreased endurance;Decreased self-care trans;Decreased high-level ADLs   Prognosis Good   Assessment Pt is a 65 y o  female who was admitted to [facility] on 3/29/2021 with Sepsis ([address] )   Pt seen for reeval 2* VATS procedure 3 days prior  Pt has had a chest tube removed since procedure, but still has one in place  See initial eval for PMH and home s/u  Currently pt requires mod A for overall ADLS and mod Ax1 for functional mobility/transfers w/ RW  Pt currently presents with impairments in the following categories -difficulty performing ADLS, difficulty performing IADLS,  decreased initiation and engagement,  activity tolerance, endurance and standing balance/tolerance  These impairments, as well as pt's fatigue, pain and risk for falls  limit pt's ability to safely engage in all baseline areas of occupation, including grooming, bathing, dressing, toileting, functional mobility/transfers and leisure activities  The patient's raw score on the -PAC Daily Activity inpatient short form is 13, standardized score is 32 03, less than 39 4  Patients at this level are likely to benefit from discharge to post-acute rehabilitation services  Please refer to the recommendation of the Occupational Therapist for safe discharge planning  From OT standpoint, recommend inpatient rehab upon D/C  OT will continue to follow to address the below stated goals  Goals   Patient Goals to get her pain patch   LTG Time Frame 10-14   Long Term Goal see below goals    Plan   Treatment Interventions ADL retraining;Functional transfer training; Endurance training;Patient/family training; Compensatory technique education; Energy conservation; Activityengagement   Goal Expiration Date 04/19/21   OT Frequency 3-5x/wk Additional Treatment Session   Start Time 1005   End Time 1020   Treatment Assessment Pt seen for f/u treatment session to work on functional mobility, standing tolerance, and endurance as a prereq for ADLS  Pt stood at chair for less than 1 minute x2 and was able to march in place on 2nd attempt  She required seated rest breaks between stands  Pt was in retropulsion while in standing and reports that her LEs were too weak to walk further  Pt unable to ambulate household distance at this time  Pt continues to function below baseline and would benefit from short term rehab at this time upon d/c  OT will cont to follow to address previously determined goals  Recommendation   OT Discharge Recommendation Post-Acute Rehabilitation Services   OT - OK to Discharge   (yes when medically stable to STR)   AM-PAC Daily Activity Inpatient   Lower Body Dressing 1   Bathing 1   Toileting 2   Upper Body Dressing 2   Grooming 3   Eating 4   Daily Activity Raw Score 13   Daily Activity Standardized Score (Calc for Raw Score >=11) 32 03      LTG (10-14 DAYS)  Pt will improve activity tolerance to G for min 30-45 min txment sessions to increase participation in ADLs    Pt will complete ADLs/self care w/ mod I using adaptive device and DME as needed    Pt will complete toileting w/ mod I w/ G hygiene/thoroughness using DME as needed    Pt will improve functional transfers on/off all surfaces to mod I using DME as needed w/ G balance/safety  Pt will improve functional mobility during ADL/IADL/leisure tasks to mod I using DME as needed w/ G balance/safety  Pt will improve standing balance to G for 8-10 minutes of purposeful activity w/ G endurance      Pt will demonstrate 100% carryover of energy conservation techniques w/ mod I t/o functional I/ADL/leisure tasks w/o cues s/p skilled education      Chantelle May, OTR/L

## 2021-04-05 NOTE — ASSESSMENT & PLAN NOTE
· Delirium has resolved  · Family denies any heavy alcohol use, maybe she takes a glass of wine occasionally so this unlikely active alcohol use more like severe delirium in the setting of sepsis  · CT head was negative for intracranial anomaly  · Now back to baseline mental status

## 2021-04-05 NOTE — PROGRESS NOTES
[facility] Progress Note  Patient: Mike Lama 65 y o  female   MRN: [id number]  PCP: Jabari Smith MD  Unit/Bed#: [facility] 408-01 Encounter: [id number]  Date Of Visit: 04/05/21    Assessment/Plan:    * Sepsis due to Empyema  Assessment & Plan  · Presented with sepsis POA tachycardia, elevated white count to 36 K with 4% bands, found to have right-sided large parapneumonic effusion  Subsequent g stain fluid cultures revealed Gram-positive cocci in pairs  She has chest tube placed by IR on 03/23 at [facility]   Six doses of tPA/dornase have completed by 03/27  Repeat CT scan showed mid size residual right pleural effusion slightly decreased  New ground-glass opacity in the left anterior upper lobe, of 5 centimetre left pulmonary nodule noted  · On ceftriaxone and flagyl per ID recomendtions, will likely need 3 weeks of treatment  · S/p VATS decortication on 4/2  · F/u surgical cultures  · Post op chest tube management per thoracic surgery  Right post apical chest tube removal today, with follow up chest xray- Removal of right chest tube with no effusion or pneumothorax  Persistent right base atelectasis    · Add tessalon at HS to assist with sleep, avoid potent cough suppressants during the day  · Reduce Oxycodone to 10 mg Q4HPRN for severe pain and 5 mg Q4H PRN for moderate pain           Urinary retention  Assessment & Plan  · Likely due to post op pain, opiate analgesics, functional due to chest tubes  · Woody catheter placed  · Removal 1500 hours today     Anxiety  Assessment & Plan  Continue with Xanax and trazodone  Xanax 0 25 mg Q12H PRN     Dysphagia  Assessment & Plan  · Resolved, placed back on regular diet    Valvular heart disease  Assessment & Plan    Valvular disease  Aortic valve is 1 39 with moderate tricuspid and PA pressures of 60, echo shows ejection fraction of 60% with grade 1 diastolic dysfunction    Anemia  Assessment & Plan  · Chronic normocytic normochromic anemia  · Iron panel suggested chronic anemia of inflammation  · S/p 1 unit of RBCs  · Continue to monitor hb post op  · Type and screen ordered for 4/06    Delirium  Assessment & Plan  · Delirium has resolved  · Family denies any heavy alcohol use, maybe she takes a glass of wine occasionally so this unlikely active alcohol use more like severe delirium in the setting of sepsis  · CT head was negative for intracranial anomaly  · Now back to baseline mental status        Supratherapeutic INR  Assessment & Plan  Supratherapeutic INR  Presented with INR of 8 7  On admission, possibly secondary to severe malnutrition, albumin was 1 8 on admission  No history of heavy alcohol use per family  Liver enzymes are within normal limits  Received a 10 mg of vitamin K with improvement of INR to 1 27  Follow INR daily        VTE Pharmacologic Prophylaxis:   Pharmacologic: Enoxaparin (Lovenox)  Mechanical VTE Prophylaxis in Place: No    Patient Centered Rounds: I have performed bedside rounds with nursing staff today  Discussions with Specialists or Other Care Team Provider:     Education and Discussions with Family / Patient:    Time Spent for Care: 30 minutes  More than 50% of total time spent on counseling and coordination of care as described above  Current Length of Stay: 7 day(s)    Current Patient Status: Inpatient   Certification Statement: The patient will continue to require additional inpatient hospital stay due to sepsis due to empyema    Discharge Plan / Estimated Discharge Date: unknown    Code Status: Level 1 - Full Code      Subjective:   Pt stated that she is feeling much better than yesterday  Her only complaint was right sided rib pain, with respiration, due to tube removal today  She would like to ensure that her pain level is kept under control at all times, otherwise it becomes difficult to breath  Complaint of pain in region of tube removal    Review of Systems   Constitutional: Negative  HENT: Negative  Eyes: Negative  Respiratory: Negative  Cardiovascular: Negative  Gastrointestinal: Negative  Endocrine: Negative  Genitourinary: Negative  Musculoskeletal: Negative  Skin: Negative  Allergic/Immunologic: Negative  Neurological: Negative  Hematological: Negative  Psychiatric/Behavioral: Negative  Objective:     Vitals:   Temp (24hrs), Av 3 °F (36 8 °C), Min:98 °F (36 7 °C), Max:98 5 °F (36 9 °C)    Temp:  [98 °F (36 7 °C)-98 5 °F (36 9 °C)] 98 5 °F (36 9 °C)  HR:  [82-91] 91  Resp:  [19-20] 20  BP: (105-120)/(51-55) 120/51  SpO2:  [95 %-96 %] 95 %  Body mass index is 24 73 kg/m²  Input and Output Summary (last 24 hours): Intake/Output Summary (Last 24 hours) at 2021 1257  Last data filed at 2021 7707  Gross per 24 hour   Intake 180 ml   Output 675 ml   Net -495 ml       Physical Exam:     Physical Exam  Constitutional:       Appearance: Normal appearance  HENT:      Head: Normocephalic and atraumatic  Nose: Nose normal       Mouth/Throat:      Mouth: Mucous membranes are moist    Eyes:      Extraocular Movements: Extraocular movements intact  Conjunctiva/sclera: Conjunctivae normal    Neck:      Musculoskeletal: Normal range of motion and neck supple  Cardiovascular:      Rate and Rhythm: Normal rate and regular rhythm  Pulmonary:      Effort: Pulmonary effort is normal       Breath sounds: Rales present  Abdominal:      General: Abdomen is flat  Palpations: Abdomen is soft  Musculoskeletal:         General: Tenderness present  Skin:     General: Skin is warm and dry  Neurological:      Mental Status: She is alert and oriented to person, place, and time  Psychiatric:         Mood and Affect: Mood normal          Behavior: Behavior normal          Thought Content:  Thought content normal          Judgment: Judgment normal        Additional Data:     Labs:    Results from last 7 days   Lab Units 04/05/21  0501 04/04/21  0449   WBC Thousand/uL 19 71* 26 47*   HEMOGLOBIN g/dL 7 0* 7 6*   HEMATOCRIT % 23 0* 24 4*   PLATELETS Thousands/uL 490* 561*   NEUTROS PCT %  --  85*   LYMPHS PCT %  --  7*   MONOS PCT %  --  7   EOS PCT %  --  0     Results from last 7 days   Lab Units 04/05/21  0501  04/02/21  1004   POTASSIUM mmol/L 3 9   < >  --    CHLORIDE mmol/L 104   < >  --    CO2 mmol/L 27   < >  --    CO2, I-STAT mmol/L  --   --  28   BUN mg/dL 10   < >  --    CREATININE mg/dL 0 50*   < >  --    CALCIUM mg/dL 7 8*   < >  --    GLUCOSE, ISTAT mg/dl  --   --  122    < > = values in this interval not displayed  Results from last 7 days   Lab Units 04/02/21  0446   INR  1 45*       * I Have Reviewed All Lab Data Listed Above  * Additional Pertinent Lab Tests Reviewed:  All Labs Within Last 24 Hours Reviewed    Imaging:    Imaging Reports Reviewed Today Include:  Chest xray  Imaging Personally Reviewed by Myself Includes:      Recent Cultures (last 7 days):     Results from last 7 days   Lab Units 04/02/21  0958   GRAM STAIN RESULT  Rare Polys  No bacteria seen       Last 24 Hours Medication List:   Current Facility-Administered Medications   Medication Dose Route Frequency Provider Last Rate    acetaminophen  975 mg Oral [facility] Frances Wallace, PA-C      ALPRAZolam  0 25 mg Oral Q12H PRN Denys Milian MD      atorvastatin  40 mg Oral Daily With Andrea Astudillo PA-C      benzonatate  200 mg Oral HS Denys Milian MD      buPROPion  150 mg Oral Daily Frances Wallace, PA-CELIA      cefTRIAXone  2,000 mg Intravenous Q24H Jose J Wallace, PA-C 2,000 mg (04/04/21 1705)    dextromethorphan-guaiFENesin  10 mL Oral Q4H PRN Bonita Brunner PA-C      enoxaparin  40 mg Subcutaneous Daily Frances Wallace, PA-C      FLUoxetine  20 mg Oral Daily Frances Wallace, PA-C      furosemide  20 mg Oral Daily Frances Wallace, PA-C      HYDROmorphone  0 5 mg Intravenous Q3H PRN Denys Milian MD      lidocaine  1 patch Topical Daily Elonda Aliza Ugarte PA-C      melatonin  6 mg Oral HS [facility] [place]      metroNIDAZOLE  500 mg Intravenous [id number] [place], [place] [address]HECTOR garcia 500 mg (04/05/21 1249)    multivitamin-minerals  1 tablet Oral Daily Joshua Ritchie PA-C      oxyCODONE  10 mg Oral Q4H PRN Marely Fernandez MD      oxyCODONE  5 mg Oral Q4H PRN Marely Fernandez MD      pantoprazole  20 mg Oral Early Morning Joshua Ritchie PA-C      traZODone  200 mg Oral HS Joshua Ritchie PA-C          Today, Patient Was Seen By: Lashon Vernon    ** Please Note: This note has been constructed using a voice recognition system   **

## 2021-04-05 NOTE — CASE MANAGEMENT
Pt and  given list of snf rehabs beginning in [facility] and surrounding area  They are reviewing  She has about 2 more weeks of IV antibiotics

## 2021-04-05 NOTE — QUICK NOTE
04/05/21    Procedure: Chest tube removal    Right posterior apical chest tube removed in routine fashion without incident  The patient tolerated the procedure well  A dry, sterile dressing was placed  Will check a chest x-ray       Bonita Brunner PA-C

## 2021-04-05 NOTE — PHYSICAL THERAPY NOTE
Physical Therapy Re-Evaluation/Treat    Patient's Name: Oumar Azevedo    Admitting Diagnosis  Sepsis [facility]) [A41 9]    Problem List  Patient Active Problem List   Diagnosis    Pneumonia of right lower lobe due to infectious organism    CAD (coronary artery disease)    Sepsis due to Empyema    Parapneumonic effusion    GERD (gastroesophageal reflux disease)    HTN (hypertension)    Supratherapeutic INR    Delirium    Thrombocytosis (HCC)    Severe protein-calorie malnutrition (HCC)    Anemia    Valvular heart disease    Dysphagia    ETOH abuse    Anxiety    Pulmonary nodule    Pulmonary hypertension (HCC)    Peripheral vascular disease ([facility] [address] )    Tobacco abuse    Urinary retention       Past Medical History  Past Medical History:   Diagnosis Date    Anemia     Cardiac disease     Chronic pain     Coronary artery disease     Hypertension     [facility]        Past Surgical History  Past Surgical History:   Procedure Laterality Date    ANKLE SURGERY      IR CHEST TUBE PLACEMENT  3/23/2021    IR NON-TUNNELED CENTRAL LINE PLACEMENT  3/24/2021    THORACOSCOPY VIDEO ASSISTED SURGERY (VATS) Right 4/2/2021    Procedure: THORACOSCOPY VIDEO ASSISTED SURGERY (VATS); DECORTICATION;  Surgeon: Marlyn Carmona MD;  [facility];  Service: Thoracic        04/05/21 1021   PT Last Visit   PT Visit Date 04/05/21   Note Type   Note type Re-Evaluation  (and treatment)   Pain Assessment   Pain Assessment Tool FLACC   Pain Location/Orientation Location: Chest  (only with coughing or movement)   Hospital Pain Intervention(s) Repositioned; Ambulation/increased activity   Pain Rating: FLACC (Rest) - Face 1   Pain Rating: FLACC (Rest) - Legs 0   Pain Rating: FLACC (Rest) - Activity 0   Pain Rating: FLACC (Rest) - Cry 1   Pain Rating: FLACC (Rest) - Consolability 1   Score: FLACC (Rest) 3   Pain Rating: FLACC (Activity) - Face 1   Pain Rating: FLACC (Activity) - Legs 0 Pain Rating: FLACC (Activity) - Activity 0   Pain Rating: FLACC (Activity) - Cry 1   Pain Rating: FLACC (Activity) - Consolability 1   Score: FLACC (Activity) 3   Home Living   Additional Comments Pt lives in a [facility] with 1 MARIANN  Was using [facility] for community ambulation, but no AD in her house PTA  Prior Function   Level of [place] Independent with ADLs and functional mobility   Lives With Spouse   Receives Help From Family   ADL Assistance Independent   IADLs Independent   Falls in the last 6 months 0   Vocational Retired   Restrictions/Precautions   [facility] Bearing Precautions Per Order No   Other Precautions Multiple lines;Telemetry;O2;Fall Risk;Pain  (CT)   Cognition   Attention Within functional limits   Orientation Level Oriented X4   Memory Decreased recall of precautions   Following Commands Follows one step commands without difficulty   Comments pt agreeable to work with therapy, but self-limiting at times requiring encouragement from therapists   RLE Assessment   RLE Assessment WFL  (functionally at least 3+/5)   LLE Assessment   LLE Assessment WFL  (functionally at least 3+/5)   Bed Mobility   Additional Comments OOB upon arrival, not assessed   Transfers   Sit to Stand 3  Moderate assistance   Additional items Assist x 2; Increased time required;Verbal cues   Stand to Sit 3  Moderate assistance   Additional items Assist x 2; Increased time required;Verbal cues   Additional Comments Transfers with RW  Pt very retropulsive when coming up to standing position requiring VCs for "nose over toes"    Ambulation/Elevation   Distance Attempted ambulation today however pt unable 2* weakness and retropulsion in standing  Pt was able to perform standing marches x5 at [facility] with modA     Balance   Static Sitting Fair   Dynamic Sitting Fair -   Static Standing Poor   Dynamic Standing Poor -   Endurance Deficit   Endurance Deficit Yes   Endurance Deficit Description weakness, pain, deconditioning   Activity Tolerance Activity Tolerance Patient limited by fatigue;Patient limited by pain   Medical Staff Made Aware OT Debara Points   Nurse Made Aware ok to see per RN   Assessment   Prognosis Guarded   Problem List Decreased strength;Decreased endurance; Impaired balance;Decreased mobility;Pain   Assessment Pt seen for re-evaluation today s/p VATS procedure on 4/2  Pt was [facility] to [facility] on 3/[facility] with a primary dx of sepsis secondary to parapneumonic effusion/pna  Pt PMH includes: anemia, cardiac disease, chronic pain, CAD, HTN, PVD  PTA, pt reports living with her  in a [address] with 1 MARIANN  Upon initial evaluation, pt was requiring supervision for bed mobility; Laney for transfers and Laney for ambulation 10ft +5 ft +10ft w/ RW  Upon re-evaluation pt is requiring modAx2 for transfers and modAx1 for standing marches at [facility]  Pt unable to ambulate at this time 2* pain, weakness, and likely self-limiting behaviors  Pt presents at re-evaluation functioning below baseline and currently w/ overall mobility deficits 2* to: BLE weakness, impaired balance, decreased endurance, gait deviations, pain, decreased activity tolerance compared to baseline, decreased functional mobility tolerance compared to baseline, fall risk  Pt would benefit from continued acute skilled PT to address above deficits  At this time, recommending rehab upon d/c     Barriers to Discharge Inaccessible home environment;Decreased caregiver support   Goals   Patient Goals to get her pain patch   STG Expiration Date 04/19/21   Short Term Goal #1 In 10-14 days pt will be able to: 1  Demonstrate ability to perform all aspects of bed mobility independently to increase functional independence  2  Perform functional transfers with LRAD at mod I level to facilitate safe return to previous living environment  3   Ambulate 200 ft with LRAD at mod I level with stable vitals to improve safety with household distances and reduce fall risk    4  Climb 12 steps with HR independently to simulate access to 2nd floor of home  5  Improve LE strength grades by 1 to increase ease of functional mobility with transfers and gait  6  Pt will demonstrate improved balance by one grade order to decrease risk of falls  PT Treatment Day 2   Plan   Treatment/Interventions Functional transfer training;LE strengthening/ROM; Elevations; Therapeutic exercise; Endurance training;Bed mobility;Gait training;Spoke to nursing;Spoke to case management;OT   PT Frequency Other (Comment)  (3-5x/wk)   Recommendation   PT Discharge Recommendation Post-Acute Rehabilitation Services   PT - OK to Discharge Yes  (to rehab when medically appropriate)   Martita 8 in Bed Without Bedrails 3   Lying on Back to Sitting on Edge of Flat Bed 3   Moving Bed to Chair 2   Standing Up From Chair 2   Walk in Room 2   Climb 3-5 Stairs 1   Basic Mobility Inpatient Raw Score 13   Basic Mobility Standardized Score 33 99   Modified [place] Scale   Modified [place] Scale 4     Follow Up Treatment Session (10:08-10:20)    S: "I just want my pain patch "    O: Pt performed an additional 3x STS trials with RW requiring modAx2  Pt retropulsive with each trial, requiring VCs for weight-shifting to her toes and improved posture  Pt only able to tolerate standing ~15s each trial before impulsively sitting back down  Pt also performed seated march x10 and LAQ x10 b/l  A: Pt unable to correct retropulsion with tactile or verbal cues; likely it's a combination of anxiety with mobility, pain, and deconditioning as pt's nurse reports that she didn't want to get out of bed over the weekend  P: Pt would benefit from continued acute skilled PT to address above deficits  Continuing to recommend rehab upon d/c       Gregorio Thomas, PT, DPT

## 2021-04-06 ENCOUNTER — APPOINTMENT (INPATIENT)
Dept: RADIOLOGY | Facility: HOSPITAL | Age: 71
DRG: 853 | End: 2021-04-06
Payer: MEDICARE

## 2021-04-06 LAB
ABO GROUP BLD: NORMAL
ANION GAP SERPL CALCULATED.3IONS-SCNC: 4 MMOL/L (ref 4–13)
BLD GP AB SCN SERPL QL: NEGATIVE
BUN SERPL-MCNC: 9 MG/DL (ref 5–25)
CALCIUM SERPL-MCNC: 7.8 MG/DL (ref 8.3–10.1)
CHLORIDE SERPL-SCNC: 104 MMOL/L (ref 100–108)
CO2 SERPL-SCNC: 30 MMOL/L (ref 21–32)
CREAT SERPL-MCNC: 0.47 MG/DL (ref 0.6–1.3)
ERYTHROCYTE [DISTWIDTH] IN BLOOD BY AUTOMATED COUNT: 16.9 % (ref 11.6–15.1)
GFR SERPL CREATININE-BSD FRML MDRD: 99 ML/MIN/1.73SQ M
GLUCOSE SERPL-MCNC: 100 MG/DL (ref 65–140)
HCT VFR BLD AUTO: 21.8 % (ref 34.8–46.1)
HGB BLD-MCNC: 6.8 G/DL (ref 11.5–15.4)
MCH RBC QN AUTO: 28.6 PG (ref 26.8–34.3)
MCHC RBC AUTO-ENTMCNC: 31.2 G/DL (ref 31.4–37.4)
MCV RBC AUTO: 92 FL (ref 82–98)
PLATELET # BLD AUTO: 503 THOUSANDS/UL (ref 149–390)
PMV BLD AUTO: 10.4 FL (ref 8.9–12.7)
POTASSIUM SERPL-SCNC: 3.8 MMOL/L (ref 3.5–5.3)
RBC # BLD AUTO: 2.38 MILLION/UL (ref 3.81–5.12)
RH BLD: POSITIVE
SODIUM SERPL-SCNC: 138 MMOL/L (ref 136–145)
SPECIMEN EXPIRATION DATE: NORMAL
WBC # BLD AUTO: 19.19 THOUSAND/UL (ref 4.31–10.16)

## 2021-04-06 PROCEDURE — 80048 BASIC METABOLIC PNL TOTAL CA: CPT | Performed by: INTERNAL MEDICINE

## 2021-04-06 PROCEDURE — 99232 SBSQ HOSP IP/OBS MODERATE 35: CPT | Performed by: INTERNAL MEDICINE

## 2021-04-06 PROCEDURE — 86850 RBC ANTIBODY SCREEN: CPT | Performed by: INTERNAL MEDICINE

## 2021-04-06 PROCEDURE — 97530 THERAPEUTIC ACTIVITIES: CPT

## 2021-04-06 PROCEDURE — P9016 RBC LEUKOCYTES REDUCED: HCPCS

## 2021-04-06 PROCEDURE — 86901 BLOOD TYPING SEROLOGIC RH(D): CPT | Performed by: INTERNAL MEDICINE

## 2021-04-06 PROCEDURE — 99233 SBSQ HOSP IP/OBS HIGH 50: CPT | Performed by: GENERAL PRACTICE

## 2021-04-06 PROCEDURE — 86923 COMPATIBILITY TEST ELECTRIC: CPT

## 2021-04-06 PROCEDURE — 99024 POSTOP FOLLOW-UP VISIT: CPT | Performed by: SURGERY

## 2021-04-06 PROCEDURE — 86900 BLOOD TYPING SEROLOGIC ABO: CPT | Performed by: INTERNAL MEDICINE

## 2021-04-06 PROCEDURE — 94760 N-INVAS EAR/PLS OXIMETRY 1: CPT

## 2021-04-06 PROCEDURE — 94668 MNPJ CHEST WALL SBSQ: CPT

## 2021-04-06 PROCEDURE — 71046 X-RAY EXAM CHEST 2 VIEWS: CPT

## 2021-04-06 PROCEDURE — 94664 DEMO&/EVAL PT USE INHALER: CPT

## 2021-04-06 PROCEDURE — 85027 COMPLETE CBC AUTOMATED: CPT | Performed by: INTERNAL MEDICINE

## 2021-04-06 PROCEDURE — 94669 MECHANICAL CHEST WALL OSCILL: CPT

## 2021-04-06 RX ORDER — SENNOSIDES 8.6 MG
1 TABLET ORAL
Status: DISCONTINUED | OUTPATIENT
Start: 2021-04-06 | End: 2021-04-09

## 2021-04-06 RX ORDER — DOCUSATE SODIUM 100 MG/1
100 CAPSULE, LIQUID FILLED ORAL 2 TIMES DAILY
Status: DISCONTINUED | OUTPATIENT
Start: 2021-04-06 | End: 2021-04-09

## 2021-04-06 RX ADMIN — SENNOSIDES 8.6 MG: 8.6 TABLET, FILM COATED ORAL at 22:34

## 2021-04-06 RX ADMIN — BUPROPION HYDROCHLORIDE 150 MG: 150 TABLET, FILM COATED, EXTENDED RELEASE ORAL at 08:39

## 2021-04-06 RX ADMIN — ATORVASTATIN CALCIUM 40 MG: 40 TABLET, FILM COATED ORAL at 18:07

## 2021-04-06 RX ADMIN — FLUOXETINE 20 MG: 20 CAPSULE ORAL at 08:39

## 2021-04-06 RX ADMIN — PANTOPRAZOLE SODIUM 20 MG: 20 TABLET, DELAYED RELEASE ORAL at 05:50

## 2021-04-06 RX ADMIN — METRONIDAZOLE 500 MG: 500 TABLET ORAL at 14:02

## 2021-04-06 RX ADMIN — OXYCODONE HYDROCHLORIDE 10 MG: 10 TABLET ORAL at 08:39

## 2021-04-06 RX ADMIN — Medication 1 TABLET: at 08:39

## 2021-04-06 RX ADMIN — ACETAMINOPHEN 975 MG: 325 TABLET, FILM COATED ORAL at 22:34

## 2021-04-06 RX ADMIN — FUROSEMIDE 20 MG: 20 TABLET ORAL at 08:39

## 2021-04-06 RX ADMIN — BENZONATATE 200 MG: 100 CAPSULE ORAL at 22:34

## 2021-04-06 RX ADMIN — ACETAMINOPHEN 975 MG: 325 TABLET, FILM COATED ORAL at 14:02

## 2021-04-06 RX ADMIN — LIDOCAINE 1 PATCH: 50 PATCH TOPICAL at 08:46

## 2021-04-06 RX ADMIN — ALPRAZOLAM 0.25 MG: 0.25 TABLET ORAL at 02:40

## 2021-04-06 RX ADMIN — MELATONIN TAB 3 MG 6 MG: 3 TAB at 22:33

## 2021-04-06 RX ADMIN — ACETAMINOPHEN 975 MG: 325 TABLET, FILM COATED ORAL at 05:50

## 2021-04-06 RX ADMIN — GUAIFENESIN AND DEXTROMETHORPHAN 10 ML: 100; 10 SYRUP ORAL at 03:32

## 2021-04-06 RX ADMIN — ENOXAPARIN SODIUM 40 MG: 40 INJECTION SUBCUTANEOUS at 08:39

## 2021-04-06 RX ADMIN — ALPRAZOLAM 0.25 MG: 0.25 TABLET ORAL at 14:22

## 2021-04-06 RX ADMIN — METRONIDAZOLE 500 MG: 500 TABLET ORAL at 05:50

## 2021-04-06 RX ADMIN — CEFTRIAXONE SODIUM 2000 MG: 10 INJECTION, POWDER, FOR SOLUTION INTRAVENOUS at 18:07

## 2021-04-06 RX ADMIN — HYDROMORPHONE HYDROCHLORIDE 0.5 MG: 1 INJECTION, SOLUTION INTRAMUSCULAR; INTRAVENOUS; SUBCUTANEOUS at 10:14

## 2021-04-06 RX ADMIN — OXYCODONE HYDROCHLORIDE 10 MG: 10 TABLET ORAL at 18:13

## 2021-04-06 RX ADMIN — TRAZODONE HYDROCHLORIDE 200 MG: 100 TABLET ORAL at 22:34

## 2021-04-06 RX ADMIN — OXYCODONE HYDROCHLORIDE 10 MG: 10 TABLET ORAL at 02:40

## 2021-04-06 RX ADMIN — METRONIDAZOLE 500 MG: 500 TABLET ORAL at 22:34

## 2021-04-06 RX ADMIN — DOCUSATE SODIUM 100 MG: 100 CAPSULE, LIQUID FILLED ORAL at 08:47

## 2021-04-06 NOTE — QUICK NOTE
The patient was seen and the right sided chest tube dressing was removed  The site was painted with betadiene and then the suture securing the chest tube was cut  The chest tube was pulled in expiration with the patinet humming  The suture at the chest tube site was  Tied down and a gauze dressing was placed  The patient tolerated the procedure well

## 2021-04-06 NOTE — PROGRESS NOTES
Progress Note - Infectious Disease   Mahad Chapman 65 y o  female MRN: [id number]  Unit/Bed#: [facility] 408-01 Encounter: [id number]      Impression/Plan:     1- Sepsis, POA:  Secondary to #2 and #3  Negative blood cultures  Clinically improved  No fevers  WBC count slowly trending downward  - antibiotics as below  - monitor CBC  - monitor temperatures and hemodynamics  - supportive care     2- Right-sided pneumonia:  Possible aspiration event leading to right-sided pneumonia and subsequent empyema   Patient denies binge drinking or daily drinking   Negative strep/Legionella antigen   - antibiotics as below  - close monitoring of respiratory status     3- Right-sided empyema:  Pleural fluid analyzed on 2021, at time of IR insertion of right-sided chest tube  Gram stain did show GPCs in pairs  Suspect strep  Consider polymicrobial infection  Now status post right-sided VATS with thick loculated purulent fluid noted intraoperatively  OR cultures are negative  - continue IV ceftriaxone  - continue oral Flagyl  - chest tube management per CT surgery  - anticipate transition to oral Augmentin 875/125 mg b i d  Complete 3 week postop course, through       4- coronary artery disease:  -continue close monitoring of hemodynamics status     5- tobacco dependence:  She reports  gradual smoking cessation; she has not smoked over the past 2 weeks prior to presentation  - advised patient to continue with smoking cessation     6- moderate aortic stenosis:  Noted on TTE from 2021  - cardiology follow-up               Subjective:  Last chest tube was removed today  Has some discomfort at surgical site  No fevers      Objective:  Vitals:  Temp:  [97 8 °F (36 6 °C)-98 3 °F (36 8 °C)] 98 °F (36 7 °C)  HR:  [84-99] 92  Resp:  [16-20] 20  BP: (103-127)/(53-85) 120/74  SpO2:  [94 %-98 %] 94 %  Temp (24hrs), Av °F (36 7 °C), Min:97 8 °F (36 6 °C), Max:98 3 °F (36 8 °C)  Current: Temperature: 98 °F (36 7 °C)    Physical Exam:   General:  No acute distress, sitting comfortably in chair  Psychiatric:  Awake and alert  Pulmonary:  Normal respiratory excursion without accessory muscle use  Abdomen:  Soft, nontender  Extremities:  No edema  Skin:  No rashes    Lab Results:  I have personally reviewed pertinent labs  Results from last 7 days   Lab Units 04/06/21  0547 04/05/21  0501 04/04/21  0449  04/02/21  1004   POTASSIUM mmol/L 3 8 3 9 4 6   < >  --    CHLORIDE mmol/L 104 104 106   < >  --    CO2 mmol/L 30 27 25   < >  --    CO2, I-STAT mmol/L  --   --   --   --  28   BUN mg/dL 9 10 9   < >  --    CREATININE mg/dL 0 47* 0 50* 0 67   < >  --    EGFR ml/min/1 73sq m 99 97 88   < >  --    GLUCOSE, ISTAT mg/dl  --   --   --   --  122   CALCIUM mg/dL 7 8* 7 8* 7 9*   < >  --     < > = values in this interval not displayed  Results from last 7 days   Lab Units 04/06/21  0547 04/05/21  0501 04/04/21 0449   WBC Thousand/uL 19 19* 19 71* 26 47*   HEMOGLOBIN g/dL 6 8* 7 0* 7 6*   PLATELETS Thousands/uL 503* 490* 561*     Results from last 7 days   Lab Units 04/02/21  0958   GRAM STAIN RESULT  Rare Polys  No bacteria seen       Imaging Studies:   I have personally reviewed pertinent imaging study reports and images in PACS  EKG, Pathology, and Other Studies:   I have personally reviewed pertinent reports

## 2021-04-06 NOTE — PROGRESS NOTES
Progress Note - Thoracic Surgery   Mahad Chapman 65 y o  female MRN: [id number]  Unit/Bed#: [facility] 408-01 Encounter: [id number]    CT on -20 suction: 10 ss, -AL  400 IS, poor effort  On 2 L oxygen  Assessment:  65 y o  female with empyema POD3 s/p VATS decort   R apical CT dced yesterday      Plan:  Diet as tolerated  CT to suction -20 cm on pleurevac  Abx per IM/ID  Patient must use IS more aggressively  DVT ppx    Subjective/Objective     Subjective: Endorses discomfort at surgical site      Objective:     Vitals: Temp:  [97 8 °F (36 6 °C)-98 5 °F (36 9 °C)] 98 3 °F (36 8 °C)  HR:  [82-93] 84  Resp:  [16-20] 16  BP: (103-120)/(51-56) 115/56  Body mass index is 24 73 kg/m²  I/O       04/03 0701 - 04/04 0700 04/04 0701 - 04/05 0700    P  O  255 60    I V  (mL/kg) 1040 (15 2)     IV Piggyback 150 600    Total Intake(mL/kg) 1445 (21 1) 660 (9 5)    Urine (mL/kg/hr) 700 (0 4) 1175 (0 7)    Chest Tube 320 50    Total Output 1020 1225    Net +425 -565                Physical Exam:   GENERAL APPEARANCE: in no acute distress  NEURO: stable  HEENT: normocephalic, atraumatic  CV: regular rate and rhythm, no tachycardia,   LUNGS: clear to auscultation bilaterally, on 2 L oxygen  R CT on -20 suction with no air leak, 10 cc serosang output  GI: soft, nontender, nondistended  : no abnormality detected  MSK: no abnormality detected   SKIN: no abnormality detected    CT dressings with some SS drainage, incisions CDI    Lab, Imaging and other studies: I have personally reviewed pertinent reports    , CBC with diff:   No results found for: WBC, HGB, HCT, MCV, PLT, ADJUSTEDWBC, MCH, MCHC, RDW, MPV, NRBC, BMP/CMP: No results found for: SODIUM, K, CL, CO2, ANIONGAP, BUN, CREATININE, GLUCOSE, CALCIUM, AST, ALT, ALKPHOS, PROT, BILITOT, EGFR  VTE Pharmacologic Prophylaxis: Enoxaparin (Lovenox)  VTE Mechanical Prophylaxis: sequential compression device        Wendy Carvajal MD  4/6/2021 6:00 AM

## 2021-04-06 NOTE — RESTORATIVE TECHNICIAN NOTE
Restorative Specialist Mobility Note       Activity: Stand at bedside, Chair     Assistive Device: Front wheel walker

## 2021-04-06 NOTE — ASSESSMENT & PLAN NOTE
Coronary artery disease  Plavix on hold for VATS, will restart when ok by thoracics  Continue Lipitor

## 2021-04-06 NOTE — PLAN OF CARE
Problem: PHYSICAL THERAPY ADULT  Goal: Performs mobility at highest level of function for planned discharge setting  See evaluation for individualized goals  Description: Treatment/Interventions: Functional transfer training, LE strengthening/ROM, Elevations, Therapeutic exercise, Endurance training, Bed mobility, Gait training, Spoke to nursing, Spoke to case management, OT          See flowsheet documentation for full assessment, interventions and recommendations  Outcome: Progressing  Note: Prognosis: Guarded  Problem List: Decreased strength, Decreased endurance, Decreased mobility, Impaired balance, Decreased cognition, Decreased safety awareness, Pain  Assessment: Pt continues to present with weakness, impaired balance, and limited activity tolerance  Pt very tearful this session, but expressing that she wants to walk with therapy  Performed 3 STS transfer trials, during which pt was extremely retropulsive requiring modAx2 to prevent LOB  Pt able to take 1-2 small steps to stretcher, but unable to ambulate further  Pt is making very slow progress at this time; would benefit from continued acute skilled PT to address above deficits and allow for improved functional mobility  Continuing to recommend rehab upon d/c    Barriers to Discharge: Inaccessible home environment, Decreased caregiver support     PT Discharge Recommendation: [id number]     PT - OK to Discharge: Yes(to rehab, when appropriate)    See flowsheet documentation for full assessment

## 2021-04-06 NOTE — ASSESSMENT & PLAN NOTE
Malnutrition Findings:        Severe protein calorie malnutrition  Adult malnutrition type:  Acute illness  Adult degree of malnutrition:  Other severe protein calorie malnutrition severe protein calorie malnutrition as evidenced by [place] orbits, temp temple following, clavicle protrusion, with prominent bone and low albumin levels 1 6 greater than 1 8, and associated with coagulopathy with high INR 8 on admission in the setting of right lower lobe pneumonia with complicated right parapneumonic effusion  Also has a pulmonary nodule needs to be worked up       BMI finding  Body mass index is 26 19  Nutrition is following      Add ensure clear
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
## 2021-04-06 NOTE — PROGRESS NOTES
[facility]  Progress Note - Cruz Serum 1950, 65 y o  female MRN: [id number]  Unit/Bed#: [facility] 408-01 Encounter: [id number]  Primary Care Provider: Obdulio Green MD   Date and time admitted to hospital: 3/29/2021  2:37 PM    * Sepsis due to Empyema  Assessment & Plan  · Presented with sepsis POA tachycardia, elevated white count to 36 K with 4% bands, found to have right-sided large parapneumonic effusion  Subsequent g stain fluid cultures revealed Gram-positive cocci in pairs  She has chest tube placed by IR on 03/23 at [facility]   Six doses of tPA/dornase have completed by 03/27  Repeat CT scan showed mid size residual right pleural effusion slightly decreased  New ground-glass opacity in the left anterior upper lobe, of 5 centimetre left pulmonary nodule noted  · On ceftriaxone and flagyl per ID recomendtions, will likely need 3 weeks of treatment  · Can go on Augmentin per ID at d/c  · S/p VATS decortication on 4/2  · surgical cultures neg  · Post op chest tube management per thoracic surgery - Today chest tube removed  · F/u CXR post-removal  Right post apical chest tube removal today, with follow up chest xray- Removal of right chest tube with no effusion or pneumothorax  Persistent right base atelectasis    · Add tessalon at HS to assist with sleep, avoid potent cough suppressants during the day  · Reduce Oxycodone to 10 mg Q4HPRN for severe pain and 5 mg Q4H PRN for moderate pain           Urinary retention  Assessment & Plan  · Likely due to post op pain, opiate analgesics, functional due to chest tubes  · Woody catheter placed and removed 4/5 for void trial    Pulmonary nodule  Assessment & Plan  · 5 centimeter nodule noted   · Pulmonary follow up outpatient    Anxiety  Assessment & Plan  Continue with Xanax and trazodone  Xanax 0 25 mg Q12H PRN     Dysphagia  Assessment & Plan  · Resolved, placed back on regular diet    Valvular heart disease  Assessment & Plan    Valvular disease  Aortic valve is 1 39 with moderate tricuspid and PA pressures of 60, echo shows ejection fraction of 60% with grade 1 diastolic dysfunction    Anemia  Assessment & Plan  · Chronic normocytic normochromic anemia  · Iron panel suggested chronic anemia of inflammation  · S/p 2 units of RBCs - Today given a unit  · Continue to monitor hb post op - Suspect anemia related to VATS    Severe protein-calorie malnutrition (HCC)  Assessment & Plan  Malnutrition Findings:        Severe protein calorie malnutrition  Adult malnutrition type:  Acute illness  Adult degree of malnutrition:  Other severe protein calorie malnutrition severe protein calorie malnutrition as evidenced by [place] orbits, temp temple following, clavicle protrusion, with prominent bone and low albumin levels 1 6 greater than 1 8, and associated with coagulopathy with high INR 8 on admission in the setting of right lower lobe pneumonia with complicated right parapneumonic effusion  Also has a pulmonary nodule needs to be worked up  BMI finding  Body mass index is 26 19  Nutrition is following      Add ensure clear         Thrombocytosis (HCC)  Assessment & Plan  · Thrombocytosis  · Likely reactive from her infection  · Will continue to monitor    Delirium  Assessment & Plan  · Delirium has resolved  · Family denies any heavy alcohol use, maybe she takes a glass of wine occasionally so this unlikely active alcohol use more like severe delirium in the setting of sepsis  · CT head was negative for intracranial anomaly  · Now back to baseline mental status        Supratherapeutic INR  Assessment & Plan  Supratherapeutic INR  Presented with INR of 8 7    On admission, possibly secondary to severe malnutrition, albumin was 1 8 on admission  No history of heavy alcohol use per family  Liver enzymes are within normal limits  Received a 10 mg of vitamin K with improvement of INR to 1 27  Follow INR daily    HTN (hypertension)  Assessment & Plan  · Bps are acceptable  · Continue lasix po    CAD (coronary artery disease)  Assessment & Plan  Coronary artery disease  Plavix on hold for VATS, will restart when ok by thoracics  Continue Lipitor    VTE Pharmacologic Prophylaxis:   Pharmacologic: Enoxaparin (Lovenox)  Mechanical VTE Prophylaxis in Place: Yes    Patient Centered Rounds: I have performed bedside rounds with nursing staff today  Discussions with Specialists or Other Care Team Provider: thoracics    Education and Discussions with Family / Patient: Radha    Time Spent for Care: 30 minutes  More than 50% of total time spent on counseling and coordination of care as described above  Current Length of Stay: 8 day(s)    Current Patient Status: Inpatient   Certification Statement: The patient will continue to require additional inpatient hospital stay due to need for transfusion    Discharge Plan: will need rehab - possibly as soon as tomorrow    Code Status: Level 1 - Full Code      Subjective:   C/o pain at chest tube site    Objective:     Vitals:   Temp (24hrs), Av °F (36 7 °C), Min:97 8 °F (36 6 °C), Max:98 3 °F (36 8 °C)    Temp:  [97 8 °F (36 6 °C)-98 3 °F (36 8 °C)] 98 1 °F (36 7 °C)  HR:  [84-99] 90  Resp:  [16-22] 22  BP: (103-127)/(53-85) 125/60  SpO2:  [94 %-98 %] 95 %  Body mass index is 24 95 kg/m²  Input and Output Summary (last 24 hours): Intake/Output Summary (Last 24 hours) at 2021 1431  Last data filed at 2021 1150  Gross per 24 hour   Intake 800 ml   Output 485 ml   Net 315 ml       Physical Exam:     Physical Exam  HENT:      Head: Normocephalic and atraumatic  Nose: Nose normal       Mouth/Throat:      Mouth: Mucous membranes are moist    Eyes:      Extraocular Movements: Extraocular movements intact  Conjunctiva/sclera: Conjunctivae normal    Neck:      Musculoskeletal: Normal range of motion and neck supple     Cardiovascular:      Rate and Rhythm: Normal rate and regular rhythm  Pulmonary:      Effort: Pulmonary effort is normal       Breath sounds: Normal breath sounds  No wheezing or rales  Abdominal:      General: Bowel sounds are normal  There is no distension  Palpations: Abdomen is soft  Tenderness: There is no abdominal tenderness  Musculoskeletal: Normal range of motion  Right lower leg: No edema  Left lower leg: No edema  Skin:     General: Skin is warm and dry  Neurological:      Mental Status: She is alert and oriented to person, place, and time  Mental status is at baseline  Additional Data:     Labs:    Results from last 7 days   Lab Units 04/06/21  0547  04/04/21  0449   WBC Thousand/uL 19 19*   < > 26 47*   HEMOGLOBIN g/dL 6 8*   < > 7 6*   HEMATOCRIT % 21 8*   < > 24 4*   PLATELETS Thousands/uL 503*   < > 561*   NEUTROS PCT %  --   --  85*   LYMPHS PCT %  --   --  7*   MONOS PCT %  --   --  7   EOS PCT %  --   --  0    < > = values in this interval not displayed  Results from last 7 days   Lab Units 04/06/21  0547  04/02/21  1004   POTASSIUM mmol/L 3 8   < >  --    CHLORIDE mmol/L 104   < >  --    CO2 mmol/L 30   < >  --    CO2, I-STAT mmol/L  --   --  28   BUN mg/dL 9   < >  --    CREATININE mg/dL 0 47*   < >  --    CALCIUM mg/dL 7 8*   < >  --    GLUCOSE, ISTAT mg/dl  --   --  122    < > = values in this interval not displayed  Results from last 7 days   Lab Units 04/02/21  0446   INR  1 45*       * I Have Reviewed All Lab Data Listed Above  * Additional Pertinent Lab Tests Reviewed:  All [facility] Admission Reviewed        Recent Cultures (last 7 days):     Results from last 7 days   Lab Units 04/02/21  0958   GRAM STAIN RESULT  Rare Polys  No bacteria seen       Last 24 Hours Medication List:   Current Facility-Administered Medications   Medication Dose Route Frequency Provider Last Rate    acetaminophen  975 mg Oral Q8H [facility] Frances Wallace PA-C      ALPRAZolam  0 25 mg Oral Q12H PRN Shahnaz Mora MD      atorvastatin  40 mg Oral Daily With Maryjane Wu PA-C      benzonatate  200 mg Oral HS Shahnaz Mora MD      buPROPion  150 mg Oral Daily [place], [place]      cefTRIAXone  2,000 mg Intravenous Q24H [facility] [place] Stopped (04/05/21 0434)   Ta dextromethorphan-guaiFENesin  10 mL Oral Q4H PRN Jonel Thacker PA-C      docusate sodium  100 mg Oral BID Isa Bullock DO      enoxaparin  40 mg Subcutaneous Daily Frances Wallace PA-C      FLUoxetine  20 mg Oral Daily [facility] [place], [place]      furosemide  20 mg Oral Daily [facility] [place], [place]      HYDROmorphone  0 5 mg Intravenous Q3H PRN Shahnaz Mora MD      lidocaine  1 patch Topical Daily [facility] [place], [place]      melatonin  6 mg Oral HS Frances Wallace PA-C      metroNIDAZOLE  500 mg Oral [place], [place]      multivitamin-minerals  1 tablet Oral Daily Jonel [facility] [place]      oxyCODONE  10 mg Oral Q4H PRN Shahnaz Mora MD      oxyCODONE  5 mg Oral Q4H PRN Shahnaz Mora MD      pantoprazole  20 mg Oral Early Morning Jonel Thacker PA-C      senna  1 tablet Oral HS Isa Bullock DO      traZODone  200 mg Oral HS Jonel Thacker PA-C          Today, Patient Was Seen By: Isa Bullock DO    ** Please Note: Dictation voice to text software may have been used in the creation of this document   **

## 2021-04-07 LAB
ABO GROUP BLD BPU: NORMAL
ANION GAP SERPL CALCULATED.3IONS-SCNC: 6 MMOL/L (ref 4–13)
BPU ID: NORMAL
BUN SERPL-MCNC: 9 MG/DL (ref 5–25)
CA-I BLD-SCNC: 1.1 MMOL/L (ref 1.12–1.32)
CALCIUM SERPL-MCNC: 8.3 MG/DL (ref 8.3–10.1)
CHLORIDE SERPL-SCNC: 104 MMOL/L (ref 100–108)
CO2 SERPL-SCNC: 29 MMOL/L (ref 21–32)
CREAT SERPL-MCNC: 0.52 MG/DL (ref 0.6–1.3)
CROSSMATCH: NORMAL
ERYTHROCYTE [DISTWIDTH] IN BLOOD BY AUTOMATED COUNT: 16.4 % (ref 11.6–15.1)
GFR SERPL CREATININE-BSD FRML MDRD: 96 ML/MIN/1.73SQ M
GLUCOSE SERPL-MCNC: 103 MG/DL (ref 65–140)
HCT VFR BLD AUTO: 30.6 % (ref 34.8–46.1)
HGB BLD-MCNC: 9.7 G/DL (ref 11.5–15.4)
INR PPP: 1.4 (ref 0.84–1.19)
MAGNESIUM SERPL-MCNC: 1.8 MG/DL (ref 1.6–2.6)
MCH RBC QN AUTO: 28.4 PG (ref 26.8–34.3)
MCHC RBC AUTO-ENTMCNC: 31.7 G/DL (ref 31.4–37.4)
MCV RBC AUTO: 90 FL (ref 82–98)
PHOSPHATE SERPL-MCNC: 2.6 MG/DL (ref 2.3–4.1)
PLATELET # BLD AUTO: 576 THOUSANDS/UL (ref 149–390)
PMV BLD AUTO: 10.4 FL (ref 8.9–12.7)
POTASSIUM SERPL-SCNC: 4 MMOL/L (ref 3.5–5.3)
PROTHROMBIN TIME: 17.2 SECONDS (ref 11.6–14.5)
RBC # BLD AUTO: 3.41 MILLION/UL (ref 3.81–5.12)
SODIUM SERPL-SCNC: 139 MMOL/L (ref 136–145)
UNIT DISPENSE STATUS: NORMAL
UNIT PRODUCT CODE: NORMAL
UNIT RH: NORMAL
WBC # BLD AUTO: 20.26 THOUSAND/UL (ref 4.31–10.16)

## 2021-04-07 PROCEDURE — 99232 SBSQ HOSP IP/OBS MODERATE 35: CPT | Performed by: INTERNAL MEDICINE

## 2021-04-07 PROCEDURE — 80048 BASIC METABOLIC PNL TOTAL CA: CPT | Performed by: GENERAL PRACTICE

## 2021-04-07 PROCEDURE — 83735 ASSAY OF MAGNESIUM: CPT | Performed by: GENERAL PRACTICE

## 2021-04-07 PROCEDURE — 99232 SBSQ HOSP IP/OBS MODERATE 35: CPT | Performed by: GENERAL PRACTICE

## 2021-04-07 PROCEDURE — 85027 COMPLETE CBC AUTOMATED: CPT | Performed by: GENERAL PRACTICE

## 2021-04-07 PROCEDURE — 82330 ASSAY OF CALCIUM: CPT | Performed by: GENERAL PRACTICE

## 2021-04-07 PROCEDURE — 84100 ASSAY OF PHOSPHORUS: CPT | Performed by: GENERAL PRACTICE

## 2021-04-07 PROCEDURE — 85610 PROTHROMBIN TIME: CPT | Performed by: GENERAL PRACTICE

## 2021-04-07 RX ORDER — MAGNESIUM SULFATE HEPTAHYDRATE 40 MG/ML
2 INJECTION, SOLUTION INTRAVENOUS ONCE
Status: COMPLETED | OUTPATIENT
Start: 2021-04-07 | End: 2021-04-07

## 2021-04-07 RX ORDER — CLOPIDOGREL BISULFATE 75 MG/1
75 TABLET ORAL DAILY
Status: DISCONTINUED | OUTPATIENT
Start: 2021-04-07 | End: 2021-04-13 | Stop reason: HOSPADM

## 2021-04-07 RX ADMIN — PANTOPRAZOLE SODIUM 20 MG: 20 TABLET, DELAYED RELEASE ORAL at 06:38

## 2021-04-07 RX ADMIN — ACETAMINOPHEN 975 MG: 325 TABLET, FILM COATED ORAL at 10:19

## 2021-04-07 RX ADMIN — CEFTRIAXONE SODIUM 2000 MG: 10 INJECTION, POWDER, FOR SOLUTION INTRAVENOUS at 17:23

## 2021-04-07 RX ADMIN — OXYCODONE HYDROCHLORIDE 10 MG: 10 TABLET ORAL at 17:51

## 2021-04-07 RX ADMIN — ENOXAPARIN SODIUM 40 MG: 40 INJECTION SUBCUTANEOUS at 10:00

## 2021-04-07 RX ADMIN — Medication 1 TABLET: at 10:00

## 2021-04-07 RX ADMIN — LIDOCAINE 1 PATCH: 50 PATCH TOPICAL at 10:00

## 2021-04-07 RX ADMIN — METRONIDAZOLE 500 MG: 500 TABLET ORAL at 22:07

## 2021-04-07 RX ADMIN — ATORVASTATIN CALCIUM 40 MG: 40 TABLET, FILM COATED ORAL at 17:23

## 2021-04-07 RX ADMIN — METRONIDAZOLE 500 MG: 500 TABLET ORAL at 14:46

## 2021-04-07 RX ADMIN — OXYCODONE HYDROCHLORIDE 10 MG: 10 TABLET ORAL at 22:10

## 2021-04-07 RX ADMIN — ACETAMINOPHEN 975 MG: 325 TABLET, FILM COATED ORAL at 22:07

## 2021-04-07 RX ADMIN — MAGNESIUM SULFATE HEPTAHYDRATE 2 G: 40 INJECTION, SOLUTION INTRAVENOUS at 10:17

## 2021-04-07 RX ADMIN — METRONIDAZOLE 500 MG: 500 TABLET ORAL at 06:38

## 2021-04-07 RX ADMIN — FLUOXETINE 20 MG: 20 CAPSULE ORAL at 10:00

## 2021-04-07 RX ADMIN — BUPROPION HYDROCHLORIDE 150 MG: 150 TABLET, FILM COATED, EXTENDED RELEASE ORAL at 10:00

## 2021-04-07 RX ADMIN — MELATONIN TAB 3 MG 6 MG: 3 TAB at 22:07

## 2021-04-07 RX ADMIN — ALPRAZOLAM 0.25 MG: 0.25 TABLET ORAL at 10:17

## 2021-04-07 RX ADMIN — OXYCODONE HYDROCHLORIDE 10 MG: 10 TABLET ORAL at 02:14

## 2021-04-07 RX ADMIN — CLOPIDOGREL BISULFATE 75 MG: 75 TABLET, FILM COATED ORAL at 10:01

## 2021-04-07 RX ADMIN — BENZONATATE 200 MG: 100 CAPSULE ORAL at 22:07

## 2021-04-07 RX ADMIN — FUROSEMIDE 20 MG: 20 TABLET ORAL at 10:00

## 2021-04-07 RX ADMIN — TRAZODONE HYDROCHLORIDE 200 MG: 100 TABLET ORAL at 22:07

## 2021-04-07 NOTE — OCCUPATIONAL THERAPY NOTE
Occupational Therapy Cancellation Note        Patient Name: Maria R Mann  ZGUFC'I Date: 4/7/2021 04/07/21 1024   OT Last Visit   OT Visit Date 04/07/21   Note Type   Note Type Treatment   Cancel Reasons Other       Chart reviewed, OT treatment attempted  Pt adamantly refusing therapy at this time despite extensive education  OT will continue to follow and see as medically appropriate and able       Federico Beavers, MOT, OTR/L

## 2021-04-07 NOTE — ASSESSMENT & PLAN NOTE
· Presented with sepsis POA tachycardia, elevated white count to 36 K with 4% bands, found to have right-sided large parapneumonic effusion  Subsequent g stain fluid cultures revealed Gram-positive cocci in pairs  She has chest tube placed by IR on 03/[facility] at [facility]   Six doses of tPA/dornase have completed by 03/27  Repeat CT scan showed mid size residual right pleural effusion slightly decreased  New ground-glass opacity in the left anterior upper lobe, of 5 centimetre left pulmonary nodule noted  · On ceftriaxone and flagyl per ID recomendtions, will likely need 3 weeks of treatment  · Can go on Augmentin per ID at d/c  · S/p VATS decortication on 4/2  · surgical cultures neg  · Post op chest tube management per thoracic surgery - 4/6 chest tube removed  · CXR post-removal shows small residual hydoPTX - ok for d/c per thoracics  Should have OP Xray in 2-3 weeks  Right post apical chest tube removal today, with follow up chest xray- Removal of right chest tube with no effusion or pneumothorax  Persistent right base atelectasis    · Add tessalon at HS to assist with sleep, avoid potent cough suppressants during the day  · C/w Oxycodone to 10 mg Q4HPRN for severe pain and 5 mg Q4H PRN for moderate pain

## 2021-04-07 NOTE — PROGRESS NOTES
Progress Note - Infectious Disease   Lucila Salcedo 65 y o  female MRN: [id number]  Unit/Bed#: [facility] 408-01 Encounter: [id number]      Impression/Plan:     1- Sepsis, POA:  Secondary to #2 and #3   Negative blood cultures  Patient has clinically improved  WBC count remains elevated at 20, may be reactive due to pain, recent surgery  Otherwise remains afebrile and clinically stable  - antibiotics as below  - monitor CBC  - monitor temperatures and hemodynamics  - supportive care     2- Right-sided pneumonia:  Possible aspiration event leading to right-sided pneumonia and subsequent empyema   Patient denies binge drinking or daily drinking   Negative strep/Legionella antigen   - antibiotics as below  - close monitoring of respiratory status     3- Right-sided empyema:  Pleural fluid analyzed on 03/23/2021, at time of IR insertion of right-sided chest tube  Gram stain did show GPCs in pairs   Suspect strep   Consider polymicrobial infection   Now status post right-sided VATS with thick loculated purulent fluid noted intraoperatively   OR cultures are negative  - continue IV ceftriaxone  - continue oral Flagyl  - chest tube management per CT surgery  - anticipate transition to oral Augmentin 875/125 mg b i d    - complete 3 week postop course, through 4/22      4- coronary artery disease:  -continue close monitoring of hemodynamics status     5- tobacco dependence:  She reports  gradual smoking cessation; she has not smoked over the past 2 weeks prior to presentation  - advised patient to continue with smoking cessation     6- moderate aortic stenosis:  Noted on TTE from 03/23/2021  - cardiology follow-up        I discussed above plan with Dr May Ureña from Internal Medicine Service  Subjective:  No acute overnight events  No fevers  Tolerating oral intake      Objective:  Vitals:  Temp:  [97 8 °F (36 6 °C)-98 6 °F (37 °C)] 98 6 °F (37 °C)  HR:  [79-92] 85  Resp:  [20] 20  BP: ()/(55-61) 125/59  SpO2: [91 %-98 %] 91 %  Temp (24hrs), Av 2 °F (36 8 °C), Min:97 8 °F (36 6 °C), Max:98 6 °F (37 °C)  Current: Temperature: 98 6 °F (37 °C)    Physical Exam:   General:  No acute distress, sitting comfortably in chair  HEENT:  Atraumatic normocephalic  Pulmonary:  Normal respiratory excursion without accessory muscle use  Abdomen:  Soft, nontender  Extremities:  No edema  Skin:  No rashes  Neuro: Moves all extremities spontaneously  Psych:  Normal mood and affect    Lab Results:  I have personally reviewed pertinent labs  Results from last 7 days   Lab Units 21  0529 21  0547 21  0501  21  1004   POTASSIUM mmol/L 4 0 3 8 3 9   < >  --    CHLORIDE mmol/L 104 104 104   < >  --    CO2 mmol/L 29 30 27   < >  --    CO2, I-STAT mmol/L  --   --   --   --  28   BUN mg/dL 9 9 10   < >  --    CREATININE mg/dL 0 52* 0 47* 0 50*   < >  --    EGFR ml/min/1 73sq m 96 99 97   < >  --    GLUCOSE, ISTAT mg/dl  --   --   --   --  122   CALCIUM mg/dL 8 3 7 8* 7 8*   < >  --     < > = values in this interval not displayed  Results from last 7 days   Lab Units 21  0529 21  0547 21  0501   WBC Thousand/uL 20 26* 19 19* 19 71*   HEMOGLOBIN g/dL 9 7* 6 8* 7 0*   PLATELETS Thousands/uL 576* 503* 490*     Results from last 7 days   Lab Units 21  0958   GRAM STAIN RESULT  Rare Polys  No bacteria seen       Imaging Studies:   I have personally reviewed pertinent imaging study reports and images in PACS  Repeat chest x-ray from yesterday shows air-fluid level in right hemithorax indicating a small right hydropneumothorax  Persistent ground-glass opacity in right lung base, likely atelectasis and scarring  EKG, Pathology, and Other Studies:   I have personally reviewed pertinent reports

## 2021-04-07 NOTE — ASSESSMENT & PLAN NOTE
Malnutrition Findings:        Severe protein calorie malnutrition  Adult malnutrition type:  Acute illness  Adult degree of malnutrition:  Other severe protein calorie malnutrition severe protein calorie malnutrition as evidenced by [place] orbits, temp temple following, clavicle protrusion, with prominent bone and low albumin levels 1 6 greater than 1 8, and associated with coagulopathy with high INR 8 on admission in the setting of right lower lobe pneumonia with complicated right parapneumonic effusion  Also has a pulmonary nodule needs to be worked up       BMI finding  Body mass index is 26 19  Nutrition is following      Add ensure clear
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
## 2021-04-07 NOTE — PLAN OF CARE
Problem: Potential for Falls  Goal: Patient will remain free of falls  Description: INTERVENTIONS:  - Assess patient frequently for physical needs  -  Identify cognitive and physical deficits and behaviors that affect risk of falls  -  [place] fall precautions as indicated by assessment   - Educate patient/family on patient safety including physical limitations  - Instruct patient to call for assistance with activity based on assessment  - Modify environment to reduce risk of injury  - Consider OT/PT consult to assist with strengthening/mobility  Outcome: Progressing     Problem: Prexisting or High Potential for Compromised Skin Integrity  Goal: Skin integrity is maintained or improved  Description: INTERVENTIONS:  - Identify patients at risk for skin breakdown  - Assess and monitor skin integrity  - Assess and monitor nutrition and hydration status  - Monitor labs   - Assess for incontinence   - Turn and reposition patient  - Assist with mobility/ambulation  - Relieve pressure over bony prominences  - Avoid friction and shearing  - Provide appropriate hygiene as needed including keeping skin clean and dry  - Evaluate need for skin moisturizer/barrier cream  - Collaborate with interdisciplinary team   - Patient/family teaching  - Consider wound care consult   Outcome: Progressing     Problem: Nutrition/Hydration-ADULT  Goal: Nutrient/Hydration intake appropriate for improving, restoring or maintaining nutritional needs  Description: Monitor and assess patient's nutrition/hydration status for malnutrition  Collaborate with interdisciplinary team and initiate plan and interventions as ordered  Monitor patient's weight and dietary intake as ordered or per policy  Utilize nutrition screening tool and intervene as necessary  Determine patient's food preferences and provide high-protein, high-caloric foods as appropriate       INTERVENTIONS:  - Monitor oral intake, urinary output, labs, and treatment plans  - Assess nutrition and hydration status and recommend course of action  - Evaluate amount of meals eaten  - Assist patient with eating if necessary   - Allow adequate time for meals  - Recommend/ encourage appropriate diets, oral nutritional supplements, and vitamin/mineral supplements  - Order, calculate, and assess calorie counts as needed  - Recommend, monitor, and adjust tube feedings and TPN/PPN based on assessed needs  - Assess need for intravenous fluids  - Provide specific nutrition/hydration education as appropriate  - Include patient/family/caregiver in decisions related to nutrition  Outcome: Progressing     Problem: PAIN - ADULT  Goal: Verbalizes/displays adequate comfort level or baseline comfort level  Description: Interventions:  - Encourage patient to monitor pain and request assistance  - Assess pain using appropriate pain scale  - Administer analgesics based on type and severity of pain and evaluate response  - Implement non-pharmacological measures as appropriate and evaluate response  - Consider cultural and social influences on pain and pain management  - Notify physician/advanced practitioner if interventions unsuccessful or patient reports new pain  Outcome: Progressing     Problem: INFECTION - ADULT  Goal: Absence or prevention of progression during hospitalization  Description: INTERVENTIONS:  - Assess and monitor for signs and symptoms of infection  - Monitor lab/diagnostic results  - Monitor all insertion sites, i e  indwelling lines, tubes, and drains  - Monitor endotracheal if appropriate and nasal secretions for changes in amount and color  - [place] appropriate cooling/warming therapies per order  - Administer medications as ordered  - Instruct and encourage patient and family to use good hand hygiene technique  - Identify and instruct in appropriate isolation precautions for identified infection/condition  Outcome: Progressing  Goal: Absence of fever/infection during neutropenic period  Description: INTERVENTIONS:  - Monitor WBC    Outcome: Progressing     Problem: SAFETY ADULT  Goal: Patient will remain free of falls  Description: INTERVENTIONS:  - Assess patient frequently for physical needs  -  Identify cognitive and physical deficits and behaviors that affect risk of falls    -  [place] fall precautions as indicated by assessment   - Educate patient/family on patient safety including physical limitations  - Instruct patient to call for assistance with activity based on assessment  - Modify environment to reduce risk of injury  - Consider OT/PT consult to assist with strengthening/mobility  Outcome: Progressing  Goal: Maintain or return to baseline ADL function  Description: INTERVENTIONS:  -  Assess patient's ability to carry out ADLs; assess patient's baseline for ADL function and identify physical deficits which impact ability to perform ADLs (bathing, care of mouth/teeth, toileting, grooming, dressing, etc )  - Assess/evaluate cause of self-care deficits   - Assess range of motion  - Assess patient's mobility; develop plan if impaired  - Assess patient's need for assistive devices and provide as appropriate  - Encourage maximum independence but intervene and supervise when necessary  - Involve family in performance of ADLs  - Assess for home care needs following discharge   - Consider OT consult to assist with ADL evaluation and planning for discharge  - Provide patient education as appropriate  Outcome: Progressing  Goal: Maintain or return mobility status to optimal level  Description: INTERVENTIONS:  - Assess patient's baseline mobility status (ambulation, transfers, stairs, etc )    - Identify cognitive and physical deficits and behaviors that affect mobility  - Identify mobility aids required to assist with transfers and/or ambulation (gait belt, sit-to-stand, lift, walker, cane, etc )  - [place] fall precautions as indicated by assessment  - Record patient progress and toleration of activity level on Mobility SBAR; progress patient to next Phase/Stage  - Instruct patient to call for assistance with activity based on assessment  - Consider rehabilitation consult to assist with strengthening/weightbearing, etc   Outcome: Progressing     Problem: DISCHARGE PLANNING  Goal: Discharge to home or other facility with appropriate resources  Description: INTERVENTIONS:  - Identify barriers to discharge w/patient and caregiver  - Arrange for needed discharge resources and transportation as appropriate  - Identify discharge learning needs (meds, wound care, etc )  - Arrange for interpretive services to assist at discharge as needed  - Refer to Case Management Department for coordinating discharge planning if the patient needs post-hospital services based on physician/advanced practitioner order or complex needs related to functional status, cognitive ability, or social support system  Outcome: Progressing     Problem: Knowledge Deficit  Goal: Patient/family/caregiver demonstrates understanding of disease process, treatment plan, medications, and discharge instructions  Description: Complete learning assessment and assess knowledge base    Interventions:  - Provide teaching at level of understanding  - Provide teaching via preferred learning methods  Outcome: Progressing

## 2021-04-07 NOTE — ASSESSMENT & PLAN NOTE
Supratherapeutic INR  Presented with INR of 8 7  On admission, possibly secondary to severe malnutrition, albumin was 1 8 on admission  No history of heavy alcohol use per family  Liver enzymes are within normal limits  Received a 10 mg of vitamin K with improvement of INR to 1 27  INR appears stable < 1 5

## 2021-04-07 NOTE — ASSESSMENT & PLAN NOTE
· Chronic normocytic normochromic anemia  · Iron panel suggested chronic anemia of inflammation  · S/p 2 units of RBCs - last given a unit 4/6  · Hgb stable post transfusions - Suspect anemia related to VATS

## 2021-04-07 NOTE — PROGRESS NOTES
[facility]  Progress Note - Ruby Hernandez 1950, 65 y o  female MRN: [id number]  Unit/Bed#: [facility] 408-01 Encounter: [id number]  Primary Care Provider: Omari Luz MD   Date and time admitted to hospital: 3/29/2021  2:37 PM    * Sepsis due to Empyema  Assessment & Plan  · Presented with sepsis POA tachycardia, elevated white count to 36 K with 4% bands, found to have right-sided large parapneumonic effusion  Subsequent g stain fluid cultures revealed Gram-positive cocci in pairs  She has chest tube placed by IR on 03/23 at [facility]   Six doses of tPA/dornase have completed by 03/27  Repeat CT scan showed mid size residual right pleural effusion slightly decreased  New ground-glass opacity in the left anterior upper lobe, of 5 centimetre left pulmonary nodule noted  · On ceftriaxone and flagyl per ID recomendtions, will likely need 3 weeks of treatment  · Can go on Augmentin per ID at d/c  · S/p VATS decortication on 4/2  · surgical cultures neg  · Post op chest tube management per thoracic surgery - 4/6 chest tube removed  · CXR post-removal shows small residual hydoPTX - ok for d/c per thoracics  Should have OP Xray in 2-3 weeks  Right post apical chest tube removal today, with follow up chest xray- Removal of right chest tube with no effusion or pneumothorax  Persistent right base atelectasis    · Add tessalon at HS to assist with sleep, avoid potent cough suppressants during the day  · C/w Oxycodone to 10 mg Q4HPRN for severe pain and 5 mg Q4H PRN for moderate pain           Urinary retention  Assessment & Plan  · Likely due to post op pain, opiate analgesics, functional due to chest tubes  · Woody catheter placed and removed 4/5 for void trial - pt passed void trial    Pulmonary nodule  Assessment & Plan  · 5 centimeter nodule noted   · Pulmonary follow up outpatient    Anxiety  Assessment & Plan  Continue with Xanax and trazodone  Xanax 0 25 mg Q12H PRN     Dysphagia  Assessment & Plan  · Resolved, placed back on regular diet    Valvular heart disease  Assessment & Plan    Valvular disease  Aortic valve is 1 39 with moderate tricuspid and PA pressures of 60, echo shows ejection fraction of 60% with grade 1 diastolic dysfunction    Anemia  Assessment & Plan  · Chronic normocytic normochromic anemia  · Iron panel suggested chronic anemia of inflammation  · S/p 2 units of RBCs - last given a unit 4/6  · Hgb stable post transfusions - Suspect anemia related to VATS    Severe protein-calorie malnutrition (HCC)  Assessment & Plan  Malnutrition Findings:        Severe protein calorie malnutrition  Adult malnutrition type:  Acute illness  Adult degree of malnutrition:  Other severe protein calorie malnutrition severe protein calorie malnutrition as evidenced by [place] Anum orbits, temp temple following, clavicle protrusion, with prominent bone and low albumin levels 1 6 greater than 1 8, and associated with coagulopathy with high INR 8 on admission in the setting of right lower lobe pneumonia with complicated right parapneumonic effusion  Also has a pulmonary nodule needs to be worked up  BMI finding  Body mass index is 26 19  Nutrition is following      Add ensure clear         Thrombocytosis (HCC)  Assessment & Plan  · Thrombocytosis  · Likely reactive from her infection  · Will continue to monitor    Delirium  Assessment & Plan  · Delirium has resolved  · Family denies any heavy alcohol use, maybe she takes a glass of wine occasionally so this unlikely active alcohol use more like severe delirium in the setting of sepsis  · CT head was negative for intracranial anomaly  · Now back to baseline mental status        Supratherapeutic INR  Assessment & Plan  Supratherapeutic INR  Presented with INR of 8 7    On admission, possibly secondary to severe malnutrition, albumin was 1 8 on admission  No history of heavy alcohol use per family  Liver enzymes are within normal limits  Received a 10 mg of vitamin K with improvement of INR to 1 27  INR appears stable < 1 5  HTN (hypertension)  Assessment & Plan  · Bps are acceptable  · Continue lasix po    CAD (coronary artery disease)  Assessment & Plan  Coronary artery disease  Plavix restarted  Continue Lipitor      VTE Pharmacologic Prophylaxis:   Pharmacologic: Enoxaparin (Lovenox)  Mechanical VTE Prophylaxis in Place: Yes    Patient Centered Rounds: I have performed bedside rounds with nursing staff today  Discussions with Specialists or Other Care Team Provider: thoracics and ID    Education and Discussions with Family / Patient: pt and Lucia    Time Spent for Care: 30 minutes  More than 50% of total time spent on counseling and coordination of care as described above  Current Length of Stay: 9 day(s)    Current Patient Status: Inpatient   Certification Statement: The patient will continue to require additional inpatient hospital stay due to need for rehab placement    Discharge Plan: pending rehab placement    Code Status: Level 1 - Full Code      Subjective:   Feels better w/ CT out  Objective:     Vitals:   Temp (24hrs), Av 2 °F (36 8 °C), Min:97 8 °F (36 6 °C), Max:98 6 °F (37 °C)    Temp:  [97 8 °F (36 6 °C)-98 6 °F (37 °C)] 98 6 °F (37 °C)  HR:  [79-92] 85  Resp:  [20] 20  BP: ()/(55-61) 125/59  SpO2:  [91 %-98 %] 91 %  Body mass index is 24 34 kg/m²  Input and Output Summary (last 24 hours): Intake/Output Summary (Last 24 hours) at 2021 1547  Last data filed at 2021 0700  Gross per 24 hour   Intake 100 ml   Output 77 ml   Net 23 ml       Physical Exam:     Physical Exam  HENT:      Head: Normocephalic and atraumatic  Nose: Nose normal       Mouth/Throat:      Mouth: Mucous membranes are moist    Eyes:      Extraocular Movements: Extraocular movements intact  Conjunctiva/sclera: Conjunctivae normal    Neck:      Musculoskeletal: Normal range of motion and neck supple  Cardiovascular:      Rate and Rhythm: Normal rate and regular rhythm  Pulmonary:      Effort: Pulmonary effort is normal       Breath sounds: Normal breath sounds  No wheezing or rales  Abdominal:      General: Bowel sounds are normal  There is no distension  Palpations: Abdomen is soft  Tenderness: There is no abdominal tenderness  Musculoskeletal: Normal range of motion  Right lower leg: No edema  Left lower leg: No edema  Skin:     General: Skin is warm and dry  Neurological:      Mental Status: She is alert and oriented to person, place, and time  Mental status is at baseline  Additional Data:     Labs:    Results from last 7 days   Lab Units 04/07/21  0529  04/04/21  0449   WBC Thousand/uL 20 26*   < > 26 47*   HEMOGLOBIN g/dL 9 7*   < > 7 6*   HEMATOCRIT % 30 6*   < > 24 4*   PLATELETS Thousands/uL 576*   < > 561*   NEUTROS PCT %  --   --  85*   LYMPHS PCT %  --   --  7*   MONOS PCT %  --   --  7   EOS PCT %  --   --  0    < > = values in this interval not displayed  Results from last 7 days   Lab Units 04/07/21  0529  04/02/21  1004   POTASSIUM mmol/L 4 0   < >  --    CHLORIDE mmol/L 104   < >  --    CO2 mmol/L 29   < >  --    CO2, I-STAT mmol/L  --   --  28   BUN mg/dL 9   < >  --    CREATININE mg/dL 0 52*   < >  --    CALCIUM mg/dL 8 3   < >  --    GLUCOSE, ISTAT mg/dl  --   --  122    < > = values in this interval not displayed  Results from last 7 days   Lab Units 04/07/21  0529   INR  1 40*       * I Have Reviewed All Lab Data Listed Above  * Additional Pertinent Lab Tests Reviewed:  All [facility] Admission Reviewed        Recent Cultures (last 7 days):     Results from last 7 days   Lab Units 04/02/21  0958   GRAM STAIN RESULT  Rare Polys  No bacteria seen       Last 24 Hours Medication List:   Current Facility-Administered Medications   Medication Dose Route Frequency Provider Last Rate    acetaminophen  975 mg Oral [facility] HECTOR Wallace      ALPRAZolam  0 25 mg Oral Q12H PRN Ric Mcintyre MD      atorvastatin  40 mg Oral Daily With Sj Cain PA-C      benzonatate  200 mg Oral HS Ric Mcintyre MD      buPROPion  150 mg Oral Daily Irving Marx      cefTRIAXone  2,000 mg Intravenous Q24H Mark Phelpskate Wallace PA-C 2,000 mg (04/06/21 1807)    clopidogrel  75 mg Oral Daily Frank Shania,       dextromethorphan-guaiFENesin  10 mL Oral Q4H PRN Ian Esposito PA-C      docusate sodium  100 mg Oral BID Frank DO Shania      enoxaparin  40 mg Subcutaneous Daily Frances Wallace PA-C      FLUoxetine  20 mg Oral Daily Frances Wallace PA-C      furosemide  20 mg Oral Daily Frances Ugarte PA-C      lidocaine  1 patch Topical Daily Ian Esposito [place]      melatonin  6 mg Oral HS Frances Wallace PA-C      metroNIDAZOLE  500 mg Oral [place]      multivitamin-minerals  1 tablet Oral Daily Ivring Garcia      oxyCODONE  10 mg Oral Q4H PRN Ric Mcintyre MD      oxyCODONE  5 mg Oral Q4H PRN Ric Mcintyre MD      pantoprazole  20 mg Oral Early Morning Ian Esposito PA-C      senna  1 tablet Oral HS Frank Jauregui DO      traZODone  200 mg Oral HS Ian Esposito PA-C          Today, Patient Was Seen By: Frank Jauregui DO    ** Please Note: Dictation voice to text software may have been used in the creation of this document   **

## 2021-04-07 NOTE — ASSESSMENT & PLAN NOTE
· Likely due to post op pain, opiate analgesics, functional due to chest tubes  · Woody catheter placed and removed 4/5 for void trial - pt passed void trial
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
## 2021-04-08 LAB
ALBUMIN SERPL BCP-MCNC: 1.2 G/DL (ref 3.5–5)
ALP SERPL-CCNC: 90 U/L (ref 46–116)
ALT SERPL W P-5'-P-CCNC: 9 U/L (ref 12–78)
ANION GAP SERPL CALCULATED.3IONS-SCNC: 6 MMOL/L (ref 4–13)
ANISOCYTOSIS BLD QL SMEAR: PRESENT
AST SERPL W P-5'-P-CCNC: 26 U/L (ref 5–45)
BACTERIA UR QL AUTO: ABNORMAL /HPF
BASOPHILS # BLD MANUAL: 0 THOUSAND/UL (ref 0–0.1)
BASOPHILS NFR MAR MANUAL: 0 % (ref 0–1)
BILIRUB SERPL-MCNC: 0.25 MG/DL (ref 0.2–1)
BILIRUB UR QL STRIP: NEGATIVE
BUN SERPL-MCNC: 9 MG/DL (ref 5–25)
CALCIUM ALBUM COR SERPL-MCNC: 10 MG/DL (ref 8.3–10.1)
CALCIUM SERPL-MCNC: 7.8 MG/DL (ref 8.3–10.1)
CHLORIDE SERPL-SCNC: 101 MMOL/L (ref 100–108)
CK MB SERPL-MCNC: 4.3 % (ref 0–2.5)
CK MB SERPL-MCNC: 8.2 NG/ML (ref 0–5)
CK SERPL-CCNC: 191 U/L (ref 26–192)
CLARITY UR: CLEAR
CO2 SERPL-SCNC: 31 MMOL/L (ref 21–32)
COLOR UR: ABNORMAL
CREAT SERPL-MCNC: 0.48 MG/DL (ref 0.6–1.3)
EOSINOPHIL # BLD MANUAL: 0.14 THOUSAND/UL (ref 0–0.4)
EOSINOPHIL NFR BLD MANUAL: 1 % (ref 0–6)
ERYTHROCYTE [DISTWIDTH] IN BLOOD BY AUTOMATED COUNT: 16.1 % (ref 11.6–15.1)
GFR SERPL CREATININE-BSD FRML MDRD: 98 ML/MIN/1.73SQ M
GLUCOSE SERPL-MCNC: 95 MG/DL (ref 65–140)
GLUCOSE UR STRIP-MCNC: NEGATIVE MG/DL
HCT VFR BLD AUTO: 25.6 % (ref 34.8–46.1)
HGB BLD-MCNC: 8.1 G/DL (ref 11.5–15.4)
HGB UR QL STRIP.AUTO: NEGATIVE
HYPERCHROMIA BLD QL SMEAR: PRESENT
INR PPP: 1.52 (ref 0.84–1.19)
KETONES UR STRIP-MCNC: NEGATIVE MG/DL
LEUKOCYTE ESTERASE UR QL STRIP: NEGATIVE
LYMPHOCYTES # BLD AUTO: 0.86 THOUSAND/UL (ref 0.6–4.47)
LYMPHOCYTES # BLD AUTO: 6 % (ref 14–44)
MCH RBC QN AUTO: 28 PG (ref 26.8–34.3)
MCHC RBC AUTO-ENTMCNC: 31.6 G/DL (ref 31.4–37.4)
MCV RBC AUTO: 89 FL (ref 82–98)
MONOCYTES # BLD AUTO: 1.15 THOUSAND/UL (ref 0–1.22)
MONOCYTES NFR BLD: 8 % (ref 4–12)
MYELOCYTES NFR BLD MANUAL: 1 % (ref 0–1)
NEUTROPHILS # BLD MANUAL: 11.78 THOUSAND/UL (ref 1.85–7.62)
NEUTS BAND NFR BLD MANUAL: 1 % (ref 0–8)
NEUTS SEG NFR BLD AUTO: 81 % (ref 43–75)
NITRITE UR QL STRIP: POSITIVE
NON-SQ EPI CELLS URNS QL MICRO: ABNORMAL /HPF
NRBC BLD AUTO-RTO: 0 /100 WBCS
NRBC BLD AUTO-RTO: 1 /100 WBC (ref 0–2)
OTHER STN SPEC: ABNORMAL
PH UR STRIP.AUTO: 6 [PH]
PLATELET # BLD AUTO: 510 THOUSANDS/UL (ref 149–390)
PLATELET BLD QL SMEAR: ABNORMAL
PMV BLD AUTO: 10.1 FL (ref 8.9–12.7)
POLYCHROMASIA BLD QL SMEAR: PRESENT
POTASSIUM SERPL-SCNC: 3.7 MMOL/L (ref 3.5–5.3)
PROT SERPL-MCNC: 5.4 G/DL (ref 6.4–8.2)
PROT UR STRIP-MCNC: NEGATIVE MG/DL
PROTHROMBIN TIME: 18.3 SECONDS (ref 11.6–14.5)
RBC # BLD AUTO: 2.89 MILLION/UL (ref 3.81–5.12)
RBC #/AREA URNS AUTO: ABNORMAL /HPF
RBC MORPH BLD: PRESENT
SODIUM SERPL-SCNC: 138 MMOL/L (ref 136–145)
SP GR UR STRIP.AUTO: 1.03 (ref 1–1.03)
UROBILINOGEN UR QL STRIP.AUTO: 0.2 E.U./DL
VARIANT LYMPHS # BLD AUTO: 2 %
WBC # BLD AUTO: 14.37 THOUSAND/UL (ref 4.31–10.16)
WBC #/AREA URNS AUTO: ABNORMAL /HPF

## 2021-04-08 PROCEDURE — 99233 SBSQ HOSP IP/OBS HIGH 50: CPT | Performed by: INTERNAL MEDICINE

## 2021-04-08 PROCEDURE — 85610 PROTHROMBIN TIME: CPT | Performed by: PHYSICIAN ASSISTANT

## 2021-04-08 PROCEDURE — 97116 GAIT TRAINING THERAPY: CPT

## 2021-04-08 PROCEDURE — 97535 SELF CARE MNGMENT TRAINING: CPT

## 2021-04-08 PROCEDURE — 85007 BL SMEAR W/DIFF WBC COUNT: CPT | Performed by: STUDENT IN AN ORGANIZED HEALTH CARE EDUCATION/TRAINING PROGRAM

## 2021-04-08 PROCEDURE — 80053 COMPREHEN METABOLIC PANEL: CPT | Performed by: STUDENT IN AN ORGANIZED HEALTH CARE EDUCATION/TRAINING PROGRAM

## 2021-04-08 PROCEDURE — 97530 THERAPEUTIC ACTIVITIES: CPT

## 2021-04-08 PROCEDURE — 85027 COMPLETE CBC AUTOMATED: CPT | Performed by: STUDENT IN AN ORGANIZED HEALTH CARE EDUCATION/TRAINING PROGRAM

## 2021-04-08 PROCEDURE — 81001 URINALYSIS AUTO W/SCOPE: CPT | Performed by: STUDENT IN AN ORGANIZED HEALTH CARE EDUCATION/TRAINING PROGRAM

## 2021-04-08 PROCEDURE — 82553 CREATINE MB FRACTION: CPT | Performed by: STUDENT IN AN ORGANIZED HEALTH CARE EDUCATION/TRAINING PROGRAM

## 2021-04-08 PROCEDURE — 82550 ASSAY OF CK (CPK): CPT | Performed by: STUDENT IN AN ORGANIZED HEALTH CARE EDUCATION/TRAINING PROGRAM

## 2021-04-08 PROCEDURE — 99232 SBSQ HOSP IP/OBS MODERATE 35: CPT | Performed by: GENERAL PRACTICE

## 2021-04-08 RX ADMIN — TRAZODONE HYDROCHLORIDE 200 MG: 100 TABLET ORAL at 21:24

## 2021-04-08 RX ADMIN — GUAIFENESIN AND DEXTROMETHORPHAN 10 ML: 100; 10 SYRUP ORAL at 21:24

## 2021-04-08 RX ADMIN — FUROSEMIDE 20 MG: 20 TABLET ORAL at 08:30

## 2021-04-08 RX ADMIN — BENZONATATE 200 MG: 100 CAPSULE ORAL at 21:23

## 2021-04-08 RX ADMIN — FLUOXETINE 20 MG: 20 CAPSULE ORAL at 08:31

## 2021-04-08 RX ADMIN — OXYCODONE HYDROCHLORIDE 5 MG: 5 TABLET ORAL at 16:42

## 2021-04-08 RX ADMIN — CEFTRIAXONE SODIUM 2000 MG: 10 INJECTION, POWDER, FOR SOLUTION INTRAVENOUS at 17:32

## 2021-04-08 RX ADMIN — GUAIFENESIN AND DEXTROMETHORPHAN 10 ML: 100; 10 SYRUP ORAL at 03:10

## 2021-04-08 RX ADMIN — METRONIDAZOLE 500 MG: 500 TABLET ORAL at 05:42

## 2021-04-08 RX ADMIN — LIDOCAINE 1 PATCH: 50 PATCH TOPICAL at 08:33

## 2021-04-08 RX ADMIN — OXYCODONE HYDROCHLORIDE 10 MG: 10 TABLET ORAL at 10:24

## 2021-04-08 RX ADMIN — ALPRAZOLAM 0.25 MG: 0.25 TABLET ORAL at 16:42

## 2021-04-08 RX ADMIN — CLOPIDOGREL BISULFATE 75 MG: 75 TABLET, FILM COATED ORAL at 08:32

## 2021-04-08 RX ADMIN — MELATONIN TAB 3 MG 6 MG: 3 TAB at 21:24

## 2021-04-08 RX ADMIN — METRONIDAZOLE 500 MG: 500 TABLET ORAL at 12:35

## 2021-04-08 RX ADMIN — ACETAMINOPHEN 975 MG: 325 TABLET, FILM COATED ORAL at 05:42

## 2021-04-08 RX ADMIN — OXYCODONE HYDROCHLORIDE 10 MG: 10 TABLET ORAL at 03:10

## 2021-04-08 RX ADMIN — ALPRAZOLAM 0.25 MG: 0.25 TABLET ORAL at 05:55

## 2021-04-08 RX ADMIN — METRONIDAZOLE 500 MG: 500 TABLET ORAL at 21:24

## 2021-04-08 RX ADMIN — Medication 1 TABLET: at 08:32

## 2021-04-08 RX ADMIN — ACETAMINOPHEN 975 MG: 325 TABLET, FILM COATED ORAL at 21:24

## 2021-04-08 RX ADMIN — BUPROPION HYDROCHLORIDE 150 MG: 150 TABLET, FILM COATED, EXTENDED RELEASE ORAL at 08:31

## 2021-04-08 RX ADMIN — ENOXAPARIN SODIUM 40 MG: 40 INJECTION SUBCUTANEOUS at 08:32

## 2021-04-08 RX ADMIN — ATORVASTATIN CALCIUM 40 MG: 40 TABLET, FILM COATED ORAL at 16:42

## 2021-04-08 RX ADMIN — PANTOPRAZOLE SODIUM 20 MG: 20 TABLET, DELAYED RELEASE ORAL at 05:44

## 2021-04-08 RX ADMIN — ACETAMINOPHEN 975 MG: 325 TABLET, FILM COATED ORAL at 12:35

## 2021-04-08 RX ADMIN — OXYCODONE HYDROCHLORIDE 5 MG: 5 TABLET ORAL at 21:24

## 2021-04-08 NOTE — PROGRESS NOTES
[address] [facility]  Progress Note - Pineda Arnett 1950, 65 y o  female MRN: [id number]  Unit/Bed#: [facility] 623-01 Encounter: [id number]  Primary Care Provider: Viki Pennington MD   Date and time admitted to hospital: 3/29/2021  2:37 PM    * Sepsis due to Empyema  Assessment & Plan  · Presented with sepsis POA tachycardia, elevated white count to 36 K with 4% bands, found to have right-sided large parapneumonic effusion  Subsequent g stain fluid cultures revealed Gram-positive cocci in pairs  She has chest tube placed by IR on 03/23 at [facility]   Six doses of tPA/dornase have completed by 03/27  Repeat CT scan showed mid size residual right pleural effusion slightly decreased  New ground-glass opacity in the left anterior upper lobe, of 5 centimetre left pulmonary nodule noted  · On ceftriaxone and flagyl per ID recomendtions, will need 3 weeks of treatment  · Can go on Augmentin per ID at d/c  · S/p VATS decortication on 4/2  · surgical cultures neg  · Post op chest tube management per thoracic surgery - 4/6 chest tube removed  · CXR post-removal shows small residual hydoPTX - ok for d/c per thoracics  Should have OP Xray in 2-3 weeks  Right post apical chest tube removal today, with follow up chest xray- Removal of right chest tube with no effusion or pneumothorax  Persistent right base atelectasis    · Add tessalon at HS to assist with sleep, avoid potent cough suppressants during the day  · C/w Oxycodone to 10 mg Q4HPRN for severe pain and 5 mg Q4H PRN for moderate pain  Urinary retention  Assessment & Plan  · Likely due to post op pain, opiate analgesics, functional due to chest tubes  · Raymond catheter placed and removed 4/5 for void trial  · Today raymond replaced as PVR elevated  Will need to keep raymond in at d/c and rehab can do void trial  · Urine dark likely 2/2 dehydration    UA unremarkable    Pulmonary nodule  Assessment & Plan  · 5 centimeter nodule noted   · Pulmonary follow up outpatient    Anxiety  Assessment & Plan  Continue with Xanax and trazodone  Xanax 0 25 mg Q12H PRN     Dysphagia  Assessment & Plan  · Resolved, placed back on regular diet    Valvular heart disease  Assessment & Plan    Valvular disease  Aortic valve is 1 39 with moderate tricuspid and PA pressures of 60, echo shows ejection fraction of 60% with grade 1 diastolic dysfunction    Anemia  Assessment & Plan  · Chronic normocytic normochromic anemia  · Iron panel suggested chronic anemia of inflammation  · S/p 2 units of RBCs - last given a unit 4/6  · Hgb stable post transfusions - Suspect anemia related to VATS    Severe protein-calorie malnutrition (HCC)  Assessment & Plan  Malnutrition Findings:        Severe protein calorie malnutrition  Adult malnutrition type:  Acute illness  Adult degree of malnutrition:  Other severe protein calorie malnutrition severe protein calorie malnutrition as evidenced by [employer] [place] [place] orbits, temp temple following, clavicle protrusion, with prominent bone and low albumin levels 1 6 greater than 1 8, and associated with coagulopathy with high INR 8 on admission in the setting of right lower lobe pneumonia with complicated right parapneumonic effusion  Also has a pulmonary nodule needs to be worked up  BMI finding  Body mass index is 26 19  Nutrition is following      Add ensure clear         Thrombocytosis (HCC)  Assessment & Plan  · Thrombocytosis  · Likely reactive from her infection  · Will continue to monitor    Delirium  Assessment & Plan  · Delirium has resolved  · Family denies any heavy alcohol use, maybe she takes a glass of wine occasionally so this unlikely active alcohol use more like severe delirium in the setting of sepsis  · CT head was negative for intracranial anomaly  · Now back to baseline mental status        Supratherapeutic INR  Assessment & Plan  Supratherapeutic INR  Presented with INR of 8 7    On admission, possibly secondary to severe malnutrition, albumin was 1 8 on admission  No history of heavy alcohol use per family  Liver enzymes are within normal limits  Received a 10 mg of vitamin K with improvement of INR to 1 27  INR appears stable <  Or = 1 5  HTN (hypertension)  Assessment & Plan  · Bps are acceptable  · Continue lasix po    CAD (coronary artery disease)  Assessment & Plan  Coronary artery disease  Plavix restarted  Continue Lipitor      VTE Pharmacologic Prophylaxis:   Pharmacologic: Enoxaparin (Lovenox)  Mechanical VTE Prophylaxis in Place: Yes    Patient Centered Rounds: I have performed bedside rounds with nursing staff today  Discussions with Specialists or Other Care Team Provider: no    Education and Discussions with Family / Patient: pt and  and dtr    Time Spent for Care: 30 minutes  More than 50% of total time spent on counseling and coordination of care as described above  Current Length of Stay: 10 day(s)    Current Patient Status: Inpatient   Certification Statement: The patient will continue to require additional inpatient hospital stay due to need for rehab placement    Discharge Plan: pending rehab placement    Code Status: Level 1 - Full Code      Subjective:   No acute complaints    Objective:     Vitals:   Temp (24hrs), Av 6 °F (37 °C), Min:98 2 °F (36 8 °C), Max:98 8 °F (37 1 °C)    Temp:  [98 2 °F (36 8 °C)-98 8 °F (37 1 °C)] 98 5 °F (36 9 °C)  HR:  [85-86] 85  Resp:  [15-18] 17  BP: (115-124)/(54-66) 115/65  SpO2:  [94 %-98 %] 94 %  Body mass index is 24 34 kg/m²  Input and Output Summary (last 24 hours): Intake/Output Summary (Last 24 hours) at 2021 1759  Last data filed at 2021 1432  Gross per 24 hour   Intake 460 ml   Output 900 ml   Net -440 ml       Physical Exam:     Physical Exam  HENT:      Head: Normocephalic        Nose: Nose normal       Mouth/Throat:      Mouth: Mucous membranes are moist    Eyes:      Extraocular Movements: Extraocular movements intact  Conjunctiva/sclera: Conjunctivae normal    Neck:      Musculoskeletal: Normal range of motion and neck supple  Cardiovascular:      Rate and Rhythm: Normal rate and regular rhythm  Pulmonary:      Effort: Pulmonary effort is normal       Comments: Decreased BS  Abdominal:      General: Bowel sounds are normal  There is no distension  Palpations: Abdomen is soft  Tenderness: There is no abdominal tenderness  Musculoskeletal: Normal range of motion  Right lower leg: No edema  Left lower leg: No edema  Skin:     General: Skin is warm and dry  Neurological:      Mental Status: She is alert and oriented to person, place, and time  Additional Data:     Labs:    Results from last 7 days   Lab Units 04/08/21  0541  04/04/21  0449   WBC Thousand/uL 14 37*   < > 26 47*   HEMOGLOBIN g/dL 8 1*   < > 7 6*   HEMATOCRIT % 25 6*   < > 24 4*   PLATELETS Thousands/uL 510*   < > 561*   NEUTROS PCT %  --   --  85*   LYMPHS PCT %  --   --  7*   LYMPHO PCT % 6*  --   --    MONOS PCT %  --   --  7   MONO PCT % 8  --   --    EOS PCT % 1  --  0    < > = values in this interval not displayed  Results from last 7 days   Lab Units 04/08/21  0541  04/02/21  1004   POTASSIUM mmol/L 3 7   < >  --    CHLORIDE mmol/L 101   < >  --    CO2 mmol/L 31   < >  --    CO2, I-STAT mmol/L  --   --  28   BUN mg/dL 9   < >  --    CREATININE mg/dL 0 48*   < >  --    CALCIUM mg/dL 7 8*   < >  --    ALK PHOS U/L 90  --   --    ALT U/L 9*  --   --    AST U/L 26  --   --    GLUCOSE, ISTAT mg/dl  --   --  122    < > = values in this interval not displayed  Results from last 7 days   Lab Units 04/08/21  0541   INR  1 52*       * I Have Reviewed All Lab Data Listed Above  * Additional Pertinent Lab Tests Reviewed:  Aj 66 Admission Reviewed        Recent Cultures (last 7 days):     Results from last 7 days   Lab Units 04/02/21  0958   GRAM STAIN RESULT  Rare Polys  No bacteria seen Last 24 Hours Medication List:   Current Facility-Administered Medications   Medication Dose Route Frequency Provider Last Rate    acetaminophen  975 mg Oral [facility] Frances Wallace PA-C      ALPRAZolam  0 25 mg Oral Q12H PRN Rc Mckeon MD      atorvastatin  40 mg Oral Daily With Rajan Silva PA-C      benzonatate  200 mg Oral HS Rc Mckeon MD      buPROPion  150 mg Oral Daily Frances Wallace PA-C      cefTRIAXone  2,000 mg Intravenous Q24H Jose Wallace PA-C 2,000 mg (04/08/21 1732)    clopidogrel  75 mg Oral Daily Sebastián Cruz DO      dextromethorphan-guaiFENesin  10 mL Oral Q4H PRN Joselyn Goyal PA-C      docusate sodium  100 mg Oral BID Sebastián Cruz DO      enoxaparin  40 mg Subcutaneous Daily Frances Wallace PA-C      FLUoxetine  20 mg Oral Daily Frances Wallace PA-C      furosemide  20 mg Oral Daily Frances Ugarte PA-C      lidocaine  1 patch Topical Daily Irving Bedolla      melatonin  6 mg Oral HS Frances Wallace PA-C      metroNIDAZOLE  500 mg Oral Q8H [facility] [address]      multivitamin-minerals  1 tablet Oral Daily Irving Bedolla      oxyCODONE  10 mg Oral Q4H PRN Rc Mckeon MD      oxyCODONE  5 mg Oral Q4H PRN Rc Mckeon MD      pantoprazole  20 mg Oral Early Morning Joselyn Goyal PA-C      senna  1 tablet Oral HS Sebastián Crzu DO      traZODone  200 mg Oral HS Joselyn Goyal PA-C          Today, Patient Was Seen By: Sebastián Cruz DO    ** Please Note: Dictation voice to text software may have been used in the creation of this document   **

## 2021-04-08 NOTE — PLAN OF CARE
Problem: PHYSICAL THERAPY ADULT  Goal: Performs mobility at highest level of function for planned discharge setting  See evaluation for individualized goals  Description: Treatment/Interventions: Functional transfer training, LE strengthening/ROM, Elevations, Therapeutic exercise, Endurance training, Bed mobility, Gait training, Spoke to nursing, Spoke to case management, OT          See flowsheet documentation for full assessment, interventions and recommendations  Outcome: Progressing  Note: Prognosis: Guarded  Problem List: Decreased strength, Decreased endurance, Decreased mobility, Impaired balance, Decreased cognition, Decreased safety awareness, Pain  Assessment: Pt continues to require Ax1-2 for all transfers & OOB mobiilty tasks this session due to weakness, fatigue & anxiety  Pt demonstrated improved balance, but did require increased assist to gain balance over RW MARVIN due to retropulsion  Pt remains self limiting & fearful of falling as a result of percieved weakness  Was able to take better steps with bilateral arm in arm assist to pivot back to bed, then performed bed mobility without assist, which she was not axnious about  Anticipate pt will make progress if anxiety related to mobiltiy can be addressed to allow for improved focus on task and allow pt to follow instructions to safely progress mobility  POC and discharge plan remain appropriate at this time  Barriers to Discharge: Inaccessible home environment, Decreased caregiver support     PT Discharge Recommendation: [address]     PT - OK to Discharge: Yes(to rehab, when appropriate)    See flowsheet documentation for full assessment

## 2021-04-08 NOTE — QUICK NOTE
Notified of dark jimbo colored urine  Pt with 900cc of darkened urine out tonight, urine color unlikely from dehydration  Further workup pending  Pt is asymptomatic at this time

## 2021-04-08 NOTE — PLAN OF CARE
Problem: OCCUPATIONAL THERAPY ADULT  Goal: Performs self-care activities at highest level of function for planned discharge setting  See evaluation for individualized goals  Description: Treatment Interventions: ADL retraining, Functional transfer training, UE strengthening/ROM, Endurance training, Cognitive reorientation, Fine motor coordination activities, Compensatory technique education, Continued evaluation, Energy conservation, Activityengagement          See flowsheet documentation for full assessment, interventions and recommendations  Outcome: Progressing  Note: Limitation: Decreased ADL status, Decreased endurance, Decreased self-care trans, Decreased high-level ADLs  Prognosis: Good  Assessment: pt participated in am ot session and was seen focusing on adls seated eob, bed mobility transfers and activity tolerence  pt with some c/o discomfort r axillary area and neck  pt was motivated for oob and was willing to  engage in session with rests  pt was on 2 l o2 via nasal canula with pulse ox of 92%  pt feels sob without o2 or when taxed  pt demsontrates fair activity tolerence but does demosntrate improvement in sit to stnad and standing balance/tolerence during transfer and adls  pt required mod asst x 2 for supine to sit and for spt towards her right without a d   pt was minimally anxious this session and often tearful when initiating conversation about family members / deaths/ relationship with husb etc and current medical and physical status     OT Discharge Recommendation: Post-Acute Rehabilitation Services  OT - OK to Discharge:  Yes     JAYME Khan

## 2021-04-08 NOTE — ASSESSMENT & PLAN NOTE
Supratherapeutic INR  Presented with INR of 8 7  On admission, possibly secondary to severe malnutrition, albumin was 1 8 on admission  No history of heavy alcohol use per family  Liver enzymes are within normal limits  Received a 10 mg of vitamin K with improvement of INR to 1 27  INR appears stable <  Or = 1 5

## 2021-04-08 NOTE — OCCUPATIONAL THERAPY NOTE
Occupational Therapy Treatment Note:       04/08/21 1008   OT Last Visit   OT Visit Date 04/08/21   Note Type   Note Type Treatment   Restrictions/Precautions   Weight Bearing Precautions Per Order No   Pain Assessment   Pain Assessment Tool FLACC   Pain Rating: FLACC (Rest) - Face 0   Pain Rating: FLACC (Rest) - Legs 0   Pain Rating: FLACC (Rest) - Activity 0   Pain Rating: FLACC (Rest) - Cry 1   Pain Rating: FLACC (Rest) - Consolability 0   Score: FLACC (Rest) 1   Pain Rating: FLACC (Activity) - Face 1   Pain Rating: FLACC (Activity) - Legs 0   Pain Rating: FLACC (Activity) - Activity 1   Pain Rating: FLACC (Activity) - Cry 1   Pain Rating: FLACC (Activity) - Consolability 1   Score: FLACC (Activity) 4   ADL   Where Assessed Edge of bed   Grooming Assistance 5  Supervision/Setup   Grooming Comments completed in the chair after set up  pt states she has sharp broken theeth in mouth   UB Bathing Assistance 5  Supervision/Setup   LB Bathing Assistance 2  Maximal Assistance   UB Dressing Assistance 4  Minimal Assistance   UB Dressing Comments improved r ue arom although reports of pain with same   LB Dressing Assistance 2  Maximal Assistance   Toileting Assistance  2  Maximal Assistance   Toileting Comments   (pt was straight cath'ed earlier today per pca)   Activity Tolerance   Activity Tolerance Patient tolerated treatment well   Assessment   Assessment pt participated in am ot session and was seen focusing on adls seated eob, bed mobility transfers and activity tolerence  pt with some c/o discomfort r axillary area and neck  pt was motivated for oob and was willing to  engage in session with rests  pt was on 2 l o2 via nasal canula with pulse ox of 92%  pt feels sob without o2 or when taxed  pt demsontrates fair activity tolerence but does demosntrate improvement in sit to stnad and standing balance/tolerence during transfer and adls    pt required mod asst x 2 for supine to sit and for spt towards her right without a d   pt was minimally anxious this session and often tearful when initiating conversation about family members / deaths/ relationship with husb etc and current medical and physical status   Plan   Treatment Interventions ADL retraining;Functional transfer training;Cognitive reorientation;Patient/family training;Equipment evaluation/education; Activityengagement   Goal Expiration Date 04/19/21   OT Treatment Day 1   OT Frequency 3-5x/wk   Recommendation   OT Discharge Recommendation Post-Acute Rehabilitation Services   OT - OK to Discharge Yes   AM-PAC Daily Activity Inpatient   Lower Body Dressing 2   Bathing 2   Toileting 2   Upper Body Dressing 3   Grooming 3   Eating 4   Daily Activity Raw Score 16   Daily Activity Standardized Score (Calc for Raw Score >=11) 35 96   AM-PAC Applied Cognition Inpatient   Following a Speech/Presentation 3   Understanding Ordinary Conversation 4   Taking Medications 2   Remembering Where Things Are Placed or Put Away 3   Remembering List of 4-5 Errands 2   Taking Care of Complicated Tasks 2   Applied Cognition Raw Score 16   Applied Cognition Standardized Score 35 03 April A Storm, DELACRUZ

## 2021-04-08 NOTE — ASSESSMENT & PLAN NOTE
Malnutrition Findings:        Severe protein calorie malnutrition  Adult malnutrition type:  Acute illness  Adult degree of malnutrition:  Other severe protein calorie malnutrition severe protein calorie malnutrition as evidenced by [employer] [place] orbits, temp temple following, clavicle protrusion, with prominent bone and low albumin levels 1 6 greater than 1 8, and associated with coagulopathy with high INR 8 on admission in the setting of right lower lobe pneumonia with complicated right parapneumonic effusion  Also has a pulmonary nodule needs to be worked up       BMI finding  Body mass index is 26 19  Nutrition is following      Add ensure clear

## 2021-04-08 NOTE — CASE MANAGEMENT
This CM returned Gaurav Marion requesting to speak with patient  This CM spoke to pt, who is agreeable to speaking with Lino Marion  Pt requesting to use daughter, Micaela Marcus' cell phone to speak with Enoch Bush agreeable to call Micaela Velazquez' cell phone, stating she will also speak to Micaela Velazquez regarding pt situation      Lino Marion requesting medical records be faxed to her at [phone]

## 2021-04-08 NOTE — ASSESSMENT & PLAN NOTE
· Likely due to post op pain, opiate analgesics, functional due to chest tubes  · Raymond catheter placed and removed 4/5 for void trial  · Today raymond replaced as PVR elevated  Will need to keep raymond in at d/c and rehab can do void trial  · Urine dark likely 2/2 dehydration    UA unremarkable

## 2021-04-08 NOTE — PROGRESS NOTES
Progress Note - Infectious Disease   Hany Mcconnell 65 y o  female MRN: [id number]  Unit/Bed#: [facility] 623-01 Encounter: [id number]      Impression/Plan:     1- Sepsis, POA:  Secondary to #2 and #3   Negative blood cultures  Patient has clinically improved  WBC count now much improved  Otherwise remains afebrile and clinically stable  - antibiotics as below  - monitor CBC  - monitor temperatures and hemodynamics  - supportive care     2- Right-sided pneumonia:  Possible aspiration event leading to right-sided pneumonia and subsequent empyema   Patient denies binge drinking or daily drinking   Negative strep/Legionella antigen   - antibiotics as below  - close monitoring of respiratory status     3- Right-sided empyema:  Pleural fluid analyzed on 03/23/2021, at time of IR insertion of right-sided chest tube  Gram stain did show GPCs in pairs   Suspect strep   Consider polymicrobial infection   Now status post right-sided VATS with thick loculated purulent fluid noted intraoperatively   OR cultures are negative  - continue IV ceftriaxone  - continue oral Flagyl  - chest tube management per CT surgery  - anticipate transition to oral Augmentin 875/125 mg b i d    - complete 3 week postop course, through 4/22      4- coronary artery disease:  -continue close monitoring of hemodynamics status     5- tobacco dependence:  She reports  gradual smoking cessation; she has not smoked over the past 2 weeks prior to presentation  - advised patient to continue with smoking cessation     6- moderate aortic stenosis:  Noted on TTE from 03/23/2021  - cardiology follow-up            Subjective:  Starting to feel depressed  Breathing feels fine with no shortness of breath or cough  Denies fevers  Retaining urine and about to have a Woody placed      Objective:  Vitals:  Temp:  [97 5 °F (36 4 °C)-98 8 °F (37 1 °C)] 98 8 °F (37 1 °C)  HR:  [84-86] 85  Resp:  [15-18] 15  BP: (112-124)/(54-66) 118/54  SpO2:  [94 %-98 %] 94 %  Temp (24hrs), Av 3 °F (36 8 °C), Min:97 5 °F (36 4 °C), Max:98 8 °F (37 1 °C)  Current: Temperature: 98 8 °F (37 1 °C)    Physical Exam:   General:  No acute distress, nontoxic, resting comfortably in  Psychiatric:  Awake and alert  Pulmonary:  Normal respiratory excursion without accessory muscle use  Abdomen:  Soft, nontender  Extremities:  No edema  Skin:  No rashes    Lab Results:  I have personally reviewed pertinent labs  Results from last 7 days   Lab Units 21  0541 21  0529 21  0547  21  1004   POTASSIUM mmol/L 3 7 4 0 3 8   < >  --    CHLORIDE mmol/L 101 104 104   < >  --    CO2 mmol/L 31 29 30   < >  --    CO2, I-STAT mmol/L  --   --   --   --  28   BUN mg/dL 9 9 9   < >  --    CREATININE mg/dL 0 48* 0 52* 0 47*   < >  --    EGFR ml/min/1 73sq m 98 96 99   < >  --    GLUCOSE, ISTAT mg/dl  --   --   --   --  122   CALCIUM mg/dL 7 8* 8 3 7 8*   < >  --    AST U/L 26  --   --   --   --    ALT U/L 9*  --   --   --   --    ALK PHOS U/L 90  --   --   --   --     < > = values in this interval not displayed  Results from last 7 days   Lab Units 21  0541 21  0529 21  0547   WBC Thousand/uL 14 37* 20 26* 19 19*   HEMOGLOBIN g/dL 8 1* 9 7* 6 8*   PLATELETS Thousands/uL 510* 576* 503*     Results from last 7 days   Lab Units 21  0958   GRAM STAIN RESULT  Rare Polys  No bacteria seen       Imaging Studies:   I have personally reviewed pertinent imaging study reports and images in PACS  EKG, Pathology, and Other Studies:   I have personally reviewed pertinent reports

## 2021-04-08 NOTE — PLAN OF CARE
Problem: Potential for Falls  Goal: Patient will remain free of falls  Description: INTERVENTIONS:  - Assess patient frequently for physical needs  -  Identify cognitive and physical deficits and behaviors that affect risk of falls  -  Vest fall precautions as indicated by assessment   - Educate patient/family on patient safety including physical limitations  - Instruct patient to call for assistance with activity based on assessment  - Modify environment to reduce risk of injury  - Consider OT/PT consult to assist with strengthening/mobility  Outcome: Progressing     Problem: Prexisting or High Potential for Compromised Skin Integrity  Goal: Skin integrity is maintained or improved  Description: INTERVENTIONS:  - Identify patients at risk for skin breakdown  - Assess and monitor skin integrity  - Assess and monitor nutrition and hydration status  - Monitor labs   - Assess for incontinence   - Turn and reposition patient  - Assist with mobility/ambulation  - Relieve pressure over bony prominences  - Avoid friction and shearing  - Provide appropriate hygiene as needed including keeping skin clean and dry  - Evaluate need for skin moisturizer/barrier cream  - Collaborate with interdisciplinary team   - Patient/family teaching  - Consider wound care consult   Outcome: Progressing     Problem: Nutrition/Hydration-ADULT  Goal: Nutrient/Hydration intake appropriate for improving, restoring or maintaining nutritional needs  Description: Monitor and assess patient's nutrition/hydration status for malnutrition  Collaborate with interdisciplinary team and initiate plan and interventions as ordered  Monitor patient's weight and dietary intake as ordered or per policy  Utilize nutrition screening tool and intervene as necessary  Determine patient's food preferences and provide high-protein, high-caloric foods as appropriate       INTERVENTIONS:  - Monitor oral intake, urinary output, labs, and treatment plans  - Assess nutrition and hydration status and recommend course of action  - Evaluate amount of meals eaten  - Assist patient with eating if necessary   - Allow adequate time for meals  - Recommend/ encourage appropriate diets, oral nutritional supplements, and vitamin/mineral supplements  - Order, calculate, and assess calorie counts as needed  - Recommend, monitor, and adjust tube feedings and TPN/PPN based on assessed needs  - Assess need for intravenous fluids  - Provide specific nutrition/hydration education as appropriate  - Include patient/family/caregiver in decisions related to nutrition  Outcome: Progressing     Problem: PAIN - ADULT  Goal: Verbalizes/displays adequate comfort level or baseline comfort level  Description: Interventions:  - Encourage patient to monitor pain and request assistance  - Assess pain using appropriate pain scale  - Administer analgesics based on type and severity of pain and evaluate response  - Implement non-pharmacological measures as appropriate and evaluate response  - Consider cultural and social influences on pain and pain management  - Notify physician/advanced practitioner if interventions unsuccessful or patient reports new pain  Outcome: Progressing     Problem: INFECTION - ADULT  Goal: Absence or prevention of progression during hospitalization  Description: INTERVENTIONS:  - Assess and monitor for signs and symptoms of infection  - Monitor lab/diagnostic results  - Monitor all insertion sites, i e  indwelling lines, tubes, and drains  - Monitor endotracheal if appropriate and nasal secretions for changes in amount and color  - [place] appropriate cooling/warming therapies per order  - Administer medications as ordered  - Instruct and encourage patient and family to use good hand hygiene technique  - Identify and instruct in appropriate isolation precautions for identified infection/condition  Outcome: Progressing  Goal: Absence of fever/infection during neutropenic period  Description: INTERVENTIONS:  - Monitor WBC    Outcome: Progressing     Problem: SAFETY ADULT  Goal: Patient will remain free of falls  Description: INTERVENTIONS:  - Assess patient frequently for physical needs  -  Identify cognitive and physical deficits and behaviors that affect risk of falls    -  [place] fall precautions as indicated by assessment   - Educate patient/family on patient safety including physical limitations  - Instruct patient to call for assistance with activity based on assessment  - Modify environment to reduce risk of injury  - Consider OT/PT consult to assist with strengthening/mobility  Outcome: Progressing  Goal: Maintain or return to baseline ADL function  Description: INTERVENTIONS:  -  Assess patient's ability to carry out ADLs; assess patient's baseline for ADL function and identify physical deficits which impact ability to perform ADLs (bathing, care of mouth/teeth, toileting, grooming, dressing, etc )  - Assess/evaluate cause of self-care deficits   - Assess range of motion  - Assess patient's mobility; develop plan if impaired  - Assess patient's need for assistive devices and provide as appropriate  - Encourage maximum independence but intervene and supervise when necessary  - Involve family in performance of ADLs  - Assess for home care needs following discharge   - Consider OT consult to assist with ADL evaluation and planning for discharge  - Provide patient education as appropriate  Outcome: Progressing  Goal: Maintain or return mobility status to optimal level  Description: INTERVENTIONS:  - Assess patient's baseline mobility status (ambulation, transfers, stairs, etc )    - Identify cognitive and physical deficits and behaviors that affect mobility  - Identify mobility aids required to assist with transfers and/or ambulation (gait belt, sit-to-stand, lift, walker, cane, etc )  - [place] fall precautions as indicated by assessment  - Record patient progress and toleration of activity level on Mobility SBAR; progress patient to next Phase/Stage  - Instruct patient to call for assistance with activity based on assessment  - Consider rehabilitation consult to assist with strengthening/weightbearing, etc   Outcome: Progressing     Problem: DISCHARGE PLANNING  Goal: Discharge to home or other facility with appropriate resources  Description: INTERVENTIONS:  - Identify barriers to discharge w/patient and caregiver  - Arrange for needed discharge resources and transportation as appropriate  - Identify discharge learning needs (meds, wound care, etc )  - Arrange for interpretive services to assist at discharge as needed  - Refer to Case Management Department for coordinating discharge planning if the patient needs post-hospital services based on physician/advanced practitioner order or complex needs related to functional status, cognitive ability, or social support system  Outcome: Progressing     Problem: Knowledge Deficit  Goal: Patient/family/caregiver demonstrates understanding of disease process, treatment plan, medications, and discharge instructions  Description: Complete learning assessment and assess knowledge base    Interventions:  - Provide teaching at level of understanding  - Provide teaching via preferred learning methods  Outcome: Progressing

## 2021-04-08 NOTE — PHYSICAL THERAPY NOTE
Physical Therapy Progress Note    04/08/21 1137   PT Last Visit   PT Visit Date 04/08/21   Note Type   Note Type Treatment   Pain Assessment   Pain Assessment Tool FLACC   Pain Rating: FLACC (Rest) - Face 1   Pain Rating: FLACC (Rest) - Legs 1   Pain Rating: FLACC (Rest) - Activity 1   Pain Rating: FLACC (Rest) - Cry 1   Pain Rating: FLACC (Rest) - Consolability 0   Score: FLACC (Rest) 4   Pain Rating: FLACC (Activity) - Face 1   Pain Rating: FLACC (Activity) - Legs 1   Pain Rating: FLACC (Activity) - Activity 1   Pain Rating: FLACC (Activity) - Cry 2   Pain Rating: FLACC (Activity) - Consolability 1   Score: FLACC (Activity) 6   Restrictions/Precautions   Other Precautions Fall Risk;O2;Multiple lines  (TRACI drain)   Subjective   Subjective Pt encountered sitting EOB with DELACRUZ, reporting desire to walk,but requesting to do so later  Reattempted short time later & pt agreeable to treatment, but wavers between motivation & self limiting behaviors  Pt emotionally labile throughout session with frequent bouts of crying & perseverating on ailments  "I am not making progress, I feel weak, & I'm causing you all this work "  Pt responded to encouragement, but this only lasted for short periods of time  Bed Mobility   Sit to Supine 5  Supervision   Additional items Assist x 1   Additional Comments briding & lateral scooting in bed to reposition post session without assist   Transfers   Sit to Stand 3  Moderate assistance   Additional items Assist x 1  (+ standby)   Stand to Sit 3  Moderate assistance   Additional items Assist x 1  (+ standby)   Stand pivot 3  Moderate assistance   Additional items Assist x 2   Ambulation/Elevation   Gait pattern Excessively slow; Step to;Short stride; Foward flexed; Inconsistent fannie; Shuffling;Decreased foot clearance;Narrow MARVIN; Improper Weight shift; Poor UE support   Gait Assistance 3  Moderate assist   Additional items Assist x 1  (+ follow)   Assistive Device Rolling walker Distance 2-3 steps, 3 feet   Balance   Static Sitting Fair -   Static Standing Poor   Ambulatory Poor   Endurance Deficit   Endurance Deficit Yes   Endurance Deficit Description fatigue, weakness, anxiety   Activity Tolerance   Activity Tolerance Patient limited by fatigue;Patient limited by pain   Nurse Made Aware yes   Assessment   Prognosis Guarded   Problem List Decreased strength;Decreased endurance;Decreased mobility; Impaired balance;Decreased cognition;Decreased safety awareness;Pain   Assessment Pt continues to require Ax1-2 for all transfers & OOB mobiilty tasks this session due to weakness, fatigue & anxiety  Pt demonstrated improved balance, but did require increased assist to gain balance over RW MARVIN due to retropulsion  Pt remains self limiting & fearful of falling as a result of percieved weakness  Was able to take better steps with bilateral arm in arm assist to pivot back to bed, then performed bed mobility without assist, which she was not axnious about  Anticipate pt will make progress if anxiety related to mobiltiy can be addressed to allow for improved focus on task and allow pt to follow instructions to safely progress mobility  POC and discharge plan remain appropriate at this time  Goals   Patient Goals to get better   STG Expiration Date 04/19/21   PT Treatment Day 4   Plan   Treatment/Interventions Functional transfer training;LE strengthening/ROM; Elevations; Therapeutic exercise; Endurance training;Patient/family training;Equipment eval/education; Bed mobility;Gait training   Progress Slow progress, decreased activity tolerance   PT Frequency Other (Comment)  (3-5x/week)   Recommendation   PT Discharge Recommendation Post-Acute Rehabilitation Services   Equipment Recommended [facility] Recommended Wheeled walker   AM-PAC Basic Mobility Inpatient   Turning in Bed Without Bedrails 4   Lying on Back to Sitting on Edge of Flat Bed 3   Moving Bed to Chair 1   Standing Up From Chair 2   Walk in Room 2   Climb 3-5 Stairs 1   Basic Mobility Inpatient Raw Score 13   Basic Mobility Standardized Score 33 99   The patient's AM-PAC Basic Mobility Inpatient Short Form Raw Score is 13, Standardized Score is 33 99  A standardized score less than 42 9 suggests the patient may benefit from discharge to post-acute rehabilitation services  Please also refer to the recommendation of the Physical Therapist for safe discharge planning            Julius Camilo PTA

## 2021-04-08 NOTE — ASSESSMENT & PLAN NOTE
· Presented with sepsis POA tachycardia, elevated white count to 36 K with 4% bands, found to have right-sided large parapneumonic effusion  Subsequent g stain fluid cultures revealed Gram-positive cocci in pairs  She has chest tube placed by IR on 03/23 at [facility]   Six doses of tPA/dornase have completed by 03/27  Repeat CT scan showed mid size residual right pleural effusion slightly decreased  New ground-glass opacity in the left anterior upper lobe, of 5 centimetre left pulmonary nodule noted  · On ceftriaxone and flagyl per ID recomendtions, will need 3 weeks of treatment  · Can go on Augmentin per ID at d/c  · S/p VATS decortication on 4/2  · surgical cultures neg  · Post op chest tube management per thoracic surgery - 4/6 chest tube removed  · CXR post-removal shows small residual hydoPTX - ok for d/c per thoracics  Should have OP Xray in 2-3 weeks  Right post apical chest tube removal today, with follow up chest xray- Removal of right chest tube with no effusion or pneumothorax  Persistent right base atelectasis    · Add tessalon at HS to assist with sleep, avoid potent cough suppressants during the day  · C/w Oxycodone to 10 mg Q4HPRN for severe pain and 5 mg Q4H PRN for moderate pain

## 2021-04-08 NOTE — CASE MANAGEMENT
TC from [facility], informed [facility] that no facility has accepted patient at this time  List of facilities sent to daughter via email    Daughter instructed to follow up with this CM

## 2021-04-09 LAB
ERYTHROCYTE [DISTWIDTH] IN BLOOD BY AUTOMATED COUNT: 16.2 % (ref 11.6–15.1)
HCT VFR BLD AUTO: 26.3 % (ref 34.8–46.1)
HGB BLD-MCNC: 8.3 G/DL (ref 11.5–15.4)
MCH RBC QN AUTO: 28.4 PG (ref 26.8–34.3)
MCHC RBC AUTO-ENTMCNC: 31.6 G/DL (ref 31.4–37.4)
MCV RBC AUTO: 90 FL (ref 82–98)
PLATELET # BLD AUTO: 523 THOUSANDS/UL (ref 149–390)
PMV BLD AUTO: 9.7 FL (ref 8.9–12.7)
RBC # BLD AUTO: 2.92 MILLION/UL (ref 3.81–5.12)
WBC # BLD AUTO: 14.6 THOUSAND/UL (ref 4.31–10.16)

## 2021-04-09 PROCEDURE — 99232 SBSQ HOSP IP/OBS MODERATE 35: CPT | Performed by: GENERAL PRACTICE

## 2021-04-09 PROCEDURE — 85027 COMPLETE CBC AUTOMATED: CPT | Performed by: GENERAL PRACTICE

## 2021-04-09 PROCEDURE — 99233 SBSQ HOSP IP/OBS HIGH 50: CPT | Performed by: INTERNAL MEDICINE

## 2021-04-09 PROCEDURE — 97535 SELF CARE MNGMENT TRAINING: CPT

## 2021-04-09 RX ADMIN — METRONIDAZOLE 500 MG: 500 TABLET ORAL at 21:03

## 2021-04-09 RX ADMIN — OXYCODONE HYDROCHLORIDE 10 MG: 10 TABLET ORAL at 12:45

## 2021-04-09 RX ADMIN — OXYCODONE HYDROCHLORIDE 10 MG: 10 TABLET ORAL at 09:01

## 2021-04-09 RX ADMIN — CEFTRIAXONE SODIUM 2000 MG: 10 INJECTION, POWDER, FOR SOLUTION INTRAVENOUS at 17:03

## 2021-04-09 RX ADMIN — ATORVASTATIN CALCIUM 40 MG: 40 TABLET, FILM COATED ORAL at 17:02

## 2021-04-09 RX ADMIN — FUROSEMIDE 20 MG: 20 TABLET ORAL at 09:01

## 2021-04-09 RX ADMIN — PANTOPRAZOLE SODIUM 20 MG: 20 TABLET, DELAYED RELEASE ORAL at 05:39

## 2021-04-09 RX ADMIN — ALPRAZOLAM 0.25 MG: 0.25 TABLET ORAL at 17:41

## 2021-04-09 RX ADMIN — BUPROPION HYDROCHLORIDE 150 MG: 150 TABLET, FILM COATED, EXTENDED RELEASE ORAL at 09:01

## 2021-04-09 RX ADMIN — ENOXAPARIN SODIUM 40 MG: 40 INJECTION SUBCUTANEOUS at 09:01

## 2021-04-09 RX ADMIN — LIDOCAINE 1 PATCH: 50 PATCH TOPICAL at 09:01

## 2021-04-09 RX ADMIN — OXYCODONE HYDROCHLORIDE 10 MG: 10 TABLET ORAL at 17:02

## 2021-04-09 RX ADMIN — MELATONIN TAB 3 MG 6 MG: 3 TAB at 21:03

## 2021-04-09 RX ADMIN — BENZONATATE 200 MG: 100 CAPSULE ORAL at 21:03

## 2021-04-09 RX ADMIN — ACETAMINOPHEN 975 MG: 325 TABLET, FILM COATED ORAL at 21:03

## 2021-04-09 RX ADMIN — ACETAMINOPHEN 975 MG: 325 TABLET, FILM COATED ORAL at 05:39

## 2021-04-09 RX ADMIN — ACETAMINOPHEN 975 MG: 325 TABLET, FILM COATED ORAL at 12:44

## 2021-04-09 RX ADMIN — CLOPIDOGREL BISULFATE 75 MG: 75 TABLET, FILM COATED ORAL at 09:01

## 2021-04-09 RX ADMIN — TRAZODONE HYDROCHLORIDE 200 MG: 100 TABLET ORAL at 21:03

## 2021-04-09 RX ADMIN — FLUOXETINE 20 MG: 20 CAPSULE ORAL at 09:01

## 2021-04-09 RX ADMIN — METRONIDAZOLE 500 MG: 500 TABLET ORAL at 05:39

## 2021-04-09 RX ADMIN — Medication 1 TABLET: at 09:02

## 2021-04-09 RX ADMIN — METRONIDAZOLE 500 MG: 500 TABLET ORAL at 12:44

## 2021-04-09 NOTE — CASE MANAGEMENT
Referral placed in [facility] at pt and daughter request for [facility] and [facility]      Pt first choice is [facility]

## 2021-04-09 NOTE — PROGRESS NOTES
Progress Note - Infectious Disease   Lucila Salcedo 65 y o  female MRN: [id number]  Unit/Bed#: [facility] 623-01 Encounter: [id number]      Impression/Plan:     1- Sepsis, POA:  Secondary to #2 and #3   Negative blood cultures   Patient has clinically improved   Otherwise remains afebrile and clinically stable  WBC count has trended downward and remains stable  - antibiotics as below  - monitor CBC  - monitor temperatures and hemodynamics  - supportive care     2- Right-sided pneumonia:  Possible aspiration event leading to right-sided pneumonia and subsequent empyema   Patient denies binge drinking or daily drinking   Negative strep/Legionella antigen   - antibiotics as below  - close monitoring of respiratory status     3- Right-sided empyema:  Pleural fluid analyzed on 03/23/2021, at time of IR insertion of right-sided chest tube  Gram stain did show GPCs in pairs   Suspect strep   Consider polymicrobial infection   Now status post right-sided VATS with thick loculated purulent fluid noted intraoperatively   OR cultures are negative  - continue IV ceftriaxone  - continue oral Flagyl  - chest tube management per CT surgery  - anticipate transition to oral Augmentin 875/125 mg b i d    - complete 3 week postop course, through 4/22      4- coronary artery disease:  -continue close monitoring of hemodynamics status     5- tobacco dependence:  She reports  gradual smoking cessation; she has not smoked over the past 2 weeks prior to presentation  - advised patient to continue with smoking cessation     6- moderate aortic stenosis:  Noted on TTE from 03/23/2021  - cardiology follow-up    Stable from ID standpoint  If remains in hospital, will see next week  Please call us with any new questions in the interim  Subjective:  No acute overnight events  No fevers  Tolerating oral intake      Objective:  Vitals:  Temp:  [97 8 °F (36 6 °C)-98 5 °F (36 9 °C)] 97 8 °F (36 6 °C)  HR:  [74-84] 84  Resp:  [17-20] 20  BP: (112-121)/(59-65) 121/65  SpO2:  [94 %-96 %] 95 %  Temp (24hrs), Av 2 °F (36 8 °C), Min:97 8 °F (36 6 °C), Max:98 5 °F (36 9 °C)  Current: Temperature: 97 8 °F (36 6 °C)    Physical Exam:   General:  No acute distress  Psychiatric:  Awake and alert  Pulmonary:  Normal respiratory excursion without accessory muscle use  Abdomen:  Soft, nontender  Extremities:  No edema  Skin:  No rashes    Lab Results:  I have personally reviewed pertinent labs  Results from last 7 days   Lab Units 21  0541 21  0529 21  0547   POTASSIUM mmol/L 3 7 4 0 3 8   CHLORIDE mmol/L 101 104 104   CO2 mmol/L 31 29 30   BUN mg/dL 9 9 9   CREATININE mg/dL 0 48* 0 52* 0 47*   EGFR ml/min/1 73sq m 98 96 99   CALCIUM mg/dL 7 8* 8 3 7 8*   AST U/L 26  --   --    ALT U/L 9*  --   --    ALK PHOS U/L 90  --   --      Results from last 7 days   Lab Units 21  0651 21  0541 21  0529   WBC Thousand/uL 14 60* 14 37* 20 26*   HEMOGLOBIN g/dL 8 3* 8 1* 9 7*   PLATELETS Thousands/uL 523* 510* 576*           Imaging Studies:   I have personally reviewed pertinent imaging study reports and images in PACS  EKG, Pathology, and Other Studies:   I have personally reviewed pertinent reports

## 2021-04-09 NOTE — PLAN OF CARE
Problem: OCCUPATIONAL THERAPY ADULT  Goal: Performs self-care activities at highest level of function for planned discharge setting  See evaluation for individualized goals  Description: Treatment Interventions: ADL retraining, Functional transfer training, UE strengthening/ROM, Endurance training, Cognitive reorientation, Fine motor coordination activities, Compensatory technique education, Continued evaluation, Energy conservation, Activityengagement          See flowsheet documentation for full assessment, interventions and recommendations  Outcome: Progressing  Note: Limitation: Decreased ADL status, Decreased endurance, Decreased self-care trans, Decreased high-level ADLs  Prognosis: Good  Assessment: pts 2 dtrs were present this session  she participated in ot sessoin focusing on bed mobility, spt bed to chair and functional transfers  pt was motivated for oob and continues to require minimal reassurance and hha x 2 for spt  pt is not retropulsive this session     OT Discharge Recommendation: Post-Acute Rehabilitation Services  OT - OK to Discharge:  Yes  JAYME Khan

## 2021-04-09 NOTE — ASSESSMENT & PLAN NOTE
· Presented with sepsis POA tachycardia, elevated white count to 36 K with 4% bands, found to have right-sided large parapneumonic effusion  Subsequent g stain fluid cultures revealed Gram-positive cocci in pairs  She has chest tube placed by IR on 03/23 at [facility]   Six doses of tPA/dornase have completed by 03/27  Repeat CT scan showed mid size residual right pleural effusion slightly decreased  New ground-glass opacity in the left anterior upper lobe, of 5 centimetre left pulmonary nodule noted  · On ceftriaxone and flagyl per ID recomendtions, will need 3 weeks of treatment  · Can go on Augmentin per ID at d/c  · S/p VATS decortication on 4/2  · surgical cultures neg  · Post op chest tube management per thoracic surgery - 4/6 chest tube removed  · CXR post-removal shows small residual hydoPTX - ok for d/c per thoracics  Should have OP Xray in 2-3 weeks  Right post apical chest tube removal today, with follow up chest xray- Removal of right chest tube with no effusion or pneumothorax  Persistent right base atelectasis    · Add tessalon at HS to assist with sleep, avoid potent cough suppressants during the day  · C/w Oxycodone to 10 mg Q4HPRN for severe pain and 5 mg Q4H PRN for moderate pain

## 2021-04-09 NOTE — CASE MANAGEMENT
TC placed to [facility], spoke about denials from SNFs regarding current referrals  [facility]lo Ortega requesting referral placed to [facility] for [employer] in [address] [place] [address]

## 2021-04-09 NOTE — PHYSICIAN ADVISOR
Current patient class: Inpatient  The patient is currently on Hospital Day: 12      The patient was admitted to the hospital at [phone] on 3/29/21 for the following diagnosis:  Sepsis ([address] ) [A41 [id number]]     CMS OUTLIER STAY REVIEW    After review of the relevant documentation, labs, vital signs and test results, the patient is appropriate for CONTINUED INPATIENT ADMISSION  The patient continues to remain hospitalized receiving acute medical care  The patient has surpassed the expected duration of stay, however given the clinical condition, need for further acute care management, the patient is appropriate to remain in an inpatient status  The patient still being actively managed, and does have unresolved medical issues requiring further hospitalization  This review is conducted at 10 day intervals, to help satisfy the requirements for significant outlier stay review as per CMS  Given the current condition of this patient, the patient satisfies this review was determination for continued inpatient stay  Rationale is as follows: The patient is a 65 yrs old Female who presented as transfer from [facility] for sepsis  Pt presented with sepsis  tachycardia, elevated white count to 36 K with 4% bands, found to have right-sided large parapneumonic effusion  Subsequent g stain fluid cultures revealed Gram-positive cocci in pairs  She had chest tube placed by IR on 03/23 at [facility]   Six doses of tPA/dornase have completed by 03/27  Repeat CT scan showed mid size residual right pleural effusion slightly decreased  New ground-glass opacity in the left anterior upper lobe, of 5 centimetre left pulmonary nodule noted  She is followed by ID  She is on ceftriaxone and flagyl per ID recomendtions, she will need 3 weeks of treatment  She is S/p VATS decortication on 4/2  She had 2 chest tubes now removed      She also developed urinary retention   Folley catheter placed and removed 4/5 for void trial and 4/8 raymond replaced as PVR elevated  She remains inpatient appropriate      The patients vitals on arrival were   ED Triage Vitals   Temperature Pulse Respirations Blood Pressure SpO2   03/29/21 1446 03/29/21 1446 03/29/21 1446 03/29/21 1446 03/29/21 1446   97 5 °F (36 4 °C) 101 20 133/65 98 %      Temp Source Heart Rate Source Patient Position - Orthostatic VS BP Location FiO2 (%)   03/29/21 1446 03/31/21 0708 03/29/21 1446 03/29/21 1446 --   Oral Monitor Sitting Left arm       Pain Score       03/29/21 1545       Worst Possible Pain           Past Medical History:   Diagnosis Date    Anemia     Cardiac disease     Chronic pain     Coronary artery disease     Hypertension     PVD (peripheral vascular disease) (HCC)      Past Surgical History:   Procedure Laterality Date    ANKLE SURGERY      IR CHEST TUBE PLACEMENT  3/23/2021    IR NON-TUNNELED CENTRAL LINE PLACEMENT  3/24/2021    THORACOSCOPY VIDEO ASSISTED SURGERY (VATS) Right 4/2/2021    Procedure: THORACOSCOPY VIDEO ASSISTED SURGERY (VATS); DECORTICATION;  Surgeon: Regi Hinojosa MD;  Location: [facility];  Service: Thoracic           Consults have been placed to:   IP CONSULT TO PHARMACY  IP CONSULT TO THORACIC SURGERY  IP CONSULT TO INFECTIOUS DISEASES    Vitals:    04/08/21 1500 04/08/21 2342 04/09/21 0639 04/09/21 1536   BP: 115/65 112/59 121/65 107/53   Pulse:  74 84    Resp: 17 18 20 18   Temp: 98 5 °F (36 9 °C) 98 4 °F (36 9 °C) 97 8 °F (36 6 °C) 97 8 °F (36 6 °C)   TempSrc: Axillary      SpO2: 94% 96% 95%    Weight:       Height:           Most recent labs:    Recent Labs     04/08/21  0541 04/09/21  0651   WBC 14 37* 14 60*   HGB 8 1* 8 3*   HCT 25 6* 26 3*   * 523*   K 3 7  --    CALCIUM 7 8*  --    BUN 9  --    CREATININE 0 48*  --    INR 1 52*  --    CKTOTAL 191  --    AST 26  --    ALT 9*  --    ALKPHOS 90  --        Scheduled Meds:  Current Facility-Administered Medications   Medication Dose Route Frequency Provider Last Rate  acetaminophen  975 mg Oral [facility] Frances Wallace PA-C      ALPRAZolam  0 25 mg Oral Q12H PRN Isma León MD      atorvastatin  40 mg Oral Daily With Heena Rayo PA-C      benzonatate  200 mg Oral HS Isma León MD      buPROPion  150 mg Oral Daily [place], [place]      cefTRIAXone  2,000 mg Intravenous Q24H Arnoldo Wallace PA-C 2,000 mg (04/09/21 1703)    clopidogrel  75 mg Oral Daily Michelle Spain DO      dextromethorphan-guaiFENesin  10 mL Oral Q4H PRN [place] DeandreHECTOR      enoxaparin  40 mg Subcutaneous Daily Frances Wallace PA-C      FLUoxetine  20 mg Oral Daily Arnoldo Wallace PA-C      furosemide  20 mg Oral Daily [place], [place]      lidocaine  1 patch Topical Daily [place], [place]      melatonin  6 mg Oral HS Arnoldo Wallace PA-C      metroNIDAZOLE  500 mg Oral [place], [place]      multivitamin-minerals  1 tablet Oral Daily [place], [place]      oxyCODONE  10 mg Oral Q4H PRN Isma León MD      oxyCODONE  5 mg Oral Q4H PRN Isma León MD      pantoprazole  20 mg Oral [facility]      traZODone  200 mg Oral HS Frances Wallace PA-C       Continuous Infusions:   PRN Meds:  ALPRAZolam    dextromethorphan-guaiFENesin    oxyCODONE    oxyCODONE    Surgical procedures (if appropriate):  Procedure(s):  THORACOSCOPY VIDEO ASSISTED SURGERY (VATS); DECORTICATION

## 2021-04-09 NOTE — ASSESSMENT & PLAN NOTE
· Likely due to post op pain, opiate analgesics, functional due to chest tubes  · Raymond catheter placed and removed 4/5 for void trial  · 4/8 raymond replaced as PVR elevated  Will need to keep raymond in at d/c and rehab can do void trial  · Urine dark likely 2/2 dehydration    UA unremarkable

## 2021-04-09 NOTE — OCCUPATIONAL THERAPY NOTE
Occupational Therapy Treatment Note:       04/09/21 1458   OT Last Visit   OT Visit Date 04/09/21   Note Type   Note Type Treatment   Restrictions/Precautions   Other Precautions Cognitive; Chair Alarm; Bed Alarm;O2;Fall Risk   Pain Assessment   Pain Assessment Tool FLACC   Pain Rating: FLACC (Rest) - Face 0   Pain Rating: FLACC (Rest) - Legs 0   Pain Rating: FLACC (Rest) - Activity 0   Pain Rating: FLACC (Rest) - Cry 0   Pain Rating: FLACC (Rest) - Consolability 0   Score: FLACC (Rest) 0   Pain Rating: FLACC (Activity) - Face 1   Pain Rating: FLACC (Activity) - Legs 0   Pain Rating: FLACC (Activity) - Activity 0   Pain Rating: FLACC (Activity) - Cry 1   Pain Rating: FLACC (Activity) - Consolability 0   Score: FLACC (Activity) 2   ADL   Where Assessed Edge of bed   UB Dressing Assistance 4  Minimal Assistance   UB Dressing Comments long sleeved robe while seated eob   LB Dressing Assistance 2  Maximal Assistance   Functional Standing Tolerance   Time fair minus balance hha x 2 arm in arm secondary to anxiety  no retropulsion   Bed Mobility   Supine to Sit 4  Minimal assistance   Transfers   Sit to Stand 3  Moderate assistance   Stand to Sit 4  Minimal assistance   Stand pivot 3  Moderate assistance   Additional items Assist x 2   Additional Comments pt took few steps towards right to the chair c encouragement   Cognition   Overall Cognitive Status WFL   Activity Tolerance   Activity Tolerance Patient tolerated treatment well   Assessment   Assessment pts 2 dtrs were present this session  she participated in ot sessoin focusing on bed mobility, spt bed to chair and functional transfers  pt was motivated for oob and continues to require minimal reassurance and hha x 2 for spt  pt is not retropulsive this session   Plan   Treatment Interventions ADL retraining;Functional transfer training; Endurance training;Cognitive reorientation;Patient/family training; Activityengagement   Goal Expiration Date 04/19/21   OT Treatment Day 2   OT Frequency 3-5x/wk   Recommendation   OT Discharge Recommendation Post-Acute Rehabilitation Services   AM-PAC Daily Activity Inpatient   Lower Body Dressing 2   Bathing 2   Toileting 2   Upper Body Dressing 3   Grooming 3   Eating 4   Daily Activity Raw Score 16   Daily Activity Standardized Score (Calc for Raw Score >=11) 35 96   AM-PAC Applied Cognition Inpatient   Following a Speech/Presentation 3   Understanding Ordinary Conversation 4   Taking Medications 2   Remembering Where Things Are Placed or Put Away 3   Remembering List of 4-5 Errands 2   Taking Care of Complicated Tasks 2   Applied Cognition Raw Score 16   Applied Cognition Standardized Score 35 03   April A JAYME Montgomery

## 2021-04-09 NOTE — PROGRESS NOTES
[address] [facility]  Progress Note - Deon Hansen 1950, 65 y o  female MRN: [id number]  Unit/Bed#: [facility] 623-01 Encounter: [id number]  Primary Care Provider: Leda Villanueva MD   Date and time admitted to hospital: 3/29/2021  2:37 PM    * Sepsis due to Empyema  Assessment & Plan  · Presented with sepsis POA tachycardia, elevated white count to 36 K with 4% bands, found to have right-sided large parapneumonic effusion  Subsequent g stain fluid cultures revealed Gram-positive cocci in pairs  She has chest tube placed by IR on 03/23 at [facility]   Six doses of tPA/dornase have completed by 03/27  Repeat CT scan showed mid size residual right pleural effusion slightly decreased  New ground-glass opacity in the left anterior upper lobe, of 5 centimetre left pulmonary nodule noted  · On ceftriaxone and flagyl per ID recomendtions, will need 3 weeks of treatment  · Can go on Augmentin per ID at d/c  · S/p VATS decortication on 4/2  · surgical cultures neg  · Post op chest tube management per thoracic surgery - 4/6 chest tube removed  · CXR post-removal shows small residual hydoPTX - ok for d/c per thoracics  Should have OP Xray in 2-3 weeks  Right post apical chest tube removal today, with follow up chest xray- Removal of right chest tube with no effusion or pneumothorax  Persistent right base atelectasis    · Add tessalon at HS to assist with sleep, avoid potent cough suppressants during the day  · C/w Oxycodone to 10 mg Q4HPRN for severe pain and 5 mg Q4H PRN for moderate pain  Urinary retention  Assessment & Plan  · Likely due to post op pain, opiate analgesics, functional due to chest tubes  · Raymond catheter placed and removed 4/5 for void trial  · 4/8 raymond replaced as PVR elevated  Will need to keep raymond in at d/c and rehab can do void trial  · Urine dark likely 2/2 dehydration    UA unremarkable    Pulmonary nodule  Assessment & Plan  · 5 centimeter nodule noted   · Pulmonary follow up outpatient    Anxiety  Assessment & Plan  Continue with Xanax and trazodone  Xanax 0 25 mg Q12H PRN     Dysphagia  Assessment & Plan  · Resolved, placed back on regular diet    Valvular heart disease  Assessment & Plan    Valvular disease  Aortic valve is 1 39 with moderate tricuspid and PA pressures of 60, echo shows ejection fraction of 60% with grade 1 diastolic dysfunction    Anemia  Assessment & Plan  · Chronic normocytic normochromic anemia  · Iron panel suggested chronic anemia of inflammation  · S/p 2 units of RBCs - last given a unit 4/6  · Hgb stable post transfusions - Suspect anemia related to VATS    Severe protein-calorie malnutrition (HCC)  Assessment & Plan  Malnutrition Findings:        Severe protein calorie malnutrition  Adult malnutrition type:  Acute illness  Adult degree of malnutrition:  Other severe protein calorie malnutrition severe protein calorie malnutrition as evidenced by [employer] [place] orbits, temp temple following, clavicle protrusion, with prominent bone and low albumin levels 1 6 greater than 1 8, and associated with coagulopathy with high INR 8 on admission in the setting of right lower lobe pneumonia with complicated right parapneumonic effusion  Also has a pulmonary nodule needs to be worked up  BMI finding  Body mass index is 26 19  Nutrition is following      Add ensure clear         Thrombocytosis (HCC)  Assessment & Plan  · Thrombocytosis  · Likely reactive from her infection  · Will continue to monitor    Delirium  Assessment & Plan  · Delirium has resolved  · Family denies any heavy alcohol use, maybe she takes a glass of wine occasionally so this unlikely active alcohol use more like severe delirium in the setting of sepsis  · CT head was negative for intracranial anomaly  · Now back to baseline mental status        Supratherapeutic INR  Assessment & Plan  Supratherapeutic INR  Presented with INR of 8 7    On admission, possibly secondary to severe malnutrition, albumin was 1 8 on admission  No history of heavy alcohol use per family  Liver enzymes are within normal limits  Received a 10 mg of vitamin K with improvement of INR to 1 27  INR appears stable <  Or = 1 5  HTN (hypertension)  Assessment & Plan  · Bps are acceptable  · Continue lasix po    CAD (coronary artery disease)  Assessment & Plan  Coronary artery disease  Plavix restarted  Continue Lipitor      VTE Pharmacologic Prophylaxis:   Pharmacologic: Enoxaparin (Lovenox)  Mechanical VTE Prophylaxis in Place: Yes    Patient Centered Rounds: I have performed bedside rounds with nursing staff today  Discussions with Specialists or Other Care Team Provider: no    Education and Discussions with Family / Patient: pt and Lucia    Time Spent for Care: 30 minutes  More than 50% of total time spent on counseling and coordination of care as described above  Current Length of Stay: 11 day(s)    Current Patient Status: Inpatient   Certification Statement: The patient will continue to require additional inpatient hospital stay due to need for rehab placement    Discharge Plan: pending placement    Code Status: Level 1 - Full Code      Subjective:   Pt tired of being in the hospital    Objective:     Vitals:   Temp (24hrs), Av °F (36 7 °C), Min:97 8 °F (36 6 °C), Max:98 4 °F (36 9 °C)    Temp:  [97 8 °F (36 6 °C)-98 4 °F (36 9 °C)] 97 8 °F (36 6 °C)  HR:  [74-84] 84  Resp:  [18-20] 18  BP: (107-121)/(53-65) 107/53  SpO2:  [95 %-96 %] 95 %  Body mass index is 24 34 kg/m²  Input and Output Summary (last 24 hours): Intake/Output Summary (Last 24 hours) at 2021 1624  Last data filed at 2021 1303  Gross per 24 hour   Intake 290 ml   Output 1775 ml   Net -1485 ml       Physical Exam:     Physical Exam  HENT:      Head: Normocephalic and atraumatic        Nose: Nose normal       Mouth/Throat:      Mouth: Mucous membranes are moist    Eyes:      Extraocular Movements: Extraocular movements intact  Conjunctiva/sclera: Conjunctivae normal    Neck:      Musculoskeletal: Normal range of motion and neck supple  Cardiovascular:      Rate and Rhythm: Normal rate and regular rhythm  Pulmonary:      Effort: Pulmonary effort is normal       Breath sounds: No wheezing or rales  Abdominal:      General: Bowel sounds are normal  There is no distension  Palpations: Abdomen is soft  Tenderness: There is no abdominal tenderness  Musculoskeletal: Normal range of motion  Right lower leg: No edema  Left lower leg: No edema  Skin:     General: Skin is warm and dry  Neurological:      Mental Status: She is alert and oriented to person, place, and time  Mental status is at baseline  Additional Data:     Labs:    Results from last 7 days   Lab Units 04/09/21  0651 04/08/21  0541  04/04/21  0449   WBC Thousand/uL 14 60* 14 37*   < > 26 47*   HEMOGLOBIN g/dL 8 3* 8 1*   < > 7 6*   HEMATOCRIT % 26 3* 25 6*   < > 24 4*   PLATELETS Thousands/uL 523* 510*   < > 561*   NEUTROS PCT %  --   --   --  85*   LYMPHS PCT %  --   --   --  7*   LYMPHO PCT %  --  6*  --   --    MONOS PCT %  --   --   --  7   MONO PCT %  --  8  --   --    EOS PCT %  --  1  --  0    < > = values in this interval not displayed  Results from last 7 days   Lab Units 04/08/21  0541   POTASSIUM mmol/L 3 7   CHLORIDE mmol/L 101   CO2 mmol/L 31   BUN mg/dL 9   CREATININE mg/dL 0 48*   CALCIUM mg/dL 7 8*   ALK PHOS U/L 90   ALT U/L 9*   AST U/L 26     Results from last 7 days   Lab Units 04/08/21  0541   INR  1 52*       * I Have Reviewed All Lab Data Listed Above  * Additional Pertinent Lab Tests Reviewed:  [facility]        Recent Cultures (last 7 days):           Last 24 Hours Medication List:   Current Facility-Administered Medications   Medication Dose Route Frequency Provider Last Rate    acetaminophen  975 mg Oral [facility] HECTOR Trejo 0 25 mg Oral Q12H PRN Gabriel Cook MD      atorvastatin  40 mg Oral Daily With Drake Irizarry PA-C      benzonatate  200 mg Oral HS Gabriel Cook MD      buPROPion  150 mg Oral Daily Frances Wallace PA-C      cefTRIAXone  2,000 mg Intravenous Q24H [place] Stopped (04/08/21 1802)    clopidogrel  75 mg Oral Daily Rj Andrews      dextromethorphan-guaiFENesin  10 mL Oral Q4H PRN Sulma Guerra PA-C      enoxaparin  40 mg Subcutaneous Daily Irving Marx      FLUoxetine  20 mg Oral Daily [place] Hampton      furosemide  20 mg Oral Daily [place] Hampton      lidocaine  1 patch Topical Daily Irving Hampton      melatonin  6 mg Oral HS Miriam Wallace PA-C      metroNIDAZOLE  500 mg Oral [place], [place]      multivitamin-minerals  1 tablet Oral Daily Irving Hampton      oxyCODONE  10 mg Oral Q4H PRN Gabriel Cook MD      oxyCODONE  5 mg Oral Q4H PRN Gabriel Cook MD      pantoprazole  20 mg Oral Early Morning Sulma Guerra PA-C      traZODone  200 mg Oral HS Sulma Guerra PA-C          Today, Patient Was Seen By: Kimberly Echeverria DO    ** Please Note: Dictation voice to text software may have been used in the creation of this document   **

## 2021-04-09 NOTE — PROGRESS NOTES
04/09/21 1500   Clinical Encounter Type   Visited With Patient   Muslim Encounters   Muslim Needs Prayer   Sacramental Encounters   Sacrament of Sick-Anointing Anointed

## 2021-04-09 NOTE — PLAN OF CARE
Problem: Potential for Falls  Goal: Patient will remain free of falls  Description: INTERVENTIONS:  - Assess patient frequently for physical needs  -  Identify cognitive and physical deficits and behaviors that affect risk of falls  -  [place] fall precautions as indicated by assessment   - Educate patient/family on patient safety including physical limitations  - Instruct patient to call for assistance with activity based on assessment  - Modify environment to reduce risk of injury  - Consider OT/PT consult to assist with strengthening/mobility  Outcome: Progressing     Problem: Prexisting or High Potential for Compromised Skin Integrity  Goal: Skin integrity is maintained or improved  Description: INTERVENTIONS:  - Identify patients at risk for skin breakdown  - Assess and monitor skin integrity  - Assess and monitor nutrition and hydration status  - Monitor labs   - Assess for incontinence   - Turn and reposition patient  - Assist with mobility/ambulation  - Relieve pressure over bony prominences  - Avoid friction and shearing  - Provide appropriate hygiene as needed including keeping skin clean and dry  - Evaluate need for skin moisturizer/barrier cream  - Collaborate with interdisciplinary team   - Patient/family teaching  - Consider wound care consult   Outcome: Progressing     Problem: Nutrition/Hydration-ADULT  Goal: Nutrient/Hydration intake appropriate for improving, restoring or maintaining nutritional needs  Description: Monitor and assess patient's nutrition/hydration status for malnutrition  Collaborate with interdisciplinary team and initiate plan and interventions as ordered  Monitor patient's weight and dietary intake as ordered or per policy  Utilize nutrition screening tool and intervene as necessary  Determine patient's food preferences and provide high-protein, high-caloric foods as appropriate       INTERVENTIONS:  - Monitor oral intake, urinary output, labs, and treatment plans  - Assess nutrition and hydration status and recommend course of action  - Evaluate amount of meals eaten  - Assist patient with eating if necessary   - Allow adequate time for meals  - Recommend/ encourage appropriate diets, oral nutritional supplements, and vitamin/mineral supplements  - Order, calculate, and assess calorie counts as needed  - Recommend, monitor, and adjust tube feedings and TPN/PPN based on assessed needs  - Assess need for intravenous fluids  - Provide specific nutrition/hydration education as appropriate  - Include patient/family/caregiver in decisions related to nutrition  Outcome: Progressing     Problem: PAIN - ADULT  Goal: Verbalizes/displays adequate comfort level or baseline comfort level  Description: Interventions:  - Encourage patient to monitor pain and request assistance  - Assess pain using appropriate pain scale  - Administer analgesics based on type and severity of pain and evaluate response  - Implement non-pharmacological measures as appropriate and evaluate response  - Consider cultural and social influences on pain and pain management  - Notify physician/advanced practitioner if interventions unsuccessful or patient reports new pain  Outcome: Progressing     Problem: INFECTION - ADULT  Goal: Absence or prevention of progression during hospitalization  Description: INTERVENTIONS:  - Assess and monitor for signs and symptoms of infection  - Monitor lab/diagnostic results  - Monitor all insertion sites, i e  indwelling lines, tubes, and drains  - Monitor endotracheal if appropriate and nasal secretions for changes in amount and color  - [place] appropriate cooling/warming therapies per order  - Administer medications as ordered  - Instruct and encourage patient and family to use good hand hygiene technique  - Identify and instruct in appropriate isolation precautions for identified infection/condition  Outcome: Progressing  Goal: Absence of fever/infection during neutropenic period  Description: INTERVENTIONS:  - Monitor WBC    Outcome: Progressing     Problem: SAFETY ADULT  Goal: Patient will remain free of falls  Description: INTERVENTIONS:  - Assess patient frequently for physical needs  -  Identify cognitive and physical deficits and behaviors that affect risk of falls    -  [place] fall precautions as indicated by assessment   - Educate patient/family on patient safety including physical limitations  - Instruct patient to call for assistance with activity based on assessment  - Modify environment to reduce risk of injury  - Consider OT/PT consult to assist with strengthening/mobility  Outcome: Progressing  Goal: Maintain or return to baseline ADL function  Description: INTERVENTIONS:  -  Assess patient's ability to carry out ADLs; assess patient's baseline for ADL function and identify physical deficits which impact ability to perform ADLs (bathing, care of mouth/teeth, toileting, grooming, dressing, etc )  - Assess/evaluate cause of self-care deficits   - Assess range of motion  - Assess patient's mobility; develop plan if impaired  - Assess patient's need for assistive devices and provide as appropriate  - Encourage maximum independence but intervene and supervise when necessary  - Involve family in performance of ADLs  - Assess for home care needs following discharge   - Consider OT consult to assist with ADL evaluation and planning for discharge  - Provide patient education as appropriate  Outcome: Progressing  Goal: Maintain or return mobility status to optimal level  Description: INTERVENTIONS:  - Assess patient's baseline mobility status (ambulation, transfers, stairs, etc )    - Identify cognitive and physical deficits and behaviors that affect mobility  - Identify mobility aids required to assist with transfers and/or ambulation (gait belt, sit-to-stand, lift, walker, cane, etc )  - [place] fall precautions as indicated by assessment  - Record patient progress and toleration of activity level on Mobility SBAR; progress patient to next Phase/Stage  - Instruct patient to call for assistance with activity based on assessment  - Consider rehabilitation consult to assist with strengthening/weightbearing, etc   Outcome: Progressing     Problem: DISCHARGE PLANNING  Goal: Discharge to home or other facility with appropriate resources  Description: INTERVENTIONS:  - Identify barriers to discharge w/patient and caregiver  - Arrange for needed discharge resources and transportation as appropriate  - Identify discharge learning needs (meds, wound care, etc )  - Arrange for interpretive services to assist at discharge as needed  - Refer to Case Management Department for coordinating discharge planning if the patient needs post-hospital services based on physician/advanced practitioner order or complex needs related to functional status, cognitive ability, or social support system  Outcome: Progressing     Problem: Knowledge Deficit  Goal: Patient/family/caregiver demonstrates understanding of disease process, treatment plan, medications, and discharge instructions  Description: Complete learning assessment and assess knowledge base    Interventions:  - Provide teaching at level of understanding  - Provide teaching via preferred learning methods  Outcome: Progressing

## 2021-04-09 NOTE — ASSESSMENT & PLAN NOTE
Malnutrition Findings:        Severe protein calorie malnutrition  Adult malnutrition type:  Acute illness  Adult degree of malnutrition:  Other severe protein calorie malnutrition severe protein calorie malnutrition as evidenced by [place] orbits, temp temple following, clavicle protrusion, with prominent bone and low albumin levels 1 6 greater than 1 8, and associated with coagulopathy with high INR 8 on admission in the setting of right lower lobe pneumonia with complicated right parapneumonic effusion  Also has a pulmonary nodule needs to be worked up       BMI finding  Body mass index is 26 19  Nutrition is following      Add ensure clear

## 2021-04-10 ENCOUNTER — APPOINTMENT (INPATIENT)
Dept: RADIOLOGY | Facility: HOSPITAL | Age: 71
DRG: 853 | End: 2021-04-10
Payer: MEDICARE

## 2021-04-10 PROBLEM — M25.462 EFFUSION OF LEFT KNEE: Status: ACTIVE | Noted: 2021-04-10

## 2021-04-10 PROCEDURE — 99233 SBSQ HOSP IP/OBS HIGH 50: CPT | Performed by: GENERAL PRACTICE

## 2021-04-10 PROCEDURE — 73560 X-RAY EXAM OF KNEE 1 OR 2: CPT

## 2021-04-10 RX ADMIN — METRONIDAZOLE 500 MG: 500 TABLET ORAL at 13:17

## 2021-04-10 RX ADMIN — CEFTRIAXONE SODIUM 2000 MG: 10 INJECTION, POWDER, FOR SOLUTION INTRAVENOUS at 17:20

## 2021-04-10 RX ADMIN — OXYCODONE HYDROCHLORIDE 10 MG: 10 TABLET ORAL at 16:07

## 2021-04-10 RX ADMIN — ALPRAZOLAM 0.25 MG: 0.25 TABLET ORAL at 22:18

## 2021-04-10 RX ADMIN — METRONIDAZOLE 500 MG: 500 TABLET ORAL at 05:06

## 2021-04-10 RX ADMIN — OXYCODONE HYDROCHLORIDE 10 MG: 10 TABLET ORAL at 06:59

## 2021-04-10 RX ADMIN — OXYCODONE HYDROCHLORIDE 10 MG: 10 TABLET ORAL at 01:27

## 2021-04-10 RX ADMIN — PANTOPRAZOLE SODIUM 20 MG: 20 TABLET, DELAYED RELEASE ORAL at 05:06

## 2021-04-10 RX ADMIN — METRONIDAZOLE 500 MG: 500 TABLET ORAL at 22:14

## 2021-04-10 RX ADMIN — ACETAMINOPHEN 975 MG: 325 TABLET, FILM COATED ORAL at 13:16

## 2021-04-10 RX ADMIN — OXYCODONE HYDROCHLORIDE 10 MG: 10 TABLET ORAL at 20:18

## 2021-04-10 RX ADMIN — CLOPIDOGREL BISULFATE 75 MG: 75 TABLET, FILM COATED ORAL at 08:08

## 2021-04-10 RX ADMIN — ENOXAPARIN SODIUM 40 MG: 40 INJECTION SUBCUTANEOUS at 08:08

## 2021-04-10 RX ADMIN — ACETAMINOPHEN 975 MG: 325 TABLET, FILM COATED ORAL at 05:06

## 2021-04-10 RX ADMIN — BENZONATATE 200 MG: 100 CAPSULE ORAL at 22:18

## 2021-04-10 RX ADMIN — MELATONIN TAB 3 MG 6 MG: 3 TAB at 22:19

## 2021-04-10 RX ADMIN — LIDOCAINE 1 PATCH: 50 PATCH TOPICAL at 08:08

## 2021-04-10 RX ADMIN — OXYCODONE HYDROCHLORIDE 10 MG: 10 TABLET ORAL at 12:02

## 2021-04-10 RX ADMIN — ATORVASTATIN CALCIUM 40 MG: 40 TABLET, FILM COATED ORAL at 17:20

## 2021-04-10 RX ADMIN — BUPROPION HYDROCHLORIDE 150 MG: 150 TABLET, FILM COATED, EXTENDED RELEASE ORAL at 08:08

## 2021-04-10 RX ADMIN — ALPRAZOLAM 0.25 MG: 0.25 TABLET ORAL at 10:25

## 2021-04-10 RX ADMIN — Medication 1 TABLET: at 08:08

## 2021-04-10 RX ADMIN — FLUOXETINE 20 MG: 20 CAPSULE ORAL at 08:08

## 2021-04-10 RX ADMIN — ACETAMINOPHEN 975 MG: 325 TABLET, FILM COATED ORAL at 22:19

## 2021-04-10 RX ADMIN — TRAZODONE HYDROCHLORIDE 200 MG: 100 TABLET ORAL at 22:19

## 2021-04-10 RX ADMIN — FUROSEMIDE 20 MG: 20 TABLET ORAL at 08:08

## 2021-04-10 NOTE — ASSESSMENT & PLAN NOTE
Valvular disease  Aortic valve is 1 39 with moderate tricuspid and PA pressures of 60, echo shows ejection fraction of 60% with grade 1 diastolic dysfunction No

## 2021-04-10 NOTE — PROGRESS NOTES
[address] [facility]  Progress Note - Karen Osuna 1950, 65 y o  female MRN: [id number]  Unit/Bed#: [facility] 623-01 Encounter: [id number]  Primary Care Provider: Isaías Burch MD   Date and time admitted to hospital: 3/29/2021  2:37 PM    * Sepsis due to Empyema  Assessment & Plan  · Presented with sepsis POA tachycardia, elevated white count to 36 K with 4% bands, found to have right-sided large parapneumonic effusion  Subsequent g stain fluid cultures revealed Gram-positive cocci in pairs  She has chest tube placed by IR on 03/23 at [facility]   Six doses of tPA/dornase have completed by 03/27  Repeat CT scan showed mid size residual right pleural effusion slightly decreased  New ground-glass opacity in the left anterior upper lobe, of 5 centimetre left pulmonary nodule noted  · On ceftriaxone and flagyl per ID recomendtions, will need 3 weeks of treatment post -op, through 4/22  · Can go on Augmentin per ID at d/c  · Will take at central line at d/c  · S/p VATS decortication on 4/2  · surgical cultures neg  · Post op chest tube management per thoracic surgery - 4/6 chest tube removed  · CXR post-removal shows small residual hydoPTX - ok for d/c per thoracics  Should have OP Xray in 2-3 weeks  Right post apical chest tube removal today, with follow up chest xray- Removal of right chest tube with no effusion or pneumothorax  Persistent right base atelectasis    · Add tessalon at HS to assist with sleep, avoid potent cough suppressants during the day  · C/w Oxycodone to 10 mg Q4HPRN for severe pain and 5 mg Q4H PRN for moderate pain           Effusion of left knee  Assessment & Plan  · Today XRay showed severe arthritis and moderate effusion  · Ortho consulted for possible arthrocentesis and steroid injection    Urinary retention  Assessment & Plan  · Likely due to post op pain, opiate analgesics, functional due to chest tubes  · Woody catheter placed and removed 4/5 for void trial  · 4/8 raymond replaced as PVR elevated  Will need to keep raymond in at d/c and rehab can do void trial  · Urine dark likely 2/2 dehydration  UA unremarkable    Pulmonary nodule  Assessment & Plan  · 5 centimeter nodule noted   · Pulmonary follow up outpatient    Anxiety  Assessment & Plan  Continue with Xanax and trazodone  Xanax 0 25 mg Q12H PRN     Dysphagia  Assessment & Plan  · Resolved, placed back on regular diet    Valvular heart disease  Assessment & Plan    Valvular disease  Aortic valve is 1 39 with moderate tricuspid and PA pressures of 60, echo shows ejection fraction of 60% with grade 1 diastolic dysfunction    Anemia  Assessment & Plan  · Chronic normocytic normochromic anemia  · Iron panel suggested chronic anemia of inflammation  · S/p 2 units of RBCs - last unit given 4/6  · Hgb stable post transfusions - Suspect anemia related to VATS    Severe protein-calorie malnutrition (HCC)  Assessment & Plan  Malnutrition Findings:        Severe protein calorie malnutrition  Adult malnutrition type:  Acute illness  Adult degree of malnutrition:  Other severe protein calorie malnutrition severe protein calorie malnutrition as evidenced by [place] orbits, temp temple following, clavicle protrusion, with prominent bone and low albumin levels 1 6 greater than 1 8, and associated with coagulopathy with high INR 8 on admission in the setting of right lower lobe pneumonia with complicated right parapneumonic effusion  Also has a pulmonary nodule needs to be worked up       BMI finding  Body mass index is 26 19  Nutrition is following      Add ensure clear         Thrombocytosis (HCC)  Assessment & Plan  · Thrombocytosis  · Likely reactive from her infection  · Will continue to monitor  · Repeat CBC in 4 weeks    Delirium  Assessment & Plan  · Delirium has resolved  · Family denies any heavy alcohol use, maybe she takes a glass of wine occasionally so this unlikely active alcohol use more like severe delirium in the setting of sepsis  · CT head was negative for intracranial anomaly  · Now back to baseline mental status        Supratherapeutic INR  Assessment & Plan  Supratherapeutic INR  Presented with INR of 8 7  On admission, possibly secondary to severe malnutrition, albumin was 1 8 on admission  No history of heavy alcohol use per family  Liver enzymes are within normal limits  Received a 10 mg of vitamin K with improvement of INR to 1 27  INR appears stable <  Or = 1 5  HTN (hypertension)  Assessment & Plan  · Bps are acceptable  · Continue lasix po    CAD (coronary artery disease)  Assessment & Plan  Coronary artery disease  Plavix restarted  Continue Lipitor    VTE Pharmacologic Prophylaxis:   Pharmacologic: Enoxaparin (Lovenox)  Mechanical VTE Prophylaxis in Place: Yes    Patient Centered Rounds: I have performed bedside rounds with nursing staff today  Discussions with Specialists or Other Care Team Provider: ortho    Education and Discussions with Family / Patient: pt and dtrs    Time Spent for Care: 30 minutes  More than 50% of total time spent on counseling and coordination of care as described above  Current Length of Stay: 12 day(s)    Current Patient Status: Inpatient   Certification Statement: The patient will continue to require additional inpatient hospital stay due to need for rehab and tx of knee    Discharge Plan: likely Monday to rehab    Code Status: Level 1 - Full Code      Subjective:   C/o severe left knee pain    Objective:     Vitals:   Temp (24hrs), Av °F (36 7 °C), Min:97 6 °F (36 4 °C), Max:98 4 °F (36 9 °C)    Temp:  [97 6 °F (36 4 °C)-98 4 °F (36 9 °C)] 97 9 °F (36 6 °C)  HR:  [80-82] 80  Resp:  [16-19] 16  BP: (108-113)/(54-58) 108/58  SpO2:  [96 %] 96 %  Body mass index is 24 34 kg/m²  Input and Output Summary (last 24 hours):        Intake/Output Summary (Last 24 hours) at 4/10/2021 1821  Last data filed at 4/10/2021 1324  Gross per 24 hour   Intake 480 ml Output 1250 ml   Net -770 ml       Physical Exam:     Physical Exam  HENT:      Head: Normocephalic and atraumatic  Nose: Nose normal       Mouth/Throat:      Mouth: Mucous membranes are moist    Eyes:      Extraocular Movements: Extraocular movements intact  Conjunctiva/sclera: Conjunctivae normal    Neck:      Musculoskeletal: Normal range of motion and neck supple  Cardiovascular:      Rate and Rhythm: Normal rate and regular rhythm  Pulmonary:      Effort: Pulmonary effort is normal       Breath sounds: Normal breath sounds  No wheezing or rales  Abdominal:      General: Bowel sounds are normal  There is no distension  Palpations: Abdomen is soft  Tenderness: There is no abdominal tenderness  Musculoskeletal:         General: Swelling (left knee) and tenderness (left lateral and posterior knee) present  Right lower leg: No edema  Left lower leg: No edema  Skin:     General: Skin is warm and dry  Neurological:      Mental Status: She is alert and oriented to person, place, and time  Mental status is at baseline  Additional Data:     Labs:    Results from last 7 days   Lab Units 04/09/21  0651 04/08/21  0541  04/04/21  0449   WBC Thousand/uL 14 60* 14 37*   < > 26 47*   HEMOGLOBIN g/dL 8 3* 8 1*   < > 7 6*   HEMATOCRIT % 26 3* 25 6*   < > 24 4*   PLATELETS Thousands/uL 523* 510*   < > 561*   NEUTROS PCT %  --   --   --  85*   LYMPHS PCT %  --   --   --  7*   LYMPHO PCT %  --  6*  --   --    MONOS PCT %  --   --   --  7   MONO PCT %  --  8  --   --    EOS PCT %  --  1  --  0    < > = values in this interval not displayed  Results from last 7 days   Lab Units 04/08/21  0541   POTASSIUM mmol/L 3 7   CHLORIDE mmol/L 101   CO2 mmol/L 31   BUN mg/dL 9   CREATININE mg/dL 0 48*   CALCIUM mg/dL 7 8*   ALK PHOS U/L 90   ALT U/L 9*   AST U/L 26     Results from last 7 days   Lab Units 04/08/21  0541   INR  1 52*       * I Have Reviewed All Lab Data Listed Above    * Additional Pertinent Lab Tests Reviewed: Aj 66 Admission Reviewed      Recent Cultures (last 7 days):           Last 24 Hours Medication List:   Current Facility-Administered Medications   Medication Dose Route Frequency Provider Last Rate    acetaminophen  975 mg Oral [facility] Frances Wallace PA-C      ALPRAZolam  0 25 mg Oral Q12H PRN Klarissa Bedoya MD      atorvastatin  40 mg Oral Daily With Liyah Michaud PA-C      benzonatate  200 mg Oral HS Klarissa Bedoya MD      buPROPion  150 mg Oral Daily Frances Wallace PA-C      cefTRIAXone  2,000 mg Intravenous Q24H Marquis Kedar Wallace PA-C 2,000 mg (04/10/21 1720)    clopidogrel  75 mg Oral Daily Norman Dixon DO      dextromethorphan-guaiFENesin  10 mL Oral Q4H PRN Ginna Jones PA-C      enoxaparin  40 mg Subcutaneous Daily Frances Wallace PA-C      FLUoxetine  20 mg Oral Daily Frances Wallace PA-C      furosemide  20 mg Oral Daily Frances Ugarte PA-C      lidocaine  1 patch Topical Daily Irving Walker      melatonin  6 mg Oral HS Frances Wallace PA-C      metroNIDAZOLE  500 mg Oral [facility]      multivitamin-minerals  1 tablet Oral Daily Irving Walker      oxyCODONE  10 mg Oral Q4H PRN Klarissa Bedoya MD      oxyCODONE  5 mg Oral Q4H PRN Klarissa Bedoya MD      pantoprazole  20 mg Oral Early Morning Ginna Jones PA-C      traZODone  200 mg Oral HS Ginna Jones PA-C          Today, Patient Was Seen By: Norman Dixon DO    ** Please Note: Dictation voice to text software may have been used in the creation of this document   **

## 2021-04-10 NOTE — PLAN OF CARE
Problem: Potential for Falls  Goal: Patient will remain free of falls  Description: INTERVENTIONS:  - Assess patient frequently for physical needs  -  Identify cognitive and physical deficits and behaviors that affect risk of falls  -  [place] fall precautions as indicated by assessment   - Educate patient/family on patient safety including physical limitations  - Instruct patient to call for assistance with activity based on assessment  - Modify environment to reduce risk of injury  - Consider OT/PT consult to assist with strengthening/mobility  Outcome: Progressing     Problem: Prexisting or High Potential for Compromised Skin Integrity  Goal: Skin integrity is maintained or improved  Description: INTERVENTIONS:  - Identify patients at risk for skin breakdown  - Assess and monitor skin integrity  - Assess and monitor nutrition and hydration status  - Monitor labs   - Assess for incontinence   - Turn and reposition patient  - Assist with mobility/ambulation  - Relieve pressure over bony prominences  - Avoid friction and shearing  - Provide appropriate hygiene as needed including keeping skin clean and dry  - Evaluate need for skin moisturizer/barrier cream  - Collaborate with interdisciplinary team   - Patient/family teaching  - Consider wound care consult   Outcome: Progressing     Problem: Nutrition/Hydration-ADULT  Goal: Nutrient/Hydration intake appropriate for improving, restoring or maintaining nutritional needs  Description: Monitor and assess patient's nutrition/hydration status for malnutrition  Collaborate with interdisciplinary team and initiate plan and interventions as ordered  Monitor patient's weight and dietary intake as ordered or per policy  Utilize nutrition screening tool and intervene as necessary  Determine patient's food preferences and provide high-protein, high-caloric foods as appropriate       INTERVENTIONS:  - Monitor oral intake, urinary output, labs, and treatment plans  - Assess nutrition and hydration status and recommend course of action  - Evaluate amount of meals eaten  - Assist patient with eating if necessary   - Allow adequate time for meals  - Recommend/ encourage appropriate diets, oral nutritional supplements, and vitamin/mineral supplements  - Order, calculate, and assess calorie counts as needed  - Recommend, monitor, and adjust tube feedings and TPN/PPN based on assessed needs  - Assess need for intravenous fluids  - Provide specific nutrition/hydration education as appropriate  - Include patient/family/caregiver in decisions related to nutrition  Outcome: Progressing     Problem: PAIN - ADULT  Goal: Verbalizes/displays adequate comfort level or baseline comfort level  Description: Interventions:  - Encourage patient to monitor pain and request assistance  - Assess pain using appropriate pain scale  - Administer analgesics based on type and severity of pain and evaluate response  - Implement non-pharmacological measures as appropriate and evaluate response  - Consider cultural and social influences on pain and pain management  - Notify physician/advanced practitioner if interventions unsuccessful or patient reports new pain  Outcome: Progressing     Problem: INFECTION - ADULT  Goal: Absence or prevention of progression during hospitalization  Description: INTERVENTIONS:  - Assess and monitor for signs and symptoms of infection  - Monitor lab/diagnostic results  - Monitor all insertion sites, i e  indwelling lines, tubes, and drains  - Monitor endotracheal if appropriate and nasal secretions for changes in amount and color  - [place] appropriate cooling/warming therapies per order  - Administer medications as ordered  - Instruct and encourage patient and family to use good hand hygiene technique  - Identify and instruct in appropriate isolation precautions for identified infection/condition  Outcome: Progressing  Goal: Absence of fever/infection during neutropenic period  Description: INTERVENTIONS:  - Monitor WBC    Outcome: Progressing     Problem: SAFETY ADULT  Goal: Patient will remain free of falls  Description: INTERVENTIONS:  - Assess patient frequently for physical needs  -  Identify cognitive and physical deficits and behaviors that affect risk of falls    -  [place] fall precautions as indicated by assessment   - Educate patient/family on patient safety including physical limitations  - Instruct patient to call for assistance with activity based on assessment  - Modify environment to reduce risk of injury  - Consider OT/PT consult to assist with strengthening/mobility  Outcome: Progressing  Goal: Maintain or return to baseline ADL function  Description: INTERVENTIONS:  -  Assess patient's ability to carry out ADLs; assess patient's baseline for ADL function and identify physical deficits which impact ability to perform ADLs (bathing, care of mouth/teeth, toileting, grooming, dressing, etc )  - Assess/evaluate cause of self-care deficits   - Assess range of motion  - Assess patient's mobility; develop plan if impaired  - Assess patient's need for assistive devices and provide as appropriate  - Encourage maximum independence but intervene and supervise when necessary  - Involve family in performance of ADLs  - Assess for home care needs following discharge   - Consider OT consult to assist with ADL evaluation and planning for discharge  - Provide patient education as appropriate  Outcome: Progressing  Goal: Maintain or return mobility status to optimal level  Description: INTERVENTIONS:  - Assess patient's baseline mobility status (ambulation, transfers, stairs, etc )    - Identify cognitive and physical deficits and behaviors that affect mobility  - Identify mobility aids required to assist with transfers and/or ambulation (gait belt, sit-to-stand, lift, walker, cane, etc )  - [place] fall precautions as indicated by assessment  - Record patient progress and toleration of activity level on Mobility SBAR; progress patient to next Phase/Stage  - Instruct patient to call for assistance with activity based on assessment  - Consider rehabilitation consult to assist with strengthening/weightbearing, etc   Outcome: Progressing     Problem: DISCHARGE PLANNING  Goal: Discharge to home or other facility with appropriate resources  Description: INTERVENTIONS:  - Identify barriers to discharge w/patient and caregiver  - Arrange for needed discharge resources and transportation as appropriate  - Identify discharge learning needs (meds, wound care, etc )  - Arrange for interpretive services to assist at discharge as needed  - Refer to Case Management Department for coordinating discharge planning if the patient needs post-hospital services based on physician/advanced practitioner order or complex needs related to functional status, cognitive ability, or social support system  Outcome: Progressing

## 2021-04-10 NOTE — ASSESSMENT & PLAN NOTE
Malnutrition Findings:        Severe protein calorie malnutrition  Adult malnutrition type:  Acute illness  Adult degree of malnutrition:  Other severe protein calorie malnutrition severe protein calorie malnutrition as evidenced by [place] orbits, temp temple following, clavicle protrusion, with prominent bone and low albumin levels 1 6 greater than 1 8, and associated with coagulopathy with high INR 8 on admission in the setting of right lower lobe pneumonia with complicated right parapneumonic effusion  Also has a pulmonary nodule needs to be worked up       BMI finding  Body mass index is 26 19  Nutrition is following      Add ensure clear

## 2021-04-10 NOTE — ASSESSMENT & PLAN NOTE
· Today XRay showed severe arthritis and moderate effusion  · Ortho consulted for possible arthrocentesis and steroid injection

## 2021-04-10 NOTE — CASE MANAGEMENT
[employer] Medicare supplement is pt's secondary insurance  CM informed facilities  Pt is accepted to [facility] (bed available on Monday), and Juanis Agarwal wants to verify the pt's secondary insurance   Noted that Juanis Agarwal is pt's first choice

## 2021-04-10 NOTE — ASSESSMENT & PLAN NOTE
· Presented with sepsis POA tachycardia, elevated white count to 36 K with 4% bands, found to have right-sided large parapneumonic effusion  Subsequent g stain fluid cultures revealed Gram-positive cocci in pairs  She has chest tube placed by IR on 03/23 at [facility]   Six doses of tPA/dornase have completed by 03/27  Repeat CT scan showed mid size residual right pleural effusion slightly decreased  New ground-glass opacity in the left anterior upper lobe, of 5 centimetre left pulmonary nodule noted  · On ceftriaxone and flagyl per ID recomendtions, will need 3 weeks of treatment post -op, through 4/22  · Can go on Augmentin per ID at d/c  · Will take at central line at d/c  · S/p VATS decortication on 4/2  · surgical cultures neg  · Post op chest tube management per thoracic surgery - 4/6 chest tube removed  · CXR post-removal shows small residual hydoPTX - ok for d/c per thoracics  Should have OP Xray in 2-3 weeks  Right post apical chest tube removal today, with follow up chest xray- Removal of right chest tube with no effusion or pneumothorax  Persistent right base atelectasis    · Add tessalon at HS to assist with sleep, avoid potent cough suppressants during the day  · C/w Oxycodone to 10 mg Q4HPRN for severe pain and 5 mg Q4H PRN for moderate pain

## 2021-04-10 NOTE — ASSESSMENT & PLAN NOTE
· Thrombocytosis  · Likely reactive from her infection  · Will continue to monitor  · Repeat CBC in 4 weeks

## 2021-04-10 NOTE — ASSESSMENT & PLAN NOTE
· Chronic normocytic normochromic anemia  · Iron panel suggested chronic anemia of inflammation  · S/p 2 units of RBCs - last unit given 4/6  · Hgb stable post transfusions - Suspect anemia related to VATS

## 2021-04-11 PROBLEM — R41.0 DELIRIUM: Status: RESOLVED | Noted: 2021-03-24 | Resolved: 2021-04-11

## 2021-04-11 LAB
CRYSTALS SNV QL MICRO: NORMAL
GRAM STN SPEC: NORMAL
LYMPHOCYTES # SNV MANUAL: 1 %
MONOCYTES NFR SNV MANUAL: 22 %
NEUTROPHILS NFR SNV MANUAL: 77 %
RBC # SNV MANUAL: 2000 /UL (ref 0–10)
TOTAL CELLS COUNTED SPEC: 100
WBC # FLD MANUAL: ABNORMAL /UL (ref 0–200)

## 2021-04-11 PROCEDURE — 89060 EXAM SYNOVIAL FLUID CRYSTALS: CPT | Performed by: ORTHOPAEDIC SURGERY

## 2021-04-11 PROCEDURE — 87070 CULTURE OTHR SPECIMN AEROBIC: CPT | Performed by: ORTHOPAEDIC SURGERY

## 2021-04-11 PROCEDURE — 99222 1ST HOSP IP/OBS MODERATE 55: CPT | Performed by: ORTHOPAEDIC SURGERY

## 2021-04-11 PROCEDURE — NC001 PR NO CHARGE: Performed by: ORTHOPAEDIC SURGERY

## 2021-04-11 PROCEDURE — 89051 BODY FLUID CELL COUNT: CPT | Performed by: ORTHOPAEDIC SURGERY

## 2021-04-11 PROCEDURE — 87205 SMEAR GRAM STAIN: CPT | Performed by: ORTHOPAEDIC SURGERY

## 2021-04-11 PROCEDURE — 99232 SBSQ HOSP IP/OBS MODERATE 35: CPT | Performed by: GENERAL PRACTICE

## 2021-04-11 PROCEDURE — 89050 BODY FLUID CELL COUNT: CPT | Performed by: ORTHOPAEDIC SURGERY

## 2021-04-11 PROCEDURE — 0S9D3ZX DRAINAGE OF LEFT KNEE JOINT, PERCUTANEOUS APPROACH, DIAGNOSTIC: ICD-10-PCS | Performed by: ORTHOPAEDIC SURGERY

## 2021-04-11 RX ORDER — GABAPENTIN 100 MG/1
100 CAPSULE ORAL 3 TIMES DAILY
Status: DISCONTINUED | OUTPATIENT
Start: 2021-04-11 | End: 2021-04-13 | Stop reason: HOSPADM

## 2021-04-11 RX ADMIN — ATORVASTATIN CALCIUM 40 MG: 40 TABLET, FILM COATED ORAL at 17:59

## 2021-04-11 RX ADMIN — GABAPENTIN 100 MG: 100 CAPSULE ORAL at 22:23

## 2021-04-11 RX ADMIN — MELATONIN TAB 3 MG 6 MG: 3 TAB at 22:22

## 2021-04-11 RX ADMIN — LIDOCAINE 1 PATCH: 50 PATCH TOPICAL at 08:59

## 2021-04-11 RX ADMIN — OXYCODONE HYDROCHLORIDE 10 MG: 10 TABLET ORAL at 06:06

## 2021-04-11 RX ADMIN — PANTOPRAZOLE SODIUM 20 MG: 20 TABLET, DELAYED RELEASE ORAL at 06:04

## 2021-04-11 RX ADMIN — METRONIDAZOLE 500 MG: 500 TABLET ORAL at 13:19

## 2021-04-11 RX ADMIN — BUPROPION HYDROCHLORIDE 150 MG: 150 TABLET, FILM COATED, EXTENDED RELEASE ORAL at 08:59

## 2021-04-11 RX ADMIN — OXYCODONE HYDROCHLORIDE 10 MG: 10 TABLET ORAL at 11:53

## 2021-04-11 RX ADMIN — CLOPIDOGREL BISULFATE 75 MG: 75 TABLET, FILM COATED ORAL at 08:59

## 2021-04-11 RX ADMIN — Medication 1 TABLET: at 08:59

## 2021-04-11 RX ADMIN — OXYCODONE HYDROCHLORIDE 10 MG: 10 TABLET ORAL at 22:21

## 2021-04-11 RX ADMIN — METRONIDAZOLE 500 MG: 500 TABLET ORAL at 06:04

## 2021-04-11 RX ADMIN — ALPRAZOLAM 0.25 MG: 0.25 TABLET ORAL at 22:23

## 2021-04-11 RX ADMIN — ACETAMINOPHEN 975 MG: 325 TABLET, FILM COATED ORAL at 22:23

## 2021-04-11 RX ADMIN — ACETAMINOPHEN 975 MG: 325 TABLET, FILM COATED ORAL at 06:04

## 2021-04-11 RX ADMIN — OXYCODONE HYDROCHLORIDE 10 MG: 10 TABLET ORAL at 02:07

## 2021-04-11 RX ADMIN — BENZONATATE 200 MG: 100 CAPSULE ORAL at 22:22

## 2021-04-11 RX ADMIN — ACETAMINOPHEN 975 MG: 325 TABLET, FILM COATED ORAL at 13:19

## 2021-04-11 RX ADMIN — FLUOXETINE 20 MG: 20 CAPSULE ORAL at 08:59

## 2021-04-11 RX ADMIN — TRAZODONE HYDROCHLORIDE 200 MG: 100 TABLET ORAL at 22:23

## 2021-04-11 RX ADMIN — GABAPENTIN 100 MG: 100 CAPSULE ORAL at 13:19

## 2021-04-11 RX ADMIN — OXYCODONE HYDROCHLORIDE 10 MG: 10 TABLET ORAL at 17:58

## 2021-04-11 RX ADMIN — GABAPENTIN 100 MG: 100 CAPSULE ORAL at 17:59

## 2021-04-11 RX ADMIN — CEFTRIAXONE SODIUM 2000 MG: 10 INJECTION, POWDER, FOR SOLUTION INTRAVENOUS at 17:59

## 2021-04-11 RX ADMIN — ENOXAPARIN SODIUM 40 MG: 40 INJECTION SUBCUTANEOUS at 08:58

## 2021-04-11 RX ADMIN — METRONIDAZOLE 500 MG: 500 TABLET ORAL at 22:22

## 2021-04-11 NOTE — ASSESSMENT & PLAN NOTE
· Presented with sepsis POA tachycardia, elevated white count to 36 K with 4% bands, found to have right-sided large parapneumonic effusion  Subsequent g stain fluid cultures revealed Gram-positive cocci in pairs  She has chest tube placed by IR on 03/23 at [facility]   Six doses of tPA/dornase have completed by 03/27  Repeat CT scan showed mid size residual right pleural effusion slightly decreased  New ground-glass opacity in the left anterior upper lobe, of 5 centimetre left pulmonary nodule noted  · On ceftriaxone and flagyl per ID recomendtions, will need 3 weeks of treatment post -op, through 4/22  · Can go on Augmentin per ID at d/c  · Will take out central line at d/c  · S/p VATS decortication on 4/2  · surgical cultures neg  · Post op chest tube management per thoracic surgery - 4/6 chest tube removed  · CXR post-removal shows small residual hydoPTX - ok for d/c per thoracics  Should have OP Xray in 2-3 weeks  Right post apical chest tube removal today, with follow up chest xray- Removal of right chest tube with no effusion or pneumothorax  Persistent right base atelectasis    · Add tessalon at HS to assist with sleep, avoid potent cough suppressants during the day  · C/w Oxycodone to 10 mg Q4HPRN for severe pain and 5 mg Q4H PRN for moderate pain

## 2021-04-11 NOTE — CONSULTS
Orthopedics   Cruz Farooq 65 y o  female MRN: [id number]  Unit/Bed#: [facility] 623-01      Chief Complaint:   left knee pain    HPI:   65 y  o female complaining of left knee pain  She notes that the pain started last night while sleeping  No history of injury to this knee and no pain previously  No history of gout or tick bites  She was walking on it as recently as yesterday with minimal pain  Pain is worse with movement and activity and better with rest  It now radiates down her leg into her foot  She has been in the hospital for the last few weeks with sepsis and empyema      Review Of Systems:   · Skin: Normal  · Neuro: See HPI  · Musculoskeletal: See HPI  · 14 point review of systems negative except as stated above     Past Medical History:   Past Medical History:   Diagnosis Date    Anemia     Cardiac disease     Chronic pain     Coronary artery disease     Hypertension     PVD (peripheral vascular disease) ([facility] [address] )        Past Surgical History:   Past Surgical History:   Procedure Laterality Date    ANKLE SURGERY      IR CHEST TUBE PLACEMENT  3/23/2021    IR NON-TUNNELED CENTRAL LINE PLACEMENT  3/24/2021    THORACOSCOPY VIDEO ASSISTED SURGERY (VATS) Right 4/2/2021    Procedure: THORACOSCOPY VIDEO ASSISTED SURGERY (VATS); DECORTICATION;  Surgeon: Shante Dang MD;  Location: [facility];  Service: Thoracic       Family History:  Family history reviewed and non-contributory  Family History   Problem Relation Age of Onset    No Known Problems Mother     No Known Problems Father        Social History:  Social History     Socioeconomic History    Marital status: /Civil Union     Spouse name: Not on file    Number of children: Not on file    Years of education: Not on file    Highest education level: Not on file   Occupational History    Not on file   Social Needs    Financial resource strain: Not on file    Food insecurity     Worry: Not on file     Inability: Not on file   [facility] Transportation needs     Medical: Not on file     Non-medical: Not on file   Tobacco Use    Smoking status: Former Smoker     Packs/day: 1 00     Years: 0 10     Pack years: 0 10     Types: Cigarettes    Smokeless tobacco: Never Used    Tobacco comment: pt states she stopped smoking 3 weeks ago   Substance and Sexual Activity    Alcohol use: Yes     Frequency: 2-4 times a month     Drinks per session: 1 or 2     Comment: social    Drug use: No    Sexual activity: Not on file   Lifestyle    Physical activity     Days per week: Not on file     Minutes per session: Not on file    Stress: Not on file   Relationships    Social connections     Talks on phone: Not on file     Gets together: Not on file     Attends Jehovah's witness service: Not on file     Active member of club or organization: Not on file     Attends meetings of clubs or organizations: Not on file     Relationship status: Not on file    Intimate partner violence     Fear of current or ex partner: Not on file     Emotionally abused: Not on file     Physically abused: Not on file     Forced sexual activity: Not on file   Other Topics Concern    Not on file   Social History Narrative    Not on file       Allergies:    Allergies   Allergen Reactions    Digoxin Hives     HA    Gadolinium Derivatives            Labs:  0   Lab Value Date/Time    HCT 26 3 (L) 04/09/2021 0651    HCT 25 6 (L) 04/08/2021 0541    HCT 30 6 (L) 04/07/2021 0529    HGB 8 3 (L) 04/09/2021 0651    HGB 8 1 (L) 04/08/2021 0541    HGB 9 7 (L) 04/07/2021 0529    INR 1 52 (H) 04/08/2021 0541    WBC 14 60 (H) 04/09/2021 0651    WBC 14 37 (H) 04/08/2021 0541    WBC 20 26 (H) 04/07/2021 0529     5 (H) 03/21/2021 2256       Meds:    Current Facility-Administered Medications:     acetaminophen (TYLENOL) tablet 975 mg, 975 mg, Oral, Q8H [facility], Frances Wallace PA-C, 975 mg at 04/10/21 1316    ALPRAZolam (XANAX) tablet 0 25 mg, 0 25 mg, Oral, Q12H PRN, Charles Koo MD, 0 25 mg at 04/10/21 1025    atorvastatin (LIPITOR) tablet 40 mg, 40 mg, Oral, Daily With KITA Pham-C, 40 mg at 04/10/21 1720    benzonatate (TESSALON PERLES) capsule 200 mg, 200 mg, Oral, HS, Rc Mckeon MD, 200 mg at 04/09/21 2103    buPROPion (WELLBUTRIN XL) 24 hr tablet 150 mg, 150 mg, Oral, Daily, Frances Wallace PA-C, 150 mg at 04/10/21 0808    cefTRIAXone (ROCEPHIN) 2,000 mg in dextrose 5 % 50 mL IVPB, 2,000 mg, Intravenous, Q24H, KITA Marx-CELIA, Last Rate: 100 mL/hr at 04/10/21 1720, 2,000 mg at 04/10/21 1720    clopidogrel (PLAVIX) tablet 75 mg, 75 mg, Oral, Daily, Sebastián Cruz DO, 75 mg at 04/10/21 0808    dextromethorphan-guaiFENesin (ROBITUSSIN DM) oral syrup 10 mL, 10 mL, Oral, Q4H PRN, KITA Bedolla-C, 10 mL at 04/08/21 2124    enoxaparin (LOVENOX) subcutaneous injection 40 mg, 40 mg, Subcutaneous, Daily, Frances Wallace PA-C, 40 mg at 04/10/21 0808    FLUoxetine (PROzac) capsule 20 mg, 20 mg, Oral, Daily, Frances Wallace PA-C, 20 mg at 04/10/21 0808    furosemide (LASIX) tablet 20 mg, 20 mg, Oral, Daily, Frances Wallace PA-C, 20 mg at 04/10/21 0808    lidocaine (LIDODERM) 5 % patch 1 patch, 1 patch, Topical, Daily, Joselyn Goyal PA-C, 1 patch at 04/10/21 0808    melatonin tablet 6 mg, 6 mg, Oral, HS, Frances Wallace PA-C, 6 mg at 04/09/21 2103    metroNIDAZOLE (FLAGYL) tablet 500 mg, 500 mg, Oral, Q8H [facility], Joanne Worthington DO, 500 mg at 04/10/21 1317    multivitamin-minerals (CENTRUM ADULTS) tablet 1 tablet, 1 tablet, Oral, Daily, Frances Wallace PA-C, 1 tablet at 04/10/21 0808    oxyCODONE (ROXICODONE) immediate release tablet 10 mg, 10 mg, Oral, Q4H PRN, Rc Mckeon MD, 10 mg at 04/10/21 2018    oxyCODONE (ROXICODONE) IR tablet 5 mg, 5 mg, Oral, Q4H PRN, Rc Mckeon MD, 5 mg at 04/08/21 2124    pantoprazole (PROTONIX) EC tablet 20 mg, 20 mg, Oral, Early Morning, Frances Wallace PA-C, 20 mg at 04/10/21 0506    traZODone (DESYREL) tablet 200 mg, 200 mg, Oral, HS, Irven HECTOR Baltazar, 200 mg at 04/09/21 2103    Blood Culture:   Lab Results   Component Value Date    BLOODCX No Growth After 5 Days  03/21/2021    BLOODCX No Growth After 5 Days  03/21/2021       Wound Culture:   No results found for: WOUNDCULT    Ins and Outs:  I/O last 24 hours: In: 480 [P O :480]  Out: 1250 [Urine:1250]          Physical Exam:   /58   Pulse 80   Temp 97 9 °F (36 6 °C)   Resp 16   Ht 5' 6" (1 676 m)   Wt 68 4 kg (150 lb 12 8 oz)   SpO2 96%   BMI 24 34 kg/m²   Gen: Alert and oriented to person, place, time  HEENT: EOMI, eyes clear, moist mucus membranes, hearing intact  Respiratory: Bilateral chest rise  No audible wheezing found  Cardiovascular: No audible murmurs  Abdomen: soft  Musculoskeletal: left lower extremity  · Skin intact  · Tender to palpation over superior anterior and posterior knee  · Moderate effusion  · Unable to SLR secondary to pain but able to fire quadriceps weakly with no palpable gap in tendon  · Stable to varus/valgus stress, negative lachmans, posterior draw  · AROM 0-30 degrees limited by pain  · No micromotion tenderness  · Sensation intact L3-S1  · 2+ DP pulse    Radiology:   I personally reviewed the films  X-rays left knee shows moderate effusion with no acute fractures or dislocations  Assessment:  65 y o  female with left knee effusion  Low concern for septic joint  Plan:   · Weight bearing as tolerated  left lower extremity  · Will discuss therapeutic arthrocentesis and injection with team  · PT  · Pain control  · Body mass index is 24 34 kg/m²     · Dispo: Ortho will follow    Kerrie Mcgee MD

## 2021-04-11 NOTE — PROGRESS NOTES
[facility]  Progress Note - Amairani Funes 1950, 65 y o  female MRN: [id number]  Unit/Bed#: [facility] 623-01 Encounter: [id number]  Primary Care Provider: Irma Fleischer, MD   Date and time admitted to hospital: 3/29/2021  2:37 PM    * Sepsis due to Empyema  Assessment & Plan  · Presented with sepsis POA tachycardia, elevated white count to 36 K with 4% bands, found to have right-sided large parapneumonic effusion  Subsequent g stain fluid cultures revealed Gram-positive cocci in pairs  She has chest tube placed by IR on 03/23 at [facility]   Six doses of tPA/dornase have completed by 03/27  Repeat CT scan showed mid size residual right pleural effusion slightly decreased  New ground-glass opacity in the left anterior upper lobe, of 5 centimetre left pulmonary nodule noted  · On ceftriaxone and flagyl per ID recomendtions, will need 3 weeks of treatment post -op, through 4/22  · Can go on Augmentin per ID at d/c  · Will take out central line at d/c  · S/p VATS decortication on 4/2  · surgical cultures neg  · Post op chest tube management per thoracic surgery - 4/6 chest tube removed  · CXR post-removal shows small residual hydoPTX - ok for d/c per thoracics  Should have OP Xray in 2-3 weeks  Right post apical chest tube removal today, with follow up chest xray- Removal of right chest tube with no effusion or pneumothorax  Persistent right base atelectasis    · Add tessalon at HS to assist with sleep, avoid potent cough suppressants during the day  · C/w Oxycodone to 10 mg Q4HPRN for severe pain and 5 mg Q4H PRN for moderate pain           Effusion of left knee  Assessment & Plan  · 4/10 XRay showed severe arthritis and moderate effusion  · Ortho appreciated   · Today S/p arthrocentesis  · WBC and RBC high - possibly 2/2 severe osteoarthritis  · F/u cx - doubt septic arthritis as clinically afebrile  · No Crystals  · Gram stain negative  · Steroid injection not given with high WBC and in setting of active resp infxn  · Today added Neurontin for pain    Urinary retention  Assessment & Plan  · Likely due to post op pain, opiate analgesics, functional due to chest tubes  · Raymond catheter placed and removed 4/5 for void trial  · 4/8 raymond replaced as PVR elevated  Will need to keep raymond in at d/c and rehab can do void trial  · Urine dark likely 2/2 dehydration  UA unremarkable    Pulmonary nodule  Assessment & Plan  · 5 centimeter nodule noted   · Pulmonary follow up outpatient    Anxiety  Assessment & Plan  Continue with Xanax and trazodone  Xanax 0 25 mg Q12H PRN     Dysphagia  Assessment & Plan  · Resolved, placed back on regular diet    Valvular heart disease  Assessment & Plan    Valvular disease  Aortic valve is 1 39 with moderate tricuspid and PA pressures of 60, echo shows ejection fraction of 60% with grade 1 diastolic dysfunction    Anemia  Assessment & Plan  · Chronic normocytic normochromic anemia  · Iron panel suggested chronic anemia of inflammation  · S/p 2 units of RBCs - last unit given 4/6  · Hgb stable post transfusions - Suspect anemia related to VATS    Severe protein-calorie malnutrition (HCC)  Assessment & Plan  Malnutrition Findings:        Severe protein calorie malnutrition  Adult malnutrition type:  Acute illness  Adult degree of malnutrition:  Other severe protein calorie malnutrition severe protein calorie malnutrition as evidenced by [place] orbits, temp temple following, clavicle protrusion, with prominent bone and low albumin levels 1 6 greater than 1 8, and associated with coagulopathy with high INR 8 on admission in the setting of right lower lobe pneumonia with complicated right parapneumonic effusion  Also has a pulmonary nodule needs to be worked up       BMI finding  Body mass index is 26 19  Nutrition is following      Add ensure clear         Thrombocytosis (HCC)  Assessment & Plan  · Thrombocytosis  · Likely reactive from her infection  · Will continue to monitor  · Monitor CBC at rehab - should resolve in next 4 weeks    Supratherapeutic INR  Assessment & Plan  Supratherapeutic INR  Presented with INR of 8 7  On admission, possibly secondary to severe malnutrition, albumin was 1 8 on admission  No history of heavy alcohol use per family  Liver enzymes are within normal limits  Received a 10 mg of vitamin K with improvement of INR to 1 27  INR appears stable <  Or = 1 5  HTN (hypertension)  Assessment & Plan  · Bps are acceptable  · Continue lasix po    CAD (coronary artery disease)  Assessment & Plan  Coronary artery disease  Plavix restarted  Continue Lipitor    Delirium-resolved as of 2021  Assessment & Plan  · Delirium has resolved  · Family denies any heavy alcohol use, maybe she takes a glass of wine occasionally so this unlikely active alcohol use more like severe delirium in the setting of sepsis  · CT head was negative for intracranial anomaly  · Now back to baseline mental status        VTE Pharmacologic Prophylaxis:   Pharmacologic: Enoxaparin (Lovenox)  Mechanical VTE Prophylaxis in Place: Yes    Patient Centered Rounds: I have performed bedside rounds with nursing staff today  Discussions with Specialists or Other Care Team Provider: ortho    Education and Discussions with Family / Patient: pt  Left ALONDRA w/ Lucia    Time Spent for Care: 30 minutes  More than 50% of total time spent on counseling and coordination of care as described above      Current Length of Stay: 13 day(s)    Current Patient Status: Inpatient   Certification Statement: The patient will continue to require additional inpatient hospital stay due to need for rehab placement    Discharge Plan: pending rehab placement    Code Status: Level 1 - Full Code      Subjective:   C/o severe left knee pain, although slightly improved after arthrocentesis    Objective:     Vitals:   Temp (24hrs), Av 4 °F (36 9 °C), Min:98 3 °F (36 8 °C), Max:98 5 °F (36 9 °C)    Temp:  [98 3 °F (36 8 °C)-98 5 °F (36 9 °C)] 98 5 °F (36 9 °C)  HR:  [80-88] 80  Resp:  [16-18] 18  BP: (107-108)/(57) 108/57  SpO2:  [94 %-95 %] 94 %  Body mass index is 24 34 kg/m²  Input and Output Summary (last 24 hours): Intake/Output Summary (Last 24 hours) at 4/11/2021 1613  Last data filed at 4/11/2021 0532  Gross per 24 hour   Intake --   Output 600 ml   Net -600 ml       Physical Exam:     Physical Exam  HENT:      Head: Normocephalic and atraumatic  Nose: Nose normal       Mouth/Throat:      Mouth: Mucous membranes are moist    Eyes:      Extraocular Movements: Extraocular movements intact  Conjunctiva/sclera: Conjunctivae normal    Neck:      Musculoskeletal: Normal range of motion and neck supple  Cardiovascular:      Rate and Rhythm: Normal rate and regular rhythm  Pulmonary:      Effort: Pulmonary effort is normal       Breath sounds: Normal breath sounds  No wheezing or rales  Abdominal:      General: Bowel sounds are normal  There is no distension  Palpations: Abdomen is soft  Tenderness: There is no abdominal tenderness  Musculoskeletal: Normal range of motion  Right lower leg: No edema  Left lower leg: No edema  Skin:     General: Skin is warm and dry  Neurological:      Mental Status: She is alert and oriented to person, place, and time           Additional Data:     Labs:    Results from last 7 days   Lab Units 04/09/21  0651 04/08/21  0541   WBC Thousand/uL 14 60* 14 37*   HEMOGLOBIN g/dL 8 3* 8 1*   HEMATOCRIT % 26 3* 25 6*   PLATELETS Thousands/uL 523* 510*   LYMPHO PCT %  --  6*   MONO PCT %  --  8   EOS PCT %  --  1     Results from last 7 days   Lab Units 04/08/21  0541   POTASSIUM mmol/L 3 7   CHLORIDE mmol/L 101   CO2 mmol/L 31   BUN mg/dL 9   CREATININE mg/dL 0 48*   CALCIUM mg/dL 7 8*   ALK PHOS U/L 90   ALT U/L 9*   AST U/L 26     Results from last 7 days   Lab Units 04/08/21  0541   INR  1 52*       * I Have Reviewed All Lab Data Listed Above  * Additional Pertinent Lab Tests Reviewed: Aj 66 Admission Reviewed        Recent Cultures (last 7 days):     Results from last 7 days   Lab Units 04/11/21  0818 04/11/21  0806   GRAM STAIN RESULT  3+ Polys  Rare Mononuclear Cells  No bacteria seen 3+ Polys  No bacteria seen       Last 24 Hours Medication List:   Current Facility-Administered Medications   Medication Dose Route Frequency Provider Last Rate    acetaminophen  975 mg Oral Q8H [address] Frances Wallace PA-C      ALPRAZolam  0 25 mg Oral Q12H PRN Yessenia Cain MD      atorvastatin  40 mg Oral Daily With Adilson Rdz PA-C      benzonatate  200 mg Oral HS Yessenia Cain MD      buPROPion  150 mg Oral Daily Frances Wallace PA-C      cefTRIAXone  2,000 mg Intravenous Q24H Jaclyn Wallace PA-C 2,000 mg (04/10/21 1720)    clopidogrel  75 mg Oral Daily Fantasma Caceres DO      dextromethorphan-guaiFENesin  10 mL Oral Q4H PRN Joanna Lombardi PA-C      enoxaparin  40 mg Subcutaneous Daily Frances Wallace PA-C      FLUoxetine  20 mg Oral Daily Frances Wallace PA-C      furosemide  20 mg Oral Daily Irving Maher      gabapentin  100 mg Oral TID Fantasma Caceres DO      lidocaine  1 patch Topical Daily Irving Maher      melatonin  6 mg Oral HS Frances Wallace PA-C      metroNIDAZOLE  500 mg Oral [place], [place]      multivitamin-minerals  1 tablet Oral Daily Irving Maher      oxyCODONE  10 mg Oral Q4H PRN eYssenia Cain MD      oxyCODONE  5 mg Oral Q4H PRN Yessenia Cain MD      pantoprazole  20 mg Oral Early Morning Joanna Lombardi PA-C      traZODone  200 mg Oral HS Joanna Lombardi PA-C          Today, Patient Was Seen By: Fantasma Caceres DO    ** Please Note: Dictation voice to text software may have been used in the creation of this document   **

## 2021-04-11 NOTE — PLAN OF CARE
Problem: Potential for Falls  Goal: Patient will remain free of falls  Description: INTERVENTIONS:  - Assess patient frequently for physical needs  -  Identify cognitive and physical deficits and behaviors that affect risk of falls  -  [place] fall precautions as indicated by assessment   - Educate patient/family on patient safety including physical limitations  - Instruct patient to call for assistance with activity based on assessment  - Modify environment to reduce risk of injury  - Consider OT/PT consult to assist with strengthening/mobility  Outcome: Progressing     Problem: Prexisting or High Potential for Compromised Skin Integrity  Goal: Skin integrity is maintained or improved  Description: INTERVENTIONS:  - Identify patients at risk for skin breakdown  - Assess and monitor skin integrity  - Assess and monitor nutrition and hydration status  - Monitor labs   - Assess for incontinence   - Turn and reposition patient  - Assist with mobility/ambulation  - Relieve pressure over bony prominences  - Avoid friction and shearing  - Provide appropriate hygiene as needed including keeping skin clean and dry  - Evaluate need for skin moisturizer/barrier cream  - Collaborate with interdisciplinary team   - Patient/family teaching  - Consider wound care consult   Outcome: Progressing     Problem: Nutrition/Hydration-ADULT  Goal: Nutrient/Hydration intake appropriate for improving, restoring or maintaining nutritional needs  Description: Monitor and assess patient's nutrition/hydration status for malnutrition  Collaborate with interdisciplinary team and initiate plan and interventions as ordered  Monitor patient's weight and dietary intake as ordered or per policy  Utilize nutrition screening tool and intervene as necessary  Determine patient's food preferences and provide high-protein, high-caloric foods as appropriate       INTERVENTIONS:  - Monitor oral intake, urinary output, labs, and treatment plans  - Assess nutrition and hydration status and recommend course of action  - Evaluate amount of meals eaten  - Assist patient with eating if necessary   - Allow adequate time for meals  - Recommend/ encourage appropriate diets, oral nutritional supplements, and vitamin/mineral supplements  - Order, calculate, and assess calorie counts as needed  - Recommend, monitor, and adjust tube feedings and TPN/PPN based on assessed needs  - Assess need for intravenous fluids  - Provide specific nutrition/hydration education as appropriate  - Include patient/family/caregiver in decisions related to nutrition  Outcome: Progressing     Problem: PAIN - ADULT  Goal: Verbalizes/displays adequate comfort level or baseline comfort level  Description: Interventions:  - Encourage patient to monitor pain and request assistance  - Assess pain using appropriate pain scale  - Administer analgesics based on type and severity of pain and evaluate response  - Implement non-pharmacological measures as appropriate and evaluate response  - Consider cultural and social influences on pain and pain management  - Notify physician/advanced practitioner if interventions unsuccessful or patient reports new pain  Outcome: Progressing     Problem: INFECTION - ADULT  Goal: Absence or prevention of progression during hospitalization  Description: INTERVENTIONS:  - Assess and monitor for signs and symptoms of infection  - Monitor lab/diagnostic results  - Monitor all insertion sites, i e  indwelling lines, tubes, and drains  - Monitor endotracheal if appropriate and nasal secretions for changes in amount and color  - [place] appropriate cooling/warming therapies per order  - Administer medications as ordered  - Instruct and encourage patient and family to use good hand hygiene technique  - Identify and instruct in appropriate isolation precautions for identified infection/condition  Outcome: Progressing  Goal: Absence of fever/infection during neutropenic period  Description: INTERVENTIONS:  - Monitor WBC    Outcome: Progressing     Problem: SAFETY ADULT  Goal: Patient will remain free of falls  Description: INTERVENTIONS:  - Assess patient frequently for physical needs  -  Identify cognitive and physical deficits and behaviors that affect risk of falls    -  [place] fall precautions as indicated by assessment   - Educate patient/family on patient safety including physical limitations  - Instruct patient to call for assistance with activity based on assessment  - Modify environment to reduce risk of injury  - Consider OT/PT consult to assist with strengthening/mobility  Outcome: Progressing  Goal: Maintain or return to baseline ADL function  Description: INTERVENTIONS:  -  Assess patient's ability to carry out ADLs; assess patient's baseline for ADL function and identify physical deficits which impact ability to perform ADLs (bathing, care of mouth/teeth, toileting, grooming, dressing, etc )  - Assess/evaluate cause of self-care deficits   - Assess range of motion  - Assess patient's mobility; develop plan if impaired  - Assess patient's need for assistive devices and provide as appropriate  - Encourage maximum independence but intervene and supervise when necessary  - Involve family in performance of ADLs  - Assess for home care needs following discharge   - Consider OT consult to assist with ADL evaluation and planning for discharge  - Provide patient education as appropriate  Outcome: Progressing  Goal: Maintain or return mobility status to optimal level  Description: INTERVENTIONS:  - Assess patient's baseline mobility status (ambulation, transfers, stairs, etc )    - Identify cognitive and physical deficits and behaviors that affect mobility  - Identify mobility aids required to assist with transfers and/or ambulation (gait belt, sit-to-stand, lift, walker, cane, etc )  - [place] fall precautions as indicated by assessment  - Record patient progress and toleration of activity level on Mobility SBAR; progress patient to next Phase/Stage  - Instruct patient to call for assistance with activity based on assessment  - Consider rehabilitation consult to assist with strengthening/weightbearing, etc   Outcome: Progressing     Problem: DISCHARGE PLANNING  Goal: Discharge to home or other facility with appropriate resources  Description: INTERVENTIONS:  - Identify barriers to discharge w/patient and caregiver  - Arrange for needed discharge resources and transportation as appropriate  - Identify discharge learning needs (meds, wound care, etc )  - Arrange for interpretive services to assist at discharge as needed  - Refer to Case Management Department for coordinating discharge planning if the patient needs post-hospital services based on physician/advanced practitioner order or complex needs related to functional status, cognitive ability, or social support system  Outcome: Progressing     Problem: Knowledge Deficit  Goal: Patient/family/caregiver demonstrates understanding of disease process, treatment plan, medications, and discharge instructions  Description: Complete learning assessment and assess knowledge base    Interventions:  - Provide teaching at level of understanding  - Provide teaching via preferred learning methods  Outcome: Progressing

## 2021-04-11 NOTE — PROGRESS NOTES
Subjective: No acute events overnight  No acute distress  Pain is main complaint    Objective:  A 10 point ROS was performed; negative except as noted above  Lab Results   Component Value Date/Time    WBC 14 60 (H) 04/09/2021 06:51 AM    HGB 8 3 (L) 04/09/2021 06:51 AM       Vitals:    04/10/21 2233   BP: 108/57   Pulse: 83   Resp: 16   Temp: 98 3 °F (36 8 °C)   SpO2: 95%     Left lower extremity  Moderate effusion  TTP Globally  No micromotion tenderness  AROM limited by pain 0-30 flexion-extension of knee  Motor intact to EHL/FHL/TA/GS  Sensation intact to light touch to dp/sp/tib/brandy/saph distributions  Toes warm and well perfused with brisk capillary refill    Assessment: 65 y o  female with left knee effusion   Will aspirate left knee today for diagnostic and therapeutic reasons    Plan:  WBAT LLE   Pain control  DVT ppx: Per primary team  PT/OT  Dispo: Ortho will follow

## 2021-04-11 NOTE — PROGRESS NOTES
Procedure- Orthopedics   [place] Lias 65 y o  female MRN: [id number]  Unit/Bed#: [facility] 623-01    Procedure: left knee aspiration    After sterile preparation of the skin overlying the knee local anesthetic was provided with 5cc of 1% lidocaine  An 18 gauge needle was then  inserted via a superior lateral portal   Approx 18cc of cloudy yellow fluid was aspirated and sent for gram stain, culture, synovial WBC/RBC, and crystals  Sterile dressing was then applied  Pt tolerated the procedure well and was neurovascularly intact both pre and post procedure      Karime Lora MD

## 2021-04-11 NOTE — ASSESSMENT & PLAN NOTE
Malnutrition Findings:        Severe protein calorie malnutrition  Adult malnutrition type:  Acute illness  Adult degree of malnutrition:  Other severe protein calorie malnutrition severe protein calorie malnutrition as evidenced by [place] orbits, temp temple following, clavicle protrusion, with prominent bone and low albumin levels 1 6 greater than 1 8, and associated with coagulopathy with high INR 8 on admission in the setting of right lower lobe pneumonia with complicated right parapneumonic effusion  Also has a pulmonary nodule needs to be worked up       BMI finding  Body mass index is 26 19  Nutrition is following      Add ensure clear

## 2021-04-11 NOTE — ASSESSMENT & PLAN NOTE
· 4/10 XRay showed severe arthritis and moderate effusion  · Ortho appreciated   · Today S/p arthrocentesis  · WBC and RBC high - possibly 2/2 severe osteoarthritis  · F/u cx - doubt septic arthritis as clinically afebrile  · No Crystals  · Gram stain negative  · Steroid injection not given with high WBC and in setting of active resp infxn  · Today added Neurontin for pain "Dr Nance is performing right inguinal hernia on 3-9-17"

## 2021-04-11 NOTE — ASSESSMENT & PLAN NOTE
· Thrombocytosis  · Likely reactive from her infection  · Will continue to monitor  · Monitor CBC at rehab - should resolve in next 4 weeks

## 2021-04-12 LAB
FLUAV RNA RESP QL NAA+PROBE: NEGATIVE
FLUBV RNA RESP QL NAA+PROBE: NEGATIVE
RSV RNA RESP QL NAA+PROBE: NEGATIVE
SARS-COV-2 RNA RESP QL NAA+PROBE: NEGATIVE

## 2021-04-12 PROCEDURE — 99232 SBSQ HOSP IP/OBS MODERATE 35: CPT | Performed by: INTERNAL MEDICINE

## 2021-04-12 PROCEDURE — 97535 SELF CARE MNGMENT TRAINING: CPT

## 2021-04-12 PROCEDURE — 99232 SBSQ HOSP IP/OBS MODERATE 35: CPT | Performed by: GENERAL PRACTICE

## 2021-04-12 PROCEDURE — 0241U HB NFCT DS VIR RESP RNA 4 TRGT: CPT | Performed by: GENERAL PRACTICE

## 2021-04-12 RX ORDER — AMOXICILLIN AND CLAVULANATE POTASSIUM 875; 125 MG/1; MG/1
1 TABLET, FILM COATED ORAL EVERY 12 HOURS SCHEDULED
Status: DISCONTINUED | OUTPATIENT
Start: 2021-04-12 | End: 2021-04-13 | Stop reason: HOSPADM

## 2021-04-12 RX ADMIN — METRONIDAZOLE 500 MG: 500 TABLET ORAL at 12:51

## 2021-04-12 RX ADMIN — MELATONIN TAB 3 MG 6 MG: 3 TAB at 21:05

## 2021-04-12 RX ADMIN — ALPRAZOLAM 0.25 MG: 0.25 TABLET ORAL at 09:03

## 2021-04-12 RX ADMIN — BUPROPION HYDROCHLORIDE 150 MG: 150 TABLET, FILM COATED, EXTENDED RELEASE ORAL at 09:03

## 2021-04-12 RX ADMIN — OXYCODONE HYDROCHLORIDE 10 MG: 10 TABLET ORAL at 09:03

## 2021-04-12 RX ADMIN — METRONIDAZOLE 500 MG: 500 TABLET ORAL at 05:07

## 2021-04-12 RX ADMIN — ACETAMINOPHEN 975 MG: 325 TABLET, FILM COATED ORAL at 05:07

## 2021-04-12 RX ADMIN — OXYCODONE HYDROCHLORIDE 10 MG: 10 TABLET ORAL at 18:41

## 2021-04-12 RX ADMIN — Medication 1 TABLET: at 09:03

## 2021-04-12 RX ADMIN — AMOXICILLIN AND CLAVULANATE POTASSIUM 1 TABLET: 875; 125 TABLET, FILM COATED ORAL at 17:26

## 2021-04-12 RX ADMIN — GABAPENTIN 100 MG: 100 CAPSULE ORAL at 15:42

## 2021-04-12 RX ADMIN — BENZONATATE 200 MG: 100 CAPSULE ORAL at 21:06

## 2021-04-12 RX ADMIN — ATORVASTATIN CALCIUM 40 MG: 40 TABLET, FILM COATED ORAL at 15:42

## 2021-04-12 RX ADMIN — ENOXAPARIN SODIUM 40 MG: 40 INJECTION SUBCUTANEOUS at 09:04

## 2021-04-12 RX ADMIN — OXYCODONE HYDROCHLORIDE 10 MG: 10 TABLET ORAL at 14:12

## 2021-04-12 RX ADMIN — DICLOFENAC SODIUM 2 G: 10 GEL TOPICAL at 21:06

## 2021-04-12 RX ADMIN — GABAPENTIN 100 MG: 100 CAPSULE ORAL at 21:06

## 2021-04-12 RX ADMIN — GABAPENTIN 100 MG: 100 CAPSULE ORAL at 09:03

## 2021-04-12 RX ADMIN — PANTOPRAZOLE SODIUM 20 MG: 20 TABLET, DELAYED RELEASE ORAL at 05:07

## 2021-04-12 RX ADMIN — FUROSEMIDE 20 MG: 20 TABLET ORAL at 09:03

## 2021-04-12 RX ADMIN — DICLOFENAC SODIUM 2 G: 10 GEL TOPICAL at 17:26

## 2021-04-12 RX ADMIN — FLUOXETINE 20 MG: 20 CAPSULE ORAL at 09:03

## 2021-04-12 RX ADMIN — ACETAMINOPHEN 975 MG: 325 TABLET, FILM COATED ORAL at 12:51

## 2021-04-12 RX ADMIN — TRAZODONE HYDROCHLORIDE 200 MG: 100 TABLET ORAL at 21:06

## 2021-04-12 RX ADMIN — CLOPIDOGREL BISULFATE 75 MG: 75 TABLET, FILM COATED ORAL at 09:03

## 2021-04-12 RX ADMIN — ACETAMINOPHEN 975 MG: 325 TABLET, FILM COATED ORAL at 21:05

## 2021-04-12 NOTE — PROGRESS NOTES
Progress Note - Infectious Disease   Ruby Hernandez 65 y o  female MRN: [id number]  Unit/Bed#: [facility] 623-01 Encounter: [id number]      Impression/Plan:     1- Sepsis, POA:  Secondary to #2 and #3   Negative blood cultures   Patient has clinically improved   Otherwise remains afebrile and clinically stable  WBC count has trended downward and remains stable  - antibiotics as below  - monitor CBC  - monitor temperatures and hemodynamics  - supportive care     2- Right-sided pneumonia:  Possible aspiration event leading to right-sided pneumonia and subsequent empyema   Patient denies binge drinking or daily drinking   Negative strep/Legionella antigen   - antibiotics as below  - close monitoring of respiratory status     3- Right-sided empyema:  Pleural fluid analyzed on 2021, at time of IR insertion of right-sided chest tube  Gram stain did show GPCs in pairs   Suspect strep   Consider polymicrobial infection   Now status post right-sided VATS with thick loculated purulent fluid noted intraoperatively   OR cultures are negative   -will change ceftriaxone/Flagyl to oral Augmentin  -complete 3 week postop course, through   -CT surgery follow-up     4- coronary artery disease:  -continue close monitoring of hemodynamics status     5- tobacco dependence:  She reports  gradual smoking cessation; she has not smoked over the past 2 weeks prior to presentation  - advised patient to continue with smoking cessation     6- moderate aortic stenosis:  Noted on TTE from 2021  - cardiology follow-up    Stable for discharge from ID standpoint  Subjective:  Complains of some pain  No fevers, chills or sweats      Objective:  Vitals:  Temp:  [98 6 °F (37 °C)-99 2 °F (37 3 °C)] 98 6 °F (37 °C)  HR:  [81-90] 81  Resp:  [18] 18  BP: (124-125)/(63-65) 125/65  SpO2:  [94 %-97 %] 97 %  Temp (24hrs), Av 9 °F (37 2 °C), Min:98 6 °F (37 °C), Max:99 2 °F (37 3 °C)  Current: Temperature: 98 6 °F (37 °C)    Physical Exam:   General:  No acute distress, thin, nontoxic  Psychiatric:  Awake and alert  Pulmonary:  Normal respiratory excursion without accessory muscle use  Abdomen:  Soft, nontender  Extremities:  No edema  Skin:  No rashes    Lab Results:  I have personally reviewed pertinent labs  Results from last 7 days   Lab Units 04/08/21  0541 04/07/21  0529 04/06/21  0547   POTASSIUM mmol/L 3 7 4 0 3 8   CHLORIDE mmol/L 101 104 104   CO2 mmol/L 31 29 30   BUN mg/dL 9 9 9   CREATININE mg/dL 0 48* 0 52* 0 47*   EGFR ml/min/1 73sq m 98 96 99   CALCIUM mg/dL 7 8* 8 3 7 8*   AST U/L 26  --   --    ALT U/L 9*  --   --    ALK PHOS U/L 90  --   --      Results from last 7 days   Lab Units 04/09/21  0651 04/08/21  0541 04/07/21  0529   WBC Thousand/uL 14 60* 14 37* 20 26*   HEMOGLOBIN g/dL 8 3* 8 1* 9 7*   PLATELETS Thousands/uL 523* 510* 576*     Results from last 7 days   Lab Units 04/11/21  0818 04/11/21  0806   GRAM STAIN RESULT  3+ Polys  Rare Mononuclear Cells  No bacteria seen 3+ Polys  No bacteria seen   BODY FLUID CULTURE, STERILE   --  No growth       Imaging Studies:   I have personally reviewed pertinent imaging study reports and images in PACS  EKG, Pathology, and Other Studies:   I have personally reviewed pertinent reports
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
## 2021-04-12 NOTE — ASSESSMENT & PLAN NOTE
· Presented with sepsis POA tachycardia, elevated white count to 36 K with 4% bands, found to have right-sided large parapneumonic effusion  Subsequent g stain fluid cultures revealed Gram-positive cocci in pairs  She has chest tube placed by IR on 03/23 at [facility]   Six doses of tPA/dornase have completed by 03/27  Repeat CT scan showed mid size residual right pleural effusion slightly decreased  New ground-glass opacity in the left anterior upper lobe, of 5 centimetre left pulmonary nodule noted  · ceftriaxone and flagyl -> Augmentin per ID recomendtions, will need 3 weeks of treatment post -op, through 4/22  · Will take out central line at d/c  · S/p VATS decortication on 4/2  · surgical cultures neg  · Post op chest tube management per thoracic surgery - 4/6 chest tube removed  · CXR post-removal shows small residual hydoPTX - ok for d/c per thoracics  Should have OP Xray in 2-3 weeks  Right post apical chest tube removal today, with follow up chest xray- Removal of right chest tube with no effusion or pneumothorax  Persistent right base atelectasis    · Add tessalon at HS to assist with sleep, avoid potent cough suppressants during the day  · C/w Oxycodone to 10 mg Q4HPRN for severe pain and 5 mg Q4H PRN for moderate pain

## 2021-04-12 NOTE — ASSESSMENT & PLAN NOTE
· 4/10 XRay showed severe arthritis and moderate effusion  · Ortho appreciated   · 4/11 S/p arthrocentesis  · WBC and RBC high - likely 2/2 severe osteoarthritis  · F/u cx - doubt septic arthritis as afebrile - prelim cx negative  · No Crystals  · Gram stain negative  · Steroid injection not given in setting of active resp infxn  · c/w Neurontin for pain  · Today added Voltaren gel

## 2021-04-12 NOTE — CASE MANAGEMENT
TC to [facility]go[facility] regarding status of bed for patient  Message left  TC to Daughter, Antony Bennett, regarding bed availability and [employer] inquiring about secondary insurance       TC from [facility] reviewing secondary coverage now and will return TC to this CM    Covid test requested    TC to daughter, Yohan Winslow regarding above

## 2021-04-12 NOTE — ASSESSMENT & PLAN NOTE
Malnutrition Findings:        Severe protein calorie malnutrition  Adult malnutrition type:  Acute illness  Adult degree of malnutrition:  Other severe protein calorie malnutrition severe protein calorie malnutrition as evidenced by [place] orbits, temp temple following, clavicle protrusion, with prominent bone and low albumin levels 1 6 greater than 1 8, and associated with coagulopathy with high INR 8 on admission in the setting of right lower lobe pneumonia with complicated right parapneumonic effusion  Also has a pulmonary nodule needs to be worked up       BMI finding  Body mass index is 26 19  Nutrition is following      Add ensure clear

## 2021-04-12 NOTE — TRANSPORTATION MEDICAL NECESSITY
Section I - General Information    Name of Patient: Manny Wall                 : 2041    Medicare #: [id number]  Transport Date: 21 (PCS is valid for round trips on this date and for all repetitive trips in the 60-day range as noted below )  Origin: [facility]                                                         Destination:  [facility]  Is the pt's stay covered under Medicare Part A (PPS/DRG)   []     Closest appropriate facility? If no, why is transport to more distant facility required? Yes  If hospice pt, is this transport related to pt's terminal illness? NA       Section II - Medical Necessity Questionnaire  Ambulance transportation is medically necessary only if other means of transport are contraindicated or would be potentially harmful to the patient  To meet this requirement, the patient must either be "bed confined" or suffer from a condition such that transport by means other than ambulance is contraindicated by the patient's condition  The following questions must be answered by the medical professional signing below for this form to be valid:    1)  Describe the MEDICAL CONDITION (physical and/or mental) of this patient AT [address] [place] [address] that requires the patient to be transported in an ambulance and why transport by other means is contraindicated by the patient's condition: cannot manage oxygen on own, dependent on 2L, moderate/severe pain on movement, unable to sit for time to tacility    2) Is the patient "bed confined" as defined below? No  To be "be confined" the patient must satisfy all three of the following conditions: (1) unable to get up from bed without Assistance; AND (2) unable to ambulate; AND (3) unable to sit in a chair or wheelchair  3) Can this patient safely be transported by car or wheelchair van (i e , seated during transport without a medical attendant or monitoring)?    No    4) In addition to completing questions 1-3 above, please check any of the following conditions that apply*:   *Note: supporting documentation for any boxes checked must be maintained in the patient's medical records  If hosp-hosp transfer, describe services needed at 2nd facility not available at 1st facility? Moderate/severe pain on movement   Requires oxygen-unable to self administer  Unable to tolerate seated position for time needed to transport       Section III - Signature of Physician or Healthcare Professional  I certify that the above information is true and correct based on my evaluation of this patient, and represent that the patient requires transport by ambulance and that other forms of transport are contraindicated  I understand that this information will be used by the Centers for Medicare and Medicaid Services (CMS) to support the determination of medical necessity for ambulance services, and I represent that I have personal knowledge of the patient's condition at time of transport  []  If this box is checked, I also certify that the patient is physically or mentally incapable of signing the ambulance service's claim and that the institution with which I am affiliated has furnished care, services, or assistance to the patient  My signature below is made on behalf of the patient pursuant to 42 CFR §424 36(b)(4)   In accordance with 42 CFR §424 37, the specific reason(s) that the patient is physically or mentally incapable of signing the claim form is as follows:     Signature of Physician* or Healthcare Professional______________________________________________________________  Signature Date 04/12/21 (For scheduled repetitive transports, this form is not valid for transports performed more than 60 days after this date)    Printed Name & Credentials of Physician or Healthcare Professional (MD, , RN, etc )____Janina Kauffman RN____________________________  *Form must be signed by patient's attending physician for scheduled, repetitive transports   For non-repetitive, unscheduled ambulance transports, if unable to obtain the signature of the attending physician, any of the following may sign (choose appropriate option below)  [] Physician Assistant [x]  Clinical Nurse Specialist []  Registered Nurse  []  Nurse Practitioner  [x] Discharge Planner

## 2021-04-12 NOTE — NURSING NOTE
Patient reports marked decrease in pain of the left knee at this time, compared to excruciating pain yesterday evening

## 2021-04-12 NOTE — OCCUPATIONAL THERAPY NOTE
Occupational Therapy Treatment Note:       04/12/21 1200   OT Last Visit   OT Visit Date 04/12/21   Note Type   Note Type Treatment   Pain Assessment   Pain Assessment Tool 0-10   Pain Score Worst Possible Pain   ADL   Where Assessed Supine, bed   Grooming Assistance 5  Supervision/Setup   UB Bathing Assistance 4  Minimal Assistance   UB Bathing Comments asst for thoroughness and for task completion   LB Bathing Assistance 2  Maximal Assistance   UB Dressing Assistance 4  Minimal Assistance   LB Dressing Assistance 2  Maximal Assistance   Cognition   Overall Cognitive Status Impaired   Arousal/Participation Alert   Attention Attends with cues to redirect   Memory Decreased recall of recent events;Decreased recall of precautions   Following Commands Follows one step commands without difficulty   Activity Tolerance   Activity Tolerance Patient tolerated treatment well   Assessment   Assessment pt participated in am ot session and was seen focusing on adls while supine in bed  pt refused to sit eob or attempt oob secondary to pain in l le  pt  initially had ++ stiffness l le as l le was on pillows and was in flexion  pt was able to tolerate straightened leg  while on bed by end of adl session which is improvement  pt required mod vc's throughout routine adls for sequencing task  pt reports she had l knee tapped yesterday, per nsg she is wbat and allowed oob  pt refusing oob secondary to pain  pt with periods of grogginess this session needing more asst for routine adls    Plan   Treatment Interventions ADL retraining;Functional transfer training; Endurance training;Cognitive reorientation;Patient/family training;Equipment evaluation/education; Activityengagement   Goal Expiration Date 04/19/21   OT Treatment Day 3   OT Frequency 3-5x/wk   Recommendation   OT Discharge Recommendation Post-Acute Rehabilitation Services   OT - OK to Discharge Yes   April A Beatriz Kasper

## 2021-04-12 NOTE — PROGRESS NOTES
[address] [facility]  Progress Note - Edita Carroll 1950, 65 y o  female MRN: [id number]  Unit/Bed#: [facility] 623-01 Encounter: [id number]  Primary Care Provider: Sruthi Ornelas MD   Date and time admitted to hospital: 3/29/2021  2:37 PM    * Sepsis due to Empyema  Assessment & Plan  · Presented with sepsis POA tachycardia, elevated white count to 36 K with 4% bands, found to have right-sided large parapneumonic effusion  Subsequent g stain fluid cultures revealed Gram-positive cocci in pairs  She has chest tube placed by IR on 03/23 at [facility]   Six doses of tPA/dornase have completed by 03/27  Repeat CT scan showed mid size residual right pleural effusion slightly decreased  New ground-glass opacity in the left anterior upper lobe, of 5 centimetre left pulmonary nodule noted  · ceftriaxone and flagyl -> Augmentin per ID recomendtions, will need 3 weeks of treatment post -op, through 4/22  · Will take out central line at d/c  · S/p VATS decortication on 4/2  · surgical cultures neg  · Post op chest tube management per thoracic surgery - 4/6 chest tube removed  · CXR post-removal shows small residual hydoPTX - ok for d/c per thoracics  Should have OP Xray in 2-3 weeks  Right post apical chest tube removal today, with follow up chest xray- Removal of right chest tube with no effusion or pneumothorax  Persistent right base atelectasis    · Add tessalon at HS to assist with sleep, avoid potent cough suppressants during the day  · C/w Oxycodone to 10 mg Q4HPRN for severe pain and 5 mg Q4H PRN for moderate pain           Effusion of left knee  Assessment & Plan  · 4/10 XRay showed severe arthritis and moderate effusion  · Ortho appreciated   · 4/11 S/p arthrocentesis  · WBC and RBC high - likely 2/2 severe osteoarthritis  · F/u cx - doubt septic arthritis as afebrile - prelim cx negative  · No Crystals  · Gram stain negative  · Steroid injection not given in setting of active resp infxn  · c/w Neurontin for pain  · Today added Voltaren gel    Urinary retention  Assessment & Plan  · Likely due to post op pain, opiate analgesics, functional due to chest tubes  · Raymond catheter placed and removed 4/5 for void trial  · 4/8 raymond replaced as PVR elevated  Will need to keep raymond in at d/c and rehab can do void trial  · Urine dark likely 2/2 dehydration  UA unremarkable    Pulmonary nodule  Assessment & Plan  · 5 centimeter nodule noted   · Pulmonary follow up outpatient    Anxiety  Assessment & Plan  Continue with Xanax and trazodone  Xanax 0 25 mg Q12H PRN     Dysphagia  Assessment & Plan  · Resolved, placed back on regular diet    Valvular heart disease  Assessment & Plan    Valvular disease  Aortic valve is 1 39 with moderate tricuspid and PA pressures of 60, echo shows ejection fraction of 60% with grade 1 diastolic dysfunction    Anemia  Assessment & Plan  · Chronic normocytic normochromic anemia  · Iron panel suggested chronic anemia of inflammation  · S/p 2 units of RBCs - last unit given 4/6  · Hgb stable post transfusions - Suspect anemia related to VATS    Severe protein-calorie malnutrition (HCC)  Assessment & Plan  Malnutrition Findings:        Severe protein calorie malnutrition  Adult malnutrition type:  Acute illness  Adult degree of malnutrition:  Other severe protein calorie malnutrition severe protein calorie malnutrition as evidenced by [place] orbits, temp temple following, clavicle protrusion, with prominent bone and low albumin levels 1 6 greater than 1 8, and associated with coagulopathy with high INR 8 on admission in the setting of right lower lobe pneumonia with complicated right parapneumonic effusion  Also has a pulmonary nodule needs to be worked up       BMI finding  Body mass index is 26 19  Nutrition is following      Add ensure clear         Thrombocytosis (HCC)  Assessment & Plan  · Thrombocytosis  · Likely reactive from her infection  · Will continue to monitor  · Monitor CBC at rehab - should resolve in next 4 weeks    Supratherapeutic INR  Assessment & Plan  Supratherapeutic INR  Presented with INR of 8 7  On admission, possibly secondary to severe malnutrition, albumin was 1 8 on admission  No history of heavy alcohol use per family  Liver enzymes are within normal limits  Received a 10 mg of vitamin K with improvement of INR to 1 27  INR appears stable <  Or = 1 5  HTN (hypertension)  Assessment & Plan  · Bps are acceptable  · Continue lasix po    CAD (coronary artery disease)  Assessment & Plan  Coronary artery disease  Plavix restarted  Continue Lipitor    VTE Pharmacologic Prophylaxis:   Pharmacologic: Enoxaparin (Lovenox)  Mechanical VTE Prophylaxis in Place: Yes    Patient Centered Rounds: I have performed bedside rounds with nursing staff today  Discussions with Specialists or Other Care Team Provider: ortho    Education and Discussions with Family / Patient: susan and Lucia    Time Spent for Care: 30 minutes  More than 50% of total time spent on counseling and coordination of care as described above  Current Length of Stay: 14 day(s)    Current Patient Status: Inpatient   Certification Statement: The patient will continue to require additional inpatient hospital stay due to need for rehab placement    Discharge Plan: tomorrow to rehab    Code Status: Level 1 - Full Code      Subjective:   C/o left knee pain    Objective:     Vitals:   Temp (24hrs), Av 7 °F (37 1 °C), Min:98 3 °F (36 8 °C), Max:99 2 °F (37 3 °C)    Temp:  [98 3 °F (36 8 °C)-99 2 °F (37 3 °C)] 98 3 °F (36 8 °C)  HR:  [81-91] 91  Resp:  [16-18] 16  BP: (123-125)/(63-67) 123/67  SpO2:  [94 %-97 %] 96 %  Body mass index is 24 37 kg/m²  Input and Output Summary (last 24 hours):        Intake/Output Summary (Last 24 hours) at 2021 1656  Last data filed at 2021 1412  Gross per 24 hour   Intake 770 ml   Output 1450 ml   Net -680 ml       Physical Exam: Physical Exam  HENT:      Head: Normocephalic and atraumatic  Nose: Nose normal       Mouth/Throat:      Mouth: Mucous membranes are moist    Eyes:      Extraocular Movements: Extraocular movements intact  Conjunctiva/sclera: Conjunctivae normal    Neck:      Musculoskeletal: Normal range of motion and neck supple  Cardiovascular:      Rate and Rhythm: Normal rate and regular rhythm  Pulmonary:      Effort: Pulmonary effort is normal       Breath sounds: Normal breath sounds  No wheezing or rales  Abdominal:      General: Bowel sounds are normal  There is no distension  Palpations: Abdomen is soft  Tenderness: There is no abdominal tenderness  Musculoskeletal: Normal range of motion  Right lower leg: No edema  Left lower leg: No edema  Skin:     General: Skin is warm and dry  Neurological:      Mental Status: She is alert and oriented to person, place, and time  Mental status is at baseline  Additional Data:     Labs:    Results from last 7 days   Lab Units 04/09/21  0651 04/08/21  0541   WBC Thousand/uL 14 60* 14 37*   HEMOGLOBIN g/dL 8 3* 8 1*   HEMATOCRIT % 26 3* 25 6*   PLATELETS Thousands/uL 523* 510*   LYMPHO PCT %  --  6*   MONO PCT %  --  8   EOS PCT %  --  1     Results from last 7 days   Lab Units 04/08/21  0541   POTASSIUM mmol/L 3 7   CHLORIDE mmol/L 101   CO2 mmol/L 31   BUN mg/dL 9   CREATININE mg/dL 0 48*   CALCIUM mg/dL 7 8*   ALK PHOS U/L 90   ALT U/L 9*   AST U/L 26     Results from last 7 days   Lab Units 04/08/21  0541   INR  1 52*       * I Have Reviewed All Lab Data Listed Above  * Additional Pertinent Lab Tests Reviewed:  [facility] Reviewed        Recent Cultures (last 7 days):     Results from last 7 days   Lab Units 04/11/21  0818 04/11/21  0806   GRAM STAIN RESULT  3+ Polys  Rare Mononuclear Cells  No bacteria seen 3+ Polys  No bacteria seen   BODY FLUID CULTURE, STERILE   --  No growth       Last 24 Hours Medication List:   Current Facility-Administered Medications   Medication Dose Route Frequency Provider Last Rate    acetaminophen  975 mg Oral [facility] Vonnie Thacker PA-C      ALPRAZolam  0 25 mg Oral Q12H PRN Judge Figueroa MD      amoxicillin-clavulanate  1 tablet Oral Q12H [facility] Joanne Worthington DO      atorvastatin  40 mg Oral Daily With Cele Wallace PA-C      benzonatate  200 mg Oral HS Judge Figueroa MD      buPROPion  150 mg Oral Daily Vonnie Thacker PA-C      clopidogrel  75 mg Oral Daily Nolberto Regalado [address] [place] [address] Avenue      dextromethorphan-guaiFENesin  10 mL Oral Q4H PRN Vonnie Thacker PA-C      Diclofenac Sodium  2 g Topical 4x Daily Nolberto Regalado DO      enoxaparin  40 mg Subcutaneous Daily Frances Wallace PA-C      FLUoxetine  20 mg Oral Daily Frances Wallace PA-C      furosemide  20 mg Oral Daily Vonnie Thacker, [place]      gabapentin  100 mg Oral TID Nolberto Regalado DO      lidocaine  1 patch Topical Daily Vonnie Thacker [place]      melatonin  6 mg Oral HS Vonnie Thacker PA-C      multivitamin-minerals  1 tablet Oral Daily [place]nie [place], [place]      oxyCODONE  10 mg Oral Q4H PRN Judge Figueroa MD      oxyCODONE  5 mg Oral Q4H PRN Judge Figueroa MD      pantoprazole  20 mg Oral Early Morning Vonnie Thacker PA-C      traZODone  200 mg Oral HS Vonnie Thacker PA-C          Today, Patient Was Seen By: Nolberto Regalado DO    ** Please Note: Dictation voice to text software may have been used in the creation of this document   **

## 2021-04-12 NOTE — CASE MANAGEMENT
Covid test ordered  Alphonso Guerrero requesting if patient to arrive at facility today, must arrive by 6:00 pm  Awaiting covid, and unable to obtain BLS for this distance  Transportation set up for 4/12 at 1300 via [facility] requests that covid results be faxed to [phone] prior to patient arrival at facility  CMN filled out, copy sent to medical records    Pt and family in agreement with above plan of DC

## 2021-04-12 NOTE — PLAN OF CARE
Problem: OCCUPATIONAL THERAPY ADULT  Goal: Performs self-care activities at highest level of function for planned discharge setting  See evaluation for individualized goals  Description: Treatment Interventions: ADL retraining, Functional transfer training, UE strengthening/ROM, Endurance training, Cognitive reorientation, Fine motor coordination activities, Compensatory technique education, Continued evaluation, Energy conservation, Activityengagement          See flowsheet documentation for full assessment, interventions and recommendations  Outcome: Progressing  Note: Limitation: Decreased ADL status, Decreased endurance, Decreased self-care trans, Decreased high-level ADLs  Prognosis: Good  Assessment: pt participated in am ot session and was seen focusing on adls while supine in bed  pt refused to sit eob or attempt oob secondary to pain in l le  pt  initially had ++ stiffness l le as l le was on pillows and was in flexion  pt was able to tolerate straightened leg  while on bed by end of adl session which is improvement  pt required mod vc's throughout routine adls for sequencing task  pt reports she had l knee tapped yesterday, per nsg she is wbat and allowed oob  pt refusing oob secondary to pain  pt with periods of grogginess this session needing more asst for routine adls      OT Discharge Recommendation: Post-Acute Rehabilitation Services  OT - OK to Discharge:  Yes     April A JAYME Montgomery

## 2021-04-13 VITALS
HEIGHT: 66 IN | WEIGHT: 151.9 LBS | SYSTOLIC BLOOD PRESSURE: 110 MMHG | HEART RATE: 89 BPM | OXYGEN SATURATION: 98 % | DIASTOLIC BLOOD PRESSURE: 53 MMHG | BODY MASS INDEX: 24.41 KG/M2 | TEMPERATURE: 98.9 F | RESPIRATION RATE: 16 BRPM

## 2021-04-13 PROBLEM — A41.9 SEPSIS (HCC): Status: RESOLVED | Noted: 2021-03-22 | Resolved: 2021-04-13

## 2021-04-13 PROCEDURE — 99239 HOSP IP/OBS DSCHRG MGMT >30: CPT | Performed by: NURSE PRACTITIONER

## 2021-04-13 RX ORDER — GUAIFENESIN/DEXTROMETHORPHAN 100-10MG/5
10 SYRUP ORAL EVERY 4 HOURS PRN
Refills: 0
Start: 2021-04-13

## 2021-04-13 RX ORDER — FUROSEMIDE 20 MG/1
20 TABLET ORAL DAILY
Refills: 0
Start: 2021-04-13

## 2021-04-13 RX ORDER — LIDOCAINE 50 MG/G
1 PATCH TOPICAL DAILY
Refills: 0
Start: 2021-04-14

## 2021-04-13 RX ORDER — ALPRAZOLAM 0.25 MG/1
0.25 TABLET ORAL EVERY 12 HOURS PRN
Qty: 10 TABLET | Refills: 0 | Status: SHIPPED | OUTPATIENT
Start: 2021-04-13

## 2021-04-13 RX ORDER — AMOXICILLIN AND CLAVULANATE POTASSIUM 875; 125 MG/1; MG/1
1 TABLET, FILM COATED ORAL EVERY 12 HOURS SCHEDULED
Refills: 0
Start: 2021-04-13 | End: 2021-04-22

## 2021-04-13 RX ORDER — MULTIVITAMIN/IRON/FOLIC ACID 18MG-0.4MG
1 TABLET ORAL DAILY
Refills: 0
Start: 2021-04-14

## 2021-04-13 RX ORDER — TRAZODONE HYDROCHLORIDE 100 MG/1
200 TABLET ORAL
Refills: 0
Start: 2021-04-13

## 2021-04-13 RX ORDER — GABAPENTIN 100 MG/1
100 CAPSULE ORAL 3 TIMES DAILY
Refills: 0
Start: 2021-04-13

## 2021-04-13 RX ORDER — OXYCODONE HYDROCHLORIDE 5 MG/1
TABLET ORAL
Qty: 15 TABLET | Refills: 0 | Status: SHIPPED | OUTPATIENT
Start: 2021-04-13

## 2021-04-13 RX ORDER — ACETAMINOPHEN 325 MG/1
975 TABLET ORAL EVERY 8 HOURS SCHEDULED
Refills: 0
Start: 2021-04-13

## 2021-04-13 RX ORDER — LANOLIN ALCOHOL/MO/W.PET/CERES
6 CREAM (GRAM) TOPICAL
Refills: 0
Start: 2021-04-13

## 2021-04-13 RX ORDER — BENZONATATE 200 MG/1
200 CAPSULE ORAL
Qty: 20 CAPSULE | Refills: 0
Start: 2021-04-13

## 2021-04-13 RX ADMIN — Medication 1 TABLET: at 09:27

## 2021-04-13 RX ADMIN — OXYCODONE HYDROCHLORIDE 10 MG: 10 TABLET ORAL at 02:58

## 2021-04-13 RX ADMIN — CLOPIDOGREL BISULFATE 75 MG: 75 TABLET, FILM COATED ORAL at 09:27

## 2021-04-13 RX ADMIN — OXYCODONE HYDROCHLORIDE 10 MG: 10 TABLET ORAL at 10:12

## 2021-04-13 RX ADMIN — GABAPENTIN 100 MG: 100 CAPSULE ORAL at 09:28

## 2021-04-13 RX ADMIN — FLUOXETINE 20 MG: 20 CAPSULE ORAL at 09:27

## 2021-04-13 RX ADMIN — ACETAMINOPHEN 975 MG: 325 TABLET, FILM COATED ORAL at 05:35

## 2021-04-13 RX ADMIN — ENOXAPARIN SODIUM 40 MG: 40 INJECTION SUBCUTANEOUS at 09:29

## 2021-04-13 RX ADMIN — PANTOPRAZOLE SODIUM 20 MG: 20 TABLET, DELAYED RELEASE ORAL at 05:35

## 2021-04-13 RX ADMIN — BUPROPION HYDROCHLORIDE 150 MG: 150 TABLET, FILM COATED, EXTENDED RELEASE ORAL at 09:27

## 2021-04-13 RX ADMIN — AMOXICILLIN AND CLAVULANATE POTASSIUM 1 TABLET: 875; 125 TABLET, FILM COATED ORAL at 09:28

## 2021-04-13 RX ADMIN — OXYCODONE HYDROCHLORIDE 10 MG: 10 TABLET ORAL at 06:25

## 2021-04-13 RX ADMIN — DICLOFENAC SODIUM 2 G: 10 GEL TOPICAL at 09:28

## 2021-04-13 NOTE — INCIDENTAL FINDINGS
The following findings require follow up:  Radiographic finding   Findin mm left lower lobe pulmonary nodule, unchanged in retrospect  Follow up required: Based on current Fleischner Society 2017 Guidelines on incidental pulmonary nodule, given patient is considered high risk for lung cancer, 12 month follow-up non-contrast chest CT is recommended       Follow up should be done within 1 year    Please notify the following clinician to assist with the follow up:   Dr Leda Villanueva MD

## 2021-04-13 NOTE — DISCHARGE SUMMARY
Discharge Summary - [facility]    Patient Information: Kristen Puentes 65 y o  female MRN: [id number]  Unit/Bed#: [address] [facility] [address] 623-01 Encounter: [id number]    Discharging Physician / Practitioner: NICKOLAS Cody  PCP: Samson Chowdhury MD  Admission Date: 3/29/2021  Discharge Date: 04/13/21    Reason for Admission:  Right-sided pneumonia and empyema status post VATS and chest tube with subsequent removal    Discharge Diagnoses:     Principal Problem (Resolved):    Sepsis due to Empyema  Active Problems:    CAD (coronary artery disease)    HTN (hypertension)    Supratherapeutic INR    Thrombocytosis (HCC)    Severe protein-calorie malnutrition ([address] )    Anemia    Valvular heart disease    Dysphagia    ETOH abuse    Anxiety    Pulmonary nodule    Pulmonary hypertension ([facility] [address] )    Peripheral vascular disease ([facility] [address] )    Tobacco abuse    Urinary retention    Effusion of left knee  Resolved Problems:    Delirium      Consultations During Hospital Stay:  · Thoracic surgery  · Infectious disease  · Orthopedics  · PT/OT  · Case management    Procedures Performed:     · VATS 4/2  · Left knee arthrocentesis    Significant Findings / Test Results:     · Blood cultures negative  · Operative cultures from VATS negative  · Most recent chest x-ray 4/6 Air-fluid level in the right hemithorax indicating a small right hydropneumothorax  Persistent groundglass opacity in the right lung base, likely atelectasis and scar  Residual pneumonia cannot be excluded  · X-ray left knee moderate joint effusion  · COVID-19 PCR negative  · Refer to chart for additional images throughout hospitalization    Incidental Findings:     5 mm left lower lobe pulmonary nodule, unchanged in retrospect  Based on current Fleischner Society 2017 Guidelines on incidental pulmonary nodule, given patient is considered high risk for lung cancer, 12 month follow-up non-contrast chest CT is recommended      Test Results Pending at Discharge (will require follow up): · None     Outpatient Tests Requested:  · Outpatient follow-up with PCP in 1 week  · Outpatient follow-up with thoracic surgery with chest x-ray prior to visit, follow-up scheduled for 4/22 with Dr Toñito Stewart    Complications:  None    Hospital Course:     Kuldeep Levin is a 65 y o  female patient with past medical history of CAD, pulmonary nodule, anxiety, anemia, hypertension and tobacco use who originally presented to the hospital on 3/29/2021 due to pleuritic right-sided chest pain and shortness of breath  Patient was initially admitted to the [facility] but transferred to [place] secondary to right-sided pneumonia with large parapneumonic effusion requiring Thoracics evaluation  Chest tube was placed  Patient was seen by infectious disease and started on antibiotics  Thoracic surgery was consulted  Patient underwent VATS decortication on 04/02  Chest tube has subsequently been removed  Will require oral Augmentin through 4/22  She will need outpatient follow-up with thoracic surgery scheduled for 4/22 with chest x-ray prior to appointment  She was also seen in consultation by Orthopedics secondary to left knee pain with moderate effusion on x-ray  Status post arthrocentesis, low suspicion for septic arthritis  Steroid injection not given in the setting of active infection  Can follow up with Orthopedics as outpatient, info placed on discharge instructions  Also having urinary retention and had Woody catheter placed  Attempted void trial however patient required replacement of Woody catheter 4/8  Plan will be to maintain Woody catheter for now and attempt a void trial once more mobile  Stable for discharge to rehab today  Outpatient follow-ups as above      Condition at Discharge: stable     Discharge Day Visit / Exam:     Subjective:      Vitals: Blood Pressure: 110/53 (04/13/21 0735)  Pulse: 89 (04/13/21 0735)  Temperature: 98 9 °F (37 2 °C) (04/13/21 4180)  Temp Source: Oral (04/12/21 0719)  Respirations: 16 (04/13/21 0735)  Height: 5' 6" (167 6 cm) (04/02/21 0600)  Weight - Scale: 68 9 kg (151 lb 14 4 oz) (04/13/21 0553)  SpO2: 98 % (04/13/21 0735)     Exam:   Physical Exam  Vitals signs and nursing note reviewed  Constitutional:       General: She is not in acute distress  Neck:      Comments: Right IJ catheter for d/c today  Cardiovascular:      Rate and Rhythm: Normal rate  Pulmonary:      Breath sounds: Decreased breath sounds present  Abdominal:      Tenderness: There is no abdominal tenderness  Genitourinary:     Comments: Woody catheter draining clear yellow urine  Musculoskeletal:      Left knee: She exhibits decreased range of motion  Tenderness found  Skin:     General: Skin is warm  Neurological:      Mental Status: She is alert and oriented to person, place, and time  Mental status is at baseline  Psychiatric:         Mood and Affect: Mood normal          Discussion with Family:  Patient and message left for daughter Mita Herbert     Discharge instructions/Information to patient and family:   See after visit summary for information provided to patient and family  Provisions for Follow-Up Care:  See after visit summary for information related to follow-up care and any pertinent home health orders  Disposition:     [place] [facility] [address] [facility]:   · Not Applicable to this Patient - Not Applicable to this Patient    Planned Readmission: no     Discharge Statement:  I spent 40 minutes discharging the patient  This time was spent on the day of discharge  I had direct contact with the patient on the day of discharge  Greater than 50% of the total time was spent examining patient, answering all patient questions, arranging and discussing plan of care with patient as well as directly providing post-discharge instructions    Additional time then spent on discharge activities  Discharge Medications:  See after visit summary for reconciled discharge medications provided to patient and family        ** Please Note: This note has been constructed using a voice recognition system **

## 2021-04-13 NOTE — PLAN OF CARE
Problem: Potential for Falls  Goal: Patient will remain free of falls  Description: INTERVENTIONS:  - Assess patient frequently for physical needs  -  Identify cognitive and physical deficits and behaviors that affect risk of falls  -  [place] fall precautions as indicated by assessment   - Educate patient/family on patient safety including physical limitations  - Instruct patient to call for assistance with activity based on assessment  - Modify environment to reduce risk of injury  - Consider OT/PT consult to assist with strengthening/mobility  Outcome: Adequate for Discharge     Problem: Prexisting or High Potential for Compromised Skin Integrity  Goal: Skin integrity is maintained or improved  Description: INTERVENTIONS:  - Identify patients at risk for skin breakdown  - Assess and monitor skin integrity  - Assess and monitor nutrition and hydration status  - Monitor labs   - Assess for incontinence   - Turn and reposition patient  - Assist with mobility/ambulation  - Relieve pressure over bony prominences  - Avoid friction and shearing  - Provide appropriate hygiene as needed including keeping skin clean and dry  - Evaluate need for skin moisturizer/barrier cream  - Collaborate with interdisciplinary team   - Patient/family teaching  - Consider wound care consult   Outcome: Adequate for Discharge     Problem: Nutrition/Hydration-ADULT  Goal: Nutrient/Hydration intake appropriate for improving, restoring or maintaining nutritional needs  Description: Monitor and assess patient's nutrition/hydration status for malnutrition  Collaborate with interdisciplinary team and initiate plan and interventions as ordered  Monitor patient's weight and dietary intake as ordered or per policy  Utilize nutrition screening tool and intervene as necessary  Determine patient's food preferences and provide high-protein, high-caloric foods as appropriate       INTERVENTIONS:  - Monitor oral intake, urinary output, labs, and treatment plans  - Assess nutrition and hydration status and recommend course of action  - Evaluate amount of meals eaten  - Assist patient with eating if necessary   - Allow adequate time for meals  - Recommend/ encourage appropriate diets, oral nutritional supplements, and vitamin/mineral supplements  - Order, calculate, and assess calorie counts as needed  - Recommend, monitor, and adjust tube feedings and TPN/PPN based on assessed needs  - Assess need for intravenous fluids  - Provide specific nutrition/hydration education as appropriate  - Include patient/family/caregiver in decisions related to nutrition  Outcome: Adequate for Discharge     Problem: PAIN - ADULT  Goal: Verbalizes/displays adequate comfort level or baseline comfort level  Description: Interventions:  - Encourage patient to monitor pain and request assistance  - Assess pain using appropriate pain scale  - Administer analgesics based on type and severity of pain and evaluate response  - Implement non-pharmacological measures as appropriate and evaluate response  - Consider cultural and social influences on pain and pain management  - Notify physician/advanced practitioner if interventions unsuccessful or patient reports new pain  Outcome: Adequate for Discharge     Problem: INFECTION - ADULT  Goal: Absence or prevention of progression during hospitalization  Description: INTERVENTIONS:  - Assess and monitor for signs and symptoms of infection  - Monitor lab/diagnostic results  - Monitor all insertion sites, i e  indwelling lines, tubes, and drains  - Monitor endotracheal if appropriate and nasal secretions for changes in amount and color  - [place] appropriate cooling/warming therapies per order  - Administer medications as ordered  - Instruct and encourage patient and family to use good hand hygiene technique  - Identify and instruct in appropriate isolation precautions for identified infection/condition  Outcome: Adequate for Discharge  Goal: Absence of fever/infection during neutropenic period  Description: INTERVENTIONS:  - Monitor WBC    Outcome: Adequate for Discharge     Problem: SAFETY ADULT  Goal: Patient will remain free of falls  Description: INTERVENTIONS:  - Assess patient frequently for physical needs  -  Identify cognitive and physical deficits and behaviors that affect risk of falls    -  [place] fall precautions as indicated by assessment   - Educate patient/family on patient safety including physical limitations  - Instruct patient to call for assistance with activity based on assessment  - Modify environment to reduce risk of injury  - Consider OT/PT consult to assist with strengthening/mobility  Outcome: Adequate for Discharge  Goal: Maintain or return to baseline ADL function  Description: INTERVENTIONS:  -  Assess patient's ability to carry out ADLs; assess patient's baseline for ADL function and identify physical deficits which impact ability to perform ADLs (bathing, care of mouth/teeth, toileting, grooming, dressing, etc )  - Assess/evaluate cause of self-care deficits   - Assess range of motion  - Assess patient's mobility; develop plan if impaired  - Assess patient's need for assistive devices and provide as appropriate  - Encourage maximum independence but intervene and supervise when necessary  - Involve family in performance of ADLs  - Assess for home care needs following discharge   - Consider OT consult to assist with ADL evaluation and planning for discharge  - Provide patient education as appropriate  Outcome: Adequate for Discharge  Goal: Maintain or return mobility status to optimal level  Description: INTERVENTIONS:  - Assess patient's baseline mobility status (ambulation, transfers, stairs, etc )    - Identify cognitive and physical deficits and behaviors that affect mobility  - Identify mobility aids required to assist with transfers and/or ambulation (gait belt, sit-to-stand, lift, walker, cane, etc )  - [place] fall precautions as indicated by assessment  - Record patient progress and toleration of activity level on Mobility SBAR; progress patient to next Phase/Stage  - Instruct patient to call for assistance with activity based on assessment  - Consider rehabilitation consult to assist with strengthening/weightbearing, etc   Outcome: Adequate for Discharge     Problem: DISCHARGE PLANNING  Goal: Discharge to home or other facility with appropriate resources  Description: INTERVENTIONS:  - Identify barriers to discharge w/patient and caregiver  - Arrange for needed discharge resources and transportation as appropriate  - Identify discharge learning needs (meds, wound care, etc )  - Arrange for interpretive services to assist at discharge as needed  - Refer to Case Management Department for coordinating discharge planning if the patient needs post-hospital services based on physician/advanced practitioner order or complex needs related to functional status, cognitive ability, or social support system  Outcome: Adequate for Discharge     Problem: Knowledge Deficit  Goal: Patient/family/caregiver demonstrates understanding of disease process, treatment plan, medications, and discharge instructions  Description: Complete learning assessment and assess knowledge base    Interventions:  - Provide teaching at level of understanding  - Provide teaching via preferred learning methods  Outcome: Adequate for Discharge

## 2021-04-14 LAB
BACTERIA SPEC BFLD CULT: NO GROWTH
GRAM STN SPEC: NORMAL
GRAM STN SPEC: NORMAL

## 2021-04-15 ENCOUNTER — TELEPHONE (OUTPATIENT)
Dept: SURGICAL ONCOLOGY | Facility: CLINIC | Age: 71
End: 2021-04-15

## 2021-04-15 NOTE — TELEPHONE ENCOUNTER
Patient's daughter, Adrienne Stacy, called regarding mother  Patient had surgery by Dr Angela Hernández on 4/2/21 to clean out lungs and fluid around lungs  She is temporarily residing at [facility] in [facility] for rehab  Adrienne Stacy mentioned that someone will need to contact the facility to schedule the expected appts  Patient was told to get an xray next week and follow-up 2 days after for f/u appt  [employer]'s number is [phone]  If you need to reach Adrienne Stacy, her number is [phone]

## 2021-04-15 NOTE — TELEPHONE ENCOUNTER
Spoke with Cletis Dakin from [facility], provided follow up appointment info for Dr Berkley Miller, along with CXR instructions

## 2021-04-16 ENCOUNTER — NURSING HOME VISIT (OUTPATIENT)
Dept: FAMILY MEDICINE CLINIC | Facility: HOSPITAL | Age: 71
End: 2021-04-16
Payer: MEDICARE

## 2021-04-16 DIAGNOSIS — R91.1 PULMONARY NODULE: ICD-10-CM

## 2021-04-16 DIAGNOSIS — J18.9 PNEUMONIA OF RIGHT LOWER LOBE DUE TO INFECTIOUS ORGANISM: ICD-10-CM

## 2021-04-16 DIAGNOSIS — I10 ESSENTIAL HYPERTENSION: Chronic | ICD-10-CM

## 2021-04-16 DIAGNOSIS — A41.9 SEPSIS, DUE TO UNSPECIFIED ORGANISM, UNSPECIFIED WHETHER ACUTE ORGAN DYSFUNCTION PRESENT (HCC): ICD-10-CM

## 2021-04-16 DIAGNOSIS — I25.10 CORONARY ARTERY DISEASE INVOLVING NATIVE HEART WITHOUT ANGINA PECTORIS, UNSPECIFIED VESSEL OR LESION TYPE: Chronic | ICD-10-CM

## 2021-04-16 DIAGNOSIS — F41.9 ANXIETY DISORDER, UNSPECIFIED TYPE: Primary | ICD-10-CM

## 2021-04-16 DIAGNOSIS — K21.9 GASTROESOPHAGEAL REFLUX DISEASE WITHOUT ESOPHAGITIS: Chronic | ICD-10-CM

## 2021-04-16 DIAGNOSIS — R26.2 AMBULATORY DYSFUNCTION: ICD-10-CM

## 2021-04-16 DIAGNOSIS — J86.9 EMPYEMA (HCC): ICD-10-CM

## 2021-04-16 DIAGNOSIS — R53.81 PHYSICAL DECONDITIONING: ICD-10-CM

## 2021-04-16 PROCEDURE — 99306 1ST NF CARE HIGH MDM 50: CPT | Performed by: FAMILY MEDICINE

## 2021-04-16 NOTE — PROGRESS NOTES
History and Physical  [facility]        NAME: Edita Carroll  AGE: 65 y o  SEX: female    DATE OF ENCOUNTER: 4/15/2021    Code status:    Assessment     Problem List Items Addressed This Visit        Digestive    GERD (gastroesophageal reflux disease) (Chronic)       Respiratory    Pneumonia of right lower lobe due to infectious organism       Cardiovascular and Mediastinum    CAD (coronary artery disease) (Chronic)    HTN (hypertension) (Chronic)       Other    Anxiety - Primary (Chronic)      Other Visit Diagnoses     Ambulatory dysfunction        Physical deconditioning        Sepsis, due to unspecified organism, unspecified whether acute organ dysfunction present ([facility] [address] )        [facility]                Plan/discussion       Patient is admitted to [facility] for comprehensive rehabilitation program   She will be seen by PT/OT/speech/nutrition  Ultimately she would like to go back home and be independent  Today with increase Anxiety  Overall due to adjustment disorder due to recent prolonged hospitalization and now SNF rehabiliations  She has been on Xanax for a while but has not been on this for a few days  She does get this prn  A dose was given this morning  She uses gabapentin for chronic pain but will increase this to see if Anxiety can be better controlled  She remains on Prozac At home she was on wellbutrin 12 hour with BID dosing, recently has only been getting daily dosing  Will resume BID dosing  cotninue to monitor  May need to adjust gabapentin a little more or even increase xanax at least for the short term  Left knee pain  Known OA  As above on gabapentin  Pain medication as needed  She is on voltaren gel as well, will adjust to every 4 hours for her  Empyema  S/p CTT and removal  S/p VATS  She will fu with CT surgery as well as pulmonology  Urinary retention  Continue with raymond for now   This is multifactorial to include her acute illness, sepsis, as well as pain medications  Had failed voiding trial a few weeks ago in the hospital    Once moving a little more I discussed we will need to give a voiding trial another chance  She is worried due to her knee pain and not being quick enough to go to the bathroom  No currenlty dysuria  No fever, no chills  Continue to monitor  Pulmonary nodule  Stable  Will need fu imaging as outpatient after discharge with PCP  Chief Complaint     SNF admission    History of Present Illness     Patient is seen for admission to [facility] for comprehensive rehabilitation program      Hospital records reviewed  Patient was admitted for sepsis secondary to pneumonia/empyema  She has a prolonged stay  Had CTT due to empyema  This was followed by VATS procedure  She will be following up with thoracic surgery  Reports mild pain over the CT site  No worsening in sob , no significant coughing  No fever, no chills  She is currently on Augmentin to complete her abx course  Reports ongoing knee pain  Has OA  Was seen by Ortho in hospital    She will be needing Ortho fu    currenlty painful on walking  She is on voltaren gel and pain medications  She is on Gabapentin as well  Staff reports ongoing Anxiety  Patient reports very nervous, restless,anxious  She is upset about being in the hospital for a long time  Upset about needing to be at rehabiliations  She is on prn Xanax  Staff noting she has not been getting this  Urinary retention  She currenlty has raymond  Not wanting this to be removed just yet due to difficulty ambulating and knee pain  The following portions of the patient's history were reviewed and updated as appropriate: current medications, past family history, past medical history, past social history, past surgical history and problem list     Allergies:   Allergies   Allergen Reactions    Digoxin Hives     HA    Gadolinium Derivatives        Review of Systems     Review of Systems   Constitutional: Negative  Negative for activity change, appetite change, chills, diaphoresis, fatigue and fever  HENT: Negative for congestion, facial swelling and sore throat  Respiratory: Negative  Negative for apnea, cough, chest tightness and shortness of breath  Cardiovascular: Negative  Negative for chest pain and palpitations  Gastrointestinal: Negative  Negative for abdominal distention, abdominal pain, blood in stool, constipation, diarrhea and nausea  Genitourinary: Negative  Negative for difficulty urinating, dysuria, flank pain and frequency  Musculoskeletal: Positive for gait problem and joint swelling  Psychiatric/Behavioral: Negative for dysphoric mood, hallucinations, self-injury and suicidal ideas  The patient is nervous/anxious  The patient is not hyperactive  Medications and orders     All medications reviewed and updated in Nursing Home EMR  Objective     Vitals: reviewed at  EHR      Physical Exam  Vitals signs reviewed  Constitutional:       General: She is not in acute distress  Appearance: Normal appearance  She is not ill-appearing or diaphoretic  HENT:      Head: Normocephalic  Right Ear: External ear normal       Left Ear: External ear normal       Nose: Nose normal       Mouth/Throat:      Mouth: Mucous membranes are moist    Eyes:      Extraocular Movements: Extraocular movements intact  Conjunctiva/sclera: Conjunctivae normal       Pupils: Pupils are equal, round, and reactive to light  Cardiovascular:      Rate and Rhythm: Normal rate and regular rhythm  Heart sounds: Normal heart sounds  Pulmonary:      Effort: Pulmonary effort is normal       Breath sounds: Normal breath sounds  Abdominal:      General: Bowel sounds are normal       Palpations: Abdomen is soft  Genitourinary:     Comments: Woody in place  Musculoskeletal:         General: Swelling and tenderness present        Comments: Left knee: slight swelling, pain on palpation  Skin:     General: Skin is warm  Capillary Refill: Capillary refill takes less than 2 seconds  Neurological:      General: No focal deficit present  Mental Status: She is alert and oriented to person, place, and time  Psychiatric:         Mood and Affect: Mood is anxious  Affect is tearful  Speech: Speech normal          Behavior: Behavior normal          Thought Content: Thought content normal  Thought content does not include homicidal or suicidal ideation           Judgment: Judgment normal          Pertinent Laboratory/Diagnostic Studies:       Jeromy Holt MD    4/16/2021 10:18 AM Normal

## 2021-04-22 ENCOUNTER — OFFICE VISIT (OUTPATIENT)
Dept: CARDIAC SURGERY | Facility: CLINIC | Age: 71
End: 2021-04-22

## 2021-04-22 VITALS
WEIGHT: 136 LBS | RESPIRATION RATE: 16 BRPM | OXYGEN SATURATION: 96 % | SYSTOLIC BLOOD PRESSURE: 110 MMHG | DIASTOLIC BLOOD PRESSURE: 80 MMHG | HEART RATE: 103 BPM | BODY MASS INDEX: 21.86 KG/M2 | HEIGHT: 66 IN | TEMPERATURE: 96.8 F

## 2021-04-22 DIAGNOSIS — J91.8 PARAPNEUMONIC EFFUSION: ICD-10-CM

## 2021-04-22 DIAGNOSIS — Z78.9 NEED FOR FOLLOW-UP BY SOCIAL WORKER: Primary | ICD-10-CM

## 2021-04-22 DIAGNOSIS — J18.9 PARAPNEUMONIC EFFUSION: ICD-10-CM

## 2021-04-22 PROCEDURE — 99024 POSTOP FOLLOW-UP VISIT: CPT | Performed by: THORACIC SURGERY (CARDIOTHORACIC VASCULAR SURGERY)

## 2021-04-22 NOTE — PROGRESS NOTES
Thoracic Follow-Up  Assessment/Plan:    Parapneumonic effusion  Ms Sarah Iniguez Is a 15-year-old female who presents to my office for a postoperative visit status post a right VATS decortication  She underwent surgery on  April 2nd  Today in the office, she is tearful because she does not like rehab  I explained to her that she has to work hard so that she can be discharged to home  From a thoracic surgical standpoint, she is recovering well  I have reviewed the report of her chest x-ray but the films have not arrived yet  I will review these when they arrive  I explained to her that as long as the chest x-ray does not show anything concerning, then she can just follow up with me on an as-needed basis  Moving forward, she can shower  Additionally, she has some questions for the orthopedic surgeon that performed a tap of her knee while she was in the hospital   I explained to her that she could always arrange for a virtual visit with her orthopedic surgeon to have this questions answered or she could just call the office and potentially speak to a physician assistant or nurse who might be able to answer her questions for her  Jose E Hewitt MD  Thoracic Surgery  (Available on [employer])  Office: [phone]         Diagnoses and all orders for this visit:    Need for follow-up by   -     Ambulatory referral to social work care management program; Future    Parapneumonic effusion          Thoracic History     Date: 4/2/21  Procedure: R VATS decortication     Subjective:    Patient ID: Manny Wall is a 65 y o  female  HPI  Ms Sarah Iniguez Is a 15-year-old female who presents to my office for a postoperative visit status post a right VATS decortication  Today in the office, she states that she is doing okay  She does not like her rehab center  She feels like they are telling lies about her  Her daughter assures me that this is not the case    Today in the office, she states that she has only mild shortness of breath  She denies fevers or chills  She denies a cough  She reports anxiety  She had a chest x-ray at her facility but we have requested the discs and these have not arrived yet  I will follow-up on these films when they arrive  The following portions of the patient's history were reviewed and updated as appropriate: allergies, current medications, past family history, past medical history, past social history, past surgical history and problem list     Review of Systems   Constitutional: Negative for chills, fatigue, fever and unexpected weight change  HENT: Negative  Eyes: Negative  Negative for visual disturbance  Respiratory: Negative for cough, shortness of breath and stridor  Cardiovascular: Negative for chest pain  Gastrointestinal: Negative  Endocrine: Negative  Genitourinary: Negative  Musculoskeletal: Negative  Skin: Negative  Neurological: Negative for dizziness, light-headedness and headaches  Hematological: Negative for adenopathy  Psychiatric/Behavioral: Positive for agitation and dysphoric mood  The patient is nervous/anxious  Objective:   Physical Exam  Vitals signs and nursing note reviewed  Constitutional:       General: She is not in acute distress  Appearance: Normal appearance  She is well-developed  She is not diaphoretic  HENT:      Head: Normocephalic and atraumatic  Nose: Nose normal  No congestion or rhinorrhea  Mouth/Throat:      Mouth: Mucous membranes are moist       Pharynx: Oropharynx is clear  No oropharyngeal exudate  Eyes:      General: No scleral icterus  Pupils: Pupils are equal, round, and reactive to light  Neck:      Musculoskeletal: Normal range of motion and neck supple  No muscular tenderness  Trachea: No tracheal deviation  Cardiovascular:      Rate and Rhythm: Normal rate and regular rhythm  Pulses: Normal pulses  Heart sounds: Normal heart sounds   No murmur  Pulmonary:      Effort: Pulmonary effort is normal  No respiratory distress  Breath sounds: Normal breath sounds  No stridor  No wheezing or rales  Comments: Chest tube stitches removed  Incisions CDI  Chest:      Chest wall: No tenderness  Abdominal:      General: Bowel sounds are normal  There is no distension  Palpations: Abdomen is soft  Tenderness: There is no abdominal tenderness  There is no rebound  Musculoskeletal: Normal range of motion  Lymphadenopathy:      Cervical: No cervical adenopathy  Skin:     General: Skin is warm and dry  Coloration: Skin is not jaundiced or pale  Findings: No erythema or rash  Neurological:      General: No focal deficit present  Mental Status: She is alert and oriented to person, place, and time  Psychiatric:         Mood and Affect: Mood normal          Behavior: Behavior normal          Thought Content: Thought content normal          Judgment: Judgment normal      /80   Pulse 103   Temp (!) 96 8 °F (36 °C) (Tympanic)   Resp 16   Ht 5' 6" (1 676 m)   Wt 61 7 kg (136 lb) Comment: pt in wheelchair, unable to weigh  SpO2 96%   BMI 21 95 kg/m²     Xr Chest Pa & Lateral    Result Date: 4/6/2021  Impression Air-fluid level in the right hemithorax indicating a small right hydropneumothorax  Persistent groundglass opacity in the right lung base, likely atelectasis and scar  Residual pneumonia cannot be excluded  Workstation performed: [id number]     Xr Chest Pa & Lateral    Result Date: 3/29/2021  Impression Right perihilar opacity and effusion, improving  Pigtail drain slightly changed position since prior examination  Large hiatal hernia  Workstation performed: [id number]      Ct Chest Wo Contrast    Result Date: 3/28/2021  Narrative CT CHEST WITHOUT IV CONTRAST INDICATION:  Pleural effusion, evaluate right empyema   COMPARISON:  CT chest 3/22/2021 TECHNIQUE: CT examination of the chest was performed without intravenous contrast   Axial, sagittal, and coronal 2D reformatted images were created from the source data and submitted for interpretation  Radiation dose length product (DLP) for this visit:  229 mGy-cm  This examination, like all CT scans performed in the [facility], was performed utilizing techniques to minimize radiation dose exposure, including the use of iterative reconstruction and automated exposure control  FINDINGS: Study is limited without IV contrast  LUNGS: Mild to moderate centrilobular emphysema  At least some component of compressive atelectasis of the right lower lobe  Although pneumonia cannot be entirely excluded and it is difficult to accurately compare noncontrast to previous contrast study, there appears to be slightly improved aeration of the right lower lobe  New groundglass opacity is noted within the anterior left upper lobe  Although this is not a typical location, aspiration may be considered  5 mm left lower lobe nodule series 3/84, stable in retrospect  No central endobronchial lesions are detected  PLEURA:  There is a modest sized residual right pleural effusion, slightly decreased from previous study  There are locules of air within the pleural fluid which could be related to previous tube placement  There is some loculation of fluid and pleural  thickening suggested in spite of absence of IV contrast  There is a new small left pleural effusion  HEART/GREAT VESSELS:  Mild cardiomegaly  Low-density blood pool in keeping with anemia  Atherosclerotic changes thoracic aorta and coronary arteries  Right IJ central line tip terminates at the caval atrial junction  MEDIASTINUM AND RUBIO:  Quite limited evaluation of the mediastinal and hilar erwin stations without IV contrast  Large hiatal hernia  CHEST WALL AND LOWER NECK:   Unremarkable  VISUALIZED STRUCTURES IN THE UPPER ABDOMEN:  Mild fullness of the left renal collecting system, incompletely visualized  Trace left upper quadrant abdominal ascites  OSSEOUS STRUCTURES:  No acute fracture or destructive osseous lesion  Degenerative changes of the spine  Impression 1  Modest sized residual right pleural effusion, slightly decreased from previous study  Locules of air within the pleural fluid which could be related to previous chest tube placement  There is some loculation of fluid and pleural thickening suggested  in spite of absence of IV contrast  2  At least some component of right lower lobe compressive atelectasis and suspected pneumonia with overall slight improvement in right lower lobe aeration suggested  Limited evaluation of the right hilum without IV contrast  3  New groundglass opacity within the anterior left upper lobe  Although this is not a typical location, aspiration may be considered  4  Mild to moderate COPD  A 5 mm left lower lobe pulmonary nodule, unchanged in retrospect  Based on current Fleischner Society 2017 Guidelines on incidental pulmonary nodule, given patient is considered high risk for lung cancer, 12 month follow-up non-contrast chest CT is recommended  5  Large hiatal hernia  6  New small left pleural effusion  Additional findings as above  The study was marked in Epic for follow-up  Workstation performed: [id number]     No CT Chest,Abdomen,Pelvis results available for this patient  No NM PET CT results available for this patient  No Barium Swallow results available for this patient

## 2021-04-22 NOTE — ASSESSMENT & PLAN NOTE
Ms Mark Amaya Is a 75-year-old female who presents to my office for a postoperative visit status post a right VATS decortication  She underwent surgery on  April 2nd  Today in the office, she is tearful because she does not like rehab  I explained to her that she has to work hard so that she can be discharged to home  From a thoracic surgical standpoint, she is recovering well  I have reviewed the report of her chest x-ray but the films have not arrived yet  I will review these when they arrive  I explained to her that as long as the chest x-ray does not show anything concerning, then she can just follow up with me on an as-needed basis  Moving forward, she can shower  Additionally, she has some questions for the orthopedic surgeon that performed a tap of her knee while she was in the hospital   I explained to her that she could always arrange for a virtual visit with her orthopedic surgeon to have this questions answered or she could just call the office and potentially speak to a physician assistant or nurse who might be able to answer her questions for her      Beto Dixon MD  Thoracic Surgery  (Available on [employer])  Office: [phone]

## 2021-04-27 ENCOUNTER — PATIENT OUTREACH (OUTPATIENT)
Dept: CASE MANAGEMENT | Facility: HOSPITAL | Age: 71
End: 2021-04-27

## 2021-04-27 NOTE — PROGRESS NOTES
MSW received a Distress Thermometer from Thoracic Surgery, where the pt self scored a 9 to indicate her level of stress  The pt did not yris off any psychosocial or physical problem areas  The form indicated that the pt was "too stressed" to complete the form  Upon a chart review, it appears pt was discharged from the hospital to a rehab facility and there is no contact number on the chart  MSW made outreach to pt's daughter  Daughter's voicemail was received and a detailed message was left, along with a contact number encouraging the pt's daughter to return the call

## 2021-05-03 LAB — FUNGUS SPEC CULT: NORMAL

## 2021-05-04 ENCOUNTER — NURSING HOME VISIT (OUTPATIENT)
Dept: FAMILY MEDICINE CLINIC | Facility: HOSPITAL | Age: 71
End: 2021-05-04
Payer: MEDICARE

## 2021-05-04 DIAGNOSIS — K21.9 GASTROESOPHAGEAL REFLUX DISEASE WITHOUT ESOPHAGITIS: ICD-10-CM

## 2021-05-04 DIAGNOSIS — F41.9 ANXIETY DISORDER, UNSPECIFIED TYPE: ICD-10-CM

## 2021-05-04 DIAGNOSIS — R26.2 AMBULATORY DYSFUNCTION: Primary | ICD-10-CM

## 2021-05-04 DIAGNOSIS — I10 ESSENTIAL HYPERTENSION: ICD-10-CM

## 2021-05-04 DIAGNOSIS — R33.9 URINARY RETENTION: ICD-10-CM

## 2021-05-04 DIAGNOSIS — R53.81 PHYSICAL DECONDITIONING: ICD-10-CM

## 2021-05-04 PROCEDURE — 99309 SBSQ NF CARE MODERATE MDM 30: CPT | Performed by: FAMILY MEDICINE

## 2021-05-04 NOTE — PROGRESS NOTES
Assessment/Plan:      Problem List Items Addressed This Visit        Digestive    GERD (gastroesophageal reflux disease) (Chronic)       Cardiovascular and Mediastinum    HTN (hypertension) (Chronic)       Genitourinary    Urinary retention      Other Visit Diagnoses     Ambulatory dysfunction    -  Primary    Physical deconditioning        Anxiety disorder, unspecified type               Plan/Discussion:    Liz Soto has been doing well  Currently upset due to her dog's new diagnosis of cancer     Overall doing  She has done well with therapies  Pain is controlled  On gabapentin and voltaren gel for her knee  Ihave recommended that she continue to follow up with Ortho for her knee  Urinary retention  Improved  Off of raymond catheter and voiding well  Subjective:   No chief complaint on file  Patient ID: Robby Jansen is a 65 y o  female  Pt is seen for fu   Plan for discharge tomorrow  She has no complaints  Upset due to her dog's new cancer diagnosis  Overall however she reports she is doing well  Anxiety is better controlled  She is walking better  Reports therapy has gone well        The following portions of the patient's history were reviewed and updated as appropriate: allergies, current medications, past family history, past medical history, past social history, past surgical history and problem list     Review of Systems   Constitutional: Negative  Negative for activity change, appetite change, chills, diaphoresis, fatigue and fever  HENT: Negative for congestion, facial swelling and sore throat  Respiratory: Negative  Negative for apnea, cough, chest tightness and shortness of breath  Cardiovascular: Negative  Negative for chest pain and palpitations  Gastrointestinal: Negative  Negative for abdominal distention, abdominal pain, blood in stool, constipation, diarrhea and nausea  Genitourinary: Negative    Negative for difficulty urinating, dysuria, flank pain and frequency  Objective: There were no vitals filed for this visit  BP Readings from Last 6 Encounters:   04/22/21 110/80   04/13/21 110/53   03/29/21 117/69   12/19/19 170/72   06/12/18 133/62   05/31/16 (!) 176/90      Wt Readings from Last 6 Encounters:   04/22/21 61 7 kg (136 lb)   04/13/21 68 9 kg (151 lb 14 4 oz)   03/29/21 73 6 kg (162 lb 4 1 oz)   12/19/19 68 kg (150 lb)   06/12/18 67 1 kg (148 lb)   05/31/16 65 8 kg (145 lb)             Physical Exam  Vitals signs reviewed  HENT:      Head: Normocephalic  Right Ear: External ear normal       Left Ear: External ear normal       Nose: Nose normal       Mouth/Throat:      Mouth: Mucous membranes are moist    Eyes:      Pupils: Pupils are equal, round, and reactive to light  Cardiovascular:      Rate and Rhythm: Normal rate and regular rhythm  Pulmonary:      Breath sounds: Normal breath sounds  Abdominal:      General: Bowel sounds are normal       Palpations: Abdomen is soft  Skin:     Capillary Refill: Capillary refill takes less than 2 seconds  Neurological:      Mental Status: She is alert and oriented to person, place, and time     Psychiatric:         Mood and Affect: Mood normal          Behavior: Behavior normal

## 2021-05-14 ENCOUNTER — OFFICE VISIT (OUTPATIENT)
Dept: WOUND CARE | Facility: HOSPITAL | Age: 71
End: 2021-05-14
Payer: MEDICARE

## 2021-05-14 VITALS
SYSTOLIC BLOOD PRESSURE: 114 MMHG | BODY MASS INDEX: 21.19 KG/M2 | DIASTOLIC BLOOD PRESSURE: 66 MMHG | TEMPERATURE: 98.7 F | HEIGHT: 66 IN | HEART RATE: 72 BPM | WEIGHT: 131.84 LBS

## 2021-05-14 DIAGNOSIS — R26.2 AMBULATORY DYSFUNCTION: ICD-10-CM

## 2021-05-14 DIAGNOSIS — I73.9 PERIPHERAL VASCULAR DISEASE (HCC): ICD-10-CM

## 2021-05-14 DIAGNOSIS — L89.620 PRESSURE INJURY OF LEFT HEEL, UNSTAGEABLE (HCC): Primary | ICD-10-CM

## 2021-05-14 PROCEDURE — 99213 OFFICE O/P EST LOW 20 MIN: CPT | Performed by: FAMILY MEDICINE

## 2021-05-14 PROCEDURE — 97597 DBRDMT OPN WND 1ST 20 CM/<: CPT | Performed by: FAMILY MEDICINE

## 2021-05-14 PROCEDURE — 99204 OFFICE O/P NEW MOD 45 MIN: CPT | Performed by: FAMILY MEDICINE

## 2021-05-14 RX ORDER — LIDOCAINE 40 MG/G
CREAM TOPICAL ONCE
Status: COMPLETED | OUTPATIENT
Start: 2021-05-14 | End: 2021-05-14

## 2021-05-14 RX ADMIN — LIDOCAINE: 40 CREAM TOPICAL at 15:08

## 2021-05-14 NOTE — PATIENT INSTRUCTIONS
Orders Placed This Encounter   Procedures    Wound cleansing and dressings     Wash your hands with soap and water  Remove old dressing, discard into plastic bag and place in trash  Cleanse the wound with normal saline prior to applying a clean dressing  Do not use tissue or cotton balls  Do not scrub the wound  Pat dry using gauze  Shower no   Apply santyl nickel thick to the left heel wound    Cover with gauze  Secure with felix and tape   Change dressing daily     Medihoney applied at [facility]       Avoid pressure at wound site, Do not walk on the wound   Float heels when sitting or when in bed     Call your vascular doctor to follow up on previous testing    Consume 3-4 servings of protein a day     Continue VNA for open and draining wounds daily, may skip on days when coming to [facility]    Follow up in [facility] in One week     Standing Status:   Future     Standing Expiration Date:   5/14/2022

## 2021-05-14 NOTE — PROGRESS NOTES
Patient ID: Kristen Puentes is a 65 y o  female Date of Birth 1950       Chief Complaint   Patient presents with    New Patient Visit     left heel wound        Allergies:  Aspirin, Digoxin, and Gadolinium derivatives    Diagnosis:      Diagnosis ICD-10-CM Associated Orders   1  Pressure injury of left heel, unstageable ([facility])  L89 620 lidocaine (LMX) 4 % cream     Wound cleansing and dressings     Debridement   2  Peripheral vascular disease ([facility])  I73 9    3  Ambulatory dysfunction  R26 2            Assessment :   Unstageable pressure injury of the left heel with PAD  No signs of infection  Plan:   Selective debridement done today with partial removal of eschar  We will order Santyl daily  Offload with pillows to float the heels  No x-ray obtained today  Consider this for the future if healing does not progress  Patient needs to make appointment with interventional Cardiology for angioplasty that was contemplated in the past       Subjective:   5/14/21: This is a 73-year-old female who comes the wound center because of pressure injury of the left heel  Patient was hospitalized in March for pneumonia  She had a stormy hospital course and was discharged with a pressure injury to the left heel  This is been followed but has not been improving  VNA is changing dressings with alginate  Patient states that she has significant pain when trying to ambulate  She has a history of PAD and has seen interventional Cardiology in the past   She was scheduled to have procedure last year but because of COVID this was delayed  She has not made a followup appointment as of yet  Patient has no history of diabetes  She has severe OA of her knees and is currently getting therapy        The following portions of the patient's history were reviewed and updated as appropriate:   Patient Active Problem List   Diagnosis    Pneumonia of right lower lobe due to infectious organism    CAD (coronary artery disease)    Parapneumonic effusion    GERD (gastroesophageal reflux disease)    HTN (hypertension)    Supratherapeutic INR    Thrombocytosis (HCC)    Severe protein-calorie malnutrition (HCC)    Anemia    Valvular heart disease    Dysphagia    ETOH abuse    Anxiety    Pulmonary nodule    Pulmonary hypertension (HCC)    Peripheral vascular disease (HCC)    Tobacco abuse    Urinary retention    Effusion of left knee     Past Medical History:   Diagnosis Date    Anemia     Cardiac disease     Chronic pain     Coronary artery disease     Hypertension     PVD (peripheral vascular disease) (HCC)      Past Surgical History:   Procedure Laterality Date    ANKLE SURGERY      IR CHEST TUBE PLACEMENT  3/23/2021    IR NON-TUNNELED CENTRAL LINE PLACEMENT  3/24/2021    THORACOSCOPY VIDEO ASSISTED SURGERY (VATS) Right 4/2/2021    Procedure: THORACOSCOPY VIDEO ASSISTED SURGERY (VATS); DECORTICATION;  Surgeon: Sanna Hoffman MD;  Location: [facility];  Service: Thoracic     Family History   Problem Relation Age of Onset    No Known Problems Mother     No Known Problems Father       Social History     Socioeconomic History    Marital status: /Civil Union     Spouse name: Not on file    Number of children: Not on file    Years of education: Not on file    Highest education level: Not on file   Occupational History    Not on file   Social Needs    Financial resource strain: Not on file    Food insecurity     Worry: Not on file     Inability: Not on file    Transportation needs     Medical: Not on file     Non-medical: Not on file   Tobacco Use    Smoking status: Former Smoker     Packs/day: 1 00     Years: 0 10     Pack years: 0 10     Types: Cigarettes    Smokeless tobacco: Never Used    Tobacco comment: pt states she stopped smoking 3 months ago ( 4/22/21)   Substance and Sexual Activity    Alcohol use: Yes     Frequency: 2-4 times a month     Drinks per session: 1 or 2     Comment: social    Drug use: No    Sexual activity: Not on file   Lifestyle    Physical activity     Days per week: Not on file     Minutes per session: Not on file    Stress: Not on file   Relationships    Social connections     Talks on phone: Not on file     Gets together: Not on file     Attends [employer] service: Not on file     Active member of club or organization: Not on file     Attends meetings of clubs or organizations: Not on file     Relationship status: Not on file    Intimate partner violence     Fear of current or ex partner: Not on file     Emotionally abused: Not on file     Physically abused: Not on file     Forced sexual activity: Not on file   Other Topics Concern    Not on file   Social History Narrative    Not on file        Current Outpatient Medications:     acetaminophen (TYLENOL) 325 mg tablet, Take 3 tablets (975 mg total) by mouth every 8 (eight) hours, Disp:  , Rfl: 0    ALPRAZolam (XANAX) 0 25 mg tablet, Take 1 tablet (0 25 mg total) by mouth every 12 (twelve) hours as needed for anxiety, Disp: 10 tablet, Rfl: 0    atorvastatin (LIPITOR) 40 mg tablet, Take 40 mg by mouth daily  , Disp: , Rfl:     benzonatate (TESSALON) 200 MG capsule, Take 1 capsule (200 mg total) by mouth daily at bedtime, Disp: 20 capsule, Rfl: 0    buPROPion (WELLBUTRIN SR) 150 mg 12 hr tablet, Take 150 mg by mouth daily, Disp: , Rfl:     clopidogrel (PLAVIX) 75 mg tablet, Take 75 mg by mouth daily  , Disp: , Rfl:     dextromethorphan-guaiFENesin (ROBITUSSIN DM)  mg/5 mL syrup, Take 10 mL by mouth every 4 (four) hours as needed for cough, Disp:  , Rfl: 0    Diclofenac Sodium (VOLTAREN) 1 %, Apply 2 g topically 4 (four) times a day, Disp:  , Rfl: 0    FLUoxetine (PROzac) 20 mg capsule, Take 20 mg by mouth daily, Disp: , Rfl:     furosemide (LASIX) 20 mg tablet, Take 1 tablet (20 mg total) by mouth daily, Disp: , Rfl: 0    gabapentin (NEURONTIN) 100 mg capsule, Take 1 capsule (100 mg total) by mouth 3 (three) times a day, Disp:  , Rfl: 0    lidocaine (LIDODERM) 5 %, Apply 1 patch topically daily Remove & Discard patch within 12 hours or as directed by MD, Disp:  , Rfl: 0    melatonin 3 mg, Take 2 tablets (6 mg total) by mouth daily at bedtime, Disp: , Rfl: 0    multivitamin-minerals (CENTRUM ADULTS) tablet, Take 1 tablet by mouth daily, Disp:  , Rfl: 0    oxyCODONE (ROXICODONE) 5 mg immediate release tablet, 1-2 tablets every 4 hours as needed for moderate to severe pain, Disp: 15 tablet, Rfl: 0    traZODone (DESYREL) 100 mg tablet, Take 2 tablets (200 mg total) by mouth daily at bedtime, Disp: , Rfl: 0  No current facility-administered medications for this visit  Review of Systems   Constitutional: Positive for appetite change  Negative for chills, fatigue, fever and unexpected weight change  HENT: Negative for congestion, hearing loss, postnasal drip and sinus pressure  Eyes: Negative for discharge and visual disturbance  Respiratory: Negative for cough and shortness of breath  Cardiovascular: Negative for chest pain, palpitations and leg swelling  Gastrointestinal: Negative for abdominal pain, blood in stool, constipation, diarrhea and nausea  Endocrine: Negative  Genitourinary: Negative for difficulty urinating, dysuria and urgency  Musculoskeletal: Positive for gait problem Vanessa Pacer or wheelchair)  Negative for back pain  Skin: Positive for wound (Left heel)  Negative for rash  Allergic/Immunologic: Negative  Neurological: Negative for dizziness, tremors, seizures, weakness, numbness and headaches  Hematological: Bruises/bleeds easily  Psychiatric/Behavioral: Negative  Negative for dysphoric mood  The patient is not nervous/anxious  Objective:  /66   Pulse 72   Temp 98 7 °F (37 1 °C)   Ht 5' 6" (1 676 m)   Wt 59 8 kg (131 lb 13 4 oz)   BMI 21 28 kg/m²   Pain Score: 0-No pain     Physical Exam  Vitals signs and nursing note reviewed     Constitutional: Appearance: Normal appearance  She is well-developed and normal weight  HENT:      Head: Normocephalic and atraumatic  Right Ear: External ear normal       Left Ear: External ear normal       Mouth/Throat:      Comments: masked  Eyes:      General: Lids are normal          Right eye: No discharge  Left eye: No discharge  Conjunctiva/sclera: Conjunctivae normal       Pupils: Pupils are equal, round, and reactive to light  Neck:      Musculoskeletal: Neck supple  Cardiovascular:      Rate and Rhythm: Normal rate and regular rhythm  Pulses:           Dorsalis pedis pulses are detected w/ Doppler on the left side  Posterior tibial pulses are detected w/ Doppler on the left side  Heart sounds: Normal heart sounds  No murmur  No friction rub  No gallop  Pulmonary:      Effort: Pulmonary effort is normal       Breath sounds: Normal air entry  Examination of the right-lower field reveals decreased breath sounds  Decreased breath sounds present  No wheezing, rhonchi or rales  Abdominal:      General: Abdomen is flat  Palpations: Abdomen is soft  There is no hepatomegaly or splenomegaly  Tenderness: There is no abdominal tenderness  There is no guarding or rebound  Musculoskeletal:      Right lower leg: No edema  Left lower leg: No edema  Left foot: Normal range of motion  Feet:    Feet:      Right foot:      Skin integrity: Skin integrity normal       Left foot:      Skin integrity: Ulcer present  Comments: Dark eschar with edges lifting  Minimal surrounding erythema  Very tender to palpation  Lymphadenopathy:      Cervical: No cervical adenopathy  Skin:     General: Skin is warm and dry  Findings: Wound present  Neurological:      Mental Status: She is alert and oriented to person, place, and time  Gait: Gait is intact     Psychiatric:         Attention and Perception: Attention normal          Mood and Affect: Mood and affect normal          Speech: Speech normal          Behavior: Behavior is cooperative  Cognition and Memory: Cognition normal            Wound 05/14/21 Pressure Injury Heel Left (Active)   Wound Image   05/14/21 1421   Wound Description Eschar 05/14/21 1426   Pressure Injury Stage U 05/14/21 1426   Isabel-wound Assessment Dry 05/14/21 1426   Wound Length (cm) 2 cm 05/14/21 1426   Wound Width (cm) 2 5 cm 05/14/21 1426   Wound Depth (cm) 0 1 cm 05/14/21 1426   Wound Surface Area (cm^2) 5 cm^2 05/14/21 1426   Wound Volume (cm^3) 0 5 cm^3 05/14/21 1426   Calculated Wound Volume (cm^3) 0 5 cm^3 05/14/21 1426   Drainage Amount Scant 05/14/21 1426   Drainage Description Sanguineous 05/14/21 1426   Non-staged Wound Description Full thickness 05/14/21 1426   Treatments Cleansed 05/14/21 1426   Dressing Changed New 05/14/21 1426            Debridement   Wound 05/14/21 Pressure Injury Heel Left    Universal Protocol:  Consent: Verbal consent obtained  Written consent obtained  Consent given by: patient  Time out: Immediately prior to procedure a "time out" was called to verify the correct patient, procedure, equipment, support staff and site/side marked as required  Patient understanding: patient states understanding of the procedure being performed  Patient identity confirmed: verbally with patient      Performed by: physician  Debridement type: selective  Pain control: lidocaine 4%  Post-debridement measurements  Length (cm): 2  Width (cm): 2 5  Depth (cm): 0 1  Percent debrided: 30%  Surface Area (cm^2): 5  Area debrided (cm^2): 1 5  Volume (cm^3): 0 5  Devitalized tissue debrided: eschar  Instrument(s) utilized: blade and forceps  Bleeding: small  Hemostasis obtained with: pressure  Procedural pain (0-10): 0  Post-procedural pain: 0   Response to treatment: procedure was tolerated well  Debridement Comments: Edges of eschar easily debrided    Central portion was significantly adhered and too painful for further debridement  The eschar was cross hatched with a scalpel blade  Wound Instructions:  Orders Placed This Encounter   Procedures    Wound cleansing and dressings     Wash your hands with soap and water  Remove old dressing, discard into plastic bag and place in trash  Cleanse the wound with normal saline prior to applying a clean dressing  Do not use tissue or cotton balls  Do not scrub the wound  Pat dry using gauze  Shower no   Apply santyl nickel thick to the left heel wound (may continue with previous treatment until Santyl obtained)  Cover with gauze  Secure with felix and tape   Change dressing daily     Medihoney applied at [facility]       Avoid pressure at wound site, Do not walk on the wound   Float heels when sitting or when in bed     Call your vascular doctor to follow up on previous testing    Consume 3-4 servings of protein a day     Continue VNA for open and draining wounds daily starting Monday, may skip on days when coming to [facility]    Follow up in [place] in One week     Standing Status:   Future     Standing Expiration Date:   5/14/2022    Debridement     This order was created via procedure documentation       Total time spent today:  35 minutes  This includes reviewing the patient's chart, pertinent physician records from hospitalization in March and nursing home visits April in May of this year  Harish Reese MD, CHT, CWS    Portions of the record may have been created with voice recognition software  Occasional wrong word or "sound alike" substitutions may have occurred due to the inherent limitations of voice recognition software  Read the chart carefully and recognize, using context, where substitutions have occurred

## 2021-05-17 LAB
MYCOBACTERIUM SPEC CULT: NORMAL
RHODAMINE-AURAMINE STN SPEC: NORMAL

## 2021-05-20 ENCOUNTER — OFFICE VISIT (OUTPATIENT)
Dept: WOUND CARE | Facility: HOSPITAL | Age: 71
End: 2021-05-20
Payer: MEDICARE

## 2021-05-20 VITALS
TEMPERATURE: 98 F | HEART RATE: 100 BPM | RESPIRATION RATE: 18 BRPM | SYSTOLIC BLOOD PRESSURE: 130 MMHG | DIASTOLIC BLOOD PRESSURE: 90 MMHG

## 2021-05-20 DIAGNOSIS — I73.9 PERIPHERAL VASCULAR DISEASE (HCC): ICD-10-CM

## 2021-05-20 DIAGNOSIS — R26.2 AMBULATORY DYSFUNCTION: ICD-10-CM

## 2021-05-20 DIAGNOSIS — L89.620 PRESSURE INJURY OF LEFT HEEL, UNSTAGEABLE (HCC): Primary | ICD-10-CM

## 2021-05-20 DIAGNOSIS — L89.610 PRESSURE ULCER OF RIGHT HEEL, UNSTAGEABLE (HCC): ICD-10-CM

## 2021-05-20 PROCEDURE — 97597 DBRDMT OPN WND 1ST 20 CM/<: CPT | Performed by: NURSE PRACTITIONER

## 2021-05-20 RX ORDER — LIDOCAINE HYDROCHLORIDE 40 MG/ML
5 SOLUTION TOPICAL ONCE
Status: COMPLETED | OUTPATIENT
Start: 2021-05-20 | End: 2021-05-20

## 2021-05-20 RX ADMIN — LIDOCAINE HYDROCHLORIDE 5 ML: 40 SOLUTION TOPICAL at 10:01

## 2021-05-20 NOTE — PROGRESS NOTES
Patient ID: Maria R Mann is a 65 y o  female Date of Birth 1950     Chief Complaint  Chief Complaint   Patient presents with    Follow Up Wound Care Visit     Dressing intact on arrival, reports applying santyl daily,  and VNA has been changing daily  Allergies  Aspirin, Digoxin, and Gadolinium derivatives    Assessment:     Diagnoses and all orders for this visit:    Pressure injury of left heel, unstageable ([facility] [address] )    Peripheral vascular disease ([facility] [address] )    Ambulatory dysfunction              Debridement   Wound 05/20/21 Pressure Injury Right    Universal Protocol:  Consent: Written consent obtained  Consent given by: patient  Time out: Immediately prior to procedure a "time out" was called to verify the correct patient, procedure, equipment, support staff and site/side marked as required  Timeout called at: 5/20/2021 10:02 AM   Patient understanding: patient states understanding of the procedure being performed  Patient consent: the patient's understanding of the procedure matches consent given  Procedure consent matches procedure scheduled: N/A  Relevant documents present and verified: N/A  Test results available and properly labeled: N/A  Site marked: the operative site was marked  Imaging studies available: N/A  Required blood products, implants, devices, and special equipment available: N/A    Patient identity confirmed: verbally with patient      Performed by: NP  Debridement type: selective  Pain control: lidocaine 4%  Pre-debridement measurements  Length (cm): 0 5  Width (cm): 0 5  Depth (cm): 0 1  Surface Area (cm^2): 0 25  Volume (cm^3): 0 03    Post-debridement measurements  Length (cm): 0 5  Width (cm): 0 5  Depth (cm): 0 1  Percent debrided: 100%  Surface Area (cm^2): 0 25  Area debrided (cm^2): 0 25  Volume (cm^3): 0 03  Devitalized tissue debrided: callus  Instrument(s) utilized: blade  Bleeding: small  Hemostasis obtained with: pressure  Procedural pain (0-10): 0  Post-procedural pain: 0   Response to treatment: procedure was tolerated well          Plan:  1  F/u visit  Right heel wound debrided  Unable to debrided left heel wound d/t adhered eschar  Will continue Santyl ointment to left heel  Will have betadine applied to right heel eschar to keep dry and intact  MIRTHA's completed in office today: 0 43 LLE  Patient wants to hold off on any additional vascular testing for now until she sees her Vascular surgeon at [facility]  Script for globoped shoe was written today to offload her left heel  Patient is to wear shoe at all times when ambulating  Counseled patient to offload heels with pillows when laying in bed  Patient will follow up in 1 week        Wound 05/14/21 Pressure Injury Heel Left (Active)   Wound Image Images linked 05/20/21 0925   Wound Description Black;Granulation tissue;Slough 05/20/21 0925   Wound Length (cm) 2 cm 05/20/21 0925   Wound Width (cm) 2 5 cm 05/20/21 0925   Wound Depth (cm) 0 2 cm 05/20/21 0925   Wound Surface Area (cm^2) 5 cm^2 05/20/21 0925   Wound Volume (cm^3) 1 cm^3 05/20/21 0925   Calculated Wound Volume (cm^3) 1 cm^3 05/20/21 0925   Change in Wound Size % -100 05/20/21 0925   Drainage Amount Moderate 05/20/21 0925   Drainage Description Serosanguineous 05/20/21 0925   Non-staged Wound Description Full thickness 05/20/21 0925   Dressing Status Intact 05/20/21 0925       Wound 05/20/21 Pressure Injury Right (Active)   Wound Image Images linked 05/20/21 0953   Wound Description Eschar 05/20/21 0952   Isabel-wound Assessment Intact 05/20/21 0952   Wound Length (cm) 0 5 cm 05/20/21 0952   Wound Width (cm) 0 5 cm 05/20/21 0952   Wound Depth (cm) 0 1 cm 05/20/21 0952   Wound Surface Area (cm^2) 0 25 cm^2 05/20/21 0952   Wound Volume (cm^3) 0 03 cm^3 05/20/21 0952   Calculated Wound Volume (cm^3) 0 03 cm^3 05/20/21 0952   Drainage Amount Scant 05/20/21 0952   Non-staged Wound Description Full thickness 05/20/21 0952       Wound 05/14/21 Pressure Injury Heel Left (Active)   Date First Assessed: 05/14/21   Primary Wound Type: Pressure Injury  Location: Heel  Wound Location Orientation: Left       Wound 05/20/21 Pressure Injury Right (Active)   Date First Assessed/Time First Assessed: 05/20/21 0951   Primary Wound Type: Pressure Injury  Wound Location Orientation: Right       [REMOVED] Wound 04/02/21 Chest Right (Removed)   Resolved Date: 05/14/21  Date First Assessed/Time First Assessed: 04/02/21 1101   Location: Chest  Wound Location Orientation: Right  Wound Description (Comments): 2 INCISIONS  Incision's 1st Dressing: ADHESIVE SKIN HIGH VISCOSITY EXOFIN 1ML (x1)       [REMOVED] Wound 04/02/21 Chest Right (Removed)   Resolved Date: 05/14/21  Date First Assessed/Time First Assessed: 04/02/21 1101   Location: Chest  Wound Location Orientation: Right  Wound Description (Comments): 2 CHEST TUBE SITES  Incision's 1st Dressing: SPONGE GAUZE 4 X 4 16 PLY STRL PLASTIC TRA    Subjective:     F/u visit unstageable pressure ulcer of left heel  RN staff report they found an additional unstageable pressure ulcer on her right heel  Patient reports she has pain in her left heel  She denies any fevers or chills  The following portions of the patient's history were reviewed and updated as appropriate:   She  has a past medical history of Anemia, Cardiac disease, Chronic pain, Coronary artery disease, Hypertension, and PVD (peripheral vascular disease) ([facility] [address] )    She   Patient Active Problem List    Diagnosis Date Noted    Effusion of left knee 04/10/2021    Urinary retention 04/04/2021    Pulmonary hypertension ([facility] [address] ) 04/01/2021    Peripheral vascular disease ([facility] [address] ) 04/01/2021    Tobacco abuse 04/01/2021    ETOH abuse 03/29/2021    Anxiety 03/29/2021    Pulmonary nodule 03/29/2021    Dysphagia 03/27/2021    Anemia 03/26/2021    Valvular heart disease 03/26/2021    Thrombocytosis ([facility] [address] ) 03/24/2021    Severe protein-calorie malnutrition ([facility] [address] ) 03/24/2021    Pneumonia of right lower lobe due to infectious organism 03/22/2021    CAD (coronary artery disease) 03/22/2021    Parapneumonic effusion 03/22/2021    GERD (gastroesophageal reflux disease) 03/22/2021    HTN (hypertension) 03/22/2021    Supratherapeutic INR 03/22/2021     She  has a past surgical history that includes Ankle surgery; IR chest tube placement (3/23/2021); IR non-tunneled central line placement (3/24/2021); and THORACOSCOPY VIDEO ASSISTED SURGERY (VATS) (Right, 4/2/2021)  Her family history includes No Known Problems in her father and mother  She  reports that she has quit smoking  Her smoking use included cigarettes  She has a 0 10 pack-year smoking history  She has never used smokeless tobacco  She reports current alcohol use  She reports that she does not use drugs  Current Outpatient Medications   Medication Sig Dispense Refill    acetaminophen (TYLENOL) 325 mg tablet Take 3 tablets (975 mg total) by mouth every 8 (eight) hours  0    ALPRAZolam (XANAX) 0 25 mg tablet Take 1 tablet (0 25 mg total) by mouth every 12 (twelve) hours as needed for anxiety 10 tablet 0    atorvastatin (LIPITOR) 40 mg tablet Take 40 mg by mouth daily   benzonatate (TESSALON) 200 MG capsule Take 1 capsule (200 mg total) by mouth daily at bedtime 20 capsule 0    buPROPion (WELLBUTRIN SR) 150 mg 12 hr tablet Take 150 mg by mouth daily      clopidogrel (PLAVIX) 75 mg tablet Take 75 mg by mouth daily        dextromethorphan-guaiFENesin (ROBITUSSIN DM)  mg/5 mL syrup Take 10 mL by mouth every 4 (four) hours as needed for cough  0    Diclofenac Sodium (VOLTAREN) 1 % Apply 2 g topically 4 (four) times a day  0    FLUoxetine (PROzac) 20 mg capsule Take 20 mg by mouth daily      furosemide (LASIX) 20 mg tablet Take 1 tablet (20 mg total) by mouth daily  0    gabapentin (NEURONTIN) 100 mg capsule Take 1 capsule (100 mg total) by mouth 3 (three) times a day  0    lidocaine (LIDODERM) 5 % Apply 1 patch topically daily Remove & Discard patch within 12 hours or as directed by MD  0    melatonin 3 mg Take 2 tablets (6 mg total) by mouth daily at bedtime  0    multivitamin-minerals (CENTRUM ADULTS) tablet Take 1 tablet by mouth daily  0    oxyCODONE (ROXICODONE) 5 mg immediate release tablet 1-2 tablets every 4 hours as needed for moderate to severe pain 15 tablet 0    traZODone (DESYREL) 100 mg tablet Take 2 tablets (200 mg total) by mouth daily at bedtime  0     No current facility-administered medications for this visit  She is allergic to aspirin; digoxin; and gadolinium derivatives       Review of Systems   Constitutional: Negative  HENT: Negative for ear pain and hearing loss  Eyes: Negative for pain  Respiratory: Negative for chest tightness and shortness of breath  Cardiovascular: Negative for chest pain, palpitations and leg swelling  Gastrointestinal: Negative for diarrhea, nausea and vomiting  Genitourinary: Negative for dysuria  Musculoskeletal: Positive for gait problem  Skin: Positive for wound  Neurological: Negative for tremors and weakness  Psychiatric/Behavioral: Negative for behavioral problems, confusion and suicidal ideas           Objective:       Wound 05/14/21 Pressure Injury Heel Left (Active)   Wound Image Images linked 05/20/21 0925   Wound Description Black;Granulation tissue;Slough 05/20/21 0925   Wound Length (cm) 2 cm 05/20/21 0925   Wound Width (cm) 2 5 cm 05/20/21 0925   Wound Depth (cm) 0 2 cm 05/20/21 0925   Wound Surface Area (cm^2) 5 cm^2 05/20/21 0925   Wound Volume (cm^3) 1 cm^3 05/20/21 0925   Calculated Wound Volume (cm^3) 1 cm^3 05/20/21 0925   Change in Wound Size % -100 05/20/21 0925   Drainage Amount Moderate 05/20/21 0925   Drainage Description Serosanguineous 05/20/21 0925   Non-staged Wound Description Full thickness 05/20/21 0925   Dressing Status Intact 05/20/21 0925       Wound 05/20/21 Pressure Injury Right (Active)   Wound Image Images linked 05/20/21 0953   Wound Description Eschar 05/20/21 0952   Isabel-wound Assessment Intact 05/20/21 0952   Wound Length (cm) 0 5 cm 05/20/21 0952   Wound Width (cm) 0 5 cm 05/20/21 0952   Wound Depth (cm) 0 1 cm 05/20/21 0952   Wound Surface Area (cm^2) 0 25 cm^2 05/20/21 0952   Wound Volume (cm^3) 0 03 cm^3 05/20/21 0952   Calculated Wound Volume (cm^3) 0 03 cm^3 05/20/21 0952   Drainage Amount Scant 05/20/21 0952   Non-staged Wound Description Full thickness 05/20/21 0952       /90   Pulse 100   Temp 98 °F (36 7 °C)   Resp 18                 Wound Instructions:  No orders of the defined types were placed in this encounter  Diagnosis ICD-10-CM Associated Orders   1  Pressure injury of left heel, unstageable ([facility] [address] )  L89 620    2  Peripheral vascular disease (HCC)  I73 9    3   Ambulatory dysfunction  R26 2

## 2021-05-20 NOTE — PATIENT INSTRUCTIONS
Orders Placed This Encounter   Procedures    Wound cleansing and dressings     Right heel wound    Wash your hands with soap and water  Remove old dressing, discard into plastic bag and place in trash  Cleanse the wound with saline prior to applying a clean dressing  Do not use tissue or cotton balls  Do not scrub the wound  Pat dry using gauze  Shower no     Apply bedatine to the right heel wound    Cover with dry dressing only until bleeding stops than may leave open to ai  Apply betadine daily    Left heel wound   Apply santyl to wound bed nickel thick  vaseline to jered wound  Cover with dry gauze and felix and tape  Change daily    Purchase global pedi shoe    Done today     Standing Status:   Future     Standing Expiration Date:   5/20/2022    Debridement     This order was created via procedure documentation

## 2021-05-27 ENCOUNTER — OFFICE VISIT (OUTPATIENT)
Dept: WOUND CARE | Facility: HOSPITAL | Age: 71
End: 2021-05-27
Payer: MEDICARE

## 2021-05-27 VITALS
TEMPERATURE: 97.5 F | RESPIRATION RATE: 18 BRPM | HEART RATE: 72 BPM | DIASTOLIC BLOOD PRESSURE: 70 MMHG | SYSTOLIC BLOOD PRESSURE: 112 MMHG

## 2021-05-27 DIAGNOSIS — L89.610 PRESSURE ULCER OF RIGHT HEEL, UNSTAGEABLE (HCC): ICD-10-CM

## 2021-05-27 DIAGNOSIS — R26.2 AMBULATORY DYSFUNCTION: ICD-10-CM

## 2021-05-27 DIAGNOSIS — I73.9 PERIPHERAL VASCULAR DISEASE (HCC): ICD-10-CM

## 2021-05-27 DIAGNOSIS — L89.620 PRESSURE INJURY OF LEFT HEEL, UNSTAGEABLE (HCC): Primary | ICD-10-CM

## 2021-05-27 PROCEDURE — 97597 DBRDMT OPN WND 1ST 20 CM/<: CPT | Performed by: NURSE PRACTITIONER

## 2021-05-27 PROCEDURE — 99213 OFFICE O/P EST LOW 20 MIN: CPT | Performed by: NURSE PRACTITIONER

## 2021-05-27 RX ORDER — LIDOCAINE HYDROCHLORIDE 40 MG/ML
5 SOLUTION TOPICAL ONCE
Status: COMPLETED | OUTPATIENT
Start: 2021-05-27 | End: 2021-05-27

## 2021-05-27 RX ADMIN — LIDOCAINE HYDROCHLORIDE 5 ML: 40 SOLUTION TOPICAL at 11:48

## 2021-05-27 NOTE — PROGRESS NOTES
Patient ID: Jorje Cruz is a 65 y o  female Date of Birth 1950     Chief Complaint  Chief Complaint   Patient presents with    Follow Up Wound Care Visit     left heel wound       Allergies  Aspirin, Digoxin, and Gadolinium derivatives    Assessment:     Diagnoses and all orders for this visit:    Pressure injury of left heel, unstageable (HCC)  -     Wound cleansing and dressings; Future  -     lidocaine (XYLOCAINE) 4 % topical solution 5 mL    Peripheral vascular disease (HCC)    Pressure ulcer of right heel, unstageable (HCC)  -     Wound cleansing and dressings; Future  -     lidocaine (XYLOCAINE) 4 % topical solution 5 mL    Ambulatory dysfunction    Other orders  -     Debridement              Debridement   Wound 05/14/21 Pressure Injury Heel Left    Universal Protocol:  Consent: Written consent obtained  Consent given by: patient  Time out: Immediately prior to procedure a "time out" was called to verify the correct patient, procedure, equipment, support staff and site/side marked as required  Timeout called at: 5/27/2021 11:42 AM   Patient understanding: patient states understanding of the procedure being performed  Patient consent: the patient's understanding of the procedure matches consent given  Procedure consent matches procedure scheduled: N/A  Relevant documents present and verified: N/A  Test results available and properly labeled: N/A  Site marked: the operative site was marked  Imaging studies available: N/A  Required blood products, implants, devices, and special equipment available: N/A    Patient identity confirmed: verbally with patient      Performed by: NP  Debridement type: selective  Pain control: lidocaine 4%  Pre-debridement measurements  Length (cm): 2  Width (cm): 1 8  Depth (cm): 0 1  Surface Area (cm^2): 3 6  Volume (cm^3): 0 36    Post-debridement measurements  Length (cm): 2  Width (cm): 1 8  Depth (cm): 0 1  Percent debrided: 100%  Surface Area (cm^2): 3 6  Area debrided (cm^2): 3 6  Volume (cm^3): 0 36  Devitalized tissue debrided: biofilm, callus, fibrin, necrotic debris and slough  Instrument(s) utilized: blade and forceps  Bleeding: small  Hemostasis obtained with: pressure  Procedural pain (0-10): 0  Post-procedural pain: 0   Response to treatment: procedure was tolerated well          Plan:  1  F/u visit  Left heel wound debrided  Wound improving and measuring smaller  Continue Santyl daily  Patient is to continue to wear her Globoped shoe on her left foot daily  2  Right heel ulcer continues to be a stable escahr  Continue to paint with betadine daily  May keep DENNY  3   Patient will follow up in 1 week      Wound 05/14/21 Pressure Injury Heel Left (Active)   Wound Image Images linked 05/27/21 1118   Wound Description Black;Granulation tissue;Slough 05/27/21 1117   Pressure Injury Stage U 05/27/21 1117   Isabel-wound Assessment Dry;Callus 05/27/21 1117   Wound Length (cm) 2 cm 05/27/21 1117   Wound Width (cm) 1 8 cm 05/27/21 1117   Wound Depth (cm) 0 1 cm 05/27/21 1117   Wound Surface Area (cm^2) 3 6 cm^2 05/27/21 1117   Wound Volume (cm^3) 0 36 cm^3 05/27/21 1117   Calculated Wound Volume (cm^3) 0 36 cm^3 05/27/21 1117   Change in Wound Size % 28 05/27/21 1117   Drainage Amount Moderate 05/27/21 1117   Drainage Description Serosanguineous 05/27/21 1117   Non-staged Wound Description Full thickness 05/27/21 1117       Wound 05/20/21 Pressure Injury Heel Right (Active)   Wound Image Images linked 05/27/21 1116   Wound Description Eschar 05/27/21 1116   Pressure Injury Stage U 05/27/21 1116   Isabel-wound Assessment Intact 05/27/21 1116   Wound Length (cm) 0 5 cm 05/27/21 1116   Wound Width (cm) 0 5 cm 05/27/21 1116   Wound Depth (cm) 0 cm 05/27/21 1116   Wound Surface Area (cm^2) 0 25 cm^2 05/27/21 1116   Wound Volume (cm^3) 0 cm^3 05/27/21 1116   Calculated Wound Volume (cm^3) 0 cm^3 05/27/21 1116   Change in Wound Size % 100 05/27/21 1116   Drainage Amount None 05/27/21 1116   Non-staged Wound Description Full thickness 05/27/21 1116       Wound 05/14/21 Pressure Injury Heel Left (Active)   Date First Assessed: 05/14/21   Primary Wound Type: Pressure Injury  Location: Heel  Wound Location Orientation: Left       Wound 05/20/21 Pressure Injury Heel Right (Active)   Date First Assessed/Time First Assessed: 05/20/21 0951   Primary Wound Type: Pressure Injury  Location: Heel  Wound Location Orientation: Right       [REMOVED] Wound 04/02/21 Chest Right (Removed)   Resolved Date: 05/14/21  Date First Assessed/Time First Assessed: 04/02/21 1101   Location: Chest  Wound Location Orientation: Right  Wound Description (Comments): 2 INCISIONS  Incision's 1st Dressing: ADHESIVE SKIN HIGH VISCOSITY EXOFIN 1ML (x1)       [REMOVED] Wound 04/02/21 Chest Right (Removed)   Resolved Date: 05/14/21  Date First Assessed/Time First Assessed: 04/02/21 1101   Location: Chest  Wound Location Orientation: Right  Wound Description (Comments): 2 CHEST TUBE SITES  Incision's 1st Dressing: SPONGE GAUZE 4 X 4 16 PLY STRL PLASTIC TRA    Subjective:     F/u visit unstageable pressure ulcers on her bilateral heels  No new complaints  Patient arrived to appointment today with Globoped shoe  Patient reports she has no pain when wearing her Globoped shoe  She has also been frequently elevating her heels with pillows when she is in bed  Has been applying Santyl to left heel wound daily as recommended  She denies any fevers or chills  The following portions of the patient's history were reviewed and updated as appropriate:   She  has a past medical history of Anemia, Cardiac disease, Chronic pain, Coronary artery disease, Hypertension, and PVD (peripheral vascular disease) ([address] )    She   Patient Active Problem List    Diagnosis Date Noted    Effusion of left knee 04/10/2021    Urinary retention 04/04/2021    Pulmonary hypertension ([address] ) 04/01/2021    Peripheral vascular disease ([address] ) 04/01/2021    Tobacco abuse 04/01/2021    ETOH abuse 03/29/2021    Anxiety 03/29/2021    Pulmonary nodule 03/29/2021    Dysphagia 03/27/2021    Anemia 03/26/2021    Valvular heart disease 03/26/2021    Thrombocytosis ([facility] [address] ) 03/24/2021    Severe protein-calorie malnutrition ([facility] [address] [facility] 03/24/2021    Pneumonia of right lower lobe due to infectious organism 03/22/2021    CAD (coronary artery disease) 03/22/2021    Parapneumonic effusion 03/22/2021    GERD (gastroesophageal reflux disease) 03/22/2021    HTN (hypertension) 03/22/2021    Supratherapeutic INR 03/22/2021     She  has a past surgical history that includes Ankle surgery; IR chest tube placement (3/23/2021); IR non-tunneled central line placement (3/24/2021); and THORACOSCOPY VIDEO ASSISTED SURGERY (VATS) (Right, 4/2/2021)  Her family history includes No Known Problems in her father and mother  She  reports that she has quit smoking  Her smoking use included cigarettes  She has a 0 10 pack-year smoking history  She has never used smokeless tobacco  She reports current alcohol use  She reports that she does not use drugs  Current Outpatient Medications   Medication Sig Dispense Refill    acetaminophen (TYLENOL) 325 mg tablet Take 3 tablets (975 mg total) by mouth every 8 (eight) hours  0    ALPRAZolam (XANAX) 0 25 mg tablet Take 1 tablet (0 25 mg total) by mouth every 12 (twelve) hours as needed for anxiety 10 tablet 0    atorvastatin (LIPITOR) 40 mg tablet Take 40 mg by mouth daily   benzonatate (TESSALON) 200 MG capsule Take 1 capsule (200 mg total) by mouth daily at bedtime 20 capsule 0    buPROPion (WELLBUTRIN SR) 150 mg 12 hr tablet Take 150 mg by mouth daily      clopidogrel (PLAVIX) 75 mg tablet Take 75 mg by mouth daily        dextromethorphan-guaiFENesin (ROBITUSSIN DM)  mg/5 mL syrup Take 10 mL by mouth every 4 (four) hours as needed for cough  0    Diclofenac Sodium (VOLTAREN) 1 % Apply 2 g topically 4 (four) times a day  0    FLUoxetine (PROzac) 20 mg capsule Take 20 mg by mouth daily      furosemide (LASIX) 20 mg tablet Take 1 tablet (20 mg total) by mouth daily  0    gabapentin (NEURONTIN) 100 mg capsule Take 1 capsule (100 mg total) by mouth 3 (three) times a day  0    lidocaine (LIDODERM) 5 % Apply 1 patch topically daily Remove & Discard patch within 12 hours or as directed by MD  0    melatonin 3 mg Take 2 tablets (6 mg total) by mouth daily at bedtime  0    multivitamin-minerals (CENTRUM ADULTS) tablet Take 1 tablet by mouth daily  0    oxyCODONE (ROXICODONE) 5 mg immediate release tablet 1-2 tablets every 4 hours as needed for moderate to severe pain 15 tablet 0    traZODone (DESYREL) 100 mg tablet Take 2 tablets (200 mg total) by mouth daily at bedtime  0     No current facility-administered medications for this visit  She is allergic to aspirin; digoxin; and gadolinium derivatives       Review of Systems   Constitutional: Negative  HENT: Negative for ear pain and hearing loss  Eyes: Negative for pain  Respiratory: Negative for chest tightness and shortness of breath  Cardiovascular: Negative for chest pain, palpitations and leg swelling  Gastrointestinal: Negative for diarrhea, nausea and vomiting  Genitourinary: Negative for dysuria  Musculoskeletal: Positive for gait problem  Skin: Positive for wound  Neurological: Negative for tremors and weakness  Psychiatric/Behavioral: Negative for behavioral problems, confusion and suicidal ideas           Objective:       Wound 05/14/21 Pressure Injury Heel Left (Active)   Wound Image Images linked 05/27/21 1118   Wound Description Black;Granulation tissue;Slough 05/27/21 1117   Pressure Injury Stage U 05/27/21 1117   Isabel-wound Assessment Dry;Callus 05/27/21 1117   Wound Length (cm) 2 cm 05/27/21 1117   Wound Width (cm) 1 8 cm 05/27/21 1117   Wound Depth (cm) 0 1 cm 05/27/21 1117   Wound Surface Area (cm^2) 3 6 cm^2 05/27/21 1117   Wound Volume (cm^3) 0 36 cm^3 05/27/21 1117   Calculated Wound Volume (cm^3) 0 36 cm^3 05/27/21 1117   Change in Wound Size % 28 05/27/21 1117   Drainage Amount Moderate 05/27/21 1117   Drainage Description Serosanguineous 05/27/21 1117   Non-staged Wound Description Full thickness 05/27/21 1117       Wound 05/20/21 Pressure Injury Heel Right (Active)   Wound Image Images linked 05/27/21 1116   Wound Description Eschar 05/27/21 1116   Pressure Injury Stage U 05/27/21 1116   Jered-wound Assessment Intact 05/27/21 1116   Wound Length (cm) 0 5 cm 05/27/21 1116   Wound Width (cm) 0 5 cm 05/27/21 1116   Wound Depth (cm) 0 cm 05/27/21 1116   Wound Surface Area (cm^2) 0 25 cm^2 05/27/21 1116   Wound Volume (cm^3) 0 cm^3 05/27/21 1116   Calculated Wound Volume (cm^3) 0 cm^3 05/27/21 1116   Change in Wound Size % 100 05/27/21 1116   Drainage Amount None 05/27/21 1116   Non-staged Wound Description Full thickness 05/27/21 1116       /70   Pulse 72   Temp 97 5 °F (36 4 °C)   Resp 18                 Wound Instructions:  Orders Placed This Encounter   Procedures    Wound cleansing and dressings     Wound cleansing and dressings       Right heel wound     Wash your hands with soap and water  Remove old dressing, discard into plastic bag and place in trash  Cleanse the wound with saline prior to applying a clean dressing  Do not use tissue or cotton balls  Do not scrub the wound  Pat dry using gauze  Shower no      Apply bedatine to the right heel wound  Cover with dry dressing only until bleeding stops than may leave open to ai  Apply betadine daily     Left heel wound   Apply santyl to wound bed nickel thick  vaseline to jered wound  Cover with dry gauze and felix and tape  Change daily     Purchase global pedi shoe     Done today     Standing Status:   Future     Standing Expiration Date:   5/27/2022    Debridement     This order was created via procedure documentation        Diagnosis ICD-10-CM Associated Orders   1   Pressure injury of left heel, unstageable ([facility])  L89 620 Wound cleansing and dressings     lidocaine (XYLOCAINE) 4 % topical solution 5 mL   2  Peripheral vascular disease ([facility])  I73 9    3  Pressure ulcer of right heel, unstageable ([facility])  L89 610 Wound cleansing and dressings     lidocaine (XYLOCAINE) 4 % topical solution 5 mL   4   Ambulatory dysfunction  R26 2

## 2021-06-03 ENCOUNTER — OFFICE VISIT (OUTPATIENT)
Dept: WOUND CARE | Facility: HOSPITAL | Age: 71
End: 2021-06-03
Payer: MEDICARE

## 2021-06-03 VITALS
SYSTOLIC BLOOD PRESSURE: 108 MMHG | DIASTOLIC BLOOD PRESSURE: 64 MMHG | RESPIRATION RATE: 18 BRPM | HEART RATE: 72 BPM | TEMPERATURE: 97.8 F

## 2021-06-03 DIAGNOSIS — L89.610 PRESSURE ULCER OF RIGHT HEEL, UNSTAGEABLE (HCC): ICD-10-CM

## 2021-06-03 DIAGNOSIS — L89.620 PRESSURE INJURY OF LEFT HEEL, UNSTAGEABLE (HCC): Primary | ICD-10-CM

## 2021-06-03 DIAGNOSIS — R26.2 AMBULATORY DYSFUNCTION: ICD-10-CM

## 2021-06-03 DIAGNOSIS — I73.9 PERIPHERAL VASCULAR DISEASE (HCC): ICD-10-CM

## 2021-06-03 PROCEDURE — 97597 DBRDMT OPN WND 1ST 20 CM/<: CPT | Performed by: NURSE PRACTITIONER

## 2021-06-03 PROCEDURE — 99213 OFFICE O/P EST LOW 20 MIN: CPT | Performed by: NURSE PRACTITIONER

## 2021-06-03 RX ORDER — LIDOCAINE 40 MG/G
CREAM TOPICAL ONCE
Status: COMPLETED | OUTPATIENT
Start: 2021-06-03 | End: 2021-06-03

## 2021-06-03 RX ADMIN — LIDOCAINE 1 APPLICATION: 40 CREAM TOPICAL at 12:06

## 2021-06-03 NOTE — PROGRESS NOTES
Patient ID: Halina Beal is a 65 y o  female Date of Birth 1950     Chief Complaint  No chief complaint on file  Allergies  Aspirin, Digoxin, and Gadolinium derivatives    Assessment:     Diagnoses and all orders for this visit:    Pressure injury of left heel, unstageable (HCC)  -     Wound cleansing and dressings; Future  -     lidocaine (LMX) 4 % cream    Peripheral vascular disease (HCC)    Pressure ulcer of right heel, unstageable (HCC)  -     Wound cleansing and dressings; Future    Ambulatory dysfunction              Debridement   Wound 05/14/21 Pressure Injury Heel Left    Universal Protocol:  Consent: Written consent obtained  Consent given by: patient  Time out: Immediately prior to procedure a "time out" was called to verify the correct patient, procedure, equipment, support staff and site/side marked as required  Timeout called at: 6/3/2021 12:22 PM   Patient understanding: patient states understanding of the procedure being performed  Patient consent: the patient's understanding of the procedure matches consent given  Procedure consent matches procedure scheduled: N/A  Relevant documents present and verified: N/A  Test results available and properly labeled: N/A  Site marked: the operative site was marked  Imaging studies available: N/A  Required blood products, implants, devices, and special equipment available: N/A    Patient identity confirmed: verbally with patient      Performed by: NP  Debridement type: selective  Pain control: lidocaine 4%  Pre-debridement measurements  Length (cm): 2  Width (cm): 1 8  Depth (cm): 0 1  Surface Area (cm^2): 3 6  Volume (cm^3): 0 36    Post-debridement measurements  Length (cm): 2  Width (cm): 1 8  Depth (cm): 0 1  Percent debrided: 100%  Surface Area (cm^2): 3 6  Area debrided (cm^2): 3 6  Volume (cm^3): 0 36  Devitalized tissue debrided: necrotic debris, slough and eschar  Instrument(s) utilized: blade and forceps  Bleeding: small  Hemostasis obtained with: pressure  Procedural pain (0-10): 0  Post-procedural pain: 0   Response to treatment: procedure was tolerated well          Plan:  1  F/u visit  Left heel wound debrided  Eschar on wound continues to soften and is easier to debride  Continue Santyl ointment  Right heel wound is still covered with dry adhered eschar continue to paint with betadine daily  Patient is to continue to offload heels  Patient will follow up in 1 week        Wound 05/14/21 Pressure Injury Heel Left (Active)   Wound Image Images linked 06/03/21 1138   Wound Description Black;Granulation tissue;Slough 06/03/21 1138   Pressure Injury Stage U 06/03/21 1138   Isabel-wound Assessment Dry;Callus 06/03/21 1138   Wound Length (cm) 2 cm 06/03/21 1138   Wound Width (cm) 1 8 cm 06/03/21 1138   Wound Depth (cm) 0 1 cm 06/03/21 1138   Wound Surface Area (cm^2) 3 6 cm^2 06/03/21 1138   Wound Volume (cm^3) 0 36 cm^3 06/03/21 1138   Calculated Wound Volume (cm^3) 0 36 cm^3 06/03/21 1138   Change in Wound Size % 28 06/03/21 1138   Drainage Amount Moderate 06/03/21 1138   Drainage Description Serosanguineous 06/03/21 1138       Wound 05/20/21 Pressure Injury Heel Right (Active)   Wound Image Images linked 06/03/21 1136   Wound Description Eschar 06/03/21 1136   Isabel-wound Assessment Intact 06/03/21 1136   Wound Length (cm) 0 4 cm 06/03/21 1136   Wound Width (cm) 0 5 cm 06/03/21 1136   Wound Depth (cm) 0 cm 06/03/21 1136   Wound Surface Area (cm^2) 0 2 cm^2 06/03/21 1136   Wound Volume (cm^3) 0 cm^3 06/03/21 1136   Calculated Wound Volume (cm^3) 0 cm^3 06/03/21 1136   Change in Wound Size % 100 06/03/21 1136   Drainage Amount None 06/03/21 1136   Non-staged Wound Description Full thickness 06/03/21 1136       Wound 05/14/21 Pressure Injury Heel Left (Active)   Date First Assessed: 05/14/21   Primary Wound Type: Pressure Injury  Location: Heel  Wound Location Orientation: Left       Wound 05/20/21 Pressure Injury Heel Right (Active)   Date First Assessed/Time First Assessed: 05/20/21 0951   Primary Wound Type: Pressure Injury  Location: Heel  Wound Location Orientation: Right       [REMOVED] Wound 04/02/21 Chest Right (Removed)   Resolved Date: 05/14/21  Date First Assessed/Time First Assessed: 04/02/21 1101   Location: Chest  Wound Location Orientation: Right  Wound Description (Comments): 2 INCISIONS  Incision's 1st Dressing: ADHESIVE SKIN HIGH VISCOSITY EXOFIN 1ML (x1)       [REMOVED] Wound 04/02/21 Chest Right (Removed)   Resolved Date: 05/14/21  Date First Assessed/Time First Assessed: 04/02/21 1101   Location: Chest  Wound Location Orientation: Right  Wound Description (Comments): 2 CHEST TUBE SITES  Incision's 1st Dressing: SPONGE GAUZE 4 X 4 16 PLY STRL PLASTIC TRA    Subjective:     F/u visit unstageable pressure ulcers on bilateral heels  No new complaints  She denies any pain, fevers, or chills  The following portions of the patient's history were reviewed and updated as appropriate:   She  has a past medical history of Anemia, Cardiac disease, Chronic pain, Coronary artery disease, Hypertension, and PVD (peripheral vascular disease) ([address] )    She   Patient Active Problem List    Diagnosis Date Noted    Effusion of left knee 04/10/2021    Urinary retention 04/04/2021    Pulmonary hypertension ([address] ) 04/01/2021    Peripheral vascular disease ([address] ) 04/01/2021    Tobacco abuse 04/01/2021    ETOH abuse 03/29/2021    Anxiety 03/29/2021    Pulmonary nodule 03/29/2021    Dysphagia 03/27/2021    Anemia 03/26/2021    Valvular heart disease 03/26/2021    Thrombocytosis ([address] ) 03/24/2021    Severe protein-calorie malnutrition ([address] ) 03/24/2021    Pneumonia of right lower lobe due to infectious organism 03/22/2021    CAD (coronary artery disease) 03/22/2021    Parapneumonic effusion 03/22/2021    GERD (gastroesophageal reflux disease) 03/22/2021    HTN (hypertension) 03/22/2021    Supratherapeutic INR 03/22/2021     She  has a past surgical history that includes Ankle surgery; IR chest tube placement (3/23/2021); IR non-tunneled central line placement (3/24/2021); and THORACOSCOPY VIDEO ASSISTED SURGERY (VATS) (Right, 4/2/2021)  Her family history includes No Known Problems in her father and mother  She  reports that she has quit smoking  Her smoking use included cigarettes  She has a 0 10 pack-year smoking history  She has never used smokeless tobacco  She reports current alcohol use  She reports that she does not use drugs  Current Outpatient Medications   Medication Sig Dispense Refill    acetaminophen (TYLENOL) 325 mg tablet Take 3 tablets (975 mg total) by mouth every 8 (eight) hours  0    ALPRAZolam (XANAX) 0 25 mg tablet Take 1 tablet (0 25 mg total) by mouth every 12 (twelve) hours as needed for anxiety 10 tablet 0    atorvastatin (LIPITOR) 40 mg tablet Take 40 mg by mouth daily   benzonatate (TESSALON) 200 MG capsule Take 1 capsule (200 mg total) by mouth daily at bedtime 20 capsule 0    buPROPion (WELLBUTRIN SR) 150 mg 12 hr tablet Take 150 mg by mouth daily      clopidogrel (PLAVIX) 75 mg tablet Take 75 mg by mouth daily        dextromethorphan-guaiFENesin (ROBITUSSIN DM)  mg/5 mL syrup Take 10 mL by mouth every 4 (four) hours as needed for cough  0    Diclofenac Sodium (VOLTAREN) 1 % Apply 2 g topically 4 (four) times a day  0    FLUoxetine (PROzac) 20 mg capsule Take 20 mg by mouth daily      furosemide (LASIX) 20 mg tablet Take 1 tablet (20 mg total) by mouth daily  0    gabapentin (NEURONTIN) 100 mg capsule Take 1 capsule (100 mg total) by mouth 3 (three) times a day  0    lidocaine (LIDODERM) 5 % Apply 1 patch topically daily Remove & Discard patch within 12 hours or as directed by MD  0    melatonin 3 mg Take 2 tablets (6 mg total) by mouth daily at bedtime  0    multivitamin-minerals (CENTRUM ADULTS) tablet Take 1 tablet by mouth daily  0    oxyCODONE (ROXICODONE) 5 mg immediate release tablet 1-2 tablets every 4 hours as needed for moderate to severe pain 15 tablet 0    traZODone (DESYREL) 100 mg tablet Take 2 tablets (200 mg total) by mouth daily at bedtime  0     No current facility-administered medications for this visit  She is allergic to aspirin; digoxin; and gadolinium derivatives       Review of Systems   Constitutional: Negative  HENT: Negative for ear pain and hearing loss  Eyes: Negative for pain  Respiratory: Negative for chest tightness and shortness of breath  Cardiovascular: Negative for chest pain, palpitations and leg swelling  Gastrointestinal: Negative for diarrhea, nausea and vomiting  Genitourinary: Negative for dysuria  Musculoskeletal: Positive for gait problem  Skin: Positive for wound  Neurological: Negative for tremors and weakness  Psychiatric/Behavioral: Negative for behavioral problems, confusion and suicidal ideas           Objective:       Wound 05/14/21 Pressure Injury Heel Left (Active)   Wound Image Images linked 06/03/21 1138   Wound Description Black;Granulation tissue;Slough 06/03/21 1138   Pressure Injury Stage U 06/03/21 1138   Isabel-wound Assessment Dry;Callus 06/03/21 1138   Wound Length (cm) 2 cm 06/03/21 1138   Wound Width (cm) 1 8 cm 06/03/21 1138   Wound Depth (cm) 0 1 cm 06/03/21 1138   Wound Surface Area (cm^2) 3 6 cm^2 06/03/21 1138   Wound Volume (cm^3) 0 36 cm^3 06/03/21 1138   Calculated Wound Volume (cm^3) 0 36 cm^3 06/03/21 1138   Change in Wound Size % 28 06/03/21 1138   Drainage Amount Moderate 06/03/21 1138   Drainage Description Serosanguineous 06/03/21 1138       Wound 05/20/21 Pressure Injury Heel Right (Active)   Wound Image Images linked 06/03/21 1136   Wound Description Eschar 06/03/21 1136   Isabel-wound Assessment Intact 06/03/21 1136   Wound Length (cm) 0 4 cm 06/03/21 1136   Wound Width (cm) 0 5 cm 06/03/21 1136   Wound Depth (cm) 0 cm 06/03/21 1136   Wound Surface Area (cm^2) 0 2 cm^2 06/03/21 1136   Wound Volume (cm^3) 0 cm^3 06/03/21 1136   Calculated Wound Volume (cm^3) 0 cm^3 06/03/21 1136   Change in Wound Size % 100 06/03/21 1136   Drainage Amount None 06/03/21 1136   Non-staged Wound Description Full thickness 06/03/21 1136       /64   Pulse 72   Temp 97 8 °F (36 6 °C)   Resp 18                 Wound Instructions:  Orders Placed This Encounter   Procedures    Wound cleansing and dressings                             Right heel wound     Wash your hands with soap and water  Remove old dressing, discard into plastic bag and place in trash  Cleanse the wound with saline prior to applying a clean dressing  Do not use tissue or cotton balls  Do not scrub the wound  Pat dry using gauze  Shower no      Apply bedatine to the right heel wound  Cover with dry dressing only until bleeding stops than may leave open to air  Apply betadine daily     Left heel wound   Apply santyl to wound bed nickel thick  Cover with dry gauze and felix and tape  Change daily     Done today     Standing Status:   Future     Standing Expiration Date:   6/3/2022        Diagnosis ICD-10-CM Associated Orders   1  Pressure injury of left heel, unstageable ([facility])  L89 620 Wound cleansing and dressings     lidocaine (LMX) 4 % cream   2  Peripheral vascular disease ([facility])  I73 9    3  Pressure ulcer of right heel, unstageable ([facility])  L89 610 Wound cleansing and dressings   4   Ambulatory dysfunction  R26 2

## 2021-06-03 NOTE — PATIENT INSTRUCTIONS
Orders Placed This Encounter   Procedures    Wound cleansing and dressings                             Right heel wound     Wash your hands with soap and water  Remove old dressing, discard into plastic bag and place in trash  Cleanse the wound with saline prior to applying a clean dressing  Do not use tissue or cotton balls  Do not scrub the wound  Pat dry using gauze  Shower no      Apply bedatine to the right heel wound    Cover with dry dressing only until bleeding stops than may leave open to air  Apply betadine daily     Left heel wound   Apply santyl to wound bed nickel thick  Cover with dry gauze and felix and tape  Change daily     Done today     Standing Status:   Future     Standing Expiration Date:   6/3/2022

## 2021-06-10 ENCOUNTER — OFFICE VISIT (OUTPATIENT)
Dept: WOUND CARE | Facility: HOSPITAL | Age: 71
End: 2021-06-10
Payer: MEDICARE

## 2021-06-10 VITALS
TEMPERATURE: 98 F | RESPIRATION RATE: 16 BRPM | DIASTOLIC BLOOD PRESSURE: 60 MMHG | HEART RATE: 80 BPM | SYSTOLIC BLOOD PRESSURE: 122 MMHG

## 2021-06-10 DIAGNOSIS — L89.620 PRESSURE INJURY OF LEFT HEEL, UNSTAGEABLE (HCC): Primary | ICD-10-CM

## 2021-06-10 DIAGNOSIS — I73.9 PERIPHERAL VASCULAR DISEASE (HCC): ICD-10-CM

## 2021-06-10 DIAGNOSIS — L89.610 PRESSURE ULCER OF RIGHT HEEL, UNSTAGEABLE (HCC): ICD-10-CM

## 2021-06-10 DIAGNOSIS — R26.2 AMBULATORY DYSFUNCTION: ICD-10-CM

## 2021-06-10 PROCEDURE — 97597 DBRDMT OPN WND 1ST 20 CM/<: CPT | Performed by: NURSE PRACTITIONER

## 2021-06-10 PROCEDURE — 99213 OFFICE O/P EST LOW 20 MIN: CPT | Performed by: NURSE PRACTITIONER

## 2021-06-10 RX ORDER — LIDOCAINE HYDROCHLORIDE 40 MG/ML
5 SOLUTION TOPICAL ONCE
Status: COMPLETED | OUTPATIENT
Start: 2021-06-10 | End: 2021-06-10

## 2021-06-10 RX ADMIN — LIDOCAINE HYDROCHLORIDE 5 ML: 40 SOLUTION TOPICAL at 11:30

## 2021-06-10 NOTE — PATIENT INSTRUCTIONS
Orders Placed This Encounter   Procedures    Wound cleansing and dressings     Left heel wound     Wash your hands with soap and water  Remove old dressing, discard into plastic bag and place in trash  Cleanse the wound with saline prior to applying a clean dressing  Do not use tissue or cotton balls  Do not scrub the wound  Pat dry using gauze  Shower no   Apply Santyl to the right heel wound  Cover with gauze (Silvasorb gel used today in wound care)  Secure with felix and tape  Change dressing daily  This was done today       Right heel wound is healed  Moisturize area daily     Standing Status:   Future     Standing Expiration Date:   6/10/2022    Wound home care     Continue [facility] services for wound care       Standing Status:   Future     Standing Expiration Date:   6/10/2022

## 2021-06-10 NOTE — PROGRESS NOTES
Patient ID: Sascha Hilton is a 65 y o  female Date of Birth 1950     Chief Complaint  Chief Complaint   Patient presents with    Follow Up Wound Care Visit     B/L heel wounds       Allergies  Aspirin, Digoxin, and Gadolinium derivatives    Assessment:     Diagnoses and all orders for this visit:    Pressure injury of left heel, unstageable (HCC)  -     lidocaine (XYLOCAINE) 4 % topical solution 5 mL  -     Wound cleansing and dressings; Future  -     Wound home care; Future    Peripheral vascular disease (HCC)  -     Wound cleansing and dressings; Future  -     Wound home care; Future    Pressure ulcer of right heel, unstageable (HCC)  -     lidocaine (XYLOCAINE) 4 % topical solution 5 mL  -     Wound cleansing and dressings; Future  -     Wound home care; Future    Ambulatory dysfunction  -     Wound cleansing and dressings; Future  -     Wound home care; Future              Debridement   Wound 05/14/21 Pressure Injury Heel Left    Universal Protocol:  Consent: Written consent obtained  Consent given by: patient  Time out: Immediately prior to procedure a "time out" was called to verify the correct patient, procedure, equipment, support staff and site/side marked as required  Timeout called at: 6/10/2021 12:00 PM   Patient understanding: patient states understanding of the procedure being performed  Patient consent: the patient's understanding of the procedure matches consent given  Procedure consent matches procedure scheduled: N/A  Relevant documents present and verified: N/A  Test results available and properly labeled: N/A  Site marked: the operative site was marked  Imaging studies available: N/A  Required blood products, implants, devices, and special equipment available: N/A    Patient identity confirmed: verbally with patient      Performed by: NP  Debridement type: selective  Pain control: lidocaine 4%  Pre-debridement measurements  Length (cm): 1 4  Width (cm): 1 4  Depth (cm): 0 1  Surface Area (cm^2): 1 96  Volume (cm^3): 0 2    Post-debridement measurements  Length (cm): 1 4  Width (cm): 1 4  Depth (cm): 0 1  Percent debrided: 100%  Surface Area (cm^2): 1 96  Area debrided (cm^2): 1 96  Volume (cm^3): 0 2  Devitalized tissue debrided: biofilm, callus and slough  Instrument(s) utilized: blade, curette and forceps  Bleeding: small  Hemostasis obtained with: pressure  Procedural pain (0-10): 0  Post-procedural pain: 0   Response to treatment: procedure was tolerated well          Plan:  1  F/U visit  Right heel is epithelialized  Counseled patient to moisturize right heel daily with lotion  She may keep open to air  2  Left heel wound debrided  Wound improving and measuring smaller  Continue Santyl ointment daily  Patient has continued to wear her globoped shoe to help offload her left heel  3  Counseled patient to use a cold compress over her dressing on her left heel to help with pain p r n  Patient verbalized agreement understanding   4  Patient will followup in 1 week       Wound 05/14/21 Pressure Injury Heel Left (Active)   Wound Image Images linked 06/10/21 1124   Wound Description Black;Granulation tissue; Yellow;Slough;Pink 06/10/21 1124   Isabel-wound Assessment Dry;Callus 06/10/21 1124   Wound Length (cm) 1 4 cm 06/10/21 1124   Wound Width (cm) 1 4 cm 06/10/21 1124   Wound Depth (cm) 0 1 cm 06/10/21 1124   Wound Surface Area (cm^2) 1 96 cm^2 06/10/21 1124   Wound Volume (cm^3) 0 2 cm^3 06/10/21 1124   Calculated Wound Volume (cm^3) 0 2 cm^3 06/10/21 1124   Change in Wound Size % 60 06/10/21 1124   Drainage Amount Moderate 06/10/21 1124   Drainage Description Serosanguineous 06/10/21 1124   Non-staged Wound Description Full thickness 06/10/21 1124   Dressing Status Intact (upon arrival) 06/10/21 1124       Wound 05/20/21 Pressure Injury Heel Right (Active)   Wound Image Images linked 06/10/21 1153   Wound Description Eschar 06/10/21 1127   Pressure Injury Stage U 06/10/21 1121 Isabel-wound Assessment Intact 06/10/21 1127   Wound Length (cm) 0 3 cm 06/10/21 1127   Wound Width (cm) 0 4 cm 06/10/21 1127   Wound Depth (cm) 0 cm 06/10/21 1127   Wound Surface Area (cm^2) 0 12 cm^2 06/10/21 1127   Wound Volume (cm^3) 0 cm^3 06/10/21 1127   Calculated Wound Volume (cm^3) 0 cm^3 06/10/21 1127   Change in Wound Size % 100 06/10/21 1127   Drainage Amount None 06/10/21 1127   Non-staged Wound Description Full thickness 06/10/21 1127   Dressing Status Other (Comment) (no dressing upon arrival) 06/10/21 1127       Wound 05/14/21 Pressure Injury Heel Left (Active)   Date First Assessed: 05/14/21   Primary Wound Type: Pressure Injury  Location: Heel  Wound Location Orientation: Left       Wound 05/20/21 Pressure Injury Heel Right (Active)   Date First Assessed/Time First Assessed: 05/20/21 0951   Primary Wound Type: Pressure Injury  Location: Heel  Wound Location Orientation: Right  Wound Outcome: Healed       [REMOVED] Wound 04/02/21 Chest Right (Removed)   Resolved Date: 05/14/21  Date First Assessed/Time First Assessed: 04/02/21 1101   Location: Chest  Wound Location Orientation: Right  Wound Description (Comments): 2 INCISIONS  Incision's 1st Dressing: ADHESIVE SKIN HIGH VISCOSITY EXOFIN 1ML (x1)       [REMOVED] Wound 04/02/21 Chest Right (Removed)   Resolved Date: 05/14/21  Date First Assessed/Time First Assessed: 04/02/21 1101   Location: Chest  Wound Location Orientation: Right  Wound Description (Comments): 2 CHEST TUBE SITES  Incision's 1st Dressing: SPONGE GAUZE 4 X 4 16 PLY STRL PLASTIC TRA    Subjective:     F/U visit for pressure ulcers of bilateral heels  No new complaints  Patient reports some pain in her left heel with ambulation  Patient reports she takes extra strength Excedrin which minimally helps her pain  She denies any fevers or chills        The following portions of the patient's history were reviewed and updated as appropriate:   She  has a past medical history of Anemia, Cardiac disease, Chronic pain, Coronary artery disease, Hypertension, and PVD (peripheral vascular disease) ([facility] )  She   Patient Active Problem List    Diagnosis Date Noted    Effusion of left knee 04/10/2021    Urinary retention 04/04/2021    Pulmonary hypertension ([facility] ) 04/01/2021    Peripheral vascular disease ([facility] ) 04/01/2021    Tobacco abuse 04/01/2021    ETOH abuse 03/29/2021    Anxiety 03/29/2021    Pulmonary nodule 03/29/2021    Dysphagia 03/27/2021    Anemia 03/26/2021    Valvular heart disease 03/26/2021    Thrombocytosis ([facility] ) 03/24/2021    Severe protein-calorie malnutrition ([place] [id number] ) 03/24/2021    Pneumonia of right lower lobe due to infectious organism 03/22/2021    CAD (coronary artery disease) 03/22/2021    Parapneumonic effusion 03/22/2021    GERD (gastroesophageal reflux disease) 03/22/2021    HTN (hypertension) 03/22/2021    Supratherapeutic INR 03/22/2021     She  has a past surgical history that includes Ankle surgery; IR chest tube placement (3/23/2021); IR non-tunneled central line placement (3/24/2021); and THORACOSCOPY VIDEO ASSISTED SURGERY (VATS) (Right, 4/2/2021)  Her family history includes No Known Problems in her father and mother  She  reports that she has quit smoking  Her smoking use included cigarettes  She has a 0 10 pack-year smoking history  She has never used smokeless tobacco  She reports current alcohol use  She reports that she does not use drugs  Current Outpatient Medications   Medication Sig Dispense Refill    acetaminophen (TYLENOL) 325 mg tablet Take 3 tablets (975 mg total) by mouth every 8 (eight) hours  0    ALPRAZolam (XANAX) 0 25 mg tablet Take 1 tablet (0 25 mg total) by mouth every 12 (twelve) hours as needed for anxiety 10 tablet 0    atorvastatin (LIPITOR) 40 mg tablet Take 40 mg by mouth daily        benzonatate (TESSALON) 200 MG capsule Take 1 capsule (200 mg total) by mouth daily at bedtime 20 capsule 0    buPROPion [facility] SR) 150 mg 12 hr tablet Take 150 mg by mouth daily      clopidogrel (PLAVIX) 75 mg tablet Take 75 mg by mouth daily   dextromethorphan-guaiFENesin (ROBITUSSIN DM)  mg/5 mL syrup Take 10 mL by mouth every 4 (four) hours as needed for cough  0    Diclofenac Sodium (VOLTAREN) 1 % Apply 2 g topically 4 (four) times a day  0    FLUoxetine (PROzac) 20 mg capsule Take 20 mg by mouth daily      furosemide (LASIX) 20 mg tablet Take 1 tablet (20 mg total) by mouth daily  0    gabapentin (NEURONTIN) 100 mg capsule Take 1 capsule (100 mg total) by mouth 3 (three) times a day  0    lidocaine (LIDODERM) 5 % Apply 1 patch topically daily Remove & Discard patch within 12 hours or as directed by MD  0    melatonin 3 mg Take 2 tablets (6 mg total) by mouth daily at bedtime  0    multivitamin-minerals (CENTRUM ADULTS) tablet Take 1 tablet by mouth daily  0    oxyCODONE (ROXICODONE) 5 mg immediate release tablet 1-2 tablets every 4 hours as needed for moderate to severe pain 15 tablet 0    traZODone (DESYREL) 100 mg tablet Take 2 tablets (200 mg total) by mouth daily at bedtime  0     No current facility-administered medications for this visit  She is allergic to aspirin; digoxin; and gadolinium derivatives       Review of Systems   Constitutional: Negative  HENT: Negative for ear pain and hearing loss  Eyes: Negative for pain  Respiratory: Negative for chest tightness and shortness of breath  Cardiovascular: Negative for chest pain, palpitations and leg swelling  Gastrointestinal: Negative for diarrhea, nausea and vomiting  Genitourinary: Negative for dysuria  Musculoskeletal: Negative for gait problem  Skin: Positive for wound  Neurological: Negative for tremors and weakness  Psychiatric/Behavioral: Negative for behavioral problems, confusion and suicidal ideas           Objective:       Wound 05/14/21 Pressure Injury Heel Left (Active)   Wound Image Images linked 06/10/21 1124   Wound Description Black;Granulation tissue; Yellow;Slough;Pink 06/10/21 1124   Isabel-wound Assessment Dry;Callus 06/10/21 1124   Wound Length (cm) 1 4 cm 06/10/21 1124   Wound Width (cm) 1 4 cm 06/10/21 1124   Wound Depth (cm) 0 1 cm 06/10/21 1124   Wound Surface Area (cm^2) 1 96 cm^2 06/10/21 1124   Wound Volume (cm^3) 0 2 cm^3 06/10/21 1124   Calculated Wound Volume (cm^3) 0 2 cm^3 06/10/21 1124   Change in Wound Size % 60 06/10/21 1124   Drainage Amount Moderate 06/10/21 1124   Drainage Description Serosanguineous 06/10/21 1124   Non-staged Wound Description Full thickness 06/10/21 1124   Dressing Status Intact (upon arrival) 06/10/21 1124       Wound 05/20/21 Pressure Injury Heel Right (Active)   Wound Image Images linked 06/10/21 1153   Wound Description Eschar 06/10/21 1127   Pressure Injury Stage U 06/10/21 1127   Isabel-wound Assessment Intact 06/10/21 1127   Wound Length (cm) 0 3 cm 06/10/21 1127   Wound Width (cm) 0 4 cm 06/10/21 1127   Wound Depth (cm) 0 cm 06/10/21 1127   Wound Surface Area (cm^2) 0 12 cm^2 06/10/21 1127   Wound Volume (cm^3) 0 cm^3 06/10/21 1127   Calculated Wound Volume (cm^3) 0 cm^3 06/10/21 1127   Change in Wound Size % 100 06/10/21 1127   Drainage Amount None 06/10/21 1127   Non-staged Wound Description Full thickness 06/10/21 1127   Dressing Status Other (Comment) (no dressing upon arrival) 06/10/21 1127       /60   Pulse 80   Temp 98 °F (36 7 °C)   Resp 16                     Wound Instructions:  Orders Placed This Encounter   Procedures    Wound cleansing and dressings     Left heel wound     Wash your hands with soap and water  Remove old dressing, discard into plastic bag and place in trash  Cleanse the wound with saline prior to applying a clean dressing  Do not use tissue or cotton balls  Do not scrub the wound  Pat dry using gauze  Shower no   Apply Santyl to the right heel wound    Cover with gauze (Silvasorb gel used today in wound care)  Secure with felix and tape  Change dressing daily  This was done today       Right heel wound is healed  Moisturize area daily     Standing Status:   Future     Standing Expiration Date:   6/10/2022    Wound home care     Continue [facility] services for wound care  Standing Status:   Future     Standing Expiration Date:   6/10/2022        Diagnosis ICD-10-CM Associated Orders   1  Pressure injury of left heel, unstageable ([facility])  L89 620 lidocaine (XYLOCAINE) 4 % topical solution 5 mL     Wound cleansing and dressings     Wound home care   2  Peripheral vascular disease ([facility])  I73 9 Wound cleansing and dressings     Wound home care   3  Pressure ulcer of right heel, unstageable ([facility])  L89 610 lidocaine (XYLOCAINE) 4 % topical solution 5 mL     Wound cleansing and dressings     Wound home care   4   Ambulatory dysfunction  R26 2 Wound cleansing and dressings     Wound home care

## 2021-06-17 ENCOUNTER — OFFICE VISIT (OUTPATIENT)
Dept: WOUND CARE | Facility: HOSPITAL | Age: 71
End: 2021-06-17
Payer: MEDICARE

## 2021-06-17 ENCOUNTER — HOSPITAL ENCOUNTER (OUTPATIENT)
Dept: RADIOLOGY | Facility: HOSPITAL | Age: 71
Discharge: HOME | End: 2021-06-17
Payer: MEDICARE

## 2021-06-17 VITALS
RESPIRATION RATE: 16 BRPM | HEART RATE: 60 BPM | DIASTOLIC BLOOD PRESSURE: 72 MMHG | TEMPERATURE: 97.4 F | SYSTOLIC BLOOD PRESSURE: 138 MMHG

## 2021-06-17 DIAGNOSIS — I73.9 PERIPHERAL VASCULAR DISEASE (HCC): ICD-10-CM

## 2021-06-17 DIAGNOSIS — R26.2 AMBULATORY DYSFUNCTION: ICD-10-CM

## 2021-06-17 DIAGNOSIS — L89.620 PRESSURE INJURY OF LEFT HEEL, UNSTAGEABLE (HCC): ICD-10-CM

## 2021-06-17 DIAGNOSIS — L89.620 PRESSURE INJURY OF LEFT HEEL, UNSTAGEABLE (HCC): Primary | ICD-10-CM

## 2021-06-17 PROCEDURE — 97597 DBRDMT OPN WND 1ST 20 CM/<: CPT | Performed by: NURSE PRACTITIONER

## 2021-06-17 PROCEDURE — 73650 X-RAY EXAM OF HEEL: CPT

## 2021-06-17 PROCEDURE — 99213 OFFICE O/P EST LOW 20 MIN: CPT | Performed by: NURSE PRACTITIONER

## 2021-06-17 RX ORDER — LIDOCAINE HYDROCHLORIDE 40 MG/ML
5 SOLUTION TOPICAL ONCE
Status: COMPLETED | OUTPATIENT
Start: 2021-06-17 | End: 2021-06-17

## 2021-06-17 RX ADMIN — LIDOCAINE HYDROCHLORIDE 5 ML: 40 SOLUTION TOPICAL at 11:45

## 2021-06-17 NOTE — PATIENT INSTRUCTIONS
Orders Placed This Encounter   Procedures    Debridement     This order was created via procedure documentation    Wound cleansing and dressings     Left heel wound  Wash your hands with soap and water  Remove old dressing, discard into plastic bag and place in trash  Cleanse the wound with saline prior to applying a clean dressing  Do not use tissue or cotton balls  Do not scrub the wound  Pat dry using gauze  Shower yes with protection    Apply hydrofera blue to the left heel wound   Cover with allevyn life  Secure with tubifast fast  Change dressing every other day, orders sent to [facility]  discontinue santyl    X-ray ordered  Done today     Standing Status:   Future     Standing Expiration Date:   6/17/2022    XR heel / calcaneus 2+ vw left     Non-healing pressure ulcer of left heel  Rule out osteomyelitis     Standing Status:   Future     Standing Expiration Date:   6/17/2025     Scheduling Instructions:      Bring along any outside films relating to this procedure

## 2021-06-17 NOTE — PROGRESS NOTES
Patient ID: Hiren Mccloud is a 65 y o  female Date of Birth 1950     Chief Complaint  Chief Complaint   Patient presents with    Follow Up Wound Care Visit     Dressing intact on arrival, global pedi shoe intact  Reports [facility] has been applying sanyl daily  Seeing cardiologist Tuesday  Allergies  Aspirin, Digoxin, and Gadolinium derivatives    Assessment:     Diagnoses and all orders for this visit:    Pressure injury of left heel, unstageable (HCC)  -     XR heel / calcaneus 2+ vw left; Future  -     Debridement  -     lidocaine (XYLOCAINE) 4 % topical solution 5 mL  -     Wound cleansing and dressings; Future    Peripheral vascular disease (HCC)    Ambulatory dysfunction              Debridement   Wound Heel Left;Posterior    Universal Protocol:  Consent: Written consent obtained  Consent given by: patient  Time out: Immediately prior to procedure a "time out" was called to verify the correct patient, procedure, equipment, support staff and site/side marked as required  Timeout called at: 6/17/2021 11:44 AM   Patient understanding: patient states understanding of the procedure being performed  Patient consent: the patient's understanding of the procedure matches consent given  Procedure consent matches procedure scheduled: N/A  Relevant documents present and verified: N/A  Test results available and properly labeled: N/A  Site marked: the operative site was marked  Imaging studies available: N/A  Required blood products, implants, devices, and special equipment available: N/A    Patient identity confirmed: verbally with patient      Performed by: NP  Debridement type: selective  Pain control: lidocaine 4%  Pre-debridement measurements  Length (cm): 1 8  Width (cm): 1 6  Depth (cm): 0 2  Surface Area (cm^2): 2 88  Volume (cm^3): 0 58    Post-debridement measurements  Length (cm): 1 8  Width (cm): 1 6  Depth (cm): 0 2  Percent debrided: 100%  Surface Area (cm^2): 2 88  Area debrided (cm^2): 2 88  Volume (cm^3): 0 58  Devitalized tissue debrided: biofilm, fibrin and slough  Instrument(s) utilized: curette  Bleeding: small  Hemostasis obtained with: pressure  Procedural pain (0-10): 0  Post-procedural pain: 0   Response to treatment: procedure was tolerated well          Plan:  1  F/u visit  Wound debrided  Wound still covered with yellow slough and increased pain around wound to palpation  Will order an XR of her left heel to r/o osteomyelitis  Will change treatment to Hydrofera blue with an Allevyn bordered foam changed every other day  Patient is to continue to wear her globoped shoe when ambulating  Patient will follow up in 1 week  Wound 05/14/21 Pressure Injury Heel Left;Posterior (Active)   Wound Image Images linked 06/17/21 1136   Wound Description Granulation tissue; Yellow;Eschar 06/17/21 1136   Isabel-wound Assessment Intact 06/17/21 1136   Wound Length (cm) 1 8 cm 06/17/21 1136   Wound Width (cm) 1 6 cm 06/17/21 1136   Wound Depth (cm) 0 2 cm 06/17/21 1136   Wound Surface Area (cm^2) 2 88 cm^2 06/17/21 1136   Wound Volume (cm^3) 0 576 cm^3 06/17/21 1136   Calculated Wound Volume (cm^3) 0 58 cm^3 06/17/21 1136   Change in Wound Size % 0 06/17/21 1136   Drainage Amount Moderate 06/17/21 1136   Drainage Description Serosanguineous 06/17/21 1136   Non-staged Wound Description Full thickness 06/17/21 1136   Dressing Status Intact 06/17/21 1136       [REMOVED] Wound 05/14/21 Pressure Injury Heel Left (Removed)   Wound Image Images linked 06/17/21 1136   Wound Description Granulation tissue;Slough;Eschar 06/17/21 1124   Isabel-wound Assessment Intact 06/17/21 1124   Wound Length (cm) 1 8 cm 06/17/21 1124   Wound Width (cm) 1 6 cm 06/17/21 1124   Wound Depth (cm) 0 2 cm 06/17/21 1124   Wound Surface Area (cm^2) 2 88 cm^2 06/17/21 1124   Wound Volume (cm^3) 0 576 cm^3 06/17/21 1124   Calculated Wound Volume (cm^3) 0 58 cm^3 06/17/21 1124   Change in Wound Size % -16 06/17/21 1124   Drainage Amount Moderate 06/17/21 1124   Drainage Description Serosanguineous 06/17/21 1124   Dressing Status Intact 06/17/21 1124       [REMOVED] Wound 05/20/21 Pressure Injury Heel Right (Removed)   Wound Image Images linked 06/17/21 1126   Wound Description Epithelialization 06/17/21 1126   Isabel-wound Assessment Intact 06/17/21 1122   Wound Length (cm) 0 cm 06/17/21 1126   Wound Width (cm) 0 cm 06/17/21 1126   Wound Depth (cm) 0 cm 06/17/21 1126   Wound Surface Area (cm^2) 0 cm^2 06/17/21 1126   Wound Volume (cm^3) 0 cm^3 06/17/21 1126   Calculated Wound Volume (cm^3) 0 cm^3 06/17/21 1126   Change in Wound Size % 100 06/17/21 1126   Drainage Amount None 06/17/21 1126   Drainage Description Serosanguineous 06/17/21 1122   Non-staged Wound Description Full thickness 06/17/21 1122   Dressing Status Intact 06/17/21 1126       Wound 05/14/21 Pressure Injury Heel Left;Posterior (Active)   Date First Assessed/Time First Assessed: 05/14/21 1000   Pre-Existing Wound: Yes  Primary Wound Type: Pressure Injury  Location: Heel  Wound Location Orientation: Left;Posterior  Wound Outcome: (c)        [REMOVED] Wound 04/02/21 Chest Right (Removed)   Resolved Date: 05/14/21  Date First Assessed/Time First Assessed: 04/02/21 1101   Location: Chest  Wound Location Orientation: Right  Wound Description (Comments): 2 INCISIONS  Incision's 1st Dressing: ADHESIVE SKIN HIGH VISCOSITY EXOFIN 1ML (x1)       [REMOVED] Wound 04/02/21 Chest Right (Removed)   Resolved Date: 05/14/21  Date First Assessed/Time First Assessed: 04/02/21 1101   Location: Chest  Wound Location Orientation: Right  Wound Description (Comments): 2 CHEST TUBE SITES  Incision's 1st Dressing: SPONGE GAUZE 4 X 4 16 PLY STRL PLASTIC TRA           [REMOVED] Wound 05/14/21 Pressure Injury Heel Left (Removed)   Resolved Date/Resolved Time: 06/17/21 1128  Date First Assessed: 05/14/21   Primary Wound Type: Pressure Injury  Location: Heel  Wound Location Orientation: Left  Wound Outcome: Healed [REMOVED] Wound 05/20/21 Pressure Injury Heel Right (Removed)   Resolved Date/Resolved Time: 06/17/21 1131  Date First Assessed/Time First Assessed: 05/20/21 0951   Primary Wound Type: Pressure Injury  Location: Heel  Wound Location Orientation: Right  Wound Outcome: Healed       Subjective:     F/u visit unstageable pressure ulcer of left heel  Patient reports she is still having pain around her wound with ambulation  Has been using Santyl daily as recommended  The following portions of the patient's history were reviewed and updated as appropriate:   She  has a past medical history of Anemia, Cardiac disease, Chronic pain, Coronary artery disease, Hypertension, and PVD (peripheral vascular disease) ([facility] [address] )  She   Patient Active Problem List    Diagnosis Date Noted    Effusion of left knee 04/10/2021    Urinary retention 04/04/2021    Pulmonary hypertension ([facility] [address] ) 04/01/2021    [facility] 04/01/2021    Tobacco abuse 04/01/2021    ETOH abuse 03/29/2021    Anxiety 03/29/2021    Pulmonary nodule 03/29/2021    Dysphagia 03/27/2021    Anemia 03/26/2021    Valvular heart disease 03/26/2021    Thrombocytosis ([facility] [address] ) 03/24/2021    Severe protein-calorie malnutrition ([facility] [address] ) 03/24/2021    Pneumonia of right lower lobe due to infectious organism 03/22/2021    CAD (coronary artery disease) 03/22/2021    Parapneumonic effusion 03/22/2021    GERD (gastroesophageal reflux disease) 03/22/2021    HTN (hypertension) 03/22/2021    Supratherapeutic INR 03/22/2021     She  has a past surgical history that includes Ankle surgery; IR chest tube placement (3/23/2021); IR non-tunneled central line placement (3/24/2021); and THORACOSCOPY VIDEO ASSISTED SURGERY (VATS) (Right, 4/2/2021)  Her family history includes No Known Problems in her father and mother  She  reports that she has quit smoking  Her smoking use included cigarettes  She has a 0 10 pack-year smoking history   She has never used smokeless tobacco  She reports current alcohol use  She reports that she does not use drugs  Current Outpatient Medications   Medication Sig Dispense Refill    acetaminophen (TYLENOL) 325 mg tablet Take 3 tablets (975 mg total) by mouth every 8 (eight) hours  0    ALPRAZolam (XANAX) 0 25 mg tablet Take 1 tablet (0 25 mg total) by mouth every 12 (twelve) hours as needed for anxiety 10 tablet 0    atorvastatin (LIPITOR) 40 mg tablet Take 40 mg by mouth daily   benzonatate (TESSALON) 200 MG capsule Take 1 capsule (200 mg total) by mouth daily at bedtime 20 capsule 0    buPROPion (WELLBUTRIN SR) 150 mg 12 hr tablet Take 150 mg by mouth daily      clopidogrel (PLAVIX) 75 mg tablet Take 75 mg by mouth daily   dextromethorphan-guaiFENesin (ROBITUSSIN DM)  mg/5 mL syrup Take 10 mL by mouth every 4 (four) hours as needed for cough  0    Diclofenac Sodium (VOLTAREN) 1 % Apply 2 g topically 4 (four) times a day  0    FLUoxetine (PROzac) 20 mg capsule Take 20 mg by mouth daily      furosemide (LASIX) 20 mg tablet Take 1 tablet (20 mg total) by mouth daily  0    gabapentin (NEURONTIN) 100 mg capsule Take 1 capsule (100 mg total) by mouth 3 (three) times a day  0    lidocaine (LIDODERM) 5 % Apply 1 patch topically daily Remove & Discard patch within 12 hours or as directed by MD  0    melatonin 3 mg Take 2 tablets (6 mg total) by mouth daily at bedtime  0    multivitamin-minerals (CENTRUM ADULTS) tablet Take 1 tablet by mouth daily  0    oxyCODONE (ROXICODONE) 5 mg immediate release tablet 1-2 tablets every 4 hours as needed for moderate to severe pain 15 tablet 0    traZODone (DESYREL) 100 mg tablet Take 2 tablets (200 mg total) by mouth daily at bedtime  0     No current facility-administered medications for this visit  She is allergic to aspirin, digoxin, and gadolinium derivatives       Review of Systems   Constitutional: Negative  HENT: Negative for ear pain and hearing loss  Eyes: Negative for pain  Respiratory: Negative for chest tightness and shortness of breath  Cardiovascular: Negative for chest pain, palpitations and leg swelling  Gastrointestinal: Negative for diarrhea, nausea and vomiting  Genitourinary: Negative for dysuria  Musculoskeletal: Negative for gait problem  Skin: Positive for wound  Neurological: Negative for tremors and weakness  Psychiatric/Behavioral: Negative for behavioral problems, confusion and suicidal ideas  Objective:       Wound 05/14/21 Pressure Injury Heel Left;Posterior (Active)   Wound Image Images linked 06/17/21 1136   Wound Description Granulation tissue; Yellow;Eschar 06/17/21 1136   Isabel-wound Assessment Intact 06/17/21 1136   Wound Length (cm) 1 8 cm 06/17/21 1136   Wound Width (cm) 1 6 cm 06/17/21 1136   Wound Depth (cm) 0 2 cm 06/17/21 1136   Wound Surface Area (cm^2) 2 88 cm^2 06/17/21 1136   Wound Volume (cm^3) 0 576 cm^3 06/17/21 1136   Calculated Wound Volume (cm^3) 0 58 cm^3 06/17/21 1136   Change in Wound Size % 0 06/17/21 1136   Drainage Amount Moderate 06/17/21 1136   Drainage Description Serosanguineous 06/17/21 1136   Non-staged Wound Description Full thickness 06/17/21 1136   Dressing Status Intact 06/17/21 1136       [REMOVED] Wound 05/14/21 Pressure Injury Heel Left (Removed)   Wound Image Images linked 06/17/21 1136   Wound Description Granulation tissue;Slough;Eschar 06/17/21 1124   Isabel-wound Assessment Intact 06/17/21 1124   Wound Length (cm) 1 8 cm 06/17/21 1124   Wound Width (cm) 1 6 cm 06/17/21 1124   Wound Depth (cm) 0 2 cm 06/17/21 1124   Wound Surface Area (cm^2) 2 88 cm^2 06/17/21 1124   Wound Volume (cm^3) 0 576 cm^3 06/17/21 1124   Calculated Wound Volume (cm^3) 0 58 cm^3 06/17/21 1124   Change in Wound Size % -16 06/17/21 1124   Drainage Amount Moderate 06/17/21 1124   Drainage Description Serosanguineous 06/17/21 1124   Dressing Status Intact 06/17/21 1124       [REMOVED] Wound 05/20/21 Pressure Injury Heel Right (Removed)   Wound Image Images linked 06/17/21 1126   Wound Description Epithelialization 06/17/21 1126   Isabel-wound Assessment Intact 06/17/21 1122   Wound Length (cm) 0 cm 06/17/21 1126   Wound Width (cm) 0 cm 06/17/21 1126   Wound Depth (cm) 0 cm 06/17/21 1126   Wound Surface Area (cm^2) 0 cm^2 06/17/21 1126   Wound Volume (cm^3) 0 cm^3 06/17/21 1126   Calculated Wound Volume (cm^3) 0 cm^3 06/17/21 1126   Change in Wound Size % 100 06/17/21 1126   Drainage Amount None 06/17/21 1126   Drainage Description Serosanguineous 06/17/21 1122   Non-staged Wound Description Full thickness 06/17/21 1122   Dressing Status Intact 06/17/21 1126       /72   Pulse 60   Temp (!) 97 4 °F (36 3 °C)   Resp 16             Wound Instructions:  Orders Placed This Encounter   Procedures    Debridement     This order was created via procedure documentation    Wound cleansing and dressings     Left heel wound  Wash your hands with soap and water  Remove old dressing, discard into plastic bag and place in trash  Cleanse the wound with saline prior to applying a clean dressing  Do not use tissue or cotton balls  Do not scrub the wound  Pat dry using gauze  Shower yes with protection    Apply hydrofera blue to the left heel wound   Cover with allevyn life  Secure with tubifast fast  Change dressing every other day, orders sent to [facility]  discontinue santyl    X-ray ordered  Done today     Standing Status:   Future     Standing Expiration Date:   6/17/2022    XR heel / calcaneus 2+ vw left     Non-healing pressure ulcer of left heel  Rule out osteomyelitis     Standing Status:   Future     Standing Expiration Date:   6/17/2025     Scheduling Instructions:      Bring along any outside films relating to this procedure  Diagnosis ICD-10-CM Associated Orders   1   Pressure injury of left heel, unstageable ([facility])  L89 [id number] XR heel / calcaneus 2+ vw left     Debridement     lidocaine (XYLOCAINE) 4 % topical solution 5 mL     Wound cleansing and dressings   2  Peripheral vascular disease (HCC)  I73 9    3   Ambulatory dysfunction  R26 2

## 2021-06-24 ENCOUNTER — OFFICE VISIT (OUTPATIENT)
Dept: WOUND CARE | Facility: HOSPITAL | Age: 71
End: 2021-06-24
Payer: MEDICARE

## 2021-06-24 VITALS
DIASTOLIC BLOOD PRESSURE: 74 MMHG | TEMPERATURE: 98.7 F | SYSTOLIC BLOOD PRESSURE: 118 MMHG | RESPIRATION RATE: 18 BRPM | HEART RATE: 80 BPM

## 2021-06-24 DIAGNOSIS — I73.9 PERIPHERAL VASCULAR DISEASE (HCC): ICD-10-CM

## 2021-06-24 DIAGNOSIS — R26.2 AMBULATORY DYSFUNCTION: ICD-10-CM

## 2021-06-24 DIAGNOSIS — L89.620 PRESSURE INJURY OF LEFT HEEL, UNSTAGEABLE (HCC): Primary | ICD-10-CM

## 2021-06-24 PROCEDURE — 97597 DBRDMT OPN WND 1ST 20 CM/<: CPT | Performed by: NURSE PRACTITIONER

## 2021-06-24 RX ORDER — LIDOCAINE HYDROCHLORIDE 40 MG/ML
5 SOLUTION TOPICAL ONCE
Status: COMPLETED | OUTPATIENT
Start: 2021-06-24 | End: 2021-06-24

## 2021-06-24 RX ADMIN — LIDOCAINE HYDROCHLORIDE 5 ML: 40 SOLUTION TOPICAL at 12:19

## 2021-06-24 NOTE — PATIENT INSTRUCTIONS
Orders Placed This Encounter   Procedures    Wound cleansing and dressings     Wound cleansing and dressings      Left heel wound  Wash your hands with soap and water  Remove old dressing, discard into plastic bag and place in trash  Cleanse the wound with saline prior to applying a clean dressing  Do not use tissue or cotton balls  Do not scrub the wound  Pat dry using gauze  Shower yes with protection  Apply hydrofera blue ready to the left heel wound   Cover with allevyn life  Secure with tubifast blue  Change dressing 3x week  (2x week by home care)     Standing Status:   Future     Standing Expiration Date:   6/24/2022     Orders Placed This Encounter   Procedures    Wound cleansing and dressings     Wound cleansing and dressings      Left heel wound  Wash your hands with soap and water  Remove old dressing, discard into plastic bag and place in trash  Cleanse the wound with saline prior to applying a clean dressing  Do not use tissue or cotton balls  Do not scrub the wound  Pat dry using gauze  Shower yes with protection  Apply hydrofera blue ready to the left heel wound   Cover with allevyn life  Secure with tubifast blue  Change dressing 3x week  (2x week by home care)     Standing Status:   Future     Standing Expiration Date:   6/24/2022    Wound off loading     Wear heel offloading shoe with each step    No pressure to heel     Standing Status:   Future     Standing Expiration Date:   6/24/2022

## 2021-06-24 NOTE — PROGRESS NOTES
Patient ID: Angel Hinds is a 65 y o  female Date of Birth 1950     Chief Complaint  Chief Complaint   Patient presents with    Follow Up Wound Care Visit     left heel wound       Allergies  Aspirin, Digoxin, and Gadolinium derivatives    Assessment:     Diagnoses and all orders for this visit:    Pressure injury of left heel, unstageable (HCC)  -     Wound cleansing and dressings; Future  -     lidocaine (XYLOCAINE) 4 % topical solution 5 mL  -     Wound off loading; Future    Peripheral vascular disease ([address] )    Ambulatory dysfunction    Other orders  -     Debridement              Debridement   Wound 05/14/21 Pressure Injury Heel Left;Posterior    Universal Protocol:  Consent: Written consent obtained  Consent given by: patient  Time out: Immediately prior to procedure a "time out" was called to verify the correct patient, procedure, equipment, support staff and site/side marked as required  Timeout called at: 6/24/2021 12:36 PM   Patient understanding: patient states understanding of the procedure being performed  Patient consent: the patient's understanding of the procedure matches consent given  Procedure consent matches procedure scheduled: N/A  Relevant documents present and verified: N/A  Test results available and properly labeled: N/A  Site marked: the operative site was marked  Imaging studies available: N/A  Required blood products, implants, devices, and special equipment available: N/A    Patient identity confirmed: verbally with patient      Performed by: NP  Debridement type: selective  Pain control: lidocaine 4%  Pre-debridement measurements  Length (cm): 2  Width (cm): 1 6  Depth (cm): 0 2  Surface Area (cm^2): 3 2  Volume (cm^3): 0 64    Post-debridement measurements  Length (cm): 2  Width (cm): 1 6  Depth (cm): 0 2  Percent debrided: 100%  Surface Area (cm^2): 3 2  Area debrided (cm^2): 3 2  Volume (cm^3): 0 64  Devitalized tissue debrided: biofilm, fibrin and slough  Instrument(s) utilized: blade and forceps  Bleeding: small  Hemostasis obtained with: pressure  Procedural pain (0-10): 0  Post-procedural pain: 0   Response to treatment: procedure was tolerated well          Plan:  1  F/U visit  Wound debrided  Wound improving with softened slough in as showed are present in the wound bed that is easier to remove  Continue hydrafera blue changed 3 times a week  Patient is to continue to offload her heel frequently and she is to wear her globoped shoe when ambulating  Patient will followup in 1 week  Wound 05/14/21 Pressure Injury Heel Left;Posterior (Active)   Wound Description Granulation tissue; Yellow;Eschar 06/24/21 1141   Pressure Injury Stage U 06/24/21 1141   Isabel-wound Assessment Intact 06/24/21 1141   Wound Length (cm) 2 cm 06/24/21 1141   Wound Width (cm) 1 6 cm 06/24/21 1141   Wound Depth (cm) 0 2 cm 06/24/21 1141   Wound Surface Area (cm^2) 3 2 cm^2 06/24/21 1141   Wound Volume (cm^3) 0 64 cm^3 06/24/21 1141   Calculated Wound Volume (cm^3) 0 64 cm^3 06/24/21 1141   Change in Wound Size % -10 34 06/24/21 1141   Drainage Amount Moderate 06/24/21 1141   Drainage Description Serosanguineous 06/24/21 1141       Wound 05/14/21 Pressure Injury Heel Left;Posterior (Active)   Date First Assessed/Time First Assessed: 05/14/21 1000   Pre-Existing Wound: Yes  Primary Wound Type: Pressure Injury  Location: Heel  Wound Location Orientation: Left;Posterior  Wound Outcome: (c)        [REMOVED] Wound 04/02/21 Chest Right (Removed)   Resolved Date: 05/14/21  Date First Assessed/Time First Assessed: 04/02/21 1101   Location: Chest  Wound Location Orientation: Right  Wound Description (Comments): 2 INCISIONS  Incision's 1st Dressing: ADHESIVE SKIN HIGH VISCOSITY EXOFIN 1ML (x1)       [REMOVED] Wound 04/02/21 Chest Right (Removed)   Resolved Date: 05/14/21  Date First Assessed/Time First Assessed: 04/02/21 1101   Location: Chest  Wound Location Orientation: Right  Wound Description (Comments): 2 CHEST TUBE SITES  Incision's 1st Dressing: SPONGE GAUZE 4 X 4 16 PLY STRL PLASTIC TRA    [REMOVED] Wound 05/14/21 Pressure Injury Heel Left (Removed)   Resolved Date/Resolved Time: 06/17/21 1128  Date First Assessed: 05/14/21   Primary Wound Type: Pressure Injury  Location: Heel  Wound Location Orientation: Left  Wound Outcome: Healed       [REMOVED] Wound 05/20/21 Pressure Injury Heel Right (Removed)   Resolved Date/Resolved Time: 06/17/21 1131  Date First Assessed/Time First Assessed: 05/20/21 0951   Primary Wound Type: Pressure Injury  Location: Heel  Wound Location Orientation: Right  Wound Outcome: Healed       Subjective:     F/U visit for pressure ulcer of left heel  No new complaints  Patient completed her ordered x-ray of her left heel which was negative for osteomyelitis  She denies any pain, fevers, or chills  The following portions of the patient's history were reviewed and updated as appropriate:   She  has a past medical history of Anemia, Cardiac disease, Chronic pain, Coronary artery disease, Hypertension, and PVD (peripheral vascular disease) ([facility] )    She   Patient Active Problem List    Diagnosis Date Noted    Effusion of left knee 04/10/2021    Urinary retention 04/04/2021    Pulmonary hypertension ([address] ) 04/01/2021    Peripheral vascular disease ([facility] ) 04/01/2021    Tobacco abuse 04/01/2021    ETOH abuse 03/29/2021    Anxiety 03/29/2021    Pulmonary nodule 03/29/2021    Dysphagia 03/27/2021    Anemia 03/26/2021    Valvular heart disease 03/26/2021    Thrombocytosis ([facility] ) 03/24/2021    Severe protein-calorie malnutrition ([facility] ) 03/24/2021    Pneumonia of right lower lobe due to infectious organism 03/22/2021    CAD (coronary artery disease) 03/22/2021    Parapneumonic effusion 03/22/2021    GERD (gastroesophageal reflux disease) 03/22/2021    HTN (hypertension) 03/22/2021    Supratherapeutic INR 03/22/2021     She  has a past surgical history that includes Ankle surgery; IR chest tube placement (3/23/2021); IR non-tunneled central line placement (3/24/2021); and THORACOSCOPY VIDEO ASSISTED SURGERY (VATS) (Right, 4/2/2021)  Her family history includes No Known Problems in her father and mother  She  reports that she has quit smoking  Her smoking use included cigarettes  She has a 0 10 pack-year smoking history  She has never used smokeless tobacco  She reports current alcohol use  She reports that she does not use drugs  Current Outpatient Medications   Medication Sig Dispense Refill    acetaminophen (TYLENOL) 325 mg tablet Take 3 tablets (975 mg total) by mouth every 8 (eight) hours  0    ALPRAZolam (XANAX) 0 25 mg tablet Take 1 tablet (0 25 mg total) by mouth every 12 (twelve) hours as needed for anxiety 10 tablet 0    atorvastatin (LIPITOR) 40 mg tablet Take 40 mg by mouth daily   benzonatate (TESSALON) 200 MG capsule Take 1 capsule (200 mg total) by mouth daily at bedtime 20 capsule 0    buPROPion (WELLBUTRIN SR) 150 mg 12 hr tablet Take 150 mg by mouth daily      clopidogrel (PLAVIX) 75 mg tablet Take 75 mg by mouth daily        dextromethorphan-guaiFENesin (ROBITUSSIN DM)  mg/5 mL syrup Take 10 mL by mouth every 4 (four) hours as needed for cough  0    Diclofenac Sodium (VOLTAREN) 1 % Apply 2 g topically 4 (four) times a day  0    FLUoxetine (PROzac) 20 mg capsule Take 20 mg by mouth daily      furosemide (LASIX) 20 mg tablet Take 1 tablet (20 mg total) by mouth daily  0    gabapentin (NEURONTIN) 100 mg capsule Take 1 capsule (100 mg total) by mouth 3 (three) times a day  0    lidocaine (LIDODERM) 5 % Apply 1 patch topically daily Remove & Discard patch within 12 hours or as directed by MD  0    melatonin 3 mg Take 2 tablets (6 mg total) by mouth daily at bedtime  0    multivitamin-minerals (CENTRUM ADULTS) tablet Take 1 tablet by mouth daily  0    oxyCODONE (ROXICODONE) 5 mg immediate release tablet 1-2 tablets every 4 hours as needed for moderate to severe pain 15 tablet 0    traZODone (DESYREL) 100 mg tablet Take 2 tablets (200 mg total) by mouth daily at bedtime  0     No current facility-administered medications for this visit  She is allergic to aspirin, digoxin, and gadolinium derivatives       Review of Systems   Constitutional: Negative  HENT: Negative for ear pain and hearing loss  Eyes: Negative for pain  Respiratory: Negative for chest tightness and shortness of breath  Cardiovascular: Negative for chest pain, palpitations and leg swelling  Gastrointestinal: Negative for diarrhea, nausea and vomiting  Genitourinary: Negative for dysuria  Musculoskeletal: Negative for gait problem  Skin: Positive for wound  Neurological: Negative for tremors and weakness  Psychiatric/Behavioral: Negative for behavioral problems, confusion and suicidal ideas  Objective:       Wound 05/14/21 Pressure Injury Heel Left;Posterior (Active)   Wound Description Granulation tissue; Yellow;Eschar 06/24/21 1141   Pressure Injury Stage U 06/24/21 1141   Isabel-wound Assessment Intact 06/24/21 1141   Wound Length (cm) 2 cm 06/24/21 1141   Wound Width (cm) 1 6 cm 06/24/21 1141   Wound Depth (cm) 0 2 cm 06/24/21 1141   Wound Surface Area (cm^2) 3 2 cm^2 06/24/21 1141   Wound Volume (cm^3) 0 64 cm^3 06/24/21 1141   Calculated Wound Volume (cm^3) 0 64 cm^3 06/24/21 1141   Change in Wound Size % -10 34 06/24/21 1141   Drainage Amount Moderate 06/24/21 1141   Drainage Description Serosanguineous 06/24/21 1141       /74   Pulse 80   Temp 98 7 °F (37 1 °C)   Resp 18     Wound Instructions:  Orders Placed This Encounter   Procedures    Wound cleansing and dressings     Wound cleansing and dressings      Left heel wound  Wash your hands with soap and water  Remove old dressing, discard into plastic bag and place in trash  Cleanse the wound with saline prior to applying a clean dressing   Do not use tissue or cotton balls  Do not scrub the wound  Pat dry using gauze  Shower yes with protection  Apply hydrofera blue ready to the left heel wound   Cover with allevyn life  Secure with tubifast blue  Change dressing 3x week  (2x week by home care)     Standing Status:   Future     Standing Expiration Date:   6/24/2022    Wound off loading     Wear heel offloading shoe with each step  No pressure to heel     Standing Status:   Future     Standing Expiration Date:   6/24/2022    Debridement     This order was created via procedure documentation        Diagnosis ICD-10-CM Associated Orders   1  Pressure injury of left heel, unstageable ([facility])  L89 620 Wound cleansing and dressings     lidocaine (XYLOCAINE) 4 % topical solution 5 mL     Wound off loading   2  Peripheral vascular disease ([facility])  I73 9    3   Ambulatory dysfunction  R26 2

## 2021-07-01 ENCOUNTER — OFFICE VISIT (OUTPATIENT)
Dept: WOUND CARE | Facility: HOSPITAL | Age: 71
End: 2021-07-01
Payer: MEDICARE

## 2021-07-01 VITALS
SYSTOLIC BLOOD PRESSURE: 110 MMHG | RESPIRATION RATE: 18 BRPM | TEMPERATURE: 99.1 F | HEART RATE: 72 BPM | DIASTOLIC BLOOD PRESSURE: 82 MMHG

## 2021-07-01 DIAGNOSIS — L89.620 PRESSURE INJURY OF LEFT HEEL, UNSTAGEABLE (HCC): Primary | ICD-10-CM

## 2021-07-01 PROCEDURE — 97597 DBRDMT OPN WND 1ST 20 CM/<: CPT | Performed by: NURSE PRACTITIONER

## 2021-07-01 RX ORDER — LIDOCAINE HYDROCHLORIDE 40 MG/ML
5 SOLUTION TOPICAL ONCE
Status: COMPLETED | OUTPATIENT
Start: 2021-07-01 | End: 2021-07-01

## 2021-07-01 RX ADMIN — LIDOCAINE HYDROCHLORIDE 5 ML: 40 SOLUTION TOPICAL at 12:09

## 2021-07-01 NOTE — PROGRESS NOTES
Patient ID: Seth Roque is a 65 y o  female Date of Birth 1950     Chief Complaint  Chief Complaint   Patient presents with    Follow Up Wound Care Visit     left heel wound       Allergies  Aspirin, Digoxin, and Gadolinium derivatives    Assessment:     Diagnoses and all orders for this visit:    Pressure injury of left heel, unstageable (HCC)  -     lidocaine (XYLOCAINE) 4 % topical solution 5 mL  -     Wound cleansing and dressings; Future              Debridement   Wound 05/14/21 Pressure Injury Heel Left;Posterior    Universal Protocol:  Consent: Written consent obtained  Consent given by: patient  Time out: Immediately prior to procedure a "time out" was called to verify the correct patient, procedure, equipment, support staff and site/side marked as required  Timeout called at: 7/1/2021 12:21 PM   Patient understanding: patient states understanding of the procedure being performed  Patient consent: the patient's understanding of the procedure matches consent given  Procedure consent matches procedure scheduled: N/A  Relevant documents present and verified: N/A  Test results available and properly labeled: N/A  Site marked: the operative site was marked  Imaging studies available: N/A  Required blood products, implants, devices, and special equipment available: N/A    Patient identity confirmed: verbally with patient      Performed by: NP  Debridement type: selective  Pain control: lidocaine 4%  Pre-debridement measurements  Length (cm): 1 8  Width (cm): 1 8  Depth (cm): 0 5  Surface Area (cm^2): 3 24  Volume (cm^3): 1 62    Post-debridement measurements  Length (cm): 1 8  Width (cm): 1 8  Depth (cm): 0 5  Percent debrided: 100%  Surface Area (cm^2): 3 24  Area debrided (cm^2): 3 24  Volume (cm^3): 1 62  Devitalized tissue debrided: biofilm, fibrin, slough and eschar  Instrument(s) utilized: blade, curette and forceps  Bleeding: small  Hemostasis obtained with: pressure  Procedural pain (0-10): 0  Post-procedural pain: 0   Response to treatment: procedure was tolerated well          Plan:   1  F/u visit  Wound debrided  Wound measuring slightly larger but has less devitalized tissue present in the wound bed  Continue current plan of care  Patient will follow up in 1 week  Wound 05/14/21 Pressure Injury Heel Left;Posterior (Active)   Wound Image Images linked 07/01/21 1202   Wound Description Granulation tissue; Yellow;Eschar 07/01/21 1200   Pressure Injury Stage U 07/01/21 1200   Isabel-wound Assessment Intact; Maceration 07/01/21 1200   Wound Length (cm) 1 8 cm 07/01/21 1200   Wound Width (cm) 1 8 cm 07/01/21 1200   Wound Depth (cm) 0 5 cm 07/01/21 1200   Wound Surface Area (cm^2) 3 24 cm^2 07/01/21 1200   Wound Volume (cm^3) 1 62 cm^3 07/01/21 1200   Calculated Wound Volume (cm^3) 1 62 cm^3 07/01/21 1200   Change in Wound Size % -179 31 07/01/21 1200   Drainage Amount Moderate 07/01/21 1200   Drainage Description Serosanguineous 07/01/21 1200   Non-staged Wound Description Full thickness 07/01/21 1200       Wound 05/14/21 Pressure Injury Heel Left;Posterior (Active)   Date First Assessed/Time First Assessed: 05/14/21 1000   Pre-Existing Wound: Yes  Primary Wound Type: Pressure Injury  Location: Heel  Wound Location Orientation: Left;Posterior  Wound Outcome: (c)        [REMOVED] Wound 04/02/21 Chest Right (Removed)   Resolved Date: 05/14/21  Date First Assessed/Time First Assessed: 04/02/21 1101   Location: Chest  Wound Location Orientation: Right  Wound Description (Comments): 2 INCISIONS  Incision's 1st Dressing: ADHESIVE SKIN HIGH VISCOSITY EXOFIN 1ML (x1)       [REMOVED] Wound 04/02/21 Chest Right (Removed)   Resolved Date: 05/14/21  Date First Assessed/Time First Assessed: 04/02/21 1101   Location: Chest  Wound Location Orientation: Right  Wound Description (Comments): 2 CHEST TUBE SITES  Incision's 1st Dressing: SPONGE GAUZE 4 X 4 16 PLY STRL PLASTIC TRA           [REMOVED] Wound 05/14/21 Pressure Injury Heel Left (Removed)   Resolved Date/Resolved Time: 06/17/21 1128  Date First Assessed: 05/14/21   Primary Wound Type: Pressure Injury  Location: Heel  Wound Location Orientation: Left  Wound Outcome: Healed       [REMOVED] Wound 05/20/21 Pressure Injury Heel Right (Removed)   Resolved Date/Resolved Time: 06/17/21 1131  Date First Assessed/Time First Assessed: 05/20/21 0951   Primary Wound Type: Pressure Injury  Location: Heel  Wound Location Orientation: Right  Wound Outcome: Healed       Subjective:     F/u visit pressure ulcer of left heel  No new complaints  Patient reports she is having less pain in her wound  She denies any fevers or chills  The following portions of the patient's history were reviewed and updated as appropriate:   She  has a past medical history of Anemia, Cardiac disease, Chronic pain, Coronary artery disease, Hypertension, and PVD (peripheral vascular disease) ([facility] )  She   Patient Active Problem List    Diagnosis Date Noted    Effusion of left knee 04/10/2021    Urinary retention 04/04/2021    Pulmonary hypertension ([facility] ) 04/01/2021    Peripheral vascular disease ([place] [address] ) 04/01/2021    Tobacco abuse 04/01/2021    ETOH abuse 03/29/2021    Anxiety 03/29/2021    Pulmonary nodule 03/29/2021    Dysphagia 03/27/2021    Anemia 03/26/2021    Valvular heart disease 03/26/2021    Thrombocytosis ([facility] ) 03/24/2021    Severe protein-calorie malnutrition ([facility] ) 03/24/2021    Pneumonia of right lower lobe due to infectious organism 03/22/2021    CAD (coronary artery disease) 03/22/2021    Parapneumonic effusion 03/22/2021    GERD (gastroesophageal reflux disease) 03/22/2021    HTN (hypertension) 03/22/2021    Supratherapeutic INR 03/22/2021     She  has a past surgical history that includes Ankle surgery; IR chest tube placement (3/23/2021); IR non-tunneled central line placement (3/24/2021); and THORACOSCOPY VIDEO ASSISTED SURGERY (VATS) (Right, 4/2/2021)    Her family history includes No Known Problems in her father and mother  She  reports that she has quit smoking  Her smoking use included cigarettes  She has a 0 10 pack-year smoking history  She has never used smokeless tobacco  She reports current alcohol use  She reports that she does not use drugs  Current Outpatient Medications   Medication Sig Dispense Refill    acetaminophen (TYLENOL) 325 mg tablet Take 3 tablets (975 mg total) by mouth every 8 (eight) hours  0    ALPRAZolam (XANAX) 0 25 mg tablet Take 1 tablet (0 25 mg total) by mouth every 12 (twelve) hours as needed for anxiety 10 tablet 0    atorvastatin (LIPITOR) 40 mg tablet Take 40 mg by mouth daily   benzonatate (TESSALON) 200 MG capsule Take 1 capsule (200 mg total) by mouth daily at bedtime 20 capsule 0    buPROPion (WELLBUTRIN SR) 150 mg 12 hr tablet Take 150 mg by mouth daily      clopidogrel (PLAVIX) 75 mg tablet Take 75 mg by mouth daily        dextromethorphan-guaiFENesin (ROBITUSSIN DM)  mg/5 mL syrup Take 10 mL by mouth every 4 (four) hours as needed for cough  0    Diclofenac Sodium (VOLTAREN) 1 % Apply 2 g topically 4 (four) times a day  0    FLUoxetine (PROzac) 20 mg capsule Take 20 mg by mouth daily      furosemide (LASIX) 20 mg tablet Take 1 tablet (20 mg total) by mouth daily  0    gabapentin (NEURONTIN) 100 mg capsule Take 1 capsule (100 mg total) by mouth 3 (three) times a day  0    lidocaine (LIDODERM) 5 % Apply 1 patch topically daily Remove & Discard patch within 12 hours or as directed by MD  0    melatonin 3 mg Take 2 tablets (6 mg total) by mouth daily at bedtime  0    multivitamin-minerals (CENTRUM ADULTS) tablet Take 1 tablet by mouth daily  0    oxyCODONE (ROXICODONE) 5 mg immediate release tablet 1-2 tablets every 4 hours as needed for moderate to severe pain 15 tablet 0    traZODone (DESYREL) 100 mg tablet Take 2 tablets (200 mg total) by mouth daily at bedtime  0     No current facility-administered medications for this visit  She is allergic to aspirin, digoxin, and gadolinium derivatives       Review of Systems   Constitutional: Negative  HENT: Negative for ear pain and hearing loss  Eyes: Negative for pain  Respiratory: Negative for chest tightness and shortness of breath  Cardiovascular: Negative for chest pain, palpitations and leg swelling  Gastrointestinal: Negative for diarrhea, nausea and vomiting  Genitourinary: Negative for dysuria  Musculoskeletal: Negative for gait problem  Skin: Positive for wound  Neurological: Negative for tremors and weakness  Psychiatric/Behavioral: Negative for behavioral problems, confusion and suicidal ideas  Objective:       Wound 05/14/21 Pressure Injury Heel Left;Posterior (Active)   Wound Image Images linked 07/01/21 1202   Wound Description Granulation tissue; Yellow;Eschar 07/01/21 1200   Pressure Injury Stage U 07/01/21 1200   Isabel-wound Assessment Intact; Maceration 07/01/21 1200   Wound Length (cm) 1 8 cm 07/01/21 1200   Wound Width (cm) 1 8 cm 07/01/21 1200   Wound Depth (cm) 0 5 cm 07/01/21 1200   Wound Surface Area (cm^2) 3 24 cm^2 07/01/21 1200   Wound Volume (cm^3) 1 62 cm^3 07/01/21 1200   Calculated Wound Volume (cm^3) 1 62 cm^3 07/01/21 1200   Change in Wound Size % -179 31 07/01/21 1200   Drainage Amount Moderate 07/01/21 1200   Drainage Description Serosanguineous 07/01/21 1200   Non-staged Wound Description Full thickness 07/01/21 1200       /82   Pulse 72   Temp 99 1 °F (37 3 °C)   Resp 18             Wound Instructions:  Orders Placed This Encounter   Procedures    Wound cleansing and dressings     Wound cleansing and dressings                          Left heel wound  Wash your hands with soap and water  Remove old dressing, discard into plastic bag and place in trash  Cleanse the wound with saline prior to applying a clean dressing  Do not use tissue or cotton balls  Do not scrub the wound  Pat dry using gauze    Shower yes with protection  Apply hydrofera blue ready to the left heel wound   Cover with allevyn life  Secure with tubifast blue or felix (do not wrap felix too tight)  Change dressing 3x week  (2x week by home care)    Wound off loading      Wear heel offloading shoe with each step  No pressure to heel     Standing Status:   Future     Standing Expiration Date:   7/1/2022        Diagnosis ICD-10-CM Associated Orders   1   Pressure injury of left heel, unstageable ([facility])  L89 645 lidocaine (XYLOCAINE) 4 % topical solution 5 mL     Wound cleansing and dressings

## 2021-07-01 NOTE — PATIENT INSTRUCTIONS
Orders Placed This Encounter   Procedures    Wound cleansing and dressings     Wound cleansing and dressings                          Left heel wound  Wash your hands with soap and water  Remove old dressing, discard into plastic bag and place in trash  Cleanse the wound with saline prior to applying a clean dressing  Do not use tissue or cotton balls  Do not scrub the wound  Pat dry using gauze  Shower yes with protection  Apply hydrofera blue ready to the left heel wound   Cover with allevyn life  Secure with tubifast blue or felix (do not wrap felix too tight)  Change dressing 3x week  (2x week by home care)    Wound off loading      Wear heel offloading shoe with each step    No pressure to heel     Standing Status:   Future     Standing Expiration Date:   7/1/2022

## 2021-07-08 ENCOUNTER — OFFICE VISIT (OUTPATIENT)
Dept: WOUND CARE | Facility: HOSPITAL | Age: 71
End: 2021-07-08
Payer: MEDICARE

## 2021-07-08 VITALS
TEMPERATURE: 97.3 F | HEART RATE: 68 BPM | RESPIRATION RATE: 18 BRPM | SYSTOLIC BLOOD PRESSURE: 112 MMHG | DIASTOLIC BLOOD PRESSURE: 72 MMHG

## 2021-07-08 DIAGNOSIS — L89.623 PRESSURE INJURY OF LEFT HEEL, STAGE 3 (HCC): Primary | ICD-10-CM

## 2021-07-08 DIAGNOSIS — I73.9 PERIPHERAL VASCULAR DISEASE (HCC): ICD-10-CM

## 2021-07-08 DIAGNOSIS — R26.2 AMBULATORY DYSFUNCTION: ICD-10-CM

## 2021-07-08 PROCEDURE — 11042 DBRDMT SUBQ TIS 1ST 20SQCM/<: CPT | Performed by: NURSE PRACTITIONER

## 2021-07-08 RX ORDER — LIDOCAINE HYDROCHLORIDE 40 MG/ML
5 SOLUTION TOPICAL ONCE
Status: COMPLETED | OUTPATIENT
Start: 2021-07-08 | End: 2021-07-08

## 2021-07-08 RX ADMIN — LIDOCAINE HYDROCHLORIDE 5 ML: 40 SOLUTION TOPICAL at 11:06

## 2021-07-08 NOTE — PATIENT INSTRUCTIONS
Orders Placed This Encounter   Procedures    Debridement     This order was created via procedure documentation    Wound cleansing and dressings     Left heel wound  Wash your hands with soap and water  Remove old dressing, discard into plastic bag and place in trash  Cleanse the wound with saline prior to applying a clean dressing  Do not use tissue or cotton balls  Do not scrub the wound  Pat dry using gauze  Shower yes with protection    Apply hydrofera blue to the left heel wound    Cover with allevyn life    Change dressing Saturday and Tuesday, return here next Thursday    Done today     Standing Status:   Future     Standing Expiration Date:   7/8/2022

## 2021-07-08 NOTE — PROGRESS NOTES
Patient ID: Nelsy Luna is a 65 y o  female Date of Birth 1950     Chief Complaint  Chief Complaint   Patient presents with    Follow Up Wound Care Visit     Dressing intact on arrival, reports VNA changing Saturday and Tuesday, Thursday  Allergies  Aspirin, Digoxin, and Gadolinium derivatives    Assessment:     Diagnoses and all orders for this visit:    Pressure injury of left heel, stage 3 (HCC)  -     Wound cleansing and dressings; Future  -     lidocaine (XYLOCAINE) 4 % topical solution 5 mL    Peripheral vascular disease (HCC)    Ambulatory dysfunction    Other orders  -     Debridement              Debridement   Wound 05/14/21 Pressure Injury Heel Left;Posterior    Universal Protocol:  Consent: Written consent obtained  Consent given by: patient  Time out: Immediately prior to procedure a "time out" was called to verify the correct patient, procedure, equipment, support staff and site/side marked as required  Timeout called at: 7/8/2021 11:06 AM   Patient understanding: patient states understanding of the procedure being performed  Patient consent: the patient's understanding of the procedure matches consent given  Procedure consent matches procedure scheduled: N/A  Relevant documents present and verified: N/A  Test results available and properly labeled: N/A  Site marked: the operative site was marked  Imaging studies available: N/A  Required blood products, implants, devices, and special equipment available: N/A    Patient identity confirmed: verbally with patient      Performed by: NP  Debridement type: surgical  Level of debridement: subcutaneous tissue  Pain control: lidocaine 4%  Pre-debridement measurements  Length (cm): 2  Width (cm): 1 8  Depth (cm): 0 4  Surface Area (cm^2): 3 6  Volume (cm^3): 1 44    Post-debridement measurements  Length (cm): 2  Width (cm): 1 8  Depth (cm): 0 5  Percent debrided: 100%  Surface Area (cm^2): 3 6  Area debrided (cm^2): 3 6  Volume (cm^3): 1 8  Tissue and other material debrided: subcutaneous tissue  Devitalized tissue debrided: biofilm, fibrin and slough  Instrument(s) utilized: curette  Bleeding: small  Hemostasis obtained with: pressure  Procedural pain (0-10): 0  Post-procedural pain: 0   Response to treatment: procedure was tolerated well          Plan:  1  F/u visit  Wound debrided  Wound improving and measuring slightly smaller with less slough present in the wound bed  Continue Hydrafera blue dressing  Patient is to continue to wear her globoped shoe at all times while ambulating  Patient will follow up in 1 week        Wound 05/14/21 Pressure Injury Heel Left;Posterior (Active)   Wound Image Images linked 07/08/21 1103   Wound Description Granulation tissue;Slough 07/08/21 1038   Isabel-wound Assessment Intact 07/08/21 1038   Wound Length (cm) 2 cm 07/08/21 1103   Wound Width (cm) 1 8 cm 07/08/21 1103   Wound Depth (cm) 0 5 cm 07/08/21 1103   Wound Surface Area (cm^2) 3 6 cm^2 07/08/21 1103   Wound Volume (cm^3) 1 8 cm^3 07/08/21 1103   Calculated Wound Volume (cm^3) 1 8 cm^3 07/08/21 1103   Change in Wound Size % -210 34 07/08/21 1103   Drainage Amount Moderate 07/08/21 1038   Drainage Description Yellow 07/08/21 1038   Non-staged Wound Description Full thickness 07/08/21 1038   Dressing Status Intact 07/08/21 1038       Wound 05/14/21 Pressure Injury Heel Left;Posterior (Active)   Date First Assessed/Time First Assessed: 05/14/21 1000   Pre-Existing Wound: Yes  Primary Wound Type: Pressure Injury  Location: Heel  Wound Location Orientation: Left;Posterior  Wound Outcome: (c)        [REMOVED] Wound 04/02/21 Chest Right (Removed)   Resolved Date: 05/14/21  Date First Assessed/Time First Assessed: 04/02/21 1101   Location: Chest  Wound Location Orientation: Right  Wound Description (Comments): 2 INCISIONS  Incision's 1st Dressing: ADHESIVE SKIN HIGH VISCOSITY EXOFIN 1ML (x1)       [REMOVED] Wound 04/02/21 Chest Right (Removed)   Resolved Date: 05/14/21  Date First Assessed/Time First Assessed: 04/02/21 1101   Location: Chest  Wound Location Orientation: Right  Wound Description (Comments): 2 CHEST TUBE SITES  Incision's 1st Dressing: SPONGE GAUZE 4 X 4 16 PLY STRL PLASTIC TRA    [REMOVED] Wound 05/14/21 Pressure Injury Heel Left (Removed)   Resolved Date/Resolved Time: 06/17/21 1128  Date First Assessed: 05/14/21   Primary Wound Type: Pressure Injury  Location: Heel  Wound Location Orientation: Left  Wound Outcome: Healed       [REMOVED] Wound 05/20/21 Pressure Injury Heel Right (Removed)   Resolved Date/Resolved Time: 06/17/21 1131  Date First Assessed/Time First Assessed: 05/20/21 0951   Primary Wound Type: Pressure Injury  Location: Heel  Wound Location Orientation: Right  Wound Outcome: Healed       Subjective:     F/u visit stage 3 pressure ulcer of left heel  No new complaints  She denies any pain, fevers, or chills  The following portions of the patient's history were reviewed and updated as appropriate:   She  has a past medical history of Anemia, Cardiac disease, Chronic pain, Coronary artery disease, Hypertension, and PVD [facility]peripheral vascular [facility]) ([facility]    She   Patient Active Problem List    Diagnosis Date Noted    Effusion of left knee 04/10/2021    Urinary retention 04/04/2021    Pulmonary hypertension ([facility] ) 04/01/2021    Peripheral vascular disease ([facility] ) 04/01/2021    Tobacco abuse 04/01/2021    ETOH abuse 03/29/2021    Anxiety 03/29/2021    Pulmonary nodule 03/29/2021    Dysphagia 03/27/2021    Anemia 03/26/2021    Valvular heart disease 03/26/2021    Thrombocytosis ([facility] ) 03/24/2021    Severe protein-calorie malnutrition ([facility] ) 03/24/2021    Pneumonia of right lower lobe due to infectious organism 03/22/2021    CAD (coronary artery disease) 03/22/2021    Parapneumonic effusion 03/22/2021    GERD (gastroesophageal reflux disease) 03/22/2021    HTN (hypertension) 03/22/2021    Supratherapeutic INR 03/22/2021 She  has a past surgical history that includes Ankle surgery; IR chest tube placement (3/23/2021); IR non-tunneled central line placement (3/24/2021); and THORACOSCOPY VIDEO ASSISTED SURGERY (VATS) (Right, 4/2/2021)  Her family history includes No Known Problems in her father and mother  She  reports that she has quit smoking  Her smoking use included cigarettes  She has a 0 10 pack-year smoking history  She has never used smokeless tobacco  She reports current alcohol use  She reports that she does not use drugs  Current Outpatient Medications   Medication Sig Dispense Refill    acetaminophen (TYLENOL) 325 mg tablet Take 3 tablets (975 mg total) by mouth every 8 (eight) hours  0    ALPRAZolam (XANAX) 0 25 mg tablet Take 1 tablet (0 25 mg total) by mouth every 12 (twelve) hours as needed for anxiety 10 tablet 0    atorvastatin (LIPITOR) 40 mg tablet Take 40 mg by mouth daily   benzonatate (TESSALON) 200 MG capsule Take 1 capsule (200 mg total) by mouth daily at bedtime 20 capsule 0    buPROPion (WELLBUTRIN SR) 150 mg 12 hr tablet Take 150 mg by mouth daily      clopidogrel (PLAVIX) 75 mg tablet Take 75 mg by mouth daily        dextromethorphan-guaiFENesin (ROBITUSSIN DM)  mg/5 mL syrup Take 10 mL by mouth every 4 (four) hours as needed for cough  0    Diclofenac Sodium (VOLTAREN) 1 % Apply 2 g topically 4 (four) times a day  0    FLUoxetine (PROzac) 20 mg capsule Take 20 mg by mouth daily      furosemide (LASIX) 20 mg tablet Take 1 tablet (20 mg total) by mouth daily  0    gabapentin (NEURONTIN) 100 mg capsule Take 1 capsule (100 mg total) by mouth 3 (three) times a day  0    lidocaine (LIDODERM) 5 % Apply 1 patch topically daily Remove & Discard patch within 12 hours or as directed by MD  0    melatonin 3 mg Take 2 tablets (6 mg total) by mouth daily at bedtime  0    multivitamin-minerals (CENTRUM ADULTS) tablet Take 1 tablet by mouth daily  0    oxyCODONE (ROXICODONE) 5 mg immediate release tablet 1-2 tablets every 4 hours as needed for moderate to severe pain 15 tablet 0    traZODone (DESYREL) 100 mg tablet Take 2 tablets (200 mg total) by mouth daily at bedtime  0     No current facility-administered medications for this visit  She is allergic to aspirin, digoxin, and gadolinium derivatives       Review of Systems   Constitutional: Negative  HENT: Negative for ear pain and hearing loss  Eyes: Negative for pain  Respiratory: Negative for chest tightness and shortness of breath  Cardiovascular: Negative for chest pain, palpitations and leg swelling  Gastrointestinal: Negative for diarrhea, nausea and vomiting  Genitourinary: Negative for dysuria  Musculoskeletal: Negative for gait problem  Skin: Positive for wound  Neurological: Negative for tremors and weakness  Psychiatric/Behavioral: Negative for behavioral problems, confusion and suicidal ideas           Objective:       Wound 05/14/21 Pressure Injury Heel Left;Posterior (Active)   Wound Image Images linked 07/08/21 1103   Wound Description Granulation tissue;Slough 07/08/21 1038   Isabel-wound Assessment Intact 07/08/21 1038   Wound Length (cm) 2 cm 07/08/21 1103   Wound Width (cm) 1 8 cm 07/08/21 1103   Wound Depth (cm) 0 5 cm 07/08/21 1103   Wound Surface Area (cm^2) 3 6 cm^2 07/08/21 1103   Wound Volume (cm^3) 1 8 cm^3 07/08/21 1103   Calculated Wound Volume (cm^3) 1 8 cm^3 07/08/21 1103   Change in Wound Size % -210 34 07/08/21 1103   Drainage Amount Moderate 07/08/21 1038   Drainage Description Yellow 07/08/21 1038   Non-staged Wound Description Full thickness 07/08/21 1038   Dressing Status Intact 07/08/21 1038       /72   Pulse 68   Temp (!) 97 3 °F (36 3 °C)   Resp 18                 Wound Instructions:  Orders Placed This Encounter   Procedures    Debridement     This order was created via procedure documentation    Wound cleansing and dressings     Left heel wound  Wash your hands with soap and water   Remove old dressing, discard into plastic bag and place in trash  Cleanse the wound with saline prior to applying a clean dressing  Do not use tissue or cotton balls  Do not scrub the wound  Pat dry using gauze  Shower yes with protection    Apply hydrofera blue to the left heel wound  Cover with allevyn life    Change dressing Saturday and Tuesday, return here next Thursday    Done today     Standing Status:   Future     Standing Expiration Date:   7/8/2022        Diagnosis ICD-10-CM Associated Orders   1  Pressure injury of left heel, stage 3 ([facility])  L89 623 Wound cleansing and dressings     lidocaine (XYLOCAINE) 4 % topical solution 5 mL   2  Peripheral vascular disease ([facility])  I73 9    3   Ambulatory dysfunction  R26 2

## 2021-07-15 ENCOUNTER — OFFICE VISIT (OUTPATIENT)
Dept: WOUND CARE | Facility: HOSPITAL | Age: 71
End: 2021-07-15
Payer: MEDICARE

## 2021-07-15 VITALS
RESPIRATION RATE: 18 BRPM | DIASTOLIC BLOOD PRESSURE: 78 MMHG | HEART RATE: 100 BPM | TEMPERATURE: 97.5 F | SYSTOLIC BLOOD PRESSURE: 122 MMHG

## 2021-07-15 DIAGNOSIS — R26.2 AMBULATORY DYSFUNCTION: ICD-10-CM

## 2021-07-15 DIAGNOSIS — I73.9 PERIPHERAL VASCULAR DISEASE (HCC): ICD-10-CM

## 2021-07-15 DIAGNOSIS — L89.623 PRESSURE INJURY OF LEFT HEEL, STAGE 3 (HCC): Primary | ICD-10-CM

## 2021-07-15 PROCEDURE — 11042 DBRDMT SUBQ TIS 1ST 20SQCM/<: CPT | Performed by: NURSE PRACTITIONER

## 2021-07-15 RX ORDER — LIDOCAINE HYDROCHLORIDE 40 MG/ML
5 SOLUTION TOPICAL ONCE
Status: COMPLETED | OUTPATIENT
Start: 2021-07-15 | End: 2021-07-15

## 2021-07-15 RX ADMIN — LIDOCAINE HYDROCHLORIDE 5 ML: 40 SOLUTION TOPICAL at 11:33

## 2021-07-15 NOTE — PROGRESS NOTES
Patient ID: Agueda Rivas is a 65 y o  female Date of Birth 1950     Chief Complaint  Chief Complaint   Patient presents with    Follow Up Wound Care Visit     Dressing intact on arrival, reports vna has been changing Tuesday and Saturday  Global pedi shoe intact on arrival        Allergies  Aspirin, Digoxin, and Gadolinium derivatives    Assessment:     Diagnoses and all orders for this visit:    Pressure injury of left heel, stage 3 (HCC)  -     Wound cleansing and dressings; Future  -     lidocaine (XYLOCAINE) 4 % topical solution 5 mL    Peripheral vascular disease (HCC)    Ambulatory dysfunction    Other orders  -     Debridement              Debridement   Wound 05/14/21 Pressure Injury Heel Left;Posterior    Universal Protocol:  Consent: Written consent obtained  Consent given by: patient  Time out: Immediately prior to procedure a "time out" was called to verify the correct patient, procedure, equipment, support staff and site/side marked as required  Timeout called at: 7/15/2021 11:30 AM   Patient understanding: patient states understanding of the procedure being performed  Patient consent: the patient's understanding of the procedure matches consent given  Procedure consent matches procedure scheduled: N/A  Relevant documents present and verified: N/A  Test results available and properly labeled: N/A  Site marked: the operative site was marked  Imaging studies available: N/A  Required blood products, implants, devices, and special equipment available: N/A    Patient identity confirmed: verbally with patient      Performed by: NP  Debridement type: surgical  Level of debridement: subcutaneous tissue  Pain control: lidocaine 4%  Pre-debridement measurements  Length (cm): 1 8  Width (cm): 1 4  Depth (cm): 0 7  Surface Area (cm^2): 2 52  Volume (cm^3): 1 76    Post-debridement measurements  Length (cm): 1 8  Width (cm): 1 4  Depth (cm): 0 8  Percent debrided: 100%  Surface Area (cm^2): 2 52  Area debrided (cm^2): 2 52  Volume (cm^3): 2 02  Tissue and other material debrided: subcutaneous tissue  Devitalized tissue debrided: biofilm, fibrin and slough  Instrument(s) utilized: curette  Bleeding: medium  Hemostasis obtained with: pressure  Procedural pain (0-10): 0  Post-procedural pain: 0   Response to treatment: procedure was tolerated well          Plan:  1  F/u visit  Wound debrided  Wound measuring the same  Will change treatment to Endoform with ABD and felix changed 3x per week  Patient is to continue to offload pressure from her wound  Will await for results of Vascular study  Patient will follow up in 1 week  Wound 05/14/21 Pressure Injury Heel Left;Posterior (Active)   Wound Image Images linked 07/15/21 1129   Wound Description Beefy red 07/15/21 1129   Wound Length (cm) 1 8 cm 07/15/21 1129   Wound Width (cm) 1 4 cm 07/15/21 1129   Wound Depth (cm) 0 8 cm 07/15/21 1129   Wound Surface Area (cm^2) 2 52 cm^2 07/15/21 1129   Wound Volume (cm^3) 2  016 cm^3 07/15/21 1129   Calculated Wound Volume (cm^3) 2 02 cm^3 07/15/21 1129   Change in Wound Size % -248 28 07/15/21 1129   Drainage Amount Moderate 07/15/21 1120   Drainage Description Serosanguineous 07/15/21 1120   Non-staged Wound Description Full thickness 07/15/21 1120   Dressing Status Intact 07/15/21 1129       Wound 05/14/21 Pressure Injury Heel Left;Posterior (Active)   Date First Assessed/Time First Assessed: 05/14/21 1000   Pre-Existing Wound: Yes  Primary Wound Type: Pressure Injury  Location: Heel  Wound Location Orientation: Left;Posterior  Wound Outcome: (c)        [REMOVED] Wound 04/02/21 Chest Right (Removed)   Resolved Date: 05/14/21  Date First Assessed/Time First Assessed: 04/02/21 1101   Location: Chest  Wound Location Orientation: Right  Wound Description (Comments): 2 INCISIONS  Incision's 1st Dressing: ADHESIVE SKIN HIGH VISCOSITY EXOFIN 1ML (x1)       [REMOVED] Wound 04/02/21 Chest Right (Removed)   Resolved Date: 05/14/21 Date First Assessed/Time First Assessed: 04/02/21 1101   Location: Chest  Wound Location Orientation: Right  Wound Description (Comments): 2 CHEST TUBE SITES  Incision's 1st Dressing: SPONGE GAUZE 4 X 4 16 PLY STRL PLASTIC TRA    [REMOVED] Wound 05/14/21 Pressure Injury Heel Left (Removed)   Resolved Date/Resolved Time: 06/17/21 1128  Date First Assessed: 05/14/21   Primary Wound Type: Pressure Injury  Location: Heel  Wound Location Orientation: Left  Wound Outcome: Healed       [REMOVED] Wound 05/20/21 Pressure Injury Heel Right (Removed)   Resolved Date/Resolved Time: 06/17/21 1131  Date First Assessed/Time First Assessed: 05/20/21 0951   Primary Wound Type: Pressure Injury  Location: Heel  Wound Location Orientation: Right  Wound Outcome: Healed       Subjective:     F/u visit stage 3 pressure ulcer of left heel  No new complaints  She denies any pain, fevers, or chills  Patient reports she is scheduled to have a vascular test completed this coming Wednesday  The following portions of the patient's history were reviewed and updated as appropriate:   She  has a past medical history of Anemia, Cardiac disease, Chronic pain, Coronary artery disease, Hypertension, and PVD (peripheral vascular disease) ([facility] )    She   Patient Active Problem List    Diagnosis Date Noted    Effusion of left knee 04/10/2021    Urinary retention 04/04/2021    Pulmonary hypertension ([facility] ) 04/01/2021    Peripheral vascular disease ([facility] ) 04/01/2021    Tobacco abuse 04/01/2021    ETOH abuse 03/29/2021    Anxiety 03/29/2021    Pulmonary nodule 03/29/2021    Dysphagia 03/27/2021    Anemia 03/26/2021    Valvular heart disease 03/26/2021    Thrombocytosis ([facility] ) 03/24/2021    Severe protein-calorie malnutrition ([facility] ) 03/24/2021    Pneumonia of right lower lobe due to infectious organism 03/22/2021    CAD (coronary artery disease) 03/22/2021    Parapneumonic effusion 03/22/2021    GERD (gastroesophageal reflux disease) 03/22/2021    HTN (hypertension) 03/22/2021    Supratherapeutic INR 03/22/2021     She  has a past surgical history that includes Ankle surgery; IR chest tube placement (3/23/2021); IR non-tunneled central line placement (3/24/2021); and THORACOSCOPY VIDEO ASSISTED SURGERY (VATS) (Right, 4/2/2021)  Her family history includes No Known Problems in her father and mother  She  reports that she has quit smoking  Her smoking use included cigarettes  She has a 0 10 pack-year smoking history  She has never used smokeless tobacco  She reports current alcohol use  She reports that she does not use drugs  Current Outpatient Medications   Medication Sig Dispense Refill    acetaminophen (TYLENOL) 325 mg tablet Take 3 tablets (975 mg total) by mouth every 8 (eight) hours  0    ALPRAZolam (XANAX) 0 25 mg tablet Take 1 tablet (0 25 mg total) by mouth every 12 (twelve) hours as needed for anxiety 10 tablet 0    atorvastatin (LIPITOR) 40 mg tablet Take 40 mg by mouth daily   benzonatate (TESSALON) 200 MG capsule Take 1 capsule (200 mg total) by mouth daily at bedtime 20 capsule 0    buPROPion (WELLBUTRIN SR) 150 mg 12 hr tablet Take 150 mg by mouth daily      clopidogrel (PLAVIX) 75 mg tablet Take 75 mg by mouth daily        dextromethorphan-guaiFENesin (ROBITUSSIN DM)  mg/5 mL syrup Take 10 mL by mouth every 4 (four) hours as needed for cough  0    Diclofenac Sodium (VOLTAREN) 1 % Apply 2 g topically 4 (four) times a day  0    FLUoxetine (PROzac) 20 mg capsule Take 20 mg by mouth daily      furosemide (LASIX) 20 mg tablet Take 1 tablet (20 mg total) by mouth daily  0    gabapentin (NEURONTIN) 100 mg capsule Take 1 capsule (100 mg total) by mouth 3 (three) times a day  0    lidocaine (LIDODERM) 5 % Apply 1 patch topically daily Remove & Discard patch within 12 hours or as directed by MD  0    melatonin 3 mg Take 2 tablets (6 mg total) by mouth daily at bedtime  0    multivitamin-minerals (CENTRUM ADULTS) tablet Take 1 tablet by mouth daily  0    oxyCODONE (ROXICODONE) 5 mg immediate release tablet 1-2 tablets every 4 hours as needed for moderate to severe pain 15 tablet 0    traZODone (DESYREL) 100 mg tablet Take 2 tablets (200 mg total) by mouth daily at bedtime  0     No current facility-administered medications for this visit  She is allergic to aspirin, digoxin, and gadolinium derivatives       Review of Systems   Constitutional: Negative  HENT: Negative for ear pain and hearing loss  Eyes: Negative for pain  Respiratory: Negative for chest tightness and shortness of breath  Cardiovascular: Negative for chest pain, palpitations and leg swelling  Gastrointestinal: Negative for diarrhea, nausea and vomiting  Genitourinary: Negative for dysuria  Musculoskeletal: Negative for gait problem  Skin: Positive for wound  Neurological: Negative for tremors and weakness  Psychiatric/Behavioral: Negative for behavioral problems, confusion and suicidal ideas  Objective:       Wound 05/14/21 Pressure Injury Heel Left;Posterior (Active)   Wound Image Images linked 07/15/21 1129   Wound Description Beefy red 07/15/21 1129   Wound Length (cm) 1 8 cm 07/15/21 1129   Wound Width (cm) 1 4 cm 07/15/21 1129   Wound Depth (cm) 0 8 cm 07/15/21 1129   Wound Surface Area (cm^2) 2 52 cm^2 07/15/21 1129   Wound Volume (cm^3) 2  016 cm^3 07/15/21 1129   Calculated Wound Volume (cm^3) 2 02 cm^3 07/15/21 1129   Change in Wound Size % -248 28 07/15/21 1129   Drainage Amount Moderate 07/15/21 1120   Drainage Description Serosanguineous 07/15/21 1120   Non-staged Wound Description Full thickness 07/15/21 1120   Dressing Status Intact 07/15/21 1129       /78   Pulse 100   Temp 97 5 °F (36 4 °C)   Resp 18                 Wound Instructions:  Orders Placed This Encounter   Procedures    Debridement     This order was created via procedure documentation    Wound cleansing and dressings     Left heel wound    Wash your hands with soap and water  Remove old dressing, discard into plastic bag and place in trash  Cleanse the wound with saline prior to applying a clean dressing  Do not use tissue or cotton balls  Do not scrub the wound  Pat dry using gauze  Shower yes with protection    Apply endoform to the left heel wound  Cover with allevyn life  Secure with gauze and tubifast blue  Change dressing Tuesday and Saturday, VNA to change    Return in one week     Standing Status:   Future     Standing Expiration Date:   7/15/2022        Diagnosis ICD-10-CM Associated Orders   1  Pressure injury of left heel, stage 3 ([facility])  L89 623 Wound cleansing and dressings     lidocaine (XYLOCAINE) 4 % topical solution 5 mL   2  Peripheral vascular disease ([facility])  I73 9    3   Ambulatory dysfunction  R26 2

## 2021-07-15 NOTE — PATIENT INSTRUCTIONS
Orders Placed This Encounter   Procedures    Debridement     This order was created via procedure documentation    Wound cleansing and dressings     Left heel wound    Wash your hands with soap and water  Remove old dressing, discard into plastic bag and place in trash  Cleanse the wound with saline prior to applying a clean dressing  Do not use tissue or cotton balls  Do not scrub the wound  Pat dry using gauze  Shower yes with protection    Apply endoform to the left heel wound    Cover with allevyn life  Secure with gauze and tubifast blue  Change dressing Tuesday and Saturday, VNA to change    Return in one week     Standing Status:   Future     Standing Expiration Date:   7/15/2022

## 2021-07-22 ENCOUNTER — OFFICE VISIT (OUTPATIENT)
Dept: WOUND CARE | Facility: HOSPITAL | Age: 71
End: 2021-07-22
Payer: MEDICARE

## 2021-07-22 VITALS
SYSTOLIC BLOOD PRESSURE: 134 MMHG | RESPIRATION RATE: 18 BRPM | TEMPERATURE: 97.6 F | HEART RATE: 72 BPM | DIASTOLIC BLOOD PRESSURE: 86 MMHG

## 2021-07-22 DIAGNOSIS — L89.623 PRESSURE INJURY OF LEFT HEEL, STAGE 3 (HCC): Primary | ICD-10-CM

## 2021-07-22 DIAGNOSIS — R26.2 AMBULATORY DYSFUNCTION: ICD-10-CM

## 2021-07-22 DIAGNOSIS — I73.9 PERIPHERAL VASCULAR DISEASE (HCC): ICD-10-CM

## 2021-07-22 PROCEDURE — 97597 DBRDMT OPN WND 1ST 20 CM/<: CPT | Performed by: NURSE PRACTITIONER

## 2021-07-22 RX ORDER — LIDOCAINE HYDROCHLORIDE 40 MG/ML
5 SOLUTION TOPICAL ONCE
Status: COMPLETED | OUTPATIENT
Start: 2021-07-22 | End: 2021-07-22

## 2021-07-22 RX ADMIN — LIDOCAINE HYDROCHLORIDE 5 ML: 40 SOLUTION TOPICAL at 11:24

## 2021-07-22 NOTE — PATIENT INSTRUCTIONS
Orders Placed This Encounter   Procedures    Wound cleansing and dressings     Wound cleansing and dressings      Left heel wound     Wash your hands with soap and water  Remove old dressing, discard into plastic bag and place in trash  Cleanse the wound with saline prior to applying a clean dressing  Do not use tissue or cotton balls  Do not scrub the wound  Pat dry using gauze  Shower yes with protection     Apply endoform to the left heel wound    Cover with allevyn life  Secure with gauze and tubifast blue  Change dressing Tuesday and Saturday, VNA to change     Return in one week     Standing Status:   Future     Standing Expiration Date:   7/22/2022

## 2021-07-22 NOTE — PROGRESS NOTES
Patient ID: Shirley Carvalho is a 65 y o  female Date of Birth 1950     Chief Complaint  Chief Complaint   Patient presents with    Follow Up Wound Care Visit     left heel wound       Allergies  Aspirin, Digoxin, and Gadolinium derivatives    Assessment:     Diagnoses and all orders for this visit:    Pressure injury of left heel, stage 3 (HCC)  -     Wound cleansing and dressings; Future    Peripheral vascular disease (HCC)  -     Wound cleansing and dressings; Future    Ambulatory dysfunction  -     Wound cleansing and dressings; Future    Other orders  -     Debridement              Debridement   Wound 05/14/21 Pressure Injury Heel Left;Posterior    Universal Protocol:  Consent: Written consent obtained  Consent given by: patient  Time out: Immediately prior to procedure a "time out" was called to verify the correct patient, procedure, equipment, support staff and site/side marked as required  Timeout called at: 7/22/2021 11:18 AM   Patient understanding: patient states understanding of the procedure being performed  Patient consent: the patient's understanding of the procedure matches consent given  Procedure consent matches procedure scheduled: N/A  Relevant documents present and verified: N/A  Test results available and properly labeled: N/A  Site marked: the operative site was marked  Imaging studies available: N/A  Required blood products, implants, devices, and special equipment available: N/A    Patient identity confirmed: verbally with patient      Performed by: NP  Debridement type: selective    Pre-debridement measurements  Length (cm): 1 7  Width (cm): 1 5  Depth (cm): 0 6  Surface Area (cm^2): 2 55  Volume (cm^3): 1 53    Post-debridement measurements  Length (cm): 1 7  Width (cm): 1 5  Depth (cm): 0 6  Percent debrided: 100%  Surface Area (cm^2): 2 55  Area debrided (cm^2): 2 55  Volume (cm^3): 1 53  Devitalized tissue debrided: biofilm, fibrin and slough  Instrument(s) utilized: curette  Bleeding: small  Hemostasis obtained with: pressure  Procedural pain (0-10): 0  Post-procedural pain: 0   Response to treatment: procedure was tolerated well          Plan:  1  F/u visit  Wound debrided  Wound measuring slightly smaller  Continue current plan of care  Patient will follow up in 1 week  Wound 05/14/21 Pressure Injury Heel Left;Posterior (Active)   Wound Image Images linked 07/22/21 1103   Wound Description Beefy red;Slough; Yellow 07/22/21 1103   Pressure Injury Stage 3 07/22/21 1103   Isabel-wound Assessment Intact 07/22/21 1103   Wound Length (cm) 1 7 cm 07/22/21 1103   Wound Width (cm) 1 5 cm 07/22/21 1103   Wound Depth (cm) 0 6 cm 07/22/21 1103   Wound Surface Area (cm^2) 2 55 cm^2 07/22/21 1103   Wound Volume (cm^3) 1 53 cm^3 07/22/21 1103   Calculated Wound Volume (cm^3) 1 53 cm^3 07/22/21 1103   Change in Wound Size % -163 79 07/22/21 1103   Drainage Amount Moderate 07/22/21 1103   Drainage Description Serosanguineous 07/22/21 1103       Wound 05/14/21 Pressure Injury Heel Left;Posterior (Active)   Date First Assessed/Time First Assessed: 05/14/21 1000   Pre-Existing Wound: Yes  Primary Wound Type: Pressure Injury  Location: Heel  Wound Location Orientation: Left;Posterior  Wound Outcome: (c)        [REMOVED] Wound 04/02/21 Chest Right (Removed)   Resolved Date: 05/14/21  Date First Assessed/Time First Assessed: 04/02/21 1101   Location: Chest  Wound Location Orientation: Right  Wound Description (Comments): 2 INCISIONS  Incision's 1st Dressing: ADHESIVE SKIN HIGH VISCOSITY EXOFIN 1ML (x1)       [REMOVED] Wound 04/02/21 Chest Right (Removed)   Resolved Date: 05/14/21  Date First Assessed/Time First Assessed: 04/02/21 1101   Location: Chest  Wound Location Orientation: Right  Wound Description (Comments): 2 CHEST TUBE SITES  Incision's 1st Dressing: SPONGE GAUZE 4 X 4 16 PLY STRL PLASTIC TRA           [REMOVED] Wound 05/14/21 Pressure Injury Heel Left (Removed)   Resolved Date/Resolved Time: 06/17/21 1128  Date First Assessed: 05/14/21   Primary Wound Type: Pressure Injury  Location: Heel  Wound Location Orientation: Left  Wound Outcome: Healed       [REMOVED] Wound 05/20/21 Pressure Injury Heel Right (Removed)   Resolved Date/Resolved Time: 06/17/21 1131  Date First Assessed/Time First Assessed: 05/20/21 0951   Primary Wound Type: Pressure Injury  Location: Heel  Wound Location Orientation: Right  Wound Outcome: Healed       Subjective:     F/u visit stage 3 pressure ulcer of her left heel  Patient completed her vascular study  She reports she is scheduled to follow up with her vascular surgeon next Wednesday to review the results and come up with a plan of care  She denies any pain, fevers, or chills  The following portions of the patient's history were reviewed and updated as appropriate:   She  has a past medical history of Anemia, Cardiac disease, Chronic pain, Coronary artery disease, Hypertension, and PVD (peripheral vascular disease) ([facility] )  She   Patient Active Problem List    Diagnosis Date Noted    Effusion of left knee 04/10/2021    Urinary retention 04/04/2021    Pulmonary hypertension ([facility] ) 04/01/2021    Peripheral vascular disease ([facility] ) 04/01/2021    Tobacco abuse 04/01/2021    ETOH abuse 03/29/2021    Anxiety 03/29/2021    Pulmonary nodule 03/29/2021    Dysphagia 03/27/2021    Anemia 03/26/2021    Valvular heart disease 03/26/2021    Thrombocytosis ([facility] ) 03/24/2021    Severe protein-calorie malnutrition ([facility] ) 03/24/2021    Pneumonia of right lower lobe due to infectious organism 03/22/2021    CAD (coronary artery disease) 03/22/2021    Parapneumonic effusion 03/22/2021    GERD (gastroesophageal reflux disease) 03/22/2021    HTN (hypertension) 03/22/2021    Supratherapeutic INR 03/22/2021     She  has a past surgical history that includes Ankle surgery; IR chest tube placement (3/23/2021);  IR non-tunneled central line placement (3/24/2021); and THORACOSCOPY VIDEO ASSISTED SURGERY (VATS) (Right, 4/2/2021)  Her family history includes No Known Problems in her father and mother  She  reports that she has quit smoking  Her smoking use included cigarettes  She has a 0 10 pack-year smoking history  She has never used smokeless tobacco  She reports current alcohol use  She reports that she does not use drugs  Current Outpatient Medications   Medication Sig Dispense Refill    acetaminophen (TYLENOL) 325 mg tablet Take 3 tablets (975 mg total) by mouth every 8 (eight) hours  0    ALPRAZolam (XANAX) 0 25 mg tablet Take 1 tablet (0 25 mg total) by mouth every 12 (twelve) hours as needed for anxiety 10 tablet 0    atorvastatin (LIPITOR) 40 mg tablet Take 40 mg by mouth daily   benzonatate (TESSALON) 200 MG capsule Take 1 capsule (200 mg total) by mouth daily at bedtime 20 capsule 0    buPROPion (WELLBUTRIN SR) 150 mg 12 hr tablet Take 150 mg by mouth daily      clopidogrel (PLAVIX) 75 mg tablet Take 75 mg by mouth daily        dextromethorphan-guaiFENesin (ROBITUSSIN DM)  mg/5 mL syrup Take 10 mL by mouth every 4 (four) hours as needed for cough  0    Diclofenac Sodium (VOLTAREN) 1 % Apply 2 g topically 4 (four) times a day  0    FLUoxetine (PROzac) 20 mg capsule Take 20 mg by mouth daily      furosemide (LASIX) 20 mg tablet Take 1 tablet (20 mg total) by mouth daily  0    gabapentin (NEURONTIN) 100 mg capsule Take 1 capsule (100 mg total) by mouth 3 (three) times a day  0    lidocaine (LIDODERM) 5 % Apply 1 patch topically daily Remove & Discard patch within 12 hours or as directed by MD  0    melatonin 3 mg Take 2 tablets (6 mg total) by mouth daily at bedtime  0    multivitamin-minerals (CENTRUM ADULTS) tablet Take 1 tablet by mouth daily  0    oxyCODONE (ROXICODONE) 5 mg immediate release tablet 1-2 tablets every 4 hours as needed for moderate to severe pain 15 tablet 0    traZODone (DESYREL) 100 mg tablet Take 2 tablets (200 mg total) by mouth daily at bedtime  0     No current facility-administered medications for this visit  She is allergic to aspirin, digoxin, and gadolinium derivatives       Review of Systems   Constitutional: Negative  HENT: Negative for ear pain and hearing loss  Eyes: Negative for pain  Respiratory: Negative for chest tightness and shortness of breath  Cardiovascular: Negative for chest pain, palpitations and leg swelling  Gastrointestinal: Negative for diarrhea, nausea and vomiting  Genitourinary: Negative for dysuria  Musculoskeletal: Negative for gait problem  Skin: Positive for wound  Neurological: Negative for tremors and weakness  Psychiatric/Behavioral: Negative for behavioral problems, confusion and suicidal ideas  Objective:       Wound 05/14/21 Pressure Injury Heel Left;Posterior (Active)   Wound Image Images linked 07/22/21 1103   Wound Description Beefy red;Slough; Yellow 07/22/21 1103   Pressure Injury Stage 3 07/22/21 1103   Isabel-wound Assessment Intact 07/22/21 1103   Wound Length (cm) 1 7 cm 07/22/21 1103   Wound Width (cm) 1 5 cm 07/22/21 1103   Wound Depth (cm) 0 6 cm 07/22/21 1103   Wound Surface Area (cm^2) 2 55 cm^2 07/22/21 1103   Wound Volume (cm^3) 1 53 cm^3 07/22/21 1103   Calculated Wound Volume (cm^3) 1 53 cm^3 07/22/21 1103   Change in Wound Size % -163 79 07/22/21 1103   Drainage Amount Moderate 07/22/21 1103   Drainage Description Serosanguineous 07/22/21 1103       /86   Pulse 72   Temp 97 6 °F (36 4 °C)   Resp 18             Wound Instructions:  Orders Placed This Encounter   Procedures    Wound cleansing and dressings     Wound cleansing and dressings      Left heel wound     Wash your hands with soap and water  Remove old dressing, discard into plastic bag and place in trash  Cleanse the wound with saline prior to applying a clean dressing  Do not use tissue or cotton balls  Do not scrub the wound  Pat dry using gauze    Shower yes with protection     Apply endoform to the left heel wound  Cover with [employer]  Secure with gauze and tubifast blue  Change dressing Tuesday and Saturday, VNA to change     Return in one week     Standing Status:   Future     Standing Expiration Date:   7/22/2022    Debridement     This order was created via procedure documentation        Diagnosis ICD-10-CM Associated Orders   1  Pressure injury of left heel, stage 3 (HCC)  K79 888 Wound cleansing and dressings   2  Peripheral vascular disease (HCC)  I73 9 Wound cleansing and dressings   3   Ambulatory dysfunction  R26 2 Wound cleansing and dressings

## 2021-07-29 ENCOUNTER — OFFICE VISIT (OUTPATIENT)
Dept: WOUND CARE | Facility: HOSPITAL | Age: 71
End: 2021-07-29
Payer: MEDICARE

## 2021-07-29 VITALS
TEMPERATURE: 97.1 F | SYSTOLIC BLOOD PRESSURE: 132 MMHG | DIASTOLIC BLOOD PRESSURE: 80 MMHG | HEART RATE: 68 BPM | RESPIRATION RATE: 18 BRPM

## 2021-07-29 DIAGNOSIS — L89.623 PRESSURE INJURY OF LEFT HEEL, STAGE 3 (HCC): Primary | ICD-10-CM

## 2021-07-29 DIAGNOSIS — I73.9 PERIPHERAL VASCULAR DISEASE (HCC): ICD-10-CM

## 2021-07-29 DIAGNOSIS — R26.2 AMBULATORY DYSFUNCTION: ICD-10-CM

## 2021-07-29 PROCEDURE — 97597 DBRDMT OPN WND 1ST 20 CM/<: CPT | Performed by: NURSE PRACTITIONER

## 2021-07-29 RX ORDER — LIDOCAINE HYDROCHLORIDE 40 MG/ML
5 SOLUTION TOPICAL ONCE
Status: COMPLETED | OUTPATIENT
Start: 2021-07-29 | End: 2021-07-29

## 2021-07-29 RX ADMIN — LIDOCAINE HYDROCHLORIDE 5 ML: 40 SOLUTION TOPICAL at 11:14

## 2021-07-29 NOTE — PATIENT INSTRUCTIONS
Orders Placed This Encounter   Procedures    Wound cleansing and dressings                               Left heel wound     Wash your hands with soap and water  Remove old dressing, discard into plastic bag and place in trash  Cleanse the wound with saline prior to applying a clean dressing  Do not use tissue or cotton balls  Do not scrub the wound  Pat dry using gauze  Shower yes with protection     Apply endoform to the left heel wound  Cover with [employer]  Secure with gauze and tubifast blue    Change dressing Tuesday and Saturday, VNA to change    This was done today       Return in one week to wound center  Standing Status:   Future     Standing Expiration Date:   7/29/2022     Order Update:  Return to wound center in 2 weeks  Patient having vascular surgery on August 6, 2021

## 2021-07-29 NOTE — PROGRESS NOTES
Patient ID: Nelsy Luna is a 65 y o  female Date of Birth 1950     Chief Complaint  Chief Complaint   Patient presents with    Follow Up Wound Care Visit     left heel wound       Allergies  Aspirin, Digoxin, and Gadolinium derivatives    Assessment:     Diagnoses and all orders for this visit:    Pressure injury of left heel, stage 3 (HCC)  -     Wound cleansing and dressings; Future  -     lidocaine (XYLOCAINE) 4 % topical solution 5 mL    Peripheral vascular disease (HCC)    Ambulatory dysfunction    Other orders  -     Debridement              Debridement   Wound 05/14/21 Pressure Injury Heel Left;Posterior    Universal Protocol:  Consent: Written consent obtained  Consent given by: patient  Time out: Immediately prior to procedure a "time out" was called to verify the correct patient, procedure, equipment, support staff and site/side marked as required  Timeout called at: 7/29/2021 11:22 AM   Patient understanding: patient states understanding of the procedure being performed  Patient consent: the patient's understanding of the procedure matches consent given  Procedure consent matches procedure scheduled: N/A  Relevant documents present and verified: N/A  Test results available and properly labeled: N/A  Site marked: the operative site was marked  Imaging studies available: N/A  Required blood products, implants, devices, and special equipment available: N/A    Patient identity confirmed: verbally with patient      Performed by: NP  Debridement type: selective  Pain control: lidocaine 4%  Pre-debridement measurements  Length (cm): 1 6  Width (cm): 1 4  Depth (cm): 0 6  Surface Area (cm^2): 2 24  Volume (cm^3): 1 34    Post-debridement measurements  Length (cm): 1 6  Width (cm): 1 4  Depth (cm): 0 6  Percent debrided: 100%  Surface Area (cm^2): 2 24  Area debrided (cm^2): 2 24  Volume (cm^3): 1 34  Devitalized tissue debrided: biofilm, fibrin and slough  Instrument(s) utilized: curette  Bleeding: small  Hemostasis obtained with: pressure  Procedural pain (0-10): 0  Post-procedural pain: 0   Response to treatment: procedure was tolerated well          Plan:  1  F/u visit  Wound debrided  Wound measuring slightly smaller  Continue current plan of care  Patient scheduled to undergo an angiography on 8/6  Patient will follow up in 2 weeks  Wound 05/14/21 Pressure Injury Heel Left;Posterior (Active)   Wound Image Images linked 07/29/21 1054   Wound Description Beefy red;Slough 07/29/21 1000   Pressure Injury Stage 3 07/29/21 1000   Isabel-wound Assessment Dry; Intact 07/29/21 1000   Wound Length (cm) 1 6 cm 07/29/21 1000   Wound Width (cm) 1 4 cm 07/29/21 1000   Wound Depth (cm) 0 6 cm 07/29/21 1000   Wound Surface Area (cm^2) 2 24 cm^2 07/29/21 1000   Wound Volume (cm^3) 1 344 cm^3 07/29/21 1000   Calculated Wound Volume (cm^3) 1 34 cm^3 07/29/21 1000   Change in Wound Size % -131 03 07/29/21 1000   Drainage Amount Moderate 07/29/21 1000   Drainage Description Serosanguineous 07/29/21 1000       Wound 05/14/21 Pressure Injury Heel Left;Posterior (Active)   Date First Assessed/Time First Assessed: 05/14/21 1000   Pre-Existing Wound: Yes  Primary Wound Type: Pressure Injury  Location: Heel  Wound Location Orientation: Left;Posterior  Wound Outcome: (c)        [REMOVED] Wound 04/02/21 Chest Right (Removed)   Resolved Date: 05/14/21  Date First Assessed/Time First Assessed: 04/02/21 1101   Location: Chest  Wound Location Orientation: Right  Wound Description (Comments): 2 INCISIONS  Incision's 1st Dressing: ADHESIVE SKIN HIGH VISCOSITY EXOFIN 1ML (x1)       [REMOVED] Wound 04/02/21 Chest Right (Removed)   Resolved Date: 05/14/21  Date First Assessed/Time First Assessed: 04/02/21 1101   Location: Chest  Wound Location Orientation: Right  Wound Description (Comments): 2 CHEST TUBE SITES  Incision's 1st Dressing: SPONGE GAUZE 4 X 4 16 PLY STRL PLASTIC TRA           [REMOVED] Wound 05/14/21 Pressure Injury Heel Left (Removed)   Resolved Date/Resolved Time: 06/17/21 1128  Date First Assessed: 05/14/21   Primary Wound Type: Pressure Injury  Location: Heel  Wound Location Orientation: Left  Wound Outcome: Healed       [REMOVED] Wound 05/20/21 Pressure Injury Heel Right (Removed)   Resolved Date/Resolved Time: 06/17/21 1131  Date First Assessed/Time First Assessed: 05/20/21 0951   Primary Wound Type: Pressure Injury  Location: Heel  Wound Location Orientation: Right  Wound Outcome: Healed       Subjective:     F/u visit stage 3 pressure ulcer of left heel  Patient reports she is scheduled to undergo an angiography on 8/6  She denies any pain, fevers, or chills  The following portions of the patient's history were reviewed and updated as appropriate:   She  has a past medical history of Anemia, Cardiac disease, Chronic pain, Coronary artery disease, Hypertension, and PVD (peripheral vascular disease) ([facility] )  She   Patient Active Problem List    Diagnosis Date Noted    Effusion of left knee 04/10/2021    Urinary retention 04/04/2021    Pulmonary hypertension ([facility] ) 04/01/2021    Peripheral vascular disease ([facility] ) 04/01/2021    Tobacco abuse 04/01/2021    ETOH abuse 03/29/2021    Anxiety 03/29/2021    Pulmonary nodule 03/29/2021    Dysphagia 03/27/2021    Anemia 03/26/2021    Valvular heart disease 03/26/2021    [facility] ) 03/24/2021    Severe protein-calorie malnutrition ([facility] ) 03/24/2021    Pneumonia of right lower lobe due to infectious organism 03/22/2021    CAD (coronary artery disease) 03/22/2021    Parapneumonic effusion 03/22/2021    GERD (gastroesophageal reflux disease) 03/22/2021    HTN (hypertension) 03/22/2021    Supratherapeutic INR 03/22/2021     She  has a past surgical history that includes Ankle surgery; IR chest tube placement (3/23/2021); IR non-tunneled central line placement (3/24/2021); and THORACOSCOPY VIDEO ASSISTED SURGERY (VATS) (Right, 4/2/2021)    Her family history includes No Known Problems in her father and mother  She  reports that she has quit smoking  Her smoking use included cigarettes  She has a 0 10 pack-year smoking history  She has never used smokeless tobacco  She reports current alcohol use  She reports that she does not use drugs  Current Outpatient Medications   Medication Sig Dispense Refill    acetaminophen (TYLENOL) 325 mg tablet Take 3 tablets (975 mg total) by mouth every 8 (eight) hours  0    ALPRAZolam (XANAX) 0 25 mg tablet Take 1 tablet (0 25 mg total) by mouth every 12 (twelve) hours as needed for anxiety 10 tablet 0    atorvastatin (LIPITOR) 40 mg tablet Take 40 mg by mouth daily   benzonatate (TESSALON) 200 MG capsule Take 1 capsule (200 mg total) by mouth daily at bedtime 20 capsule 0    buPROPion (WELLBUTRIN SR) 150 mg 12 hr tablet Take 150 mg by mouth daily      clopidogrel (PLAVIX) 75 mg tablet Take 75 mg by mouth daily        dextromethorphan-guaiFENesin (ROBITUSSIN DM)  mg/5 mL syrup Take 10 mL by mouth every 4 (four) hours as needed for cough  0    Diclofenac Sodium (VOLTAREN) 1 % Apply 2 g topically 4 (four) times a day  0    FLUoxetine (PROzac) 20 mg capsule Take 20 mg by mouth daily      furosemide (LASIX) 20 mg tablet Take 1 tablet (20 mg total) by mouth daily  0    gabapentin (NEURONTIN) 100 mg capsule Take 1 capsule (100 mg total) by mouth 3 (three) times a day  0    lidocaine (LIDODERM) 5 % Apply 1 patch topically daily Remove & Discard patch within 12 hours or as directed by MD  0    melatonin 3 mg Take 2 tablets (6 mg total) by mouth daily at bedtime  0    multivitamin-minerals (CENTRUM ADULTS) tablet Take 1 tablet by mouth daily  0    oxyCODONE (ROXICODONE) 5 mg immediate release tablet 1-2 tablets every 4 hours as needed for moderate to severe pain 15 tablet 0    traZODone (DESYREL) 100 mg tablet Take 2 tablets (200 mg total) by mouth daily at bedtime  0     No current facility-administered medications for this visit      She is allergic to aspirin, digoxin, and gadolinium derivatives       Review of Systems   Constitutional: Negative  HENT: Negative for ear pain and hearing loss  Eyes: Negative for pain  Respiratory: Negative for chest tightness and shortness of breath  Cardiovascular: Negative for chest pain, palpitations and leg swelling  Gastrointestinal: Negative for diarrhea, nausea and vomiting  Genitourinary: Negative for dysuria  Musculoskeletal: Negative for gait problem  Skin: Positive for wound  Neurological: Negative for tremors and weakness  Psychiatric/Behavioral: Negative for behavioral problems, confusion and suicidal ideas  Objective:       Wound 05/14/21 Pressure Injury Heel Left;Posterior (Active)   Wound Image Images linked 07/29/21 1054   Wound Description Beefy red;Slough 07/29/21 1000   Pressure Injury Stage 3 07/29/21 1000   Isabel-wound Assessment Dry; Intact 07/29/21 1000   Wound Length (cm) 1 6 cm 07/29/21 1000   Wound Width (cm) 1 4 cm 07/29/21 1000   Wound Depth (cm) 0 6 cm 07/29/21 1000   Wound Surface Area (cm^2) 2 24 cm^2 07/29/21 1000   Wound Volume (cm^3) 1 344 cm^3 07/29/21 1000   Calculated Wound Volume (cm^3) 1 34 cm^3 07/29/21 1000   Change in Wound Size % -131 03 07/29/21 1000   Drainage Amount Moderate 07/29/21 1000   Drainage Description Serosanguineous 07/29/21 1000       /80   Pulse 68   Temp (!) 97 1 °F (36 2 °C) (Temporal)   Resp 18             Wound Instructions:  Orders Placed This Encounter   Procedures    Wound cleansing and dressings                               Left heel wound     Wash your hands with soap and water  Remove old dressing, discard into plastic bag and place in trash  Cleanse the wound with saline prior to applying a clean dressing  Do not use tissue or cotton balls  Do not scrub the wound  Pat dry using gauze  Shower yes with protection     Apply endoform to the left heel wound  Cover with [employer]      Secure with gauze and tubifast blue    Change dressing Tuesday and Saturday, VNA to change    This was done today       Return in one week to wound center  Standing Status:   Future     Standing Expiration Date:   7/29/2022    Debridement     This order was created via procedure documentation        Diagnosis ICD-10-CM Associated Orders   1  Pressure injury of left heel, stage 3 ([facility])  L89 [id number] Wound cleansing and dressings     lidocaine (XYLOCAINE) 4 % topical solution 5 mL   2  Peripheral vascular disease ([facility])  I73 9    3   Ambulatory dysfunction  R26 2

## 2021-08-10 ENCOUNTER — APPOINTMENT (OUTPATIENT)
Dept: LAB | Facility: HOSPITAL | Age: 71
End: 2021-08-10
Payer: MEDICARE

## 2021-08-10 DIAGNOSIS — D50.0 IRON DEFICIENCY ANEMIA SECONDARY TO BLOOD LOSS (CHRONIC): ICD-10-CM

## 2021-08-10 DIAGNOSIS — N17.9 ACUTE RENAL FAILURE, UNSPECIFIED ACUTE RENAL FAILURE TYPE (HCC): ICD-10-CM

## 2021-08-10 LAB
ANION GAP SERPL CALCULATED.3IONS-SCNC: 7 MMOL/L (ref 4–13)
BASOPHILS # BLD AUTO: 0.04 THOUSANDS/ΜL (ref 0–0.1)
BASOPHILS NFR BLD AUTO: 1 % (ref 0–1)
BUN SERPL-MCNC: 15 MG/DL (ref 5–25)
CALCIUM SERPL-MCNC: 8.8 MG/DL (ref 8.3–10.1)
CHLORIDE SERPL-SCNC: 111 MMOL/L (ref 100–108)
CO2 SERPL-SCNC: 24 MMOL/L (ref 21–32)
CREAT SERPL-MCNC: 0.9 MG/DL (ref 0.6–1.3)
EOSINOPHIL # BLD AUTO: 0.76 THOUSAND/ΜL (ref 0–0.61)
EOSINOPHIL NFR BLD AUTO: 9 % (ref 0–6)
ERYTHROCYTE [DISTWIDTH] IN BLOOD BY AUTOMATED COUNT: 17.1 % (ref 11.6–15.1)
GFR SERPL CREATININE-BSD FRML MDRD: 64 ML/MIN/1.73SQ M
GLUCOSE SERPL-MCNC: 80 MG/DL (ref 65–140)
HCT VFR BLD AUTO: 33.6 % (ref 34.8–46.1)
HGB BLD-MCNC: 10.3 G/DL (ref 11.5–15.4)
IMM GRANULOCYTES # BLD AUTO: 0.06 THOUSAND/UL (ref 0–0.2)
IMM GRANULOCYTES NFR BLD AUTO: 1 % (ref 0–2)
LYMPHOCYTES # BLD AUTO: 1.62 THOUSANDS/ΜL (ref 0.6–4.47)
LYMPHOCYTES NFR BLD AUTO: 19 % (ref 14–44)
MCH RBC QN AUTO: 27.5 PG (ref 26.8–34.3)
MCHC RBC AUTO-ENTMCNC: 30.7 G/DL (ref 31.4–37.4)
MCV RBC AUTO: 90 FL (ref 82–98)
MONOCYTES # BLD AUTO: 0.67 THOUSAND/ΜL (ref 0.17–1.22)
MONOCYTES NFR BLD AUTO: 8 % (ref 4–12)
NEUTROPHILS # BLD AUTO: 5.31 THOUSANDS/ΜL (ref 1.85–7.62)
NEUTS SEG NFR BLD AUTO: 62 % (ref 43–75)
NRBC BLD AUTO-RTO: 0 /100 WBCS
PLATELET # BLD AUTO: 320 THOUSANDS/UL (ref 149–390)
PMV BLD AUTO: 10.8 FL (ref 8.9–12.7)
POTASSIUM SERPL-SCNC: 4.9 MMOL/L (ref 3.5–5.3)
RBC # BLD AUTO: 3.74 MILLION/UL (ref 3.81–5.12)
SODIUM SERPL-SCNC: 142 MMOL/L (ref 136–145)
WBC # BLD AUTO: 8.46 THOUSAND/UL (ref 4.31–10.16)

## 2021-08-10 PROCEDURE — 36415 COLL VENOUS BLD VENIPUNCTURE: CPT

## 2021-08-10 PROCEDURE — 80048 BASIC METABOLIC PNL TOTAL CA: CPT

## 2021-08-10 PROCEDURE — 85025 COMPLETE CBC W/AUTO DIFF WBC: CPT

## 2021-08-12 ENCOUNTER — OFFICE VISIT (OUTPATIENT)
Dept: WOUND CARE | Facility: HOSPITAL | Age: 71
End: 2021-08-12
Payer: MEDICARE

## 2021-08-12 VITALS
HEART RATE: 78 BPM | DIASTOLIC BLOOD PRESSURE: 78 MMHG | SYSTOLIC BLOOD PRESSURE: 144 MMHG | TEMPERATURE: 98.2 F | RESPIRATION RATE: 18 BRPM

## 2021-08-12 DIAGNOSIS — I73.9 PERIPHERAL VASCULAR DISEASE (HCC): ICD-10-CM

## 2021-08-12 DIAGNOSIS — L89.623 PRESSURE INJURY OF LEFT HEEL, STAGE 3 (HCC): Primary | ICD-10-CM

## 2021-08-12 DIAGNOSIS — R26.2 AMBULATORY DYSFUNCTION: ICD-10-CM

## 2021-08-12 PROCEDURE — 97597 DBRDMT OPN WND 1ST 20 CM/<: CPT | Performed by: NURSE PRACTITIONER

## 2021-08-12 RX ORDER — LIDOCAINE HYDROCHLORIDE 40 MG/ML
5 SOLUTION TOPICAL ONCE
Status: COMPLETED | OUTPATIENT
Start: 2021-08-12 | End: 2021-08-12

## 2021-08-12 RX ADMIN — LIDOCAINE HYDROCHLORIDE 5 ML: 40 SOLUTION TOPICAL at 11:40

## 2021-08-12 NOTE — PROGRESS NOTES
Patient ID: Anna De Los Santos is a 66 y o  female Date of Birth 1950     Chief Complaint  Chief Complaint   Patient presents with    Follow Up Wound Care Visit     Pressure injury left heel wound       Allergies  Aspirin, Digoxin, and Gadolinium derivatives    Assessment:     Diagnoses and all orders for this visit:    Pressure injury of left heel, stage 3 (HCC)  -     lidocaine (XYLOCAINE) 4 % topical solution 5 mL  -     Wound cleansing and dressings; Future    Peripheral vascular disease (HCC)  -     lidocaine (XYLOCAINE) 4 % topical solution 5 mL  -     Wound cleansing and dressings; Future    Ambulatory dysfunction              Debridement   Wound 05/14/21 Pressure Injury Heel Left;Posterior    Universal Protocol:  Consent: Written consent obtained  Consent given by: patient  Time out: Immediately prior to procedure a "time out" was called to verify the correct patient, procedure, equipment, support staff and site/side marked as required  Timeout called at: 8/12/2021 2:19 PM   Patient identity confirmed: verbally with patient      Performed by: NP  Debridement type: selective  Pain control: lidocaine 4%  Pre-debridement measurements  Length (cm): 1 5  Width (cm): 1 4  Depth (cm): 0 6  Surface Area (cm^2): 2 1  Volume (cm^3): 1 26    Post-debridement measurements  Length (cm): 1 5  Width (cm): 1 4  Depth (cm): 0 6  Percent debrided: 100%  Surface Area (cm^2): 2 1  Area debrided (cm^2): 2 1  Volume (cm^3): 1 26  Devitalized tissue debrided: biofilm, fibrin and slough  Instrument(s) utilized: curette  Bleeding: small  Hemostasis obtained with: pressure  Procedural pain (0-10): 0  Post-procedural pain: 0   Response to treatment: procedure was tolerated well          Plan:  1  F/u visit  Wound debrided  Wound improving and measuring smaller  Continue current plan of care  Patient will follow up in 1 week        Wound 05/14/21 Pressure Injury Heel Left;Posterior (Active)   Wound Image Images linked 08/12/21 1125 Wound Description Beefy red;Slough; Yellow 08/12/21 1100   Pressure Injury Stage 3 08/12/21 1100   Isabel-wound Assessment Dry; Intact 08/12/21 1100   Wound Length (cm) 1 5 cm 08/12/21 1100   Wound Width (cm) 1 4 cm 08/12/21 1100   Wound Depth (cm) 0 6 cm 08/12/21 1100   Wound Surface Area (cm^2) 2 1 cm^2 08/12/21 1100   Wound Volume (cm^3) 1 26 cm^3 08/12/21 1100   Calculated Wound Volume (cm^3) 1 26 cm^3 08/12/21 1100   Change in Wound Size % -117 24 08/12/21 1100   Drainage Amount Moderate 08/12/21 1100   Drainage Description Serosanguineous 08/12/21 1100       Wound 05/14/21 Pressure Injury Heel Left;Posterior (Active)   Date First Assessed/Time First Assessed: 05/14/21 1000   Pre-Existing Wound: Yes  Primary Wound Type: Pressure Injury  Location: Heel  Wound Location Orientation: Left;Posterior  Wound Outcome: (c)        [REMOVED] Wound 04/02/21 Chest Right (Removed)   Resolved Date: 05/14/21  Date First Assessed/Time First Assessed: 04/02/21 1101   Location: Chest  Wound Location Orientation: Right  Wound Description (Comments): 2 INCISIONS  Incision's 1st Dressing: ADHESIVE SKIN HIGH VISCOSITY EXOFIN 1ML (x1)       [REMOVED] Wound 04/02/21 Chest Right (Removed)   Resolved Date: 05/14/21  Date First Assessed/Time First Assessed: 04/02/21 1101   Location: Chest  Wound Location Orientation: Right  Wound Description (Comments): 2 CHEST TUBE SITES  Incision's 1st Dressing: SPONGE GAUZE 4 X 4 16 PLY STRL PLASTIC TRA           [REMOVED] Wound 05/14/21 Pressure Injury Heel Left (Removed)   Resolved Date/Resolved Time: 06/17/21 1128  Date First Assessed: 05/14/21   Primary Wound Type: Pressure Injury  Location: Heel  Wound Location Orientation: Left  Wound Outcome: Healed       [REMOVED] Wound 05/20/21 Pressure Injury Heel Right (Removed)   Resolved Date/Resolved Time: 06/17/21 1131  Date First Assessed/Time First Assessed: 05/20/21 0951   Primary Wound Type: Pressure Injury  Location: Heel  Wound Location Orientation: Right  Wound Outcome: Healed       Subjective:     F/u visit stage 3 pressure ulcer of left heel  No new complaints  She denies any pain, fevers, or chills  Patient reports she had her angiography on 8/6  The following portions of the patient's history were reviewed and updated as appropriate:   She  has a past medical history of Anemia, Cardiac disease, Chronic pain, Coronary artery disease, Hypertension, and PVD (peripheral vascular disease) ([facility])  She   Patient Active Problem List    Diagnosis Date Noted    Effusion of left knee 04/10/2021    Urinary retention 04/04/2021    Pulmonary hypertension ([facility]) 04/01/2021    Peripheral vascular disease ([facility]) 04/01/2021    Tobacco abuse 04/01/2021    ETOH abuse 03/29/2021    Anxiety 03/29/2021    Pulmonary nodule 03/29/2021    Dysphagia 03/27/2021    Anemia 03/26/2021    Valvular heart disease 03/26/2021    Thrombocytosis ([facility]) 03/24/2021    Severe protein-calorie malnutrition ([facility]) 03/24/2021    Pneumonia of right lower lobe due to infectious organism 03/22/2021    CAD (coronary artery disease) 03/22/2021    Parapneumonic effusion 03/22/2021    GERD (gastroesophageal reflux disease) 03/22/2021    HTN (hypertension) 03/22/2021    Supratherapeutic INR 03/22/2021     She  has a past surgical history that includes Ankle surgery; IR chest tube placement (3/23/2021); IR non-tunneled central line placement (3/24/2021); and THORACOSCOPY VIDEO ASSISTED SURGERY (VATS) (Right, 4/2/2021)  Her family history includes No Known Problems in her father and mother  She  reports that she has quit smoking  Her smoking use included cigarettes  She has a 0 10 pack-year smoking history  She has never used smokeless tobacco  She reports current alcohol use  She reports that she does not use drugs    Current Outpatient Medications   Medication Sig Dispense Refill    acetaminophen (TYLENOL) 325 mg tablet Take 3 tablets (975 mg total) by mouth every 8 (eight) hours  0  ALPRAZolam (XANAX) 0 25 mg tablet Take 1 tablet (0 25 mg total) by mouth every 12 (twelve) hours as needed for anxiety 10 tablet 0    atorvastatin (LIPITOR) 40 mg tablet Take 40 mg by mouth daily   benzonatate (TESSALON) 200 MG capsule Take 1 capsule (200 mg total) by mouth daily at bedtime 20 capsule 0    buPROPion (WELLBUTRIN SR) 150 mg 12 hr tablet Take 150 mg by mouth daily      clopidogrel (PLAVIX) 75 mg tablet Take 75 mg by mouth daily   dextromethorphan-guaiFENesin (ROBITUSSIN DM)  mg/5 mL syrup Take 10 mL by mouth every 4 (four) hours as needed for cough  0    Diclofenac Sodium (VOLTAREN) 1 % Apply 2 g topically 4 (four) times a day  0    FLUoxetine (PROzac) 20 mg capsule Take 20 mg by mouth daily      furosemide (LASIX) 20 mg tablet Take 1 tablet (20 mg total) by mouth daily  0    gabapentin (NEURONTIN) 100 mg capsule Take 1 capsule (100 mg total) by mouth 3 (three) times a day  0    lidocaine (LIDODERM) 5 % Apply 1 patch topically daily Remove & Discard patch within 12 hours or as directed by MD  0    melatonin 3 mg Take 2 tablets (6 mg total) by mouth daily at bedtime  0    multivitamin-minerals (CENTRUM ADULTS) tablet Take 1 tablet by mouth daily  0    oxyCODONE (ROXICODONE) 5 mg immediate release tablet 1-2 tablets every 4 hours as needed for moderate to severe pain 15 tablet 0    traZODone (DESYREL) 100 mg tablet Take 2 tablets (200 mg total) by mouth daily at bedtime  0     Current Facility-Administered Medications   Medication Dose Route Frequency Provider Last Rate Last Admin    lidocaine (XYLOCAINE) 4 % topical solution 5 mL  5 mL Topical Once NICKOLAS Rabago         She is allergic to aspirin, digoxin, and gadolinium derivatives       Review of Systems   Constitutional: Negative  HENT: Negative for ear pain and hearing loss  Eyes: Negative for pain  Respiratory: Negative for chest tightness and shortness of breath      Cardiovascular: Negative for chest pain, palpitations and leg swelling  Gastrointestinal: Negative for diarrhea, nausea and vomiting  Genitourinary: Negative for dysuria  Musculoskeletal: Negative for gait problem  Skin: Positive for wound  Neurological: Negative for tremors and weakness  Psychiatric/Behavioral: Negative for behavioral problems, confusion and suicidal ideas  Objective:       Wound 05/14/21 Pressure Injury Heel Left;Posterior (Active)   Wound Image Images linked 08/12/21 1123   Wound Description Beefy red;Slough; Yellow 08/12/21 1100   Pressure Injury Stage 3 08/12/21 1100   Isabel-wound Assessment Dry; Intact 08/12/21 1100   Wound Length (cm) 1 5 cm 08/12/21 1100   Wound Width (cm) 1 4 cm 08/12/21 1100   Wound Depth (cm) 0 6 cm 08/12/21 1100   Wound Surface Area (cm^2) 2 1 cm^2 08/12/21 1100   Wound Volume (cm^3) 1 26 cm^3 08/12/21 1100   Calculated Wound Volume (cm^3) 1 26 cm^3 08/12/21 1100   Change in Wound Size % -117 24 08/12/21 1100   Drainage Amount Moderate 08/12/21 1100   Drainage Description Serosanguineous 08/12/21 1100       /78   Pulse 78   Temp 98 2 °F (36 8 °C) (Temporal)   Resp 18                  Wound Instructions:  Orders Placed This Encounter   Procedures    Wound cleansing and dressings     Left heel wound     Wash your hands with soap and water  Remove old dressing, discard into plastic bag and place in trash  Cleanse the wound with saline prior to applying a clean dressing  Do not use tissue or cotton balls  Do not scrub the wound  Pat dry using gauze      Shower yes with protection     Apply endoform to the left heel wound  Cover with allevyn lite      Secure with gauze and tubifast blue     Change dressing Tuesday and Saturday, VNA to change dressing on those days      This was done today          Standing Status:   Future     Standing Expiration Date:   8/12/2022        Diagnosis ICD-10-CM Associated Orders   1   Pressure injury of left heel, stage 3 ([facility])  L89 623 lidocaine (XYLOCAINE) 4 % topical solution 5 mL     Wound cleansing and dressings   2  Peripheral vascular disease (HCC)  I73 9 lidocaine (XYLOCAINE) 4 % topical solution 5 mL     Wound cleansing and dressings   3   Ambulatory dysfunction  R26 2

## 2021-08-19 ENCOUNTER — OFFICE VISIT (OUTPATIENT)
Dept: WOUND CARE | Facility: HOSPITAL | Age: 71
End: 2021-08-19
Payer: MEDICARE

## 2021-08-19 VITALS
DIASTOLIC BLOOD PRESSURE: 64 MMHG | SYSTOLIC BLOOD PRESSURE: 160 MMHG | TEMPERATURE: 97.7 F | HEART RATE: 64 BPM | RESPIRATION RATE: 18 BRPM

## 2021-08-19 DIAGNOSIS — R26.2 AMBULATORY DYSFUNCTION: ICD-10-CM

## 2021-08-19 DIAGNOSIS — L89.623 PRESSURE INJURY OF LEFT HEEL, STAGE 3 (HCC): Primary | ICD-10-CM

## 2021-08-19 DIAGNOSIS — I73.9 PERIPHERAL VASCULAR DISEASE (HCC): ICD-10-CM

## 2021-08-19 PROCEDURE — 11042 DBRDMT SUBQ TIS 1ST 20SQCM/<: CPT | Performed by: NURSE PRACTITIONER

## 2021-08-19 RX ORDER — LIDOCAINE HYDROCHLORIDE 40 MG/ML
5 SOLUTION TOPICAL ONCE
Status: COMPLETED | OUTPATIENT
Start: 2021-08-19 | End: 2021-08-19

## 2021-08-19 RX ADMIN — LIDOCAINE HYDROCHLORIDE 5 ML: 40 SOLUTION TOPICAL at 12:03

## 2021-08-19 NOTE — PATIENT INSTRUCTIONS
Orders Placed This Encounter   Procedures    Debridement     This order was created via procedure documentation     Orders Placed This Encounter   Procedures    Debridement     This order was created via procedure documentation    Wound cleansing and dressings     Left heel wound  Wash your hands with soap and water  Remove old dressing, discard into plastic bag and place in trash  Cleanse the wound with nss prior to applying a clean dressing  Do not use tissue or cotton balls  Do not scrub the wound  Pat dry using gauze  Shower yes with protection  Apply medihoney to the left heel wound  Cover with allevyn life and felix wrap tape and tubifast blue    change Tuesday and Saturday by vna    Done today     Standing Status:   Future     Standing Expiration Date:   8/19/2022

## 2021-08-19 NOTE — PROGRESS NOTES
Patient ID: Nissa Cota is a 66 y o  female Date of Birth 1950     Chief Complaint  Chief Complaint   Patient presents with    Follow Up Wound Care Visit     dressing and pedi shoe intact  vna visits tuesday and saturday       Allergies  Aspirin, Digoxin, and Gadolinium derivatives    Assessment:    No problem-specific Assessment & Plan notes found for this encounter  Diagnoses and all orders for this visit:    Pressure injury of left heel, stage 3 (HCC)  -     lidocaine (XYLOCAINE) 4 % topical solution 5 mL    Peripheral vascular disease ([facility] [address] )    Ambulatory dysfunction    Other orders  -     Debridement              Debridement   Wound 05/14/21 Pressure Injury Heel Left;Posterior    Universal Protocol:  Consent: Written consent obtained  Consent given by: patient  Time out: Immediately prior to procedure a "time out" was called to verify the correct patient, procedure, equipment, support staff and site/side marked as required  Timeout called at: 8/19/2021 12:03 PM   Patient understanding: patient states understanding of the procedure being performed  Patient consent: the patient's understanding of the procedure matches consent given  Procedure consent matches procedure scheduled: N/A  Relevant documents present and verified: N/A  Test results available and properly labeled: N/A  Site marked: the operative site was marked  Imaging studies available: N/A  Required blood products, implants, devices, and special equipment available: N/A    Patient identity confirmed: verbally with patient      Performed by: NP  Debridement type: surgical  Level of debridement: subcutaneous tissue  Pain control: lidocaine 4%  Pre-debridement measurements  Length (cm): 1 5  Width (cm): 1  Depth (cm): 0 5  Surface Area (cm^2): 1 5  Volume (cm^3): 0 75    Post-debridement measurements  Length (cm): 1 5  Width (cm): 1  Depth (cm): 0 6  Percent debrided: 100%  Surface Area (cm^2): 1 5  Area debrided (cm^2): 1 5  Volume (cm^3): 0 9  Tissue and other material debrided: subcutaneous tissue  Devitalized tissue debrided: biofilm, fibrin and slough  Instrument(s) utilized: curette  Bleeding: small  Hemostasis obtained with: pressure  Procedural pain (0-10): 0  Post-procedural pain: 0   Response to treatment: procedure was tolerated well          Plan:  1  F/u visit  Wound debrided  Wound improving and measuring smaller  Will change treatment to Medihoney changed every other day  Patient is to continue to offload pressure from her heel  Patient will follow up in 1 week  Wound 05/14/21 Pressure Injury Heel Left;Posterior (Active)   Wound Image Images linked (Simultaneous filing   User may not have seen previous data ) 08/19/21 1200   Wound Description Slough;Granulation tissue 08/19/21 1152   Isabel-wound Assessment Intact 08/19/21 1152   Wound Length (cm) 1 5 cm 08/19/21 1152   Wound Width (cm) 1 cm 08/19/21 1152   Wound Depth (cm) 0 5 cm 08/19/21 1152   Wound Surface Area (cm^2) 1 5 cm^2 08/19/21 1152   Wound Volume (cm^3) 0 75 cm^3 08/19/21 1152   Calculated Wound Volume (cm^3) 0 75 cm^3 08/19/21 1152   Change in Wound Size % -29 31 08/19/21 1152   Drainage Amount Moderate 08/19/21 1152   Drainage Description Serosanguineous 08/19/21 1152   Non-staged Wound Description Full thickness 08/19/21 1152   Dressing Status Intact 08/19/21 1152       Wound 05/14/21 Pressure Injury Heel Left;Posterior (Active)   Date First Assessed/Time First Assessed: 05/14/21 1000   Pre-Existing Wound: Yes  Primary Wound Type: Pressure Injury  Location: Heel  Wound Location Orientation: Left;Posterior  Wound Outcome: (c)        [REMOVED] Wound 04/02/21 Chest Right (Removed)   Resolved Date: 05/14/21  Date First Assessed/Time First Assessed: 04/02/21 1101   Location: Chest  Wound Location Orientation: Right  Wound Description (Comments): 2 INCISIONS  Incision's 1st Dressing: ADHESIVE SKIN HIGH VISCOSITY EXOFIN 1ML (x1)       [REMOVED] Wound 04/02/21 Chest Right (Removed)   Resolved Date: 05/14/21  Date First Assessed/Time First Assessed: 04/02/21 1101   Location: Chest  Wound Location Orientation: Right  Wound Description (Comments): 2 CHEST TUBE SITES  Incision's 1st Dressing: SPONGE GAUZE 4 X 4 16 PLY STRL PLASTIC TRA    [REMOVED] Wound 05/14/21 Pressure Injury Heel Left (Removed)   Resolved Date/Resolved Time: 06/17/21 1128  Date First Assessed: 05/14/21   Primary Wound Type: Pressure Injury  Location: Heel  Wound Location Orientation: Left  Wound Outcome: Healed       [REMOVED] Wound 05/20/21 Pressure Injury Heel Right (Removed)   Resolved Date/Resolved Time: 06/17/21 1131  Date First Assessed/Time First Assessed: 05/20/21 0951   Primary Wound Type: Pressure Injury  Location: Heel  Wound Location Orientation: Right  Wound Outcome: Healed       Subjective:     F/u visit stage 3 pressure ulcer of left heel  No new complaints  She denies any pain, fevers,or chills  The following portions of the patient's history were reviewed and updated as appropriate:   She  has a past medical history of Anemia, Cardiac disease, Chronic pain, Coronary artery disease, Hypertension, and PVD (peripheral vascular disease) ([facility] [address] )    She   Patient Active Problem List    Diagnosis Date Noted    Effusion of left knee 04/10/2021    Urinary retention 04/04/2021    Pulmonary hypertension ([facility] [address] ) 04/01/2021    Peripheral vascular disease ([facility] [address] ) 04/01/2021    Tobacco abuse 04/01/2021    ETOH abuse 03/29/2021    Anxiety 03/29/2021    Pulmonary nodule 03/29/2021    Dysphagia 03/27/2021    Anemia 03/26/2021    Valvular heart disease 03/26/2021    Thrombocytosis ([facility] [address] ) 03/24/2021    Severe protein-calorie malnutrition ([facility] [address] ) 03/24/2021    Pneumonia of right lower lobe due to infectious organism 03/22/2021    CAD (coronary artery disease) 03/22/2021    Parapneumonic effusion 03/22/2021    GERD (gastroesophageal reflux disease) 03/22/2021    HTN (hypertension) 03/22/2021    Supratherapeutic INR 03/22/2021     She  has a past surgical history that includes Ankle surgery; IR chest tube placement (3/23/2021); IR non-tunneled central line placement (3/24/2021); and THORACOSCOPY VIDEO ASSISTED SURGERY (VATS) (Right, 4/2/2021)  Her family history includes No Known Problems in her father and mother  She  reports that she has quit smoking  Her smoking use included cigarettes  She has a 0 10 pack-year smoking history  She has never used smokeless tobacco  She reports current alcohol use  She reports that she does not use drugs  Current Outpatient Medications   Medication Sig Dispense Refill    acetaminophen (TYLENOL) 325 mg tablet Take 3 tablets (975 mg total) by mouth every 8 (eight) hours  0    ALPRAZolam (XANAX) 0 25 mg tablet Take 1 tablet (0 25 mg total) by mouth every 12 (twelve) hours as needed for anxiety 10 tablet 0    atorvastatin (LIPITOR) 40 mg tablet Take 40 mg by mouth daily   benzonatate (TESSALON) 200 MG capsule Take 1 capsule (200 mg total) by mouth daily at bedtime 20 capsule 0    buPROPion (WELLBUTRIN SR) 150 mg 12 hr tablet Take 150 mg by mouth daily      clopidogrel (PLAVIX) 75 mg tablet Take 75 mg by mouth daily        dextromethorphan-guaiFENesin (ROBITUSSIN DM)  mg/5 mL syrup Take 10 mL by mouth every 4 (four) hours as needed for cough  0    Diclofenac Sodium (VOLTAREN) 1 % Apply 2 g topically 4 (four) times a day  0    FLUoxetine (PROzac) 20 mg capsule Take 20 mg by mouth daily      furosemide (LASIX) 20 mg tablet Take 1 tablet (20 mg total) by mouth daily  0    gabapentin (NEURONTIN) 100 mg capsule Take 1 capsule (100 mg total) by mouth 3 (three) times a day  0    lidocaine (LIDODERM) 5 % Apply 1 patch topically daily Remove & Discard patch within 12 hours or as directed by MD  0    melatonin 3 mg Take 2 tablets (6 mg total) by mouth daily at bedtime  0    multivitamin-minerals (CENTRUM ADULTS) tablet Take 1 tablet by mouth daily  0    oxyCODONE (ROXICODONE) 5 mg immediate release tablet 1-2 tablets every 4 hours as needed for moderate to severe pain 15 tablet 0    traZODone (DESYREL) 100 mg tablet Take 2 tablets (200 mg total) by mouth daily at bedtime  0     No current facility-administered medications for this visit  She is allergic to aspirin, digoxin, and gadolinium derivatives       Review of Systems   Constitutional: Negative  HENT: Negative for ear pain and hearing loss  Eyes: Negative for pain  Respiratory: Negative for chest tightness and shortness of breath  Cardiovascular: Negative for chest pain, palpitations and leg swelling  Gastrointestinal: Negative for diarrhea, nausea and vomiting  Genitourinary: Negative for dysuria  Musculoskeletal: Negative for gait problem  Skin: Positive for wound  Neurological: Negative for tremors and weakness  Psychiatric/Behavioral: Negative for behavioral problems, confusion and suicidal ideas  Objective:       Wound 05/14/21 Pressure Injury Heel Left;Posterior (Active)   Wound Image Images linked (Simultaneous filing   User may not have seen previous data ) 08/19/21 1200   Wound Description Slough;Granulation tissue 08/19/21 1152   Isabel-wound Assessment Intact 08/19/21 1152   Wound Length (cm) 1 5 cm 08/19/21 1152   Wound Width (cm) 1 cm 08/19/21 1152   Wound Depth (cm) 0 5 cm 08/19/21 1152   Wound Surface Area (cm^2) 1 5 cm^2 08/19/21 1152   Wound Volume (cm^3) 0 75 cm^3 08/19/21 1152   Calculated Wound Volume (cm^3) 0 75 cm^3 08/19/21 1152   Change in Wound Size % -29 31 08/19/21 1152   Drainage Amount Moderate 08/19/21 1152   Drainage Description Serosanguineous 08/19/21 1152   Non-staged Wound Description Full thickness 08/19/21 1152   Dressing Status Intact 08/19/21 1152       /64   Pulse 64   Temp 97 7 °F (36 5 °C)   Resp 18                 Wound Instructions:  Orders Placed This Encounter   Procedures    Debridement     This order was created via procedure documentation        Diagnosis ICD-10-CM Associated Orders   1  Pressure injury of left heel, stage 3 ([facility])  L89 623 lidocaine (XYLOCAINE) 4 % topical solution 5 mL   2  Peripheral vascular disease ([facility])  I73 9    3   Ambulatory dysfunction  R26 2

## 2021-08-26 ENCOUNTER — OFFICE VISIT (OUTPATIENT)
Dept: WOUND CARE | Facility: HOSPITAL | Age: 71
End: 2021-08-26
Payer: MEDICARE

## 2021-08-26 VITALS
TEMPERATURE: 99.3 F | DIASTOLIC BLOOD PRESSURE: 62 MMHG | RESPIRATION RATE: 20 BRPM | HEART RATE: 68 BPM | SYSTOLIC BLOOD PRESSURE: 100 MMHG

## 2021-08-26 DIAGNOSIS — I73.9 PERIPHERAL VASCULAR DISEASE (HCC): ICD-10-CM

## 2021-08-26 DIAGNOSIS — L89.623 PRESSURE INJURY OF LEFT HEEL, STAGE 3 (HCC): Primary | ICD-10-CM

## 2021-08-26 DIAGNOSIS — R26.2 AMBULATORY DYSFUNCTION: ICD-10-CM

## 2021-08-26 PROCEDURE — 11042 DBRDMT SUBQ TIS 1ST 20SQCM/<: CPT | Performed by: NURSE PRACTITIONER

## 2021-08-26 RX ORDER — LIDOCAINE HYDROCHLORIDE 40 MG/ML
5 SOLUTION TOPICAL ONCE
Status: COMPLETED | OUTPATIENT
Start: 2021-08-26 | End: 2021-08-26

## 2021-08-26 RX ADMIN — LIDOCAINE HYDROCHLORIDE 5 ML: 40 SOLUTION TOPICAL at 11:54

## 2021-08-26 NOTE — PATIENT INSTRUCTIONS
Orders Placed This Encounter   Procedures    Debridement     This order was created via procedure documentation    Wound cleansing and dressings     LEFT FOOT WOUND    Shower YES WITH PROTECTION  Apply SKIN PREP to skin surrounding wound  Apply ENDOFORM  ACTICOAT 7   to the LEFT FOOT wound  Cover with FOAM   MEDFIX TAPE  FOAM OFFLOADING PAD  FOOTBALL DRESSING APPLIED: [facility] [address] [facility]  [facility] [address] [facility] [address]   TUBIFAST YELLOW  KEEP FOOTBALL DRESSING DRY AND INTACT  Change dressing AT [facility] NEXT Thursday    DONE TODAY     Standing Status:   Future     Standing Expiration Date:   8/26/2022

## 2021-08-26 NOTE — PROGRESS NOTES
Patient ID: Sara Morin is a 66 y o  female Date of Birth 1950     Chief Complaint  Chief Complaint   Patient presents with    Follow Up Wound Care Visit     wound care to left foot  [facility] COMES TUESDAY AND SATURDAY FOR WC  Shyanne ANTONIOM  ON THURSDAY       Allergies  Aspirin, Digoxin, and Gadolinium derivatives    Assessment:     Diagnoses and all orders for this visit:    Pressure injury of left heel, stage 3 (HCC)  -     lidocaine (XYLOCAINE) 4 % topical solution 5 mL    Peripheral vascular disease ([facility] [address] )    Ambulatory dysfunction    Other orders  -     Debridement              Debridement   Wound 05/14/21 Pressure Injury Heel Left;Posterior    Universal Protocol:  Consent: Written consent obtained  Consent given by: patient  Time out: Immediately prior to procedure a "time out" was called to verify the correct patient, procedure, equipment, support staff and site/side marked as required  Timeout called at: 8/26/2021 11:57 AM   Patient understanding: patient states understanding of the procedure being performed  Patient consent: the patient's understanding of the procedure matches consent given  Procedure consent matches procedure scheduled: N/A  Relevant documents present and verified: N/A  Test results available and properly labeled: N/A  Site marked: the operative site was marked  Imaging studies available: N/A  Required blood products, implants, devices, and special equipment available: N/A    Patient identity confirmed: verbally with patient      Performed by: NP  Debridement type: surgical  Level of debridement: subcutaneous tissue  Pain control: lidocaine 4%  Pre-debridement measurements  Length (cm): 1 2  Width (cm): 1  Depth (cm): 0 6  Surface Area (cm^2): 1 2  Volume (cm^3): 0 72    Post-debridement measurements  Length (cm): 1 2  Width (cm): 1  Depth (cm): 0 7  Percent debrided: 100%  Surface Area (cm^2): 1 2  Area debrided (cm^2): 1 2  Volume (cm^3): 0 84  Tissue and other material debrided: subcutaneous tissue  Devitalized tissue debrided: biofilm, fibrin and slough  Instrument(s) utilized: curette  Bleeding: small  Hemostasis obtained with: pressure  Procedural pain (0-10): 0  Post-procedural pain: 0   Response to treatment: procedure was tolerated well          Plan:  1  F/U visit  Wound debrided  Wound measuring the same  Will change treatment plan to Endoform an Acticoat flex 7 with a football dressing  Counseled patient to not get dressing wet  Patient is continue to offload pressure from heel at all times  Patient will followup in 1 week         Wound 05/14/21 Pressure Injury Heel Left;Posterior (Active)   Wound Image Images linked 08/26/21 1148   Wound Description Renteria;Slough 08/26/21 1145   Isabel-wound Assessment Intact 08/26/21 1145   Wound Length (cm) 1 2 cm 08/26/21 1145   Wound Width (cm) 1 cm 08/26/21 1145   Wound Depth (cm) 0 6 cm 08/26/21 1145   Wound Surface Area (cm^2) 1 2 cm^2 08/26/21 1145   Wound Volume (cm^3) 0 72 cm^3 08/26/21 1145   Calculated Wound Volume (cm^3) 0 72 cm^3 08/26/21 1145   Change in Wound Size % -24 14 08/26/21 1145   Drainage Amount Moderate 08/26/21 1145   Drainage Description Serosanguineous 08/26/21 1145   Non-staged Wound Description Full thickness 08/26/21 1145   Dressing Status Intact 08/26/21 1145       Wound 05/14/21 Pressure Injury Heel Left;Posterior (Active)   Date First Assessed/Time First Assessed: 05/14/21 1000   Pre-Existing Wound: Yes  Primary Wound Type: Pressure Injury  Location: Heel  Wound Location Orientation: Left;Posterior  Wound Outcome: (c)        [REMOVED] Wound 04/02/21 Chest Right (Removed)   Resolved Date: 05/14/21  Date First Assessed/Time First Assessed: 04/02/21 1101   Location: Chest  Wound Location Orientation: Right  Wound Description (Comments): 2 INCISIONS  Incision's 1st Dressing: ADHESIVE SKIN HIGH VISCOSITY EXOFIN 1ML (x1)       [REMOVED] Wound 04/02/21 Chest Right (Removed)   Resolved Date: 05/14/21  Date First Assessed/Time First Assessed: 04/02/21 1101   Location: Chest  Wound Location Orientation: Right  Wound Description (Comments): 2 CHEST TUBE SITES  Incision's 1st Dressing: SPONGE GAUZE 4 X 4 16 PLY STRL PLASTIC TRA    [REMOVED] Wound 05/14/21 Pressure Injury Heel Left (Removed)   Resolved Date/Resolved Time: 06/17/21 1128  Date First Assessed: 05/14/21   Primary Wound Type: Pressure Injury  Location: Heel  Wound Location Orientation: Left  Wound Outcome: Healed       [REMOVED] Wound 05/20/21 Pressure Injury Heel Right (Removed)   Resolved Date/Resolved Time: 06/17/21 1131  Date First Assessed/Time First Assessed: 05/20/21 0951   Primary Wound Type: Pressure Injury  Location: Heel  Wound Location Orientation: Right  Wound Outcome: Healed       Subjective:        F/U visit for stage III pressure ulcer of left heel  Patient reports she has been having some difficulty with keeping her dressing in place  Besides this she has no new complaints with her wound  She denies any pain, fevers, or chills  The following portions of the patient's history were reviewed and updated as appropriate:   She  has a past medical history of Anemia, Cardiac disease, Chronic pain, Coronary artery disease, Hypertension, and PVD (peripheral vascular disease) ([facility] [address] )    She   Patient Active Problem List    Diagnosis Date Noted    Effusion of left knee 04/10/2021    Urinary retention 04/04/2021    Pulmonary hypertension ([facility] [address] ) 04/01/2021    Peripheral vascular disease ([facility] [address] ) 04/01/2021    Tobacco abuse 04/01/2021    ETOH abuse 03/29/2021    Anxiety 03/29/2021    Pulmonary nodule 03/29/2021    Dysphagia 03/27/2021    Anemia 03/26/2021    Valvular heart disease 03/26/2021    Thrombocytosis ([address] ) 03/24/2021    Severe protein-calorie malnutrition ([facility] [address] ) 03/24/2021    Pneumonia of right lower lobe due to infectious organism 03/22/2021    CAD (coronary artery disease) 03/22/2021    Parapneumonic effusion 03/22/2021    GERD (gastroesophageal reflux disease) 03/22/2021    HTN (hypertension) 03/22/2021    Supratherapeutic INR 03/22/2021     She  has a past surgical history that includes Ankle surgery; IR chest tube placement (3/23/2021); IR non-tunneled central line placement (3/24/2021); and THORACOSCOPY VIDEO ASSISTED SURGERY (VATS) (Right, 4/2/2021)  Her family history includes No Known Problems in her father and mother  She  reports that she has quit smoking  Her smoking use included cigarettes  She has a 0 10 pack-year smoking history  She has never used smokeless tobacco  She reports current alcohol use  She reports that she does not use drugs  Current Outpatient Medications   Medication Sig Dispense Refill    acetaminophen (TYLENOL) 325 mg tablet Take 3 tablets (975 mg total) by mouth every 8 (eight) hours  0    ALPRAZolam (XANAX) 0 25 mg tablet Take 1 tablet (0 25 mg total) by mouth every 12 (twelve) hours as needed for anxiety 10 tablet 0    atorvastatin (LIPITOR) 40 mg tablet Take 40 mg by mouth daily   benzonatate (TESSALON) 200 MG capsule Take 1 capsule (200 mg total) by mouth daily at bedtime 20 capsule 0    buPROPion (WELLBUTRIN SR) 150 mg 12 hr tablet Take 150 mg by mouth daily      clopidogrel (PLAVIX) 75 mg tablet Take 75 mg by mouth daily        dextromethorphan-guaiFENesin (ROBITUSSIN DM)  mg/5 mL syrup Take 10 mL by mouth every 4 (four) hours as needed for cough  0    Diclofenac Sodium (VOLTAREN) 1 % Apply 2 g topically 4 (four) times a day  0    FLUoxetine (PROzac) 20 mg capsule Take 20 mg by mouth daily      furosemide (LASIX) 20 mg tablet Take 1 tablet (20 mg total) by mouth daily  0    gabapentin (NEURONTIN) 100 mg capsule Take 1 capsule (100 mg total) by mouth 3 (three) times a day  0    lidocaine (LIDODERM) 5 % Apply 1 patch topically daily Remove & Discard patch within 12 hours or as directed by MD  0    melatonin 3 mg Take 2 tablets (6 mg total) by mouth daily at bedtime  0    multivitamin-minerals (CENTRUM ADULTS) tablet Take 1 tablet by mouth daily  0    oxyCODONE (ROXICODONE) 5 mg immediate release tablet 1-2 tablets every 4 hours as needed for moderate to severe pain 15 tablet 0    traZODone (DESYREL) 100 mg tablet Take 2 tablets (200 mg total) by mouth daily at bedtime  0     No current facility-administered medications for this visit  She is allergic to aspirin, digoxin, and gadolinium derivatives       Review of Systems   Constitutional: Negative  HENT: Negative for ear pain and hearing loss  Eyes: Negative for pain  Respiratory: Negative for chest tightness and shortness of breath  Cardiovascular: Negative for chest pain, palpitations and leg swelling  Gastrointestinal: Negative for diarrhea, nausea and vomiting  Genitourinary: Negative for dysuria  Musculoskeletal: Positive for gait problem  Skin: Positive for wound  Neurological: Negative for tremors and weakness  Psychiatric/Behavioral: Negative for behavioral problems, confusion and suicidal ideas           Objective:       Wound 05/14/21 Pressure Injury Heel Left;Posterior (Active)   Wound Image Images linked 08/26/21 1148   Wound Description Renteria;Slough 08/26/21 1145   Isabel-wound Assessment Intact 08/26/21 1145   Wound Length (cm) 1 2 cm 08/26/21 1145   Wound Width (cm) 1 cm 08/26/21 1145   Wound Depth (cm) 0 6 cm 08/26/21 1145   Wound Surface Area (cm^2) 1 2 cm^2 08/26/21 1145   Wound Volume (cm^3) 0 72 cm^3 08/26/21 1145   Calculated Wound Volume (cm^3) 0 72 cm^3 08/26/21 1145   Change in Wound Size % -24 14 08/26/21 1145   Drainage Amount Moderate 08/26/21 1145   Drainage Description Serosanguineous 08/26/21 1145   Non-staged Wound Description Full thickness 08/26/21 1145   Dressing Status Intact 08/26/21 1145       /62   Pulse 68   Temp 99 3 °F (37 4 °C)   Resp 20                 Wound Instructions:  Orders Placed This Encounter   Procedures    Debridement     This order was created via procedure documentation        Diagnosis ICD-10-CM Associated Orders   1  Pressure injury of left heel, stage 3 ([facility])  L89 623 lidocaine (XYLOCAINE) 4 % topical solution 5 mL   2  Peripheral vascular disease ([facility])  I73 9    3   Ambulatory dysfunction  R26 2

## 2021-09-02 ENCOUNTER — OFFICE VISIT (OUTPATIENT)
Dept: WOUND CARE | Facility: HOSPITAL | Age: 71
End: 2021-09-02
Payer: MEDICARE

## 2021-09-02 VITALS
SYSTOLIC BLOOD PRESSURE: 118 MMHG | RESPIRATION RATE: 16 BRPM | DIASTOLIC BLOOD PRESSURE: 68 MMHG | HEART RATE: 68 BPM | TEMPERATURE: 98 F

## 2021-09-02 DIAGNOSIS — R26.2 AMBULATORY DYSFUNCTION: ICD-10-CM

## 2021-09-02 DIAGNOSIS — L89.623 PRESSURE INJURY OF LEFT HEEL, STAGE 3 (HCC): Primary | ICD-10-CM

## 2021-09-02 DIAGNOSIS — I73.9 PERIPHERAL VASCULAR DISEASE (HCC): ICD-10-CM

## 2021-09-02 PROCEDURE — 97597 DBRDMT OPN WND 1ST 20 CM/<: CPT | Performed by: NURSE PRACTITIONER

## 2021-09-02 RX ORDER — LIDOCAINE HYDROCHLORIDE 40 MG/ML
5 SOLUTION TOPICAL ONCE
Status: COMPLETED | OUTPATIENT
Start: 2021-09-02 | End: 2021-09-02

## 2021-09-02 RX ADMIN — LIDOCAINE HYDROCHLORIDE 5 ML: 40 SOLUTION TOPICAL at 11:08

## 2021-09-02 NOTE — PROGRESS NOTES
Patient ID: Crystal Hinson is a 66 y o  female Date of Birth 1950     Chief Complaint  Chief Complaint   Patient presents with    Follow Up Wound Care Visit     left heel wound       Allergies  Aspirin, Digoxin, and Gadolinium derivatives    Assessment:     Diagnoses and all orders for this visit:    Pressure injury of left heel, stage 3 (HCC)  -     lidocaine (XYLOCAINE) 4 % topical solution 5 mL  -     Wound cleansing and dressings; Future    Peripheral vascular disease ([facility] [address] )    Ambulatory dysfunction    Other orders  -     Debridement              Debridement   Wound 05/14/21 Pressure Injury Heel Left;Posterior    Universal Protocol:  Consent: Written consent obtained  Consent given by: patient  Time out: Immediately prior to procedure a "time out" was called to verify the correct patient, procedure, equipment, support staff and site/side marked as required  Timeout called at: 9/2/2021 11:12 AM   Patient understanding: patient states understanding of the procedure being performed  Patient consent: the patient's understanding of the procedure matches consent given  Procedure consent matches procedure scheduled: N/A  Relevant documents present and verified: N/A  Test results available and properly labeled: N/A  Site marked: the operative site was marked  Imaging studies available: N/A  Required blood products, implants, devices, and special equipment available: N/A    Patient identity confirmed: verbally with patient      Performed by: NP  Debridement type: selective  Pain control: lidocaine 4%  Pre-debridement measurements  Length (cm): 1 3  Width (cm): 0 9  Depth (cm): 0 3  Surface Area (cm^2): 1 17  Volume (cm^3): 0 35    Post-debridement measurements  Length (cm): 1 3  Width (cm): 0 9  Depth (cm): 0 3  Percent debrided: 100%  Surface Area (cm^2): 1 17  Area debrided (cm^2): 1 17  Volume (cm^3): 0 35  Devitalized tissue debrided: biofilm, fibrin and slough  Instrument(s) utilized: curette  Bleeding: small  Hemostasis obtained with: pressure  Procedural pain (0-10): 0  Post-procedural pain: 0   Response to treatment: procedure was tolerated well          Plan:  1  F/U visit  Wound debrided  Wound depth measuring smaller  Will change treatment to Endoform and Opticell Ag Ag with a football dressing  Will also give patient a surgical shoe to use when ambulating  Patient will followup in 1 week       Wound 05/14/21 Pressure Injury Heel Left;Posterior (Active)   Wound Image Images linked 09/02/21 1048   Wound Description Slough;Pink;Granulation tissue 09/02/21 1046   Pressure Injury Stage 3 09/02/21 1046   Isabel-wound Assessment Intact 09/02/21 1046   Wound Length (cm) 1 3 cm 09/02/21 1046   Wound Width (cm) 0 9 cm 09/02/21 1046   Wound Depth (cm) 0 3 cm 09/02/21 1046   Wound Surface Area (cm^2) 1 17 cm^2 09/02/21 1046   Wound Volume (cm^3) 0 351 cm^3 09/02/21 1046   Calculated Wound Volume (cm^3) 0 35 cm^3 09/02/21 1046   Change in Wound Size % 39 66 09/02/21 1046   Drainage Amount Moderate 09/02/21 1046   Drainage Description Serosanguineous 09/02/21 1046   Non-staged Wound Description Full thickness 09/02/21 1046   Dressing Status Intact 09/02/21 1046       Wound 05/14/21 Pressure Injury Heel Left;Posterior (Active)   Date First Assessed/Time First Assessed: 05/14/21 1000   Pre-Existing Wound: Yes  Primary Wound Type: Pressure Injury  Location: Heel  Wound Location Orientation: Left;Posterior  Wound Outcome: (c)        [REMOVED] Wound 04/02/21 Chest Right (Removed)   Resolved Date: 05/14/21  Date First Assessed/Time First Assessed: 04/02/21 1101   Location: Chest  Wound Location Orientation: Right  Wound Description (Comments): 2 INCISIONS  Incision's 1st Dressing: ADHESIVE SKIN HIGH VISCOSITY EXOFIN 1ML (x1)       [REMOVED] Wound 04/02/21 Chest Right (Removed)   Resolved Date: 05/14/21  Date First Assessed/Time First Assessed: 04/02/21 1101   Location: Chest  Wound Location Orientation: Right  Wound Description (Comments): 2 CHEST TUBE SITES  Incision's 1st Dressing: SPONGE GAUZE 4 X 4 16 PLY STRL PLASTIC TRA    [REMOVED] Wound 05/14/21 Pressure Injury Heel Left (Removed)   Resolved Date/Resolved Time: 06/17/21 1128  Date First Assessed: 05/14/21   Primary Wound Type: Pressure Injury  Location: Heel  Wound Location Orientation: Left  Wound Outcome: Healed       [REMOVED] Wound 05/20/21 Pressure Injury Heel Right (Removed)   Resolved Date/Resolved Time: 06/17/21 1131  Date First Assessed/Time First Assessed: 05/20/21 0951   Primary Wound Type: Pressure Injury  Location: Heel  Wound Location Orientation: Right  Wound Outcome: Healed       Subjective:     F/U visit for stage III pressure ulcer of left heel  Patient reports she had difficulty ambulating with the football and her globoped shoe  Besides this she has no other complaints with her wound  She denies any pain, fevers, or chills  The following portions of the patient's history were reviewed and updated as appropriate:   She  has a past medical history of Anemia, Cardiac disease, Chronic pain, Coronary artery disease, Hypertension, and PVD (peripheral vascular disease) ([facility] [address] )    She   Patient Active Problem List    Diagnosis Date Noted    Effusion of left knee 04/10/2021    Urinary retention 04/04/2021    Pulmonary hypertension ([facility] [address] ) 04/01/2021    Peripheral vascular disease ([facility] [address] ) 04/01/2021    Tobacco abuse 04/01/2021    ETOH abuse 03/29/2021    Anxiety 03/29/2021    Pulmonary nodule 03/29/2021    Dysphagia 03/27/2021    Anemia 03/26/2021    Valvular heart disease 03/26/2021    Thrombocytosis ([facility] [address] ) 03/24/2021    Severe protein-calorie malnutrition ([facility] [address] ) 03/24/2021    Pneumonia of right lower lobe due to infectious organism 03/22/2021    CAD (coronary artery disease) 03/22/2021    Parapneumonic effusion 03/22/2021    GERD (gastroesophageal reflux disease) 03/22/2021    HTN (hypertension) 03/22/2021    Supratherapeutic INR 03/22/2021     She  has a past surgical history that includes Ankle surgery; IR chest tube placement (3/23/2021); IR non-tunneled central line placement (3/24/2021); and THORACOSCOPY VIDEO ASSISTED SURGERY (VATS) (Right, 4/2/2021)  Her family history includes No Known Problems in her father and mother  She  reports that she has quit smoking  Her smoking use included cigarettes  She has a 0 10 pack-year smoking history  She has never used smokeless tobacco  She reports current alcohol use  She reports that she does not use drugs  Current Outpatient Medications   Medication Sig Dispense Refill    acetaminophen (TYLENOL) 325 mg tablet Take 3 tablets (975 mg total) by mouth every 8 (eight) hours  0    ALPRAZolam (XANAX) 0 25 mg tablet Take 1 tablet (0 25 mg total) by mouth every 12 (twelve) hours as needed for anxiety 10 tablet 0    atorvastatin (LIPITOR) 40 mg tablet Take 40 mg by mouth daily   benzonatate (TESSALON) 200 MG capsule Take 1 capsule (200 mg total) by mouth daily at bedtime 20 capsule 0    buPROPion (WELLBUTRIN SR) 150 mg 12 hr tablet Take 150 mg by mouth daily      clopidogrel (PLAVIX) 75 mg tablet Take 75 mg by mouth daily        dextromethorphan-guaiFENesin (ROBITUSSIN DM)  mg/5 mL syrup Take 10 mL by mouth every 4 (four) hours as needed for cough  0    Diclofenac Sodium (VOLTAREN) 1 % Apply 2 g topically 4 (four) times a day  0    FLUoxetine (PROzac) 20 mg capsule Take 20 mg by mouth daily      furosemide (LASIX) 20 mg tablet Take 1 tablet (20 mg total) by mouth daily  0    gabapentin (NEURONTIN) 100 mg capsule Take 1 capsule (100 mg total) by mouth 3 (three) times a day  0    lidocaine (LIDODERM) 5 % Apply 1 patch topically daily Remove & Discard patch within 12 hours or as directed by MD  0    melatonin 3 mg Take 2 tablets (6 mg total) by mouth daily at bedtime  0    multivitamin-minerals (CENTRUM ADULTS) tablet Take 1 tablet by mouth daily  0    oxyCODONE (ROXICODONE) 5 mg immediate release tablet 1-2 tablets every 4 hours as needed for moderate to severe pain 15 tablet 0    traZODone (DESYREL) 100 mg tablet Take 2 tablets (200 mg total) by mouth daily at bedtime  0     No current facility-administered medications for this visit  She is allergic to aspirin, digoxin, and gadolinium derivatives       Review of Systems   Constitutional: Negative  HENT: Negative for ear pain and hearing loss  Eyes: Negative for pain  Respiratory: Negative for chest tightness and shortness of breath  Cardiovascular: Negative for chest pain, palpitations and leg swelling  Gastrointestinal: Negative for diarrhea, nausea and vomiting  Genitourinary: Negative for dysuria  Musculoskeletal: Positive for gait problem  Skin: Positive for wound  Neurological: Negative for tremors and weakness  Psychiatric/Behavioral: Negative for behavioral problems, confusion and suicidal ideas           Objective:       Wound 05/14/21 Pressure Injury Heel Left;Posterior (Active)   Wound Image Images linked 09/02/21 1048   Wound Description Slough;Pink;Granulation tissue 09/02/21 1046   Pressure Injury Stage 3 09/02/21 1046   Isabel-wound Assessment Intact 09/02/21 1046   Wound Length (cm) 1 3 cm 09/02/21 1046   Wound Width (cm) 0 9 cm 09/02/21 1046   Wound Depth (cm) 0 3 cm 09/02/21 1046   Wound Surface Area (cm^2) 1 17 cm^2 09/02/21 1046   Wound Volume (cm^3) 0 351 cm^3 09/02/21 1046   Calculated Wound Volume (cm^3) 0 35 cm^3 09/02/21 1046   Change in Wound Size % 39 66 09/02/21 1046   Drainage Amount Moderate 09/02/21 1046   Drainage Description Serosanguineous 09/02/21 1046   Non-staged Wound Description Full thickness 09/02/21 1046   Dressing Status Intact 09/02/21 1046       /68   Pulse 68   Temp 98 °F (36 7 °C)   Resp 16             Wound Instructions:  Orders Placed This Encounter   Procedures    Debridement     This order was created via procedure documentation    Wound cleansing and dressings     LEFT FOOT WOUND     Shower YES WITH PROTECTION  Apply SKIN PREP to skin surrounding wound  Apply ENDOFORM and OPTICEL AG  to the LEFT FOOT wound  Cover with FOAM   MEDFIX TAPE  FOAM OFFLOADING PAD  FOOTBALL DRESSING APPLIED: [facility] [address]  with [address] [facility]  [place]ew[place] Schanz [place]   TUBIFAST YELLOW   SURICAL SHOE TO LEFT FOOT  KEEP FOOTBALL DRESSING DRY AND INTACT  Change dressing AT [facility] NEXT Thursday  DONE TODAY     Standing Status:   Future     Standing Expiration Date:   9/2/2022        Diagnosis ICD-10-CM Associated Orders   1  Pressure injury of left heel, stage 3 ([facility])  L89 623 lidocaine (XYLOCAINE) 4 % topical solution 5 mL     Wound cleansing and dressings   2  Peripheral vascular disease ([facility])  I73 9    3   Ambulatory dysfunction  R26 2

## 2021-09-02 NOTE — PATIENT INSTRUCTIONS
No orders of the defined types were placed in this encounter  Orders Placed This Encounter   Procedures    Debridement     This order was created via procedure documentation     Orders Placed This Encounter   Procedures    Debridement     This order was created via procedure documentation     Orders Placed This Encounter   Procedures    Debridement     This order was created via procedure documentation    Wound cleansing and dressings     LEFT FOOT WOUND     Shower YES WITH PROTECTION  Apply SKIN PREP to skin surrounding wound  Apply ENDOFORM and OPTICEL AG  to the LEFT FOOT wound  Cover with FOAM   MEDFIX TAPE  FOAM OFFLOADING PAD  FOOTBALL DRESSING APPLIED: [facility] [address] [facility]  with [address] [facility]  Lynita Ped [place]   TUBIFAST YELLOW   SURICAL SHOE TO LEFT FOOT  KEEP FOOTBALL DRESSING DRY AND INTACT  Change dressing AT [facility] NEXT Thursday    DONE TODAY     Standing Status:   Future     Standing Expiration Date:   9/2/2022

## 2021-09-16 ENCOUNTER — OFFICE VISIT (OUTPATIENT)
Dept: WOUND CARE | Facility: HOSPITAL | Age: 71
End: 2021-09-16
Payer: MEDICARE

## 2021-09-16 VITALS
HEART RATE: 72 BPM | SYSTOLIC BLOOD PRESSURE: 126 MMHG | DIASTOLIC BLOOD PRESSURE: 76 MMHG | TEMPERATURE: 98.2 F | RESPIRATION RATE: 18 BRPM

## 2021-09-16 DIAGNOSIS — L89.623 PRESSURE INJURY OF LEFT HEEL, STAGE 3 (HCC): Primary | ICD-10-CM

## 2021-09-16 DIAGNOSIS — I73.9 PERIPHERAL VASCULAR DISEASE (HCC): ICD-10-CM

## 2021-09-16 DIAGNOSIS — R26.2 AMBULATORY DYSFUNCTION: ICD-10-CM

## 2021-09-16 PROCEDURE — 97597 DBRDMT OPN WND 1ST 20 CM/<: CPT | Performed by: NURSE PRACTITIONER

## 2021-09-16 NOTE — PROGRESS NOTES
Patient ID: Lorrie Lama is a 66 y o  female Date of Birth 1950     Chief Complaint  Chief Complaint   Patient presents with    Follow Up Wound Care Visit     left heel wound       Allergies  Aspirin, Digoxin, and Gadolinium derivatives    Assessment:     Diagnoses and all orders for this visit:    Pressure injury of left heel, stage 3 (HCC)  -     Wound cleansing and dressings; Future    Peripheral vascular disease (HCC)  -     Wound cleansing and dressings; Future    Ambulatory dysfunction  -     Wound cleansing and dressings; Future    Other orders  -     Debridement              Debridement   Wound 05/14/21 Pressure Injury Heel Left;Posterior    Universal Protocol:  Consent: Written consent obtained  Consent given by: patient  Time out: Immediately prior to procedure a "time out" was called to verify the correct patient, procedure, equipment, support staff and site/side marked as required  Timeout called at: 9/16/2021 11:27 AM   Patient understanding: patient states understanding of the procedure being performed  Patient consent: the patient's understanding of the procedure matches consent given  Procedure consent matches procedure scheduled: N/A  Relevant documents present and verified: N/A  Test results available and properly labeled: N/A  Site marked: the operative site was marked  Imaging studies available: N/A  Required blood products, implants, devices, and special equipment available: N/A    Patient identity confirmed: verbally with patient      Performed by: NP  Debridement type: selective  Pain control: lidocaine 4%  Pre-debridement measurements  Length (cm): 0 6  Width (cm): 0 6  Depth (cm): 0 8  Surface Area (cm^2): 0 36  Volume (cm^3): 0 29    Post-debridement measurements  Length (cm): 0 6  Width (cm): 0 6  Depth (cm): 0 8  Percent debrided: 100%  Surface Area (cm^2): 0 36  Area debrided (cm^2): 0 36  Volume (cm^3): 0 29  Devitalized tissue debrided: biofilm and fibrin  Instrument(s) utilized: curette  Bleeding: small  Hemostasis obtained with: pressure  Procedural pain (0-10): 0  Post-procedural pain: 0   Response to treatment: procedure was tolerated well          Plan:  1  F/u visit  Wound debrided  Wound length and width is measuring smaller but wound is measuring slightly deeper this week  Will continue with Purocol Ag and Opticell and football dressing  Patient will follow up in 1 week  Wound 05/14/21 Pressure Injury Heel Left;Posterior (Active)   Wound Image Images linked 09/16/21 1102   Wound Description Slough;Pink;Granulation tissue 09/16/21 1102   Pressure Injury Stage 3 09/16/21 1102   Isabel-wound Assessment Callus; Maceration 09/16/21 1102   Wound Length (cm) 0 6 cm 09/16/21 1102   Wound Width (cm) 0 6 cm 09/16/21 1102   Wound Depth (cm) 0 8 cm 09/16/21 1102   Wound Surface Area (cm^2) 0 36 cm^2 09/16/21 1102   Wound Volume (cm^3) 0 288 cm^3 09/16/21 1102   Calculated Wound Volume (cm^3) 0 29 cm^3 09/16/21 1102   Change in Wound Size % 50 09/16/21 1102   Drainage Amount Moderate 09/16/21 1102   Drainage Description Serosanguineous 09/16/21 1102   Non-staged Wound Description Full thickness 09/16/21 1102       Wound 05/14/21 Pressure Injury Heel Left;Posterior (Active)   Date First Assessed/Time First Assessed: 05/14/21 1000   Pre-Existing Wound: Yes  Primary Wound Type: Pressure Injury  Location: Heel  Wound Location Orientation: Left;Posterior  Wound Outcome: (c)        [REMOVED] Wound 04/02/21 Chest Right (Removed)   Resolved Date: 05/14/21  Date First Assessed/Time First Assessed: 04/02/21 1101   Location: Chest  Wound Location Orientation: Right  Wound Description (Comments): 2 INCISIONS  Incision's 1st Dressing: ADHESIVE SKIN HIGH VISCOSITY EXOFIN 1ML (x1)       [REMOVED] Wound 04/02/21 Chest Right (Removed)   Resolved Date: 05/14/21  Date First Assessed/Time First Assessed: 04/02/21 1101   Location: Chest  Wound Location Orientation: Right  Wound Description (Comments): 2 CHEST TUBE SITES  Incision's 1st Dressing: SPONGE GAUZE 4 X 4 16 PLY STRL PLASTIC TRA    [REMOVED] Wound 05/14/21 Pressure Injury Heel Left (Removed)   Resolved Date/Resolved Time: 06/17/21 1128  Date First Assessed: 05/14/21   Primary Wound Type: Pressure Injury  Location: Heel  Wound Location Orientation: Left  Wound Outcome: Healed       [REMOVED] Wound 05/20/21 Pressure Injury Heel Right (Removed)   Resolved Date/Resolved Time: 06/17/21 1131  Date First Assessed/Time First Assessed: 05/20/21 0951   Primary Wound Type: Pressure Injury  Location: Heel  Wound Location Orientation: Right  Wound Outcome: Healed       Subjective:     F/u visit pressure injury of left heel  No new complaints  Patient reports she was unable to come to her appointment last week d/t illness  She reports the football dressing stayed in place for 2 weeks  She denies any pain, fevers,or chills  The following portions of the patient's history were reviewed and updated as appropriate:   She  has a past medical history of Anemia, Cardiac disease, Chronic pain, Coronary artery disease, Hypertension, and PVD (peripheral vascular disease) ([facility] [address] )    She   Patient Active Problem List    Diagnosis Date Noted    Effusion of left knee 04/10/2021    Urinary retention 04/04/2021    Pulmonary hypertension ([address] ) 04/01/2021    Peripheral vascular disease ([address] ) 04/01/2021    Tobacco abuse 04/01/2021    ETOH abuse 03/29/2021    Anxiety 03/29/2021    Pulmonary nodule 03/29/2021    Dysphagia 03/27/2021    Anemia 03/26/2021    Valvular heart disease 03/26/2021    Thrombocytosis ([address] ) 03/24/2021    Severe protein-calorie malnutrition ([address] ) 03/24/2021    Pneumonia of right lower lobe due to infectious organism 03/22/2021    CAD (coronary artery disease) 03/22/2021    Parapneumonic effusion 03/22/2021    GERD (gastroesophageal reflux disease) 03/22/2021    HTN (hypertension) 03/22/2021    Supratherapeutic INR 03/22/2021     She  has a past surgical history that includes Ankle surgery; IR chest tube placement (3/23/2021); IR non-tunneled central line placement (3/24/2021); and THORACOSCOPY VIDEO ASSISTED SURGERY (VATS) (Right, 4/2/2021)  Her family history includes No Known Problems in her father and mother  She  reports that she has quit smoking  Her smoking use included cigarettes  She has a 0 10 pack-year smoking history  She has never used smokeless tobacco  She reports current alcohol use  She reports that she does not use drugs  Current Outpatient Medications   Medication Sig Dispense Refill    acetaminophen (TYLENOL) 325 mg tablet Take 3 tablets (975 mg total) by mouth every 8 (eight) hours  0    ALPRAZolam (XANAX) 0 25 mg tablet Take 1 tablet (0 25 mg total) by mouth every 12 (twelve) hours as needed for anxiety 10 tablet 0    atorvastatin (LIPITOR) 40 mg tablet Take 40 mg by mouth daily   benzonatate (TESSALON) 200 MG capsule Take 1 capsule (200 mg total) by mouth daily at bedtime 20 capsule 0    buPROPion (WELLBUTRIN SR) 150 mg 12 hr tablet Take 150 mg by mouth daily      clopidogrel (PLAVIX) 75 mg tablet Take 75 mg by mouth daily        dextromethorphan-guaiFENesin (ROBITUSSIN DM)  mg/5 mL syrup Take 10 mL by mouth every 4 (four) hours as needed for cough  0    Diclofenac Sodium (VOLTAREN) 1 % Apply 2 g topically 4 (four) times a day  0    FLUoxetine (PROzac) 20 mg capsule Take 20 mg by mouth daily      furosemide (LASIX) 20 mg tablet Take 1 tablet (20 mg total) by mouth daily  0    gabapentin (NEURONTIN) 100 mg capsule Take 1 capsule (100 mg total) by mouth 3 (three) times a day  0    lidocaine (LIDODERM) 5 % Apply 1 patch topically daily Remove & Discard patch within 12 hours or as directed by MD  0    melatonin 3 mg Take 2 tablets (6 mg total) by mouth daily at bedtime  0    multivitamin-minerals (CENTRUM ADULTS) tablet Take 1 tablet by mouth daily  0    oxyCODONE (ROXICODONE) 5 mg immediate release tablet 1-2 tablets every 4 hours as needed for moderate to severe pain 15 tablet 0    traZODone (DESYREL) 100 mg tablet Take 2 tablets (200 mg total) by mouth daily at bedtime  0     No current facility-administered medications for this visit  She is allergic to aspirin, digoxin, and gadolinium derivatives       Review of Systems   Constitutional: Negative  HENT: Negative for ear pain and hearing loss  Eyes: Negative for pain  Respiratory: Negative for chest tightness and shortness of breath  Cardiovascular: Negative for chest pain, palpitations and leg swelling  Gastrointestinal: Negative for diarrhea, nausea and vomiting  Genitourinary: Negative for dysuria  Musculoskeletal: Positive for gait problem  Skin: Positive for wound  Neurological: Negative for tremors and weakness  Psychiatric/Behavioral: Negative for behavioral problems, confusion and suicidal ideas  Objective:       Wound 05/14/21 Pressure Injury Heel Left;Posterior (Active)   Wound Image Images linked 09/16/21 1102   Wound Description Slough;Pink;Granulation tissue 09/16/21 1102   Pressure Injury Stage 3 09/16/21 1102   Isabel-wound Assessment Callus; Maceration 09/16/21 1102   Wound Length (cm) 0 6 cm 09/16/21 1102   Wound Width (cm) 0 6 cm 09/16/21 1102   Wound Depth (cm) 0 8 cm 09/16/21 1102   Wound Surface Area (cm^2) 0 36 cm^2 09/16/21 1102   Wound Volume (cm^3) 0 288 cm^3 09/16/21 1102   Calculated Wound Volume (cm^3) 0 29 cm^3 09/16/21 1102   Change in Wound Size % 50 09/16/21 1102   Drainage Amount Moderate 09/16/21 1102   Drainage Description Serosanguineous 09/16/21 1102   Non-staged Wound Description Full thickness 09/16/21 1102       /76   Pulse 72   Temp 98 2 °F (36 8 °C)   Resp 18             Wound Instructions:  Orders Placed This Encounter   Procedures    Wound cleansing and dressings     Wound cleansing and dressings      LEFT FOOT WOUND     Shower yes with protection  Apply skin prep to skin surrounding wound  Apply puracol and opticel  to the LEFT FOOT wound  Cover with foam, medfix tape and foam offloading pad  Bulky football dressing applied - cotton padding, kerlix and coban  Secure with tubifast yellow  KEEP FOOTBALL DRESSING DRY AND INTACT  Change dressing at [facility] next Thursday  DONE TODAY    Offloading:    Wear surgical shoe when ambulating     Standing Status:   Future     Standing Expiration Date:   9/16/2022    Debridement     This order was created via procedure documentation        Diagnosis ICD-10-CM Associated Orders   1  Pressure injury of left heel, stage 3 (HCC)  M16 503 Wound cleansing and dressings   2  Peripheral vascular disease (HCC)  I73 9 Wound cleansing and dressings   3   Ambulatory dysfunction  R26 2 Wound cleansing and dressings

## 2021-09-16 NOTE — PATIENT INSTRUCTIONS
Orders Placed This Encounter   Procedures    Wound cleansing and dressings     Wound cleansing and dressings      LEFT FOOT WOUND     Shower yes with protection  Apply skin prep to skin surrounding wound  Apply puracol and opticel  to the LEFT FOOT wound  Cover with foam, medfix tape and foam offloading pad  Bulky football dressing applied - cotton padding, kerlix and coban  Secure with tubifast yellow  KEEP FOOTBALL DRESSING DRY AND INTACT  Change dressing at [facility] next Thursday      DONE TODAY    Offloading:    Wear surgical shoe when ambulating     Standing Status:   Future     Standing Expiration Date:   9/16/2022

## 2021-09-23 ENCOUNTER — OFFICE VISIT (OUTPATIENT)
Dept: WOUND CARE | Facility: HOSPITAL | Age: 71
End: 2021-09-23
Payer: MEDICARE

## 2021-09-23 VITALS
RESPIRATION RATE: 16 BRPM | DIASTOLIC BLOOD PRESSURE: 60 MMHG | TEMPERATURE: 97.6 F | HEART RATE: 76 BPM | SYSTOLIC BLOOD PRESSURE: 118 MMHG

## 2021-09-23 DIAGNOSIS — R26.2 AMBULATORY DYSFUNCTION: ICD-10-CM

## 2021-09-23 DIAGNOSIS — L89.623 PRESSURE INJURY OF LEFT HEEL, STAGE 3 (HCC): Primary | ICD-10-CM

## 2021-09-23 DIAGNOSIS — I73.9 PERIPHERAL VASCULAR DISEASE (HCC): ICD-10-CM

## 2021-09-23 PROCEDURE — 97597 DBRDMT OPN WND 1ST 20 CM/<: CPT | Performed by: NURSE PRACTITIONER

## 2021-09-23 RX ORDER — LIDOCAINE 40 MG/G
CREAM TOPICAL ONCE
Status: COMPLETED | OUTPATIENT
Start: 2021-09-23 | End: 2021-09-23

## 2021-09-23 RX ADMIN — LIDOCAINE: 40 CREAM TOPICAL at 10:55

## 2021-09-23 NOTE — PATIENT INSTRUCTIONS
Orders Placed This Encounter   Procedures    Debridement     This order was created via procedure documentation    Wound cleansing and dressings     Wound cleansing and dressings      Wound cleansing and dressings                          LEFT FOOT WOUND     Shower yes with protection  Apply betamasone ointment, then calasome  to skin jered wound and surrounding wound  Apply puracol ag and qwick to the LEFT FOOT wound  Cover with ABD AND KERLIX WRAP  Bulky football dressing applied - cotton padding, kerlix and coban  Secure with tubifast yellow  KEEP FOOTBALL DRESSING DRY AND INTACT    Change DRESSING IN 1 WEEK AT [facility]     DONE TODAY     Offloading:     Wear surgical shoe when ambulating      :     Standing Status:   Future     Standing Expiration Date:   9/23/2022

## 2021-09-23 NOTE — PROGRESS NOTES
Patient ID: Henrique Escalante is a 66 y o  female Date of Birth 1950     Chief Complaint  Chief Complaint   Patient presents with    Follow Up Wound Care Visit     wound care for left foot       Allergies  Aspirin, Digoxin, and Gadolinium derivatives    Assessment:     Diagnoses and all orders for this visit:    Pressure injury of left heel, stage 3 (HCC)  -     lidocaine (LMX) 4 % cream  -     Debridement    Peripheral vascular disease (HCC)    Ambulatory dysfunction  -     lidocaine (LMX) 4 % cream  -     Wound cleansing and dressings; Future              Debridement   Wound 05/14/21 Pressure Injury Heel Left;Posterior    Universal Protocol:  Consent: Written consent obtained  Consent given by: patient  Time out: Immediately prior to procedure a "time out" was called to verify the correct patient, procedure, equipment, support staff and site/side marked as required  Timeout called at: 9/23/2021 10:59 AM   Patient understanding: patient states understanding of the procedure being performed  Patient consent: the patient's understanding of the procedure matches consent given  Procedure consent matches procedure scheduled: N/A  Relevant documents present and verified: N/A  Test results available and properly labeled: N/A  Site marked: the operative site was marked  Imaging studies available: N/A  Required blood products, implants, devices, and special equipment available: N/A    Patient identity confirmed: verbally with patient      Performed by: NP  Debridement type: selective  Pain control: lidocaine 4%  Pre-debridement measurements  Length (cm): 0 6  Width (cm): 0 5  Depth (cm): 0 5  Surface Area (cm^2): 0 3  Volume (cm^3): 0 15    Post-debridement measurements  Length (cm): 0 6  Width (cm): 0 5  Depth (cm): 0 5  Percent debrided: 100%  Surface Area (cm^2): 0 3  Area debrided (cm^2): 0 3  Volume (cm^3): 0 15  Devitalized tissue debrided: biofilm, fibrin and slough  Instrument(s) utilized: curette  Bleeding: small  Hemostasis obtained with: pressure  Procedural pain (0-10): 0  Post-procedural pain: 0   Response to treatment: procedure was tolerated well          Plan:  1  F/u visit  Wound debrided  Wound measuring slightly smaller  Periwound is excoriated d/t excess drainage sitting on intact skin  Will change treatment to Purocol Ag and Qwick to wound  Will apply betamethasone cream and then calazime cream to periwound covered with ABD and a football dressing  Patient is to continue to limit ambulation to offload pressure from wound  Patient will follow up in 1 week  Wound 05/14/21 Pressure Injury Heel Left;Posterior (Active)   Wound Image Images linked (Simultaneous filing  User may not have seen previous data ) 09/23/21 1047   Wound Description Slough;Pink;Yellow 09/23/21 1047   Isabel-wound Assessment Maceration; Excoriated 09/23/21 1047   Wound Length (cm) 0 6 cm 09/23/21 1047   Wound Width (cm) 0 5 cm 09/23/21 1047   Wound Depth (cm) 0 5 cm 09/23/21 1047   Wound Surface Area (cm^2) 0 3 cm^2 09/23/21 1047   Wound Volume (cm^3) 0 15 cm^3 09/23/21 1047   Calculated Wound Volume (cm^3) 0 15 cm^3 09/23/21 1047   Change in Wound Size % 74 14 09/23/21 1047   Drainage Amount Moderate 09/23/21 1047   Drainage Description Serosanguineous 09/23/21 1047   Non-staged Wound Description Full thickness 09/23/21 1047   Dressing Status Intact 09/23/21 1047       Wound 05/14/21 Pressure Injury Heel Left;Posterior (Active)   Date First Assessed/Time First Assessed: 05/14/21 1000   Pre-Existing Wound: Yes  Primary Wound Type: Pressure Injury  Location: Heel  Wound Location Orientation: Left;Posterior  Wound Outcome: (c)        [REMOVED] Wound 04/02/21 Chest Right (Removed)   Resolved Date: 05/14/21  Date First Assessed/Time First Assessed: 04/02/21 1101   Location: Chest  Wound Location Orientation: Right  Wound Description (Comments): 2 INCISIONS  Incision's 1st Dressing: ADHESIVE SKIN HIGH VISCOSITY EXOFIN 1ML (x1)       [REMOVED] Wound 04/02/21 Chest Right (Removed)   Resolved Date: 05/14/21  Date First Assessed/Time First Assessed: 04/02/21 1101   Location: Chest  Wound Location Orientation: Right  Wound Description (Comments): 2 CHEST TUBE SITES  Incision's 1st Dressing: SPONGE GAUZE 4 X 4 16 PLY STRL PLASTIC TRA    [REMOVED] Wound 05/14/21 Pressure Injury Heel Left (Removed)   Resolved Date/Resolved Time: 06/17/21 1128  Date First Assessed: 05/14/21   Primary Wound Type: Pressure Injury  Location: Heel  Wound Location Orientation: Left  Wound Outcome: Healed       [REMOVED] Wound 05/20/21 Pressure Injury Heel Right (Removed)   Resolved Date/Resolved Time: 06/17/21 1131  Date First Assessed/Time First Assessed: 05/20/21 0951   Primary Wound Type: Pressure Injury  Location: Heel  Wound Location Orientation: Right  Wound Outcome: Healed       Subjective:     F/u visit pressure ulcer of left heel  No new complaints  She denies any pain, fevers, or chills  Tolerating football dressing  The following portions of the patient's history were reviewed and updated as appropriate:   She  has a past medical history of Anemia, Cardiac disease, Chronic pain, Coronary artery disease, Hypertension, and PVD (peripheral vascular disease) ([facility] [address] )    She   Patient Active Problem List    Diagnosis Date Noted    Effusion of left knee 04/10/2021    Urinary retention 04/04/2021    Pulmonary hypertension ([facility] [address] ) 04/01/2021    Peripheral vascular disease ([facility] [address] ) 04/01/2021    Tobacco abuse 04/01/2021    ETOH abuse 03/29/2021    Anxiety 03/29/2021    Pulmonary nodule 03/29/2021    Dysphagia 03/27/2021    Anemia 03/26/2021    Valvular heart disease 03/26/2021    Thrombocytosis ([facility] [address] ) 03/24/2021    Severe protein-calorie malnutrition ([facility] [address] ) 03/24/2021    Pneumonia of right lower lobe due to infectious organism 03/22/2021    CAD (coronary artery disease) 03/22/2021    Parapneumonic effusion 03/22/2021    GERD (gastroesophageal reflux disease) 03/22/2021    HTN (hypertension) 03/22/2021    Supratherapeutic INR 03/22/2021     She  has a past surgical history that includes Ankle surgery; IR chest tube placement (3/23/2021); IR non-tunneled central line placement (3/24/2021); and THORACOSCOPY VIDEO ASSISTED SURGERY (VATS) (Right, 4/2/2021)  Her family history includes No Known Problems in her father and mother  She  reports that she has quit smoking  Her smoking use included cigarettes  She has a 0 10 pack-year smoking history  She has never used smokeless tobacco  She reports current alcohol use  She reports that she does not use drugs  Current Outpatient Medications   Medication Sig Dispense Refill    acetaminophen (TYLENOL) 325 mg tablet Take 3 tablets (975 mg total) by mouth every 8 (eight) hours  0    ALPRAZolam (XANAX) 0 25 mg tablet Take 1 tablet (0 25 mg total) by mouth every 12 (twelve) hours as needed for anxiety 10 tablet 0    atorvastatin (LIPITOR) 40 mg tablet Take 40 mg by mouth daily   benzonatate (TESSALON) 200 MG capsule Take 1 capsule (200 mg total) by mouth daily at bedtime 20 capsule 0    buPROPion (WELLBUTRIN SR) 150 mg 12 hr tablet Take 150 mg by mouth daily      clopidogrel (PLAVIX) 75 mg tablet Take 75 mg by mouth daily        dextromethorphan-guaiFENesin (ROBITUSSIN DM)  mg/5 mL syrup Take 10 mL by mouth every 4 (four) hours as needed for cough  0    Diclofenac Sodium (VOLTAREN) 1 % Apply 2 g topically 4 (four) times a day  0    FLUoxetine (PROzac) 20 mg capsule Take 20 mg by mouth daily      furosemide (LASIX) 20 mg tablet Take 1 tablet (20 mg total) by mouth daily  0    gabapentin (NEURONTIN) 100 mg capsule Take 1 capsule (100 mg total) by mouth 3 (three) times a day  0    lidocaine (LIDODERM) 5 % Apply 1 patch topically daily Remove & Discard patch within 12 hours or as directed by MD  0    melatonin 3 mg Take 2 tablets (6 mg total) by mouth daily at bedtime  0    multivitamin-minerals (CENTRUM ADULTS) tablet Take 1 tablet by mouth daily  0    oxyCODONE (ROXICODONE) 5 mg immediate release tablet 1-2 tablets every 4 hours as needed for moderate to severe pain 15 tablet 0    traZODone (DESYREL) 100 mg tablet Take 2 tablets (200 mg total) by mouth daily at bedtime  0     No current facility-administered medications for this visit  She is allergic to aspirin, digoxin, and gadolinium derivatives       Review of Systems   Constitutional: Negative  HENT: Negative for ear pain and hearing loss  Eyes: Negative for pain  Respiratory: Negative for chest tightness and shortness of breath  Cardiovascular: Negative for chest pain, palpitations and leg swelling  Gastrointestinal: Negative for diarrhea, nausea and vomiting  Genitourinary: Negative for dysuria  Musculoskeletal: Positive for gait problem  Skin: Positive for wound  Neurological: Negative for tremors and weakness  Psychiatric/Behavioral: Negative for behavioral problems, confusion and suicidal ideas  Objective:       Wound 05/14/21 Pressure Injury Heel Left;Posterior (Active)   Wound Image Images linked (Simultaneous filing  User may not have seen previous data ) 09/23/21 1047   Wound Description Slough;Pink;Yellow 09/23/21 1047   Isabel-wound Assessment Maceration; Excoriated 09/23/21 1047   Wound Length (cm) 0 6 cm 09/23/21 1047   Wound Width (cm) 0 5 cm 09/23/21 1047   Wound Depth (cm) 0 5 cm 09/23/21 1047   Wound Surface Area (cm^2) 0 3 cm^2 09/23/21 1047   Wound Volume (cm^3) 0 15 cm^3 09/23/21 1047   Calculated Wound Volume (cm^3) 0 15 cm^3 09/23/21 1047   Change in Wound Size % 74 14 09/23/21 1047   Drainage Amount Moderate 09/23/21 1047   Drainage Description Serosanguineous 09/23/21 1047   Non-staged Wound Description Full thickness 09/23/21 1047   Dressing Status Intact 09/23/21 1047       /60   Pulse 76   Temp 97 6 °F (36 4 °C)   Resp 16             Wound Instructions:  Orders Placed This Encounter Procedures    Debridement     This order was created via procedure documentation    Wound cleansing and dressings     Wound cleansing and dressings      Wound cleansing and dressings                          LEFT FOOT WOUND     Shower yes with protection  Apply betamasone ointment, then calasome  to skin jered wound and surrounding wound  Apply puracol ag and qwick to the LEFT FOOT wound  Cover with ABD AND KERLIX WRAP  Bulky football dressing applied - cotton padding, kerlix and coban  Secure with tubifast yellow  KEEP FOOTBALL DRESSING DRY AND INTACT  Change DRESSING IN 1 WEEK AT [facility]     DONE TODAY     Offloading:     Wear surgical shoe when ambulating      :     Standing Status:   Future     Standing Expiration Date:   9/23/2022        Diagnosis ICD-10-CM Associated Orders   1  Pressure injury of left heel, stage 3 ([facility])  L89 623 lidocaine (LMX) 4 % cream     Debridement   2  Peripheral vascular disease ([facility])  I73 9    3   Ambulatory dysfunction  R26 2 lidocaine (LMX) 4 % cream     Wound cleansing and dressings

## 2021-09-29 ENCOUNTER — OFFICE VISIT (OUTPATIENT)
Dept: WOUND CARE | Facility: HOSPITAL | Age: 71
End: 2021-09-29
Payer: MEDICARE

## 2021-09-29 VITALS — HEART RATE: 80 BPM | SYSTOLIC BLOOD PRESSURE: 128 MMHG | DIASTOLIC BLOOD PRESSURE: 70 MMHG | RESPIRATION RATE: 16 BRPM

## 2021-09-29 DIAGNOSIS — L89.623 PRESSURE INJURY OF LEFT HEEL, STAGE 3 (HCC): Primary | ICD-10-CM

## 2021-09-29 DIAGNOSIS — I73.9 PERIPHERAL VASCULAR DISEASE (HCC): ICD-10-CM

## 2021-09-29 PROCEDURE — 11042 DBRDMT SUBQ TIS 1ST 20SQCM/<: CPT | Performed by: PODIATRIST

## 2021-09-29 RX ORDER — LIDOCAINE HYDROCHLORIDE 40 MG/ML
5 SOLUTION TOPICAL ONCE
Status: COMPLETED | OUTPATIENT
Start: 2021-09-29 | End: 2021-09-29

## 2021-09-29 RX ADMIN — LIDOCAINE HYDROCHLORIDE 5 ML: 40 SOLUTION TOPICAL at 15:53

## 2021-10-07 ENCOUNTER — OFFICE VISIT (OUTPATIENT)
Dept: WOUND CARE | Facility: HOSPITAL | Age: 71
End: 2021-10-07
Payer: MEDICARE

## 2021-10-07 VITALS
SYSTOLIC BLOOD PRESSURE: 100 MMHG | RESPIRATION RATE: 16 BRPM | HEART RATE: 70 BPM | DIASTOLIC BLOOD PRESSURE: 60 MMHG | TEMPERATURE: 98.3 F

## 2021-10-07 DIAGNOSIS — R26.2 AMBULATORY DYSFUNCTION: ICD-10-CM

## 2021-10-07 DIAGNOSIS — L89.623 PRESSURE INJURY OF LEFT HEEL, STAGE 3 (HCC): Primary | ICD-10-CM

## 2021-10-07 DIAGNOSIS — I73.9 PERIPHERAL VASCULAR DISEASE (HCC): ICD-10-CM

## 2021-10-07 PROCEDURE — 97597 DBRDMT OPN WND 1ST 20 CM/<: CPT | Performed by: NURSE PRACTITIONER

## 2021-10-07 RX ORDER — LIDOCAINE HYDROCHLORIDE 40 MG/ML
5 SOLUTION TOPICAL ONCE
Status: COMPLETED | OUTPATIENT
Start: 2021-10-07 | End: 2021-10-07

## 2021-10-07 RX ADMIN — LIDOCAINE HYDROCHLORIDE 5 ML: 40 SOLUTION TOPICAL at 11:18

## 2021-10-14 ENCOUNTER — OFFICE VISIT (OUTPATIENT)
Dept: WOUND CARE | Facility: HOSPITAL | Age: 71
End: 2021-10-14
Payer: MEDICARE

## 2021-10-14 VITALS
RESPIRATION RATE: 18 BRPM | DIASTOLIC BLOOD PRESSURE: 68 MMHG | TEMPERATURE: 98.4 F | SYSTOLIC BLOOD PRESSURE: 132 MMHG | HEART RATE: 72 BPM

## 2021-10-14 DIAGNOSIS — I73.9 PERIPHERAL VASCULAR DISEASE (HCC): ICD-10-CM

## 2021-10-14 DIAGNOSIS — L89.623 PRESSURE INJURY OF LEFT HEEL, STAGE 3 (HCC): Primary | ICD-10-CM

## 2021-10-14 DIAGNOSIS — R26.2 AMBULATORY DYSFUNCTION: ICD-10-CM

## 2021-10-14 PROCEDURE — 97597 DBRDMT OPN WND 1ST 20 CM/<: CPT | Performed by: NURSE PRACTITIONER

## 2021-10-14 RX ORDER — LIDOCAINE HYDROCHLORIDE 40 MG/ML
5 SOLUTION TOPICAL ONCE
Status: COMPLETED | OUTPATIENT
Start: 2021-10-14 | End: 2021-10-14

## 2021-10-14 RX ADMIN — LIDOCAINE HYDROCHLORIDE 5 ML: 40 SOLUTION TOPICAL at 11:52

## 2021-10-21 ENCOUNTER — OFFICE VISIT (OUTPATIENT)
Dept: WOUND CARE | Facility: HOSPITAL | Age: 71
End: 2021-10-21
Payer: MEDICARE

## 2021-10-21 DIAGNOSIS — S41.101A OPEN WOUND OF RIGHT UPPER ARM, INITIAL ENCOUNTER: ICD-10-CM

## 2021-10-21 DIAGNOSIS — I73.9 PERIPHERAL VASCULAR DISEASE (HCC): ICD-10-CM

## 2021-10-21 DIAGNOSIS — R26.2 AMBULATORY DYSFUNCTION: ICD-10-CM

## 2021-10-21 DIAGNOSIS — L89.623 PRESSURE INJURY OF LEFT HEEL, STAGE 3 (HCC): Primary | ICD-10-CM

## 2021-10-21 PROCEDURE — 99213 OFFICE O/P EST LOW 20 MIN: CPT | Performed by: NURSE PRACTITIONER

## 2021-10-21 PROCEDURE — 97597 DBRDMT OPN WND 1ST 20 CM/<: CPT | Performed by: NURSE PRACTITIONER

## 2021-10-21 RX ORDER — LIDOCAINE HYDROCHLORIDE 40 MG/ML
5 SOLUTION TOPICAL ONCE
Status: COMPLETED | OUTPATIENT
Start: 2021-10-21 | End: 2021-10-21

## 2021-10-21 RX ADMIN — LIDOCAINE HYDROCHLORIDE 5 ML: 40 SOLUTION TOPICAL at 12:05

## 2021-10-28 ENCOUNTER — OFFICE VISIT (OUTPATIENT)
Dept: WOUND CARE | Facility: HOSPITAL | Age: 71
End: 2021-10-28
Payer: MEDICARE

## 2021-10-28 VITALS
DIASTOLIC BLOOD PRESSURE: 90 MMHG | RESPIRATION RATE: 16 BRPM | HEART RATE: 80 BPM | TEMPERATURE: 99 F | SYSTOLIC BLOOD PRESSURE: 130 MMHG

## 2021-10-28 DIAGNOSIS — I73.9 PERIPHERAL VASCULAR DISEASE (HCC): ICD-10-CM

## 2021-10-28 DIAGNOSIS — E46 PROTEIN-CALORIE MALNUTRITION, UNSPECIFIED SEVERITY (HCC): ICD-10-CM

## 2021-10-28 DIAGNOSIS — L89.623 PRESSURE INJURY OF LEFT HEEL, STAGE 3 (HCC): Primary | ICD-10-CM

## 2021-10-28 DIAGNOSIS — R26.2 AMBULATORY DYSFUNCTION: ICD-10-CM

## 2021-10-28 PROCEDURE — 97597 DBRDMT OPN WND 1ST 20 CM/<: CPT | Performed by: NURSE PRACTITIONER

## 2021-10-28 RX ORDER — LIDOCAINE HYDROCHLORIDE 40 MG/ML
5 SOLUTION TOPICAL ONCE
Status: COMPLETED | OUTPATIENT
Start: 2021-10-28 | End: 2021-10-28

## 2021-10-28 RX ADMIN — LIDOCAINE HYDROCHLORIDE 5 ML: 40 SOLUTION TOPICAL at 11:28

## 2021-11-04 ENCOUNTER — OFFICE VISIT (OUTPATIENT)
Dept: WOUND CARE | Facility: HOSPITAL | Age: 71
End: 2021-11-04
Payer: MEDICARE

## 2021-11-04 VITALS
HEART RATE: 68 BPM | TEMPERATURE: 97.2 F | DIASTOLIC BLOOD PRESSURE: 68 MMHG | RESPIRATION RATE: 18 BRPM | SYSTOLIC BLOOD PRESSURE: 120 MMHG

## 2021-11-04 DIAGNOSIS — R26.2 AMBULATORY DYSFUNCTION: ICD-10-CM

## 2021-11-04 DIAGNOSIS — L89.623 PRESSURE INJURY OF LEFT HEEL, STAGE 3 (HCC): Primary | ICD-10-CM

## 2021-11-04 DIAGNOSIS — I73.9 PERIPHERAL VASCULAR DISEASE (HCC): ICD-10-CM

## 2021-11-04 PROCEDURE — 97597 DBRDMT OPN WND 1ST 20 CM/<: CPT | Performed by: NURSE PRACTITIONER

## 2021-11-04 RX ORDER — LIDOCAINE HYDROCHLORIDE 40 MG/ML
5 SOLUTION TOPICAL ONCE
Status: COMPLETED | OUTPATIENT
Start: 2021-11-04 | End: 2021-11-04

## 2021-11-04 RX ADMIN — LIDOCAINE HYDROCHLORIDE 5 ML: 40 SOLUTION TOPICAL at 11:36

## 2021-11-18 ENCOUNTER — OFFICE VISIT (OUTPATIENT)
Dept: WOUND CARE | Facility: HOSPITAL | Age: 71
End: 2021-11-18
Payer: MEDICARE

## 2021-11-18 VITALS
HEART RATE: 60 BPM | DIASTOLIC BLOOD PRESSURE: 64 MMHG | RESPIRATION RATE: 18 BRPM | TEMPERATURE: 97.8 F | SYSTOLIC BLOOD PRESSURE: 130 MMHG

## 2021-11-18 DIAGNOSIS — L89.623 PRESSURE INJURY OF LEFT HEEL, STAGE 3 (HCC): Primary | ICD-10-CM

## 2021-11-18 PROCEDURE — 11042 DBRDMT SUBQ TIS 1ST 20SQCM/<: CPT | Performed by: FAMILY MEDICINE

## 2021-11-18 RX ORDER — LIDOCAINE 40 MG/G
CREAM TOPICAL ONCE
Status: COMPLETED | OUTPATIENT
Start: 2021-11-18 | End: 2021-11-18

## 2021-11-18 RX ADMIN — LIDOCAINE 1 APPLICATION: 40 CREAM TOPICAL at 11:48

## 2021-11-24 ENCOUNTER — OFFICE VISIT (OUTPATIENT)
Dept: WOUND CARE | Facility: HOSPITAL | Age: 71
End: 2021-11-24
Payer: MEDICARE

## 2021-11-24 VITALS
DIASTOLIC BLOOD PRESSURE: 80 MMHG | TEMPERATURE: 98.4 F | RESPIRATION RATE: 20 BRPM | SYSTOLIC BLOOD PRESSURE: 140 MMHG | HEART RATE: 78 BPM

## 2021-11-24 DIAGNOSIS — L89.623 PRESSURE INJURY OF LEFT HEEL, STAGE 3 (HCC): Primary | ICD-10-CM

## 2021-11-24 PROCEDURE — 11042 DBRDMT SUBQ TIS 1ST 20SQCM/<: CPT | Performed by: FAMILY MEDICINE

## 2021-11-24 RX ORDER — QUETIAPINE FUMARATE 100 MG/1
100 TABLET, FILM COATED ORAL
COMMUNITY

## 2021-11-24 RX ORDER — LIDOCAINE HYDROCHLORIDE 40 MG/ML
5 SOLUTION TOPICAL ONCE
Status: COMPLETED | OUTPATIENT
Start: 2021-11-24 | End: 2021-11-24

## 2021-11-24 RX ORDER — BUSPIRONE HYDROCHLORIDE 10 MG/1
10 TABLET ORAL 3 TIMES DAILY
COMMUNITY

## 2021-11-24 RX ADMIN — LIDOCAINE HYDROCHLORIDE 5 ML: 40 SOLUTION TOPICAL at 11:32

## 2021-12-02 ENCOUNTER — OFFICE VISIT (OUTPATIENT)
Dept: WOUND CARE | Facility: HOSPITAL | Age: 71
End: 2021-12-02
Payer: MEDICARE

## 2021-12-02 VITALS
SYSTOLIC BLOOD PRESSURE: 144 MMHG | HEART RATE: 72 BPM | DIASTOLIC BLOOD PRESSURE: 78 MMHG | RESPIRATION RATE: 16 BRPM | TEMPERATURE: 97.1 F

## 2021-12-02 DIAGNOSIS — L89.623 PRESSURE INJURY OF LEFT HEEL, STAGE 3 (HCC): Primary | ICD-10-CM

## 2021-12-02 PROCEDURE — 99213 OFFICE O/P EST LOW 20 MIN: CPT | Performed by: FAMILY MEDICINE

## 2021-12-02 RX ORDER — LIDOCAINE HYDROCHLORIDE 40 MG/ML
5 SOLUTION TOPICAL ONCE
Status: COMPLETED | OUTPATIENT
Start: 2021-12-02 | End: 2021-12-02

## 2021-12-02 RX ADMIN — LIDOCAINE HYDROCHLORIDE 5 ML: 40 SOLUTION TOPICAL at 11:22

## 2021-12-09 ENCOUNTER — OFFICE VISIT (OUTPATIENT)
Dept: WOUND CARE | Facility: HOSPITAL | Age: 71
End: 2021-12-09
Payer: MEDICARE

## 2021-12-09 VITALS
SYSTOLIC BLOOD PRESSURE: 138 MMHG | DIASTOLIC BLOOD PRESSURE: 80 MMHG | RESPIRATION RATE: 20 BRPM | HEART RATE: 78 BPM | TEMPERATURE: 97.2 F

## 2021-12-09 DIAGNOSIS — L89.623 PRESSURE INJURY OF LEFT HEEL, STAGE 3 (HCC): Primary | ICD-10-CM

## 2021-12-09 PROCEDURE — 99212 OFFICE O/P EST SF 10 MIN: CPT | Performed by: FAMILY MEDICINE

## 2021-12-09 PROCEDURE — 99213 OFFICE O/P EST LOW 20 MIN: CPT | Performed by: FAMILY MEDICINE

## 2021-12-16 ENCOUNTER — OFFICE VISIT (OUTPATIENT)
Dept: WOUND CARE | Facility: HOSPITAL | Age: 71
End: 2021-12-16
Payer: MEDICARE

## 2021-12-16 DIAGNOSIS — L89.623 PRESSURE INJURY OF LEFT HEEL, STAGE 3 (HCC): Primary | ICD-10-CM

## 2021-12-16 PROCEDURE — 99212 OFFICE O/P EST SF 10 MIN: CPT | Performed by: FAMILY MEDICINE

## 2022-04-30 NOTE — PHYSICAL THERAPY NOTE
POC reviewed and ongoing. Pt states he is feeling better and believes it is due to getting more sleep since being on unit. He also feels the medication is helping him also. Denied SI/HI/AVH this shift. Remains with some depression and anxiety, but it is better today. Rested well. NO c/o voiced. Will continue to monitor for safety and any changes.   Physical Therapy Cancellation Note    Patient's Name: Jose E Cohn        04/07/21 1023   PT Last Visit   PT Visit Date 04/07/21   Note Type   Note Type Treatment   Cancel Reasons Other       Orders received, chart reviewed  Pt adamantly refusing any mobility with therapy at this time, becoming tearful stating "I'll stand when I get to the nursing home, but not here "  Will follow for PT treatment as medically appropriate  Thank you       Fany Rosas, PT, DPT

## 2022-10-12 PROBLEM — J18.9 PNEUMONIA OF RIGHT LOWER LOBE DUE TO INFECTIOUS ORGANISM: Status: RESOLVED | Noted: 2021-03-22 | Resolved: 2022-10-12

## 2023-05-03 NOTE — ANESTHESIA PROCEDURE NOTES
Arterial Line Insertion  Performed by: Jung Olivera MD  Authorized by: Jung Olivera MD   Consent: Verbal consent obtained  Written consent obtained  Risks and benefits: risks, benefits and alternatives were discussed  Patient understanding: patient states understanding of the procedure being performed  Patient consent: the patient's understanding of the procedure matches consent given  Procedure consent: procedure consent matches procedure scheduled  Relevant documents: relevant documents present and verified  Test results: test results available and properly labeled  Required items: required blood products, implants, devices, and special equipment available  Patient identity confirmed: anonymous protocol, patient vented/unresponsive  Preparation: Patient was prepped and draped in the usual sterile fashion    Indications: hemodynamic monitoring  Orientation:  Right  Location: radial artery  Sedation:  Patient sedated: yes  Sedation type: (GETA)      Procedure Details:  Needle gauge: 20  Seldinger technique: Seldinger technique used  Number of attempts: 1    Post-procedure:  Post-procedure: dressing applied  Waveform: good waveform  Patient tolerance: patient tolerated the procedure well with no immediate complications 100.3 100.4F

## 2023-05-20 ENCOUNTER — HOSPITAL ENCOUNTER (INPATIENT)
Facility: HOSPITAL | Age: 73
LOS: 2 days | End: 2023-05-23
Attending: EMERGENCY MEDICINE | Admitting: HOSPITALIST

## 2023-05-20 DIAGNOSIS — R94.31 PROLONGED Q-T INTERVAL ON ECG: ICD-10-CM

## 2023-05-20 DIAGNOSIS — T39.091A: Primary | ICD-10-CM

## 2023-05-20 DIAGNOSIS — F32.9 MAJOR DEPRESSION: ICD-10-CM

## 2023-05-20 DIAGNOSIS — Z00.8 MEDICAL CLEARANCE FOR PSYCHIATRIC ADMISSION: ICD-10-CM

## 2023-05-20 DIAGNOSIS — F41.9 ANXIETY: ICD-10-CM

## 2023-05-20 LAB
ALBUMIN SERPL BCP-MCNC: 3.8 G/DL (ref 3.5–5)
ALP SERPL-CCNC: 100 U/L (ref 34–104)
ALT SERPL W P-5'-P-CCNC: 7 U/L (ref 7–52)
AMPHETAMINES SERPL QL SCN: NEGATIVE
ANION GAP SERPL CALCULATED.3IONS-SCNC: 10 MMOL/L (ref 4–13)
APAP SERPL-MCNC: <10 UG/ML (ref 10–20)
AST SERPL W P-5'-P-CCNC: 14 U/L (ref 13–39)
ATRIAL RATE: 96 BPM
BACTERIA UR QL AUTO: ABNORMAL /HPF
BARBITURATES UR QL: NEGATIVE
BASOPHILS # BLD AUTO: 0.07 THOUSANDS/ÂΜL (ref 0–0.1)
BASOPHILS NFR BLD AUTO: 1 % (ref 0–1)
BENZODIAZ UR QL: POSITIVE
BILIRUB SERPL-MCNC: 0.33 MG/DL (ref 0.2–1)
BILIRUB UR QL STRIP: NEGATIVE
BUN SERPL-MCNC: 14 MG/DL (ref 5–25)
CALCIUM SERPL-MCNC: 9 MG/DL (ref 8.4–10.2)
CHLORIDE SERPL-SCNC: 101 MMOL/L (ref 96–108)
CLARITY UR: CLEAR
CO2 SERPL-SCNC: 28 MMOL/L (ref 21–32)
COCAINE UR QL: NEGATIVE
COLOR UR: YELLOW
CREAT SERPL-MCNC: 1.12 MG/DL (ref 0.6–1.3)
EOSINOPHIL # BLD AUTO: 0.37 THOUSAND/ÂΜL (ref 0–0.61)
EOSINOPHIL NFR BLD AUTO: 3 % (ref 0–6)
ERYTHROCYTE [DISTWIDTH] IN BLOOD BY AUTOMATED COUNT: 14.9 % (ref 11.6–15.1)
ETHANOL SERPL-MCNC: <10 MG/DL
GFR SERPL CREATININE-BSD FRML MDRD: 48 ML/MIN/1.73SQ M
GLUCOSE SERPL-MCNC: 100 MG/DL (ref 65–140)
GLUCOSE UR STRIP-MCNC: NEGATIVE MG/DL
HCT VFR BLD AUTO: 41.5 % (ref 34.8–46.1)
HGB BLD-MCNC: 13.1 G/DL (ref 11.5–15.4)
HGB UR QL STRIP.AUTO: NEGATIVE
HYALINE CASTS #/AREA URNS LPF: ABNORMAL /LPF
IMM GRANULOCYTES # BLD AUTO: 0.03 THOUSAND/UL (ref 0–0.2)
IMM GRANULOCYTES NFR BLD AUTO: 0 % (ref 0–2)
KETONES UR STRIP-MCNC: ABNORMAL MG/DL
LEUKOCYTE ESTERASE UR QL STRIP: ABNORMAL
LYMPHOCYTES # BLD AUTO: 1.97 THOUSANDS/ÂΜL (ref 0.6–4.47)
LYMPHOCYTES NFR BLD AUTO: 18 % (ref 14–44)
MCH RBC QN AUTO: 28.9 PG (ref 26.8–34.3)
MCHC RBC AUTO-ENTMCNC: 31.6 G/DL (ref 31.4–37.4)
MCV RBC AUTO: 92 FL (ref 82–98)
METHADONE UR QL: NEGATIVE
MONOCYTES # BLD AUTO: 0.9 THOUSAND/ÂΜL (ref 0.17–1.22)
MONOCYTES NFR BLD AUTO: 8 % (ref 4–12)
NEUTROPHILS # BLD AUTO: 7.7 THOUSANDS/ÂΜL (ref 1.85–7.62)
NEUTS SEG NFR BLD AUTO: 70 % (ref 43–75)
NITRITE UR QL STRIP: NEGATIVE
NON-SQ EPI CELLS URNS QL MICRO: ABNORMAL /HPF
NRBC BLD AUTO-RTO: 0 /100 WBCS
OPIATES UR QL SCN: NEGATIVE
OXYCODONE+OXYMORPHONE UR QL SCN: NEGATIVE
P AXIS: 78 DEGREES
PCP UR QL: NEGATIVE
PH UR STRIP.AUTO: 6.5 [PH]
PLATELET # BLD AUTO: 241 THOUSANDS/UL (ref 149–390)
PMV BLD AUTO: 10.2 FL (ref 8.9–12.7)
POTASSIUM SERPL-SCNC: 3.8 MMOL/L (ref 3.5–5.3)
PR INTERVAL: 180 MS
PROT SERPL-MCNC: 6.4 G/DL (ref 6.4–8.4)
PROT UR STRIP-MCNC: ABNORMAL MG/DL
QRS AXIS: -32 DEGREES
QRSD INTERVAL: 96 MS
QT INTERVAL: 428 MS
QTC INTERVAL: 540 MS
RBC # BLD AUTO: 4.53 MILLION/UL (ref 3.81–5.12)
RBC #/AREA URNS AUTO: ABNORMAL /HPF
SALICYLATES SERPL-MCNC: 10 MG/DL (ref 3–20)
SODIUM SERPL-SCNC: 139 MMOL/L (ref 135–147)
SP GR UR STRIP.AUTO: >=1.03 (ref 1–1.03)
T WAVE AXIS: 16 DEGREES
THC UR QL: NEGATIVE
TSH SERPL DL<=0.05 MIU/L-ACNC: 1.11 UIU/ML (ref 0.45–4.5)
UROBILINOGEN UR STRIP-ACNC: <2 MG/DL
VENTRICULAR RATE: 96 BPM
WBC # BLD AUTO: 11.04 THOUSAND/UL (ref 4.31–10.16)
WBC #/AREA URNS AUTO: ABNORMAL /HPF

## 2023-05-20 RX ORDER — MAGNESIUM HYDROXIDE/ALUMINUM HYDROXICE/SIMETHICONE 120; 1200; 1200 MG/30ML; MG/30ML; MG/30ML
30 SUSPENSION ORAL ONCE
Status: COMPLETED | OUTPATIENT
Start: 2023-05-20 | End: 2023-05-20

## 2023-05-20 RX ORDER — QUETIAPINE FUMARATE 100 MG/1
100 TABLET, FILM COATED ORAL
Status: DISCONTINUED | OUTPATIENT
Start: 2023-05-20 | End: 2023-05-21

## 2023-05-20 RX ORDER — RISPERIDONE 1 MG/1
1 TABLET ORAL
Status: CANCELLED | OUTPATIENT
Start: 2023-05-20

## 2023-05-20 RX ORDER — BUPROPION HYDROCHLORIDE 300 MG/1
300 TABLET ORAL DAILY
Status: ON HOLD | COMMUNITY
Start: 2023-03-02 | End: 2023-05-26 | Stop reason: SDUPTHER

## 2023-05-20 RX ORDER — ACETAMINOPHEN 325 MG/1
650 TABLET ORAL EVERY 6 HOURS PRN
Status: DISCONTINUED | OUTPATIENT
Start: 2023-05-20 | End: 2023-05-21

## 2023-05-20 RX ORDER — RISPERIDONE 0.25 MG/1
0.25 TABLET ORAL
Status: CANCELLED | OUTPATIENT
Start: 2023-05-20

## 2023-05-20 RX ORDER — FAMOTIDINE 10 MG/ML
20 INJECTION, SOLUTION INTRAVENOUS ONCE
Status: COMPLETED | OUTPATIENT
Start: 2023-05-20 | End: 2023-05-20

## 2023-05-20 RX ORDER — HALOPERIDOL 5 MG/ML
5 INJECTION INTRAMUSCULAR
Status: CANCELLED | OUTPATIENT
Start: 2023-05-20

## 2023-05-20 RX ORDER — HYDROXYZINE HYDROCHLORIDE 25 MG/1
25 TABLET, FILM COATED ORAL
Status: CANCELLED | OUTPATIENT
Start: 2023-05-20

## 2023-05-20 RX ORDER — ATORVASTATIN CALCIUM 40 MG/1
40 TABLET, FILM COATED ORAL DAILY
Status: CANCELLED | OUTPATIENT
Start: 2023-05-21

## 2023-05-20 RX ORDER — GABAPENTIN 100 MG/1
100 CAPSULE ORAL 3 TIMES DAILY
Status: CANCELLED | OUTPATIENT
Start: 2023-05-20

## 2023-05-20 RX ORDER — QUETIAPINE FUMARATE 100 MG/1
100 TABLET, FILM COATED ORAL
Status: CANCELLED | OUTPATIENT
Start: 2023-05-20

## 2023-05-20 RX ORDER — BUPROPION HYDROCHLORIDE 150 MG/1
150 TABLET ORAL DAILY
Status: DISCONTINUED | OUTPATIENT
Start: 2023-05-21 | End: 2023-05-22

## 2023-05-20 RX ORDER — PANTOPRAZOLE SODIUM 40 MG/1
40 TABLET, DELAYED RELEASE ORAL
Status: CANCELLED | OUTPATIENT
Start: 2023-05-21

## 2023-05-20 RX ORDER — PANTOPRAZOLE SODIUM 40 MG/1
40 TABLET, DELAYED RELEASE ORAL
Status: DISCONTINUED | OUTPATIENT
Start: 2023-05-21 | End: 2023-05-23 | Stop reason: HOSPADM

## 2023-05-20 RX ORDER — RISPERIDONE 0.25 MG/1
0.5 TABLET ORAL
Status: CANCELLED | OUTPATIENT
Start: 2023-05-20

## 2023-05-20 RX ORDER — ALPRAZOLAM 0.25 MG/1
0.25 TABLET ORAL 3 TIMES DAILY PRN
COMMUNITY
End: 2023-05-26

## 2023-05-20 RX ORDER — MAGNESIUM HYDROXIDE/ALUMINUM HYDROXICE/SIMETHICONE 120; 1200; 1200 MG/30ML; MG/30ML; MG/30ML
30 SUSPENSION ORAL EVERY 4 HOURS PRN
Status: CANCELLED | OUTPATIENT
Start: 2023-05-20

## 2023-05-20 RX ORDER — GABAPENTIN 100 MG/1
100 CAPSULE ORAL DAILY
Status: DISCONTINUED | OUTPATIENT
Start: 2023-05-21 | End: 2023-05-23 | Stop reason: HOSPADM

## 2023-05-20 RX ORDER — CLOPIDOGREL BISULFATE 75 MG/1
75 TABLET ORAL DAILY
Status: DISCONTINUED | OUTPATIENT
Start: 2023-05-21 | End: 2023-05-23 | Stop reason: HOSPADM

## 2023-05-20 RX ORDER — LORAZEPAM 2 MG/ML
1 INJECTION INTRAMUSCULAR
Status: CANCELLED | OUTPATIENT
Start: 2023-05-20

## 2023-05-20 RX ORDER — BUSPIRONE HYDROCHLORIDE 10 MG/1
10 TABLET ORAL 3 TIMES DAILY
Status: DISCONTINUED | OUTPATIENT
Start: 2023-05-20 | End: 2023-05-21

## 2023-05-20 RX ORDER — LANOLIN ALCOHOL/MO/W.PET/CERES
3 CREAM (GRAM) TOPICAL
Status: CANCELLED | OUTPATIENT
Start: 2023-05-20

## 2023-05-20 RX ORDER — SERTRALINE HYDROCHLORIDE 100 MG/1
100 TABLET, FILM COATED ORAL
COMMUNITY
Start: 2023-02-12 | End: 2023-05-26

## 2023-05-20 RX ORDER — ATORVASTATIN CALCIUM 40 MG/1
40 TABLET, FILM COATED ORAL
Status: DISCONTINUED | OUTPATIENT
Start: 2023-05-20 | End: 2023-05-23 | Stop reason: HOSPADM

## 2023-05-20 RX ADMIN — FAMOTIDINE 20 MG: 10 INJECTION, SOLUTION INTRAVENOUS at 16:29

## 2023-05-20 RX ADMIN — ACETAMINOPHEN 650 MG: 325 TABLET, FILM COATED ORAL at 23:02

## 2023-05-20 RX ADMIN — QUETIAPINE FUMARATE 100 MG: 100 TABLET ORAL at 21:42

## 2023-05-20 RX ADMIN — BUSPIRONE HYDROCHLORIDE 10 MG: 10 TABLET ORAL at 21:42

## 2023-05-20 RX ADMIN — ALUMINUM HYDROXIDE, MAGNESIUM HYDROXIDE, AND SIMETHICONE 30 ML: 200; 200; 20 SUSPENSION ORAL at 16:29

## 2023-05-20 RX ADMIN — SERTRALINE HYDROCHLORIDE 100 MG: 50 TABLET ORAL at 21:42

## 2023-05-20 RX ADMIN — ATORVASTATIN CALCIUM 40 MG: 40 TABLET, FILM COATED ORAL at 18:00
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
## 2023-05-21 PROBLEM — F32.9 MDD (MAJOR DEPRESSIVE DISORDER): Status: ACTIVE | Noted: 2023-05-21

## 2023-05-21 PROBLEM — R94.31 PROLONGED Q-T INTERVAL ON ECG: Status: ACTIVE | Noted: 2023-05-21

## 2023-05-21 LAB
ATRIAL RATE: 82 BPM
ATRIAL RATE: 88 BPM
P AXIS: 72 DEGREES
P AXIS: 76 DEGREES
PR INTERVAL: 186 MS
PR INTERVAL: 202 MS
QRS AXIS: 39 DEGREES
QRS AXIS: 62 DEGREES
QRSD INTERVAL: 100 MS
QRSD INTERVAL: 98 MS
QT INTERVAL: 460 MS
QT INTERVAL: 474 MS
QTC INTERVAL: 553 MS
QTC INTERVAL: 556 MS
T WAVE AXIS: 234 DEGREES
T WAVE AXIS: 244 DEGREES
VENTRICULAR RATE: 82 BPM
VENTRICULAR RATE: 88 BPM

## 2023-05-21 RX ORDER — MAGNESIUM SULFATE HEPTAHYDRATE 40 MG/ML
2 INJECTION, SOLUTION INTRAVENOUS ONCE
Status: COMPLETED | OUTPATIENT
Start: 2023-05-21 | End: 2023-05-21

## 2023-05-21 RX ORDER — HEPARIN SODIUM 5000 [USP'U]/ML
5000 INJECTION, SOLUTION INTRAVENOUS; SUBCUTANEOUS EVERY 8 HOURS SCHEDULED
Status: DISCONTINUED | OUTPATIENT
Start: 2023-05-21 | End: 2023-05-23 | Stop reason: HOSPADM

## 2023-05-21 RX ORDER — LANOLIN ALCOHOL/MO/W.PET/CERES
6 CREAM (GRAM) TOPICAL
Status: DISCONTINUED | OUTPATIENT
Start: 2023-05-21 | End: 2023-05-23 | Stop reason: HOSPADM

## 2023-05-21 RX ORDER — LANOLIN ALCOHOL/MO/W.PET/CERES
400 CREAM (GRAM) TOPICAL 2 TIMES DAILY
Status: DISCONTINUED | OUTPATIENT
Start: 2023-05-21 | End: 2023-05-23

## 2023-05-21 RX ADMIN — BUPROPION HYDROCHLORIDE 150 MG: 150 TABLET, EXTENDED RELEASE ORAL at 09:13

## 2023-05-21 RX ADMIN — ATORVASTATIN CALCIUM 40 MG: 40 TABLET, FILM COATED ORAL at 17:24

## 2023-05-21 RX ADMIN — MELATONIN 6 MG: at 20:18

## 2023-05-21 RX ADMIN — MAGNESIUM OXIDE TAB 400 MG (241.3 MG ELEMENTAL MG) 400 MG: 400 (241.3 MG) TAB at 17:24

## 2023-05-21 RX ADMIN — ACETAMINOPHEN 650 MG: 325 TABLET, FILM COATED ORAL at 12:16

## 2023-05-21 RX ADMIN — BUSPIRONE HYDROCHLORIDE 10 MG: 10 TABLET ORAL at 09:13

## 2023-05-21 RX ADMIN — MAGNESIUM SULFATE HEPTAHYDRATE 2 G: 2 INJECTION, SOLUTION INTRAVENOUS at 11:47

## 2023-05-21 RX ADMIN — PANTOPRAZOLE SODIUM 40 MG: 40 TABLET, DELAYED RELEASE ORAL at 06:37

## 2023-05-21 NOTE — ED CARE HANDOFF
Emergency Department Sign Out Note        Sign out and transfer of care from Dr Matthew Kennedy  See Separate Emergency Department note  The patient, Deanna Harry, was evaluated by the previous provider for depression  Workup Completed:  H&P, EKG, labs, crisis eval with pending placement    ED Course / Workup Pending (followup): Patient with prolonged QTc on EKG  Discussed with Dr Ella Briones who recommended repeat EKG  Repeat EKG continues to show prolonged QTc at 550msec from 540 on original  Dr Ella Briones advised 2G mag bolus  Patient is  extremely tearful on reexamination  She denies any associated chest pain, pressure, shortness of breath  She states that she has a broken heart  The patient states that her family has been managing her medications and they are laid out for her  She denied taking any extra of her medications  The patient is still able to ambulate to the restroom using her walker without difficulty upon my examination  Given the patient's prolonged QTc after magnesium, the plan is for admission and withholding QTc prolonging medications  ED Course as of 05/21/23 1411   Sun May 21, 2023   1342 Patient reevaluated after magnesium bolus and repeat EKG  I then discussed this with Dr Ella Briones from toxicology who is recommending admission for further monitoring and cessation of QT prolonging medications   1349 Discussed with Dr Taiwo Cortez from [facility] who asked for D/W Cards  Cards paged   [phone] Discussed case with cardiology who recommended admission  I then discussed again with Dr Taiwo Cortez who accepts to medicine service     ECG 12 Lead Documentation Only    Date/Time: 5/21/2023 11:34 AM  Performed by: Adan Arevalo DO  Authorized by:  Adan Arevalo DO     Indications / Diagnosis:  Abnormal EKG  ECG reviewed by me, the ED Provider: yes    Patient location:  ED  Previous ECG:     Previous ECG:  Compared to current    Comparison ECG info:  QTC mildly prolonged with new inverted T waves when compared to EKG from  5/21    Similarity:  Changes noted  Interpretation:     Interpretation: normal    Rate:     ECG rate:  82    ECG rate assessment: normal    Rhythm:     Rhythm: sinus rhythm    Ectopy:     Ectopy: none    QRS:     QRS axis:  Normal    QRS intervals:  Normal  Conduction:     Conduction: abnormal      Abnormal conduction: 1st degree    ST segments:     ST segments:  Normal  T waves:     T waves: normal    Other findings:     Other findings: prolonged qTc interval      ECG 12 Lead Documentation Only    Date/Time: 5/21/2023 1:42 PM  Performed by: Claudeen Giovanni, DO  Authorized by:  Claudeen Giovanni, DO     Indications / Diagnosis:  Prolonged QTc  ECG reviewed by me, the ED Provider: yes    Patient location:  ED  Previous ECG:     Previous ECG:  Compared to current    Similarity:  No change  Interpretation:     Interpretation: normal    Rate:     ECG rate:  88    ECG rate assessment: normal    Rhythm:     Rhythm: sinus rhythm    Ectopy:     Ectopy: none    QRS:     QRS axis:  Normal    QRS intervals:  Normal  Conduction:     Conduction: normal    ST segments:     ST segments:  Normal  T waves:     T waves: normal    Comments:      QTc remains prolonged at 556Msec      MDM        Disposition  Final diagnoses:   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, initial encounter   Major depression   Prolonged Q-T interval on ECG     Time reflects when diagnosis was documented in both MDM as applicable and the Disposition within this note     Time User Action Codes Description Comment    5/20/2023  2:54 PM Jeffery Garcia Add [P07 [id number]] 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, initial encounter     5/20/2023  3:14 PM Jeffery Garcia Add [F32 9] Major depression     5/21/2023  2:03 PM Francisco OLIVERA Add [R94 31] Prolonged Q-T interval on ECG       ED Disposition     ED Disposition   Admit    Condition   Stable    Date/Time   Sun May 21, 2023  2:03 PM    Comment MD Documentation    Manpreet Cough Most Recent Value   Patient Condition The patient has been stabilized such that within reasonable medical probability, no material deterioration of the patient condition or the condition of the unborn child(amelia) is likely to result from the transfer   Reason for Transfer Level of Care needed not available at this facility   Benefits of Transfer Specialized equipment and/or services available at the receiving facility (Include comment)________________________   Risks of Transfer Potential for delay in receiving treatment, Potential deterioration of medical condition, Increased discomfort during transfer   Accepting Physician Dr Desmond Dukes MD   Accepting Facility Name, [facility], [address]   Sending MD Dr Lata Barbosa   Provider Certification General risk, such as traffic hazards, adverse weather conditions, rough terrain or turbulence, possible failure of equipment (including vehicle or aircraft), or consequences of actions of persons outside the control of the transport personnel, Unanticipated needs of medical equipment and personnel during transport, Risk of worsening condition, The possibility of a transport vehicle being unavailable      RN Documentation    [address], [facility] [place] [facility], [address] , [place], [address]      Follow-up Information    None       Patient's Medications   Discharge Prescriptions    No medications on file     No discharge procedures on file         ED Provider  Electronically Signed by     Mike Massey DO  05/21/23 3427

## 2023-05-21 NOTE — ED NOTES
"Spoke with patient regarding signing paper work for transfer  Patient refusing, argumentative at times  States that \"you all\" lied to me yesterday or I would not have agreed to stay, states that she will \"just go home\"  Attempted to explain options to patient signing vs not signing  Patient talking over this nurse, turning face away        Lord Placido RN  05/21/23 0216    "

## 2023-05-21 NOTE — ASSESSMENT & PLAN NOTE
"· Initially presented to the ED due to severe depression and anxiety due to recent social stressors including  who has been exhibiting personality changes, using her medications, and reportedly \"left her\"   · Patient confirms taking Seroquel, Zoloft, Wellbutrin at home, she states she does not think she was taking buspar  · Hold Seroquel and Zoloft given QTc  · Continue Wellbutrin  · Patient initially signed 201 on 5/20, was scheduled for inpatient [facility] transfer on 5/21 however then refused to sign EMTALA form and was admitted due to prolonged QTc interval   · 5/21- patient now refusing inpatient psych, emotionally labile on exam  · Consult psychiatry for medication adjustment and possible 302 evaluation  · Continue virtual one-to-one  "

## 2023-05-21 NOTE — EMTALA/ACUTE CARE TRANSFER
[facility] EMERGENCY DEPARTMENT  [address]  [facility] [address]  Dept: [phone]      YLTFKN TRANSFER CONSENT    NAME Logan Smith                                         1950                              MRN [id number]    I have been informed of my rights regarding examination, treatment, and transfer   by Dr Huffman att  providers found    Benefits: Specialized equipment and/or services available at the receiving facility (Include comment)________________________    Risks: Potential for delay in receiving treatment, Potential deterioration of medical condition, Increased discomfort during transfer      Consent for Transfer:  I acknowledge that my medical condition has been evaluated and explained to me by the emergency department physician or other qualified medical person and/or my attending physician, who has recommended that I be transferred to the service of  Accepting Physician: Dr Za Bowens MD at [address] [place] [address] Name, [address] : [facility], [address] [address] [address] [facility] [address], [address]  The above potential benefits of such transfer, the potential risks associated with such transfer, and the probable risks of not being transferred have been explained to me, and I fully understand them  The doctor has explained that, in my case, the benefits of transfer outweigh the risks  I agree to be transferred  I authorize the performance of emergency medical procedures and treatments upon me in both transit and upon arrival at the receiving facility  Additionally, I authorize the release of any and all medical records to the receiving facility and request they be transported with me, if possible  I understand that the safest mode of transportation during a medical emergency is an ambulance and that the Hospital advocates the use of this mode of transport   Risks of traveling to the receiving facility by car, including absence of medical control, life sustaining equipment, such as oxygen, and medical personnel has been explained to me and I fully understand them  (QUE CORRECT BOX BELOW)  [  ]  I consent to the stated transfer and to be transported by ambulance/helicopter  [  ]  I consent to the stated transfer, but refuse transportation by ambulance and accept full responsibility for my transportation by car  I understand the risks of non-ambulance transfers and I exonerate the Hospital and its staff from any deterioration in my condition that results from this refusal     X___________________________________________    DATE  23  TIME________  Signature of patient or legally responsible individual signing on patient behalf           RELATIONSHIP TO PATIENT_________________________          Provider Certification    NAME Johnnie Aponte                                         1950                              MRN [id number]    A medical screening exam was performed on the above named patient  Based on the examination:    Condition Necessitating Transfer The primary encounter diagnosis was 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, initial encounter  A diagnosis of Major depression was also pertinent to this visit      Patient Condition: The patient has been stabilized such that within reasonable medical probability, no material deterioration of the patient condition or the condition of the unborn child(amelia) is likely to result from the transfer    Reason for Transfer: Level of Care needed not available at this facility    Transfer Requirements: Facility [facility], [address] [facility], [address]   · Space available and qualified personnel available for treatment as acknowledged by    · Agreed to accept transfer and to provide appropriate medical treatment as acknowledged by       Dr Suzie Montalvo MD  · Appropriate medical records of the examination and treatment of the patient are provided at the time of transfer   [address] [facility] COMPLETED _______  · Transfer will be performed by qualified personnel from    and appropriate transfer equipment as required, including the use of necessary and appropriate life support measures  Provider Certification: I have examined the patient and explained the following risks and benefits of being transferred/refusing transfer to the patient/family:  General risk, such as traffic hazards, adverse weather conditions, rough terrain or turbulence, possible failure of equipment (including vehicle or aircraft), or consequences of actions of persons outside the control of the transport personnel, Unanticipated needs of medical equipment and personnel during transport, Risk of worsening condition, The possibility of a transport vehicle being unavailable      Based on these reasonable risks and benefits to the patient and/or the unborn child(amelia), and based upon the information available at the time of the patient’s examination, I certify that the medical benefits reasonably to be expected from the provision of appropriate medical treatments at another medical facility outweigh the increasing risks, if any, to the individual’s medical condition, and in the case of labor to the unborn child, from effecting the transfer      X____________________________________________ DATE 05/21/23        TIME_______      ORIGINAL - SEND TO MEDICAL RECORDS   COPY - SEND WITH PATIENT DURING TRANSFER

## 2023-05-21 NOTE — H&P
"[facility]  H&P  Name: Keiko Amado 74 y o  female I MRN: [id number]  Unit/Bed#: ED 02 I Date of Admission: 5/20/2023   Date of Service: 5/21/2023 I Hospital Day: 0      Assessment/Plan   * Prolonged Q-T interval on ECG  Assessment & Plan  · Initially presented to the ED 5/20 due to depression, see below  · Med tox have been consulted due to overuse of Excedrin, medically cleared from the standpoint however noted prolonged QTc not improved 5/21   · Most recent QTc 550 (540 on presentation)  · Will monitor overnight on telemetry and hold home Zoloft and sertraline (last doses 5/20)  · Continue wellbutrin  · S/P IV mag 2 g --> continue PO mag BID  · Goal QTC < 540, cleared from med tox standpoint when < 500    MDD (major depressive disorder)  Assessment & Plan  · Initially presented to the ED due to severe depression and anxiety due to recent social stressors including  who has been exhibiting personality changes, using her medications, and reportedly \"left her\"   · Patient confirms taking Seroquel, Zoloft, Wellbutrin at home, she states she does not think she was taking buspar  · Hold Seroquel and Zoloft given QTc  · Continue Wellbutrin  · Patient initially signed 201 on 5/20, was scheduled for inpatient BHU transfer on 5/21 however then refused to sign EMTALA form and was admitted due to prolonged QTc interval   · 5/21- patient now refusing inpatient psych, emotionally labile on exam  · Consult psychiatry for medication adjustment and possible 302 evaluation  · Continue virtual one-to-one    GERD (gastroesophageal reflux disease)  Assessment & Plan  · Continue Protonix           VTE Pharmacologic Prophylaxis:   Moderate Risk (Score 3-4) - Pharmacological DVT Prophylaxis Ordered: heparin  Code Status: Level 1 - Full Code per patient   Discussion with family: Updated  (daughter) via phone      Anticipated Length of Stay: Patient will be admitted on an inpatient " "basis with an anticipated length of stay of greater than 2 midnights secondary to See above  Total Time Spent on Date of Encounter in care of patient: 65 minutes This time was spent on one or more of the following: performing physical exam; counseling and coordination of care; obtaining or reviewing history; documenting in the medical record; reviewing/ordering tests, medications or procedures; communicating with other healthcare professionals and discussing with patient's family/caregivers  Chief Complaint: \"I can't stop crying\"    History of Present Illness:  Wilhelmena Duane is a 74 y o  female with a PMH of anxiety, depression, hypertension no longer on antihypertensives, CAD, GERD who recently presented to the ED on 5/20 due to worsening depression and anxiety in setting of recent social stressors  Patient had reported \"I have been crying for days\" as her  has recently exhibited personality changes, was taking her Xanax, and has recently \"left her\" and had not returned to the house leaving her very distressed  Discussed with daughter over phone who states the patient has not been taking care of herself with poor hygiene, overusing her sleep medication, poor nutrition, etc   Patient was evaluated by crisis in the ED on 5/20 and originally signed 201 form with planned transfer to inpatient BHU on 5/21  Notably toxicology has been consulted given patient's overuse of Excedrin, she was cleared from that standpoint however was found to have a prolonged QTc interval at 540  Toxicology recommended monitoring with serial EKGs and 2 g mag however QTc was persistently prolonged at 550  Patient was no longer cleared for inpatient psych and admitted for overnight observation on telemetry  Patient denies any alcohol use or drug use    She denies any physical symptoms including fevers or chills, chest pain, shortness of breath, palpitations, abdominal symptoms, urinary symptoms, additional complaints " however she notes feeling terrible emotionally  Patient states she no longer wants to go to inpatient psych however is agreeable to medical treatment  Labs and vitals are stable, UA unremarkable  Review of Systems:  Review of Systems   Constitutional: Negative for chills and fever  HENT: Negative for congestion, rhinorrhea and sore throat  Eyes: Negative for visual disturbance  Respiratory: Negative for cough, chest tightness, shortness of breath and wheezing  Cardiovascular: Negative for chest pain, palpitations and leg swelling  Gastrointestinal: Negative for abdominal pain, constipation, diarrhea, nausea and vomiting  Genitourinary: Negative for difficulty urinating, dysuria, frequency and urgency  Musculoskeletal: Negative for arthralgias, back pain and myalgias  Skin: Negative for rash and wound  Neurological: Negative for dizziness, light-headedness and headaches  Psychiatric/Behavioral: Positive for agitation, dysphoric mood and sleep disturbance  The patient is nervous/anxious  All other systems reviewed and are negative  Past Medical and Surgical History:   Past Medical History:   Diagnosis Date   • Anemia    • Cardiac disease    • Chronic pain    • Coronary artery disease    • Hypertension    • PVD (peripheral vascular disease) ([facility] [address] )        Past Surgical History:   Procedure Laterality Date   • ANKLE SURGERY     • IR CHEST TUBE PLACEMENT  3/23/2021   • IR NON-TUNNELED CENTRAL LINE PLACEMENT  3/24/2021   • THORACOSCOPY VIDEO ASSISTED SURGERY (VATS) Right 4/2/2021    Procedure: THORACOSCOPY VIDEO ASSISTED SURGERY (VATS); DECORTICATION;  Surgeon: Graciela Smith MD;  Location: [facility];  Service: Thoracic       Meds/Allergies:  Prior to Admission medications    Medication Sig Start Date End Date Taking?  Authorizing Provider   acetaminophen (TYLENOL) 325 mg tablet Take 3 tablets (975 mg total) by mouth every 8 (eight) hours 4/13/21   NICKOLAS Alberto   ALPRAZoyolanda Amada Kettle) 0 25 mg tablet Take 0 25 mg by mouth Three times daily as needed    Historical Provider, MD   atorvastatin (LIPITOR) 40 mg tablet Take 40 mg by mouth daily  Historical Provider, MD   buPROPion (WELLBUTRIN XL) 300 mg 24 hr tablet Take 300 mg by mouth daily 3/2/23   Historical Provider, MD   busPIRone (BUSPAR) 10 mg tablet Take 10 mg by mouth 3 (three) times a day    Historical Provider, MD   clopidogrel (PLAVIX) 75 mg tablet Take 75 mg by mouth daily  Historical Provider, MD   gabapentin (NEURONTIN) 100 mg capsule Take 1 capsule (100 mg total) by mouth 3 (three) times a day 21   NICKOLAS Alberto   omeprazole (PriLOSEC) 40 MG capsule Take 40 mg by mouth daily 23   Historical Provider, MD   QUEtiapine (SEROquel) 100 mg tablet Take 100 mg by mouth daily at bedtime    Historical Provider, MD   sertraline (ZOLOFT) 100 mg tablet Take 100 mg by mouth daily at bedtime 23   Historical Provider, MD   traZODone (DESYREL) 100 mg tablet Take 2 tablets (200 mg total) by mouth daily at bedtime 21   NICKOLAS Alberto     I have reviewed home medications with patient personally  Allergies:    Allergies   Allergen Reactions   • Aspirin GI Bleeding     Patient states she is not allergic to ASA    • Digoxin Hives     HA   • Gadolinium Derivatives        Social History:  Marital Status: /Civil Union   Occupation: Not assessed  Patient Pre-hospital Living Situation: Home, With spouse  Patient Pre-hospital Level of Mobility: walks with walker  Patient Pre-hospital Diet Restrictions: None  Substance Use History:   Social History     Substance and Sexual Activity   Alcohol Use Not Currently    Comment: social     Social History     Tobacco Use   Smoking Status Former   • Packs/day: 1 00   • Years: 0 10   • Pack years: 0 10   • Types: Cigarettes   • Quit date:    • Years since quittin 3   Smokeless Tobacco Never   Tobacco Comments    pt states she stopped smoking 3 months ago ( 4/22/21)     Social History     Substance and Sexual Activity   Drug Use No       Family History:  Family History   Problem Relation Age of Onset   • No Known Problems Mother    • No Known Problems Father    • Heart disease Family        Physical Exam:     Vitals:   Blood Pressure: 120/70 (05/21/23 1330)  Pulse: 90 (05/21/23 1330)  Temperature: 97 5 °F (36 4 °C) (05/20/23 1306)  Respirations: 17 (05/21/23 1330)  Weight - Scale: 70 3 kg (155 lb) (05/20/23 1306)  SpO2: 99 % (05/21/23 1330)    Physical Exam  Vitals and nursing note reviewed  Constitutional:       General: She is not in acute distress  Appearance: She is well-developed  She is not ill-appearing  HENT:      Head: Normocephalic and atraumatic  Eyes:      General:         Right eye: No discharge  Left eye: No discharge  Extraocular Movements: Extraocular movements intact  Conjunctiva/sclera: Conjunctivae normal    Cardiovascular:      Rate and Rhythm: Normal rate and regular rhythm  Heart sounds: No murmur heard  Pulmonary:      Effort: Pulmonary effort is normal  No respiratory distress  Breath sounds: Normal breath sounds  No wheezing, rhonchi or rales  Abdominal:      General: Bowel sounds are normal  There is no distension  Palpations: Abdomen is soft  Tenderness: There is no abdominal tenderness  Musculoskeletal:      Cervical back: Neck supple  Right lower leg: No edema  Left lower leg: No edema  Skin:     General: Skin is warm and dry  Capillary Refill: Capillary refill takes less than 2 seconds  Neurological:      Mental Status: She is alert and oriented to person, place, and time  Mental status is at baseline  Cranial Nerves: No cranial nerve deficit  Psychiatric:      Comments: Patient is emotionally labile, intermittently agitated and angry, then crying and tearful            Additional Data:     Lab Results:  Results from last 7 days   Lab Units 05/20/23  1318   WBC Thousand/uL 11 04*   HEMOGLOBIN g/dL 13 1   HEMATOCRIT % 41 5   PLATELETS Thousands/uL 241   NEUTROS PCT % 70   LYMPHS PCT % 18   MONOS PCT % 8   EOS PCT % 3     Results from last 7 days   Lab Units 05/20/23  1318   SODIUM mmol/L 139   POTASSIUM mmol/L 3 8   CHLORIDE mmol/L 101   CO2 mmol/L 28   BUN mg/dL 14   CREATININE mg/dL 1 12   ANION GAP mmol/L 10   CALCIUM mg/dL 9 0   ALBUMIN g/dL 3 8   TOTAL BILIRUBIN mg/dL 0 33   ALK PHOS U/L 100   ALT U/L 7   AST U/L 14   GLUCOSE RANDOM mg/dL 100                       Lines/Drains:  Invasive Devices     Peripheral Intravenous Line  Duration           Peripheral IV 05/21/23 Dorsal (posterior); Right Hand <1 day                    Imaging: No pertinent imaging reviewed  No orders to display       EKG and Other Studies Reviewed on Admission:   · EKG: NSR, prolonged QTc 550  ** Please Note: This note has been constructed using a voice recognition system   **

## 2023-05-21 NOTE — ED NOTES
Updated patient's daughter that patient is going to be transferred to [facility]  Patient is requesting clothes and dentures be brought to her  Patient's daughter stated that she would coordinate to do so with her sister

## 2023-05-21 NOTE — ASSESSMENT & PLAN NOTE
· Initially presented to the ED 5/20 due to depression, see below     · Med tox have been consulted due to overuse of Excedrin, medically cleared from the standpoint however noted prolonged QTc not improved 5/21   · Most recent QTc 550 (540 on presentation)  · Will monitor overnight on telemetry and hold home Zoloft and sertraline (last doses 5/20)  · Continue wellbutrin  · S/P IV mag 2 g --> continue PO mag BID  · Goal QTC < 540, cleared from med tox standpoint when < 500

## 2023-05-21 NOTE — ED NOTES
Patient refusing to sign EMTALA  Notified charge who also attempted to talk with patient, however she still refused to sign EMTALA  Provider to be notified

## 2023-05-21 NOTE — ED NOTES
Patient is accepted at [facility]  Patient is accepted by Dr Cherry Rehman MD per Alfred Belcher  Transportation is arranged with [employer]  Transportation is scheduled for **  Patient may go to the floor at/after 0900  Nurse report is to be called to [phone] prior to patient transfer

## 2023-05-22 LAB
ALBUMIN SERPL BCP-MCNC: 3.6 G/DL (ref 3.5–5)
ALP SERPL-CCNC: 84 U/L (ref 34–104)
ALT SERPL W P-5'-P-CCNC: 6 U/L (ref 7–52)
ANION GAP SERPL CALCULATED.3IONS-SCNC: 11 MMOL/L (ref 4–13)
AST SERPL W P-5'-P-CCNC: 11 U/L (ref 13–39)
ATRIAL RATE: 78 BPM
ATRIAL RATE: 78 BPM
BILIRUB SERPL-MCNC: 0.43 MG/DL (ref 0.2–1)
BUN SERPL-MCNC: 12 MG/DL (ref 5–25)
CALCIUM SERPL-MCNC: 8.8 MG/DL (ref 8.4–10.2)
CHLORIDE SERPL-SCNC: 102 MMOL/L (ref 96–108)
CO2 SERPL-SCNC: 26 MMOL/L (ref 21–32)
CREAT SERPL-MCNC: 1.09 MG/DL (ref 0.6–1.3)
ERYTHROCYTE [DISTWIDTH] IN BLOOD BY AUTOMATED COUNT: 15 % (ref 11.6–15.1)
GFR SERPL CREATININE-BSD FRML MDRD: 50 ML/MIN/1.73SQ M
GLUCOSE SERPL-MCNC: 124 MG/DL (ref 65–140)
HCT VFR BLD AUTO: 39.8 % (ref 34.8–46.1)
HGB BLD-MCNC: 12.8 G/DL (ref 11.5–15.4)
MAGNESIUM SERPL-MCNC: 2.3 MG/DL (ref 1.9–2.7)
MCH RBC QN AUTO: 29.1 PG (ref 26.8–34.3)
MCHC RBC AUTO-ENTMCNC: 32.2 G/DL (ref 31.4–37.4)
MCV RBC AUTO: 91 FL (ref 82–98)
P AXIS: 57 DEGREES
P AXIS: 77 DEGREES
PHOSPHATE SERPL-MCNC: 3.3 MG/DL (ref 2.3–4.1)
PLATELET # BLD AUTO: 270 THOUSANDS/UL (ref 149–390)
PMV BLD AUTO: 10.5 FL (ref 8.9–12.7)
POTASSIUM SERPL-SCNC: 3.4 MMOL/L (ref 3.5–5.3)
PR INTERVAL: 176 MS
PR INTERVAL: 182 MS
PROT SERPL-MCNC: 6.6 G/DL (ref 6.4–8.4)
QRS AXIS: 62 DEGREES
QRS AXIS: 70 DEGREES
QRSD INTERVAL: 112 MS
QRSD INTERVAL: 114 MS
QT INTERVAL: 468 MS
QT INTERVAL: 468 MS
QTC INTERVAL: 533 MS
QTC INTERVAL: 533 MS
RBC # BLD AUTO: 4.4 MILLION/UL (ref 3.81–5.12)
SODIUM SERPL-SCNC: 139 MMOL/L (ref 135–147)
T WAVE AXIS: 206 DEGREES
T WAVE AXIS: 230 DEGREES
VENTRICULAR RATE: 78 BPM
VENTRICULAR RATE: 78 BPM
WBC # BLD AUTO: 10.5 THOUSAND/UL (ref 4.31–10.16)

## 2023-05-22 RX ORDER — MIRTAZAPINE 15 MG/1
15 TABLET, FILM COATED ORAL
Status: DISCONTINUED | OUTPATIENT
Start: 2023-05-22 | End: 2023-05-23 | Stop reason: HOSPADM

## 2023-05-22 RX ORDER — BUPROPION HYDROCHLORIDE 300 MG/1
300 TABLET ORAL DAILY
Status: DISCONTINUED | OUTPATIENT
Start: 2023-05-23 | End: 2023-05-23 | Stop reason: HOSPADM

## 2023-05-22 RX ORDER — ARIPIPRAZOLE 2 MG/1
2 TABLET ORAL DAILY
Status: DISCONTINUED | OUTPATIENT
Start: 2023-05-22 | End: 2023-05-23 | Stop reason: HOSPADM

## 2023-05-22 RX ORDER — POTASSIUM CHLORIDE 20 MEQ/1
40 TABLET, EXTENDED RELEASE ORAL ONCE
Status: COMPLETED | OUTPATIENT
Start: 2023-05-22 | End: 2023-05-22

## 2023-05-22 RX ORDER — ACETAMINOPHEN 325 MG/1
650 TABLET ORAL EVERY 6 HOURS PRN
Status: DISCONTINUED | OUTPATIENT
Start: 2023-05-22 | End: 2023-05-23 | Stop reason: HOSPADM

## 2023-05-22 RX ADMIN — GABAPENTIN 100 MG: 100 CAPSULE ORAL at 09:21

## 2023-05-22 RX ADMIN — BUPROPION HYDROCHLORIDE 150 MG: 150 TABLET, EXTENDED RELEASE ORAL at 09:21

## 2023-05-22 RX ADMIN — ATORVASTATIN CALCIUM 40 MG: 40 TABLET, FILM COATED ORAL at 17:34

## 2023-05-22 RX ADMIN — MIRTAZAPINE 15 MG: 15 TABLET, FILM COATED ORAL at 21:00

## 2023-05-22 RX ADMIN — ACETAMINOPHEN 650 MG: 325 TABLET, FILM COATED ORAL at 04:17

## 2023-05-22 RX ADMIN — HEPARIN SODIUM 5000 UNITS: 5000 INJECTION INTRAVENOUS; SUBCUTANEOUS at 14:43

## 2023-05-22 RX ADMIN — POTASSIUM CHLORIDE 40 MEQ: 1500 TABLET, EXTENDED RELEASE ORAL at 10:23

## 2023-05-22 RX ADMIN — ARIPIPRAZOLE 2 MG: 2 TABLET ORAL at 14:44

## 2023-05-22 RX ADMIN — ACETAMINOPHEN 650 MG: 325 TABLET, FILM COATED ORAL at 16:41

## 2023-05-22 RX ADMIN — MELATONIN 6 MG: at 21:00

## 2023-05-22 RX ADMIN — MAGNESIUM OXIDE TAB 400 MG (241.3 MG ELEMENTAL MG) 400 MG: 400 (241.3 MG) TAB at 09:21

## 2023-05-22 RX ADMIN — ACETAMINOPHEN 650 MG: 325 TABLET, FILM COATED ORAL at 10:23

## 2023-05-22 RX ADMIN — PANTOPRAZOLE SODIUM 40 MG: 40 TABLET, DELAYED RELEASE ORAL at 04:17

## 2023-05-22 RX ADMIN — MAGNESIUM OXIDE TAB 400 MG (241.3 MG ELEMENTAL MG) 400 MG: 400 (241.3 MG) TAB at 17:34

## 2023-05-22 RX ADMIN — CLOPIDOGREL BISULFATE 75 MG: 75 TABLET ORAL at 09:21

## 2023-05-22 RX ADMIN — HEPARIN SODIUM 5000 UNITS: 5000 INJECTION INTRAVENOUS; SUBCUTANEOUS at 21:00

## 2023-05-22 NOTE — PROGRESS NOTES
"Robbei Viramontes  Progress Note  Name: Elizabeth Jennings  MRN: [id number]  Unit/Bed#: -01 I Date of Admission: 2023   Date of Service: 2023 I Hospital Day: 1    Assessment/Plan   * Prolonged Q-T interval on ECG  Assessment & Plan  · Initially presented to the ED  due to depression, see below  · Suspect due to overuse of seroquel, daughter noted patient may have been \"overusing\" night meds  · seroquel and zoloft discontinued, last doses on   · QTC : 540  · QTC  s/p 2g IV ma  · QTC : 533  · Mag 2 3 this am, continue PO supplement   · K 3 4 repleted (poor PO intake)  · Discussed with cardiology marie, agree with monitoring through tomorrow with goal QTC < 500  · Repeat am EKG  · Tele with NSR + PVCs    MDD (major depressive disorder)  Assessment & Plan  · Initially presented to the ED due to severe depression and anxiety due to recent social stressors including  who has been exhibiting personality changes, using her medications, and reportedly \"left her\"   · Patient confirms taking Seroquel, Zoloft, Wellbutrin at home, she states she does not think she was taking buspar  · Hold Seroquel and Zoloft given QTc  · Continue Wellbutrin  · Patient initially signed 201 on , was scheduled for inpatient BHU transfer on  however then refused to sign EMTALA form and was admitted due to prolonged QTc interval   · Psych consult appreciated  · Patient agreeable to signing 61 51 81 for voluntary IP psych --> new 201 signed   · Increase Wellbutrin to 300 mg daily   · Start Abilify 2 mg daily   · Start remeron 15 mg HS for insomnia   · Continue virtual one-to-one    GERD (gastroesophageal reflux disease)  Assessment & Plan  · Continue Protonix             VTE Pharmacologic Prophylaxis:   Moderate Risk (Score 3-4) - Pharmacological DVT Prophylaxis Ordered: heparin      Patient Centered Rounds: I performed bedside rounds with nursing staff " today   Discussions with Specialists or Other Care Team Provider: None     Education and Discussions with Family / Patient: Updated  (daughter) via phone  Total Time Spent on Date of Encounter in care of patient: 35 minutes This time was spent on one or more of the following: performing physical exam; counseling and coordination of care; obtaining or reviewing history; documenting in the medical record; reviewing/ordering tests, medications or procedures; communicating with other healthcare professionals and discussing with patient's family/caregivers  Current Length of Stay: 1 day(s)  Current Patient Status: Inpatient   Certification Statement: The patient will continue to require additional inpatient hospital stay due to QTc monitoring  Discharge Plan: Anticipate discharge in 24-48 hrs to inpatient psych  Code Status: Level 1 - Full Code    Subjective:   Patient very tearful on exam however agrees that she has not been taking care of herself  She denies any physical complaints, just states she has a broken heart  Discussed at length 201 status and ability to give 72 hour notice, she was agreeable to signing this  Objective:     Vitals:   Temp (24hrs), Av 9 °F (36 6 °C), Min:97 8 °F (36 6 °C), Max:97 9 °F (36 6 °C)    Temp:  [97 8 °F (36 6 °C)-97 9 °F (36 6 °C)] 97 8 °F (36 6 °C)  HR:  [] 81  BP: (115-138)/(77-87) 115/77  SpO2:  [93 %-95 %] 95 %  Body mass index is 28 68 kg/m²  Input and Output Summary (last 24 hours): Intake/Output Summary (Last 24 hours) at 2023 1412  Last data filed at 2023 1215  Gross per 24 hour   Intake 1190 ml   Output --   Net 1190 ml       Physical Exam:   Physical Exam  Vitals and nursing note reviewed  Constitutional:       General: She is not in acute distress  Appearance: She is well-developed  She is not ill-appearing  HENT:      Head: Normocephalic and atraumatic  Eyes:      General:         Right eye: No discharge  Left eye: No discharge  Extraocular Movements: Extraocular movements intact  Conjunctiva/sclera: Conjunctivae normal    Cardiovascular:      Rate and Rhythm: Normal rate and regular rhythm  Heart sounds: No murmur heard  Pulmonary:      Effort: Pulmonary effort is normal  No respiratory distress  Breath sounds: Normal breath sounds  No wheezing, rhonchi or rales  Abdominal:      General: Bowel sounds are normal  There is no distension  Palpations: Abdomen is soft  Tenderness: There is no abdominal tenderness  Musculoskeletal:      Cervical back: Neck supple  Right lower leg: No edema  Left lower leg: No edema  Skin:     General: Skin is warm and dry  Capillary Refill: Capillary refill takes less than 2 seconds  Neurological:      General: No focal deficit present  Mental Status: She is alert and oriented to person, place, and time  Cranial Nerves: No cranial nerve deficit  Psychiatric:      Comments: Tearful, appears dysphoric but very pleasant          Additional Data:     Labs:  Results from last 7 days   Lab Units 05/22/23  0347 05/20/23  1318   WBC Thousand/uL 10 50* 11 04*   HEMOGLOBIN g/dL 12 8 13 1   HEMATOCRIT % 39 8 41 5   PLATELETS Thousands/uL 270 241   NEUTROS PCT %  --  70   LYMPHS PCT %  --  18   MONOS PCT %  --  8   EOS PCT %  --  3     Results from last 7 days   Lab Units 05/22/23  0347   SODIUM mmol/L 139   POTASSIUM mmol/L 3 4*   CHLORIDE mmol/L 102   CO2 mmol/L 26   BUN mg/dL 12   CREATININE mg/dL 1 09   ANION GAP mmol/L 11   CALCIUM mg/dL 8 8   ALBUMIN g/dL 3 6   TOTAL BILIRUBIN mg/dL 0 43   ALK PHOS U/L 84   ALT U/L 6*   AST U/L 11*   GLUCOSE RANDOM mg/dL 124                       Lines/Drains:  Invasive Devices     Peripheral Intravenous Line  Duration           Peripheral IV 05/21/23 Dorsal (posterior); Right Hand 1 day                  Telemetry:  Telemetry Orders (From admission, onward)             24 Hour Telemetry Monitoring  Continuous x 24 Hours (Telem)        Expiring   Question:  Reason for 24 Hour Telemetry  Answer:  Drug overdose known to cause cardiac arrhythmias (e g  - Cocaine, TCA, Amphetamines, Phenothiazines, Digoxin, etc ) Meds known to need cardiac monitoring: Amiodarone, Dopamine, Dobutamine, Phenytoin                 Telemetry Reviewed: Normal Sinus Rhythm and PVCs  Indication for Continued Telemetry Use: Metabolic/electrolyte disturbance with high probability of dysrhythmia             Imaging: No pertinent imaging reviewed  Recent Cultures (last 7 days):         Last 24 Hours Medication List:   Current Facility-Administered Medications   Medication Dose Route Frequency Provider Last Rate   • acetaminophen  650 mg Oral Q6H PRN Issa Hernandez PA-C     • atorvastatin  40 mg Oral Daily With Orlando Jaime PA-C     • buPROPion  150 mg Oral Daily Phil Mendes Gave     • clopidogrel  75 mg Oral Daily Phil Mendes Gave     • gabapentin  100 mg Oral Daily Phil Mendes Gave     • heparin (porcine)  5,000 Units Subcutaneous [facility] & assisted Tennille Hernandez PA-C     • magnesium Oxide  400 mg Oral BID Denilson Swan PA-C     • melatonin  6 mg Oral HS Issa Hernandez PA-C     • pantoprazole  40 mg Oral Early Morning Kennedy Bosch PA-C          Today, Patient Was Seen By: Kennedy Bosch PA-C    **Please Note: This note may have been constructed using a voice recognition system  **
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
## 2023-05-22 NOTE — CASE MANAGEMENT
Case Management Assessment & Discharge Planning Note    Patient name Wogn Emery  Location /-01 MRN [id number]  : 1950 Date 2023       Current Admission Date: 2023  Current Admission Diagnosis:Prolonged Q-T interval on ECG   Patient Active Problem List    Diagnosis Date Noted   • Prolonged Q-T interval on ECG 2023   • MDD (major depressive disorder) 2023   • Pressure injury of left heel, stage 3 ([facility] ) 2021   • Effusion of left knee 04/10/2021   • Urinary retention 2021   • Pulmonary hypertension ([facility] ) 2021   • Peripheral vascular disease ([facility] ) 2021   • Tobacco abuse 2021   • ETOH abuse 2021   • Anxiety 2021   • Pulmonary nodule 2021   • Dysphagia 2021   • Anemia 2021   • Valvular heart disease 2021   • Thrombocytosis 2021   • Severe protein-calorie malnutrition ([address] ) 2021   • CAD (coronary artery disease) 2021   • Parapneumonic effusion 2021   • GERD (gastroesophageal reflux disease) 2021   • HTN (hypertension) 2021   • Supratherapeutic INR 2021      LOS (days): 1  Geometric Mean LOS (GMLOS) (days): 2 70  Days to GMLOS:1 6     OBJECTIVE:    Risk of Unplanned Readmission Score: 11 71         Current admission status: Inpatient       Preferred Pharmacy:   [id number] [address]  Phone: [phone] Fax: [phone] [place] [address] [place] [id number]  Phone: [phone] Fax: [phone]    Primary Care Provider: Jackie Nguyen MD    Primary Insurance: MEDICARE  Secondary Insurance: COMMERCIAL MISCELLANEOUS    ASSESSMENT:  Active Health Care Proxies    There are no active Health Care Proxies on file                   Readmission Root Cause  30 Day Readmission: No    Patient Information  Admitted from[de-identified] Home  Mental Status: Alert  During Assessment patient was accompanied by: Not accompanied during assessment  Assessment information provided by[de-identified] Patient  Primary Caregiver: Self  Support Systems: Family members  Home entry access options   Select all that apply : Stairs  Number of steps to enter home : 1  Type of Current Residence: 2 story home  Upon entering residence, is there a bedroom on the main floor (no further steps)?: No  A bedroom is located on the following floor levels of residence (select all that apply):: 2nd Floor  Upon entering residence, is there a bathroom on the main floor (no further steps)?: No  Indicate which floors of current residence have a bathroom (select all the apply):: 2nd Floor  Number of steps to 2nd floor from main floor: One Flight  In the last 12 months, was there a time when you were not able to pay the mortgage or rent on time?: No  In the last 12 months, how many places have you lived?: 1  In the last 12 months, was there a time when you did not have a steady place to sleep or slept in a shelter (including now)?: No  Homeless/housing insecurity resource given?: N/A  Living Arrangements: Lives w/ Spouse/significant other    Activities of Daily Living Prior to Admission  Functional Status: Independent  Completes ADLs independently?: Yes  Ambulates independently?: Yes  Does patient use assisted devices?: No  Does patient currently own DME?: No  Does patient have a history of Outpatient Therapy (PT/OT)?: No  Does the patient have a history of Short-Term Rehab?: No  Does patient have a history of HHC?: No  Does patient currently have [employer] ?: No    Patient Information Continued  Does patient have prescription coverage?: Yes  Within the past 12 months, you worried that your food would run out before you got the money to buy more : Never true  Within the past 12 months, the food you bought just didn't last and you didn't have money to get more : Never true  Food insecurity resource given?: N/A  Does patient receive dialysis treatments?: No  Does patient have a history of substance abuse?: No  Does patient have a history of Mental Health Diagnosis?: No    Means of Transportation  In the past 12 months, has lack of transportation kept you from medical appointments or from getting medications?: No  In the past 12 months, has lack of transportation kept you from meetings, work, or from getting things needed for daily living?: No  Was application for public transport provided?: N/A    DISCHARGE DETAILS:    Discharge planning discussed with[de-identified] patient  Freedom of Choice: Yes  Comments - Freedom of Choice: Pt signed a 201  CM contacted family/caregiver?: No- see comments (pt states her daughters will be visiting this evening)    [address]         Is the patient interested in [address] at discharge?: No    DME Referral Provided  Referral made for DME?: No    Other Referral/Resources/Interventions Provided:  Interventions: Inpatient Behavioral Health  Referral Comments: Ref to [address]    Treatment Team Recommendation: Inpatient [facility]  Discharge Destination Plan[de-identified] Inpatient Behavioral Health     Additional Comments: Met with pt to discuss the role of CM and to discuss any help pt may need prior to dc  pt lives with her  Monster Cook in a 2 story home with 1 MARIANN  Pt performed ADL's indptly pta, no use of DME  No hx of HHC or rehab  No hx of mental health or D&A treatment  Pt's preferred pharmacy is [facility] on [address]  discussed recommendation for IP BHU; pt is agreeable  Pt signed a 201  CM faxed 201 to Rodrigo Georges at [address] [facility]

## 2023-05-22 NOTE — ASSESSMENT & PLAN NOTE
"· Initially presented to the ED  due to depression, see below     · Suspect due to overuse of seroquel, daughter noted patient may have been \"overusing\" night meds  · seroquel and zoloft discontinued, last doses on   · QTC : 540  · QTC  s/p 2g IV ma  · QTC : 533  · Mag 2 3 this am, continue PO supplement   · K 3 4 repleted (poor PO intake)  · Discussed with cardiology marie, agree with monitoring through tomorrow with goal QTC < 500  · Repeat am EKG  · Tele with NSR + PVCs  "

## 2023-05-22 NOTE — ASSESSMENT & PLAN NOTE
"· Initially presented to the ED due to severe depression and anxiety due to recent social stressors including  who has been exhibiting personality changes, using her medications, and reportedly \"left her\"   · Patient confirms taking Seroquel, Zoloft, Wellbutrin at home, she states she does not think she was taking buspar  · Hold Seroquel and Zoloft given QTc  · Continue Wellbutrin  · Patient initially signed 201 on 5/20, was scheduled for inpatient BHU transfer on 5/21 however then refused to sign EMTALA form and was admitted due to prolonged QTc interval   · Psych consult appreciated  · Patient agreeable to signing 201 for voluntary IP psych --> new 201 signed 5/22  · Increase Wellbutrin to 300 mg daily   · Start Abilify 2 mg daily   · Start remeron 15 mg HS for insomnia   · Continue virtual one-to-one  "

## 2023-05-23 ENCOUNTER — HOSPITAL ENCOUNTER (INPATIENT)
Facility: HOSPITAL | Age: 73
LOS: 3 days | Discharge: HOME | End: 2023-05-26
Attending: STUDENT IN AN ORGANIZED HEALTH CARE EDUCATION/TRAINING PROGRAM | Admitting: STUDENT IN AN ORGANIZED HEALTH CARE EDUCATION/TRAINING PROGRAM

## 2023-05-23 VITALS
OXYGEN SATURATION: 93 % | WEIGHT: 172.5 LBS | TEMPERATURE: 97.3 F | BODY MASS INDEX: 27.84 KG/M2 | SYSTOLIC BLOOD PRESSURE: 142 MMHG | HEART RATE: 74 BPM | RESPIRATION RATE: 16 BRPM | DIASTOLIC BLOOD PRESSURE: 85 MMHG

## 2023-05-23 DIAGNOSIS — Z00.8 MEDICAL CLEARANCE FOR PSYCHIATRIC ADMISSION: ICD-10-CM

## 2023-05-23 DIAGNOSIS — F10.10 ETOH ABUSE: Chronic | ICD-10-CM

## 2023-05-23 DIAGNOSIS — D64.9 ANEMIA, UNSPECIFIED TYPE: Chronic | ICD-10-CM

## 2023-05-23 DIAGNOSIS — F33.2 SEVERE EPISODE OF RECURRENT MAJOR DEPRESSIVE DISORDER (HCC): Primary | ICD-10-CM

## 2023-05-23 PROBLEM — R19.7 DIARRHEA: Status: ACTIVE | Noted: 2023-05-23

## 2023-05-23 LAB
ANION GAP SERPL CALCULATED.3IONS-SCNC: 8 MMOL/L (ref 4–13)
ATRIAL RATE: 75 BPM
ATRIAL RATE: 75 BPM
ATRIAL RATE: 82 BPM
BASOPHILS # BLD AUTO: 0.07 THOUSANDS/ÂΜL (ref 0–0.1)
BASOPHILS NFR BLD AUTO: 1 % (ref 0–1)
BUN SERPL-MCNC: 12 MG/DL (ref 5–25)
CALCIUM SERPL-MCNC: 9.2 MG/DL (ref 8.4–10.2)
CHLORIDE SERPL-SCNC: 104 MMOL/L (ref 96–108)
CO2 SERPL-SCNC: 28 MMOL/L (ref 21–32)
CREAT SERPL-MCNC: 1.13 MG/DL (ref 0.6–1.3)
EOSINOPHIL # BLD AUTO: 0.63 THOUSAND/ÂΜL (ref 0–0.61)
EOSINOPHIL NFR BLD AUTO: 7 % (ref 0–6)
ERYTHROCYTE [DISTWIDTH] IN BLOOD BY AUTOMATED COUNT: 15.1 % (ref 11.6–15.1)
GFR SERPL CREATININE-BSD FRML MDRD: 47 ML/MIN/1.73SQ M
GLUCOSE SERPL-MCNC: 98 MG/DL (ref 65–140)
HCT VFR BLD AUTO: 42.8 % (ref 34.8–46.1)
HGB BLD-MCNC: 13.5 G/DL (ref 11.5–15.4)
IMM GRANULOCYTES # BLD AUTO: 0.05 THOUSAND/UL (ref 0–0.2)
IMM GRANULOCYTES NFR BLD AUTO: 1 % (ref 0–2)
LYMPHOCYTES # BLD AUTO: 2.84 THOUSANDS/ÂΜL (ref 0.6–4.47)
LYMPHOCYTES NFR BLD AUTO: 31 % (ref 14–44)
MAGNESIUM SERPL-MCNC: 2.3 MG/DL (ref 1.9–2.7)
MCH RBC QN AUTO: 29.1 PG (ref 26.8–34.3)
MCHC RBC AUTO-ENTMCNC: 31.5 G/DL (ref 31.4–37.4)
MCV RBC AUTO: 92 FL (ref 82–98)
MONOCYTES # BLD AUTO: 0.86 THOUSAND/ÂΜL (ref 0.17–1.22)
MONOCYTES NFR BLD AUTO: 9 % (ref 4–12)
NEUTROPHILS # BLD AUTO: 4.79 THOUSANDS/ÂΜL (ref 1.85–7.62)
NEUTS SEG NFR BLD AUTO: 51 % (ref 43–75)
NRBC BLD AUTO-RTO: 0 /100 WBCS
P AXIS: 71 DEGREES
P AXIS: 73 DEGREES
P AXIS: 77 DEGREES
PLATELET # BLD AUTO: 273 THOUSANDS/UL (ref 149–390)
PMV BLD AUTO: 11.3 FL (ref 8.9–12.7)
POTASSIUM SERPL-SCNC: 3.8 MMOL/L (ref 3.5–5.3)
PR INTERVAL: 170 MS
PR INTERVAL: 170 MS
PR INTERVAL: 172 MS
QRS AXIS: -1 DEGREES
QRS AXIS: -2 DEGREES
QRS AXIS: 4 DEGREES
QRSD INTERVAL: 108 MS
QRSD INTERVAL: 110 MS
QRSD INTERVAL: 114 MS
QT INTERVAL: 446 MS
QT INTERVAL: 448 MS
QT INTERVAL: 452 MS
QTC INTERVAL: 498 MS
QTC INTERVAL: 504 MS
QTC INTERVAL: 523 MS
RBC # BLD AUTO: 4.64 MILLION/UL (ref 3.81–5.12)
SODIUM SERPL-SCNC: 140 MMOL/L (ref 135–147)
T WAVE AXIS: 193 DEGREES
T WAVE AXIS: 194 DEGREES
T WAVE AXIS: 205 DEGREES
VENTRICULAR RATE: 75 BPM
VENTRICULAR RATE: 75 BPM
VENTRICULAR RATE: 82 BPM
WBC # BLD AUTO: 9.24 THOUSAND/UL (ref 4.31–10.16)

## 2023-05-23 RX ORDER — BUPROPION HYDROCHLORIDE 300 MG/1
300 TABLET ORAL DAILY
Status: CANCELLED | OUTPATIENT
Start: 2023-05-24

## 2023-05-23 RX ORDER — LORAZEPAM 1 MG/1
1 TABLET ORAL
Status: CANCELLED | OUTPATIENT
Start: 2023-05-23

## 2023-05-23 RX ORDER — MIRTAZAPINE 15 MG/1
15 TABLET, FILM COATED ORAL
Status: CANCELLED | OUTPATIENT
Start: 2023-05-23

## 2023-05-23 RX ORDER — ACETAMINOPHEN 325 MG/1
650 TABLET ORAL EVERY 4 HOURS PRN
Status: CANCELLED | OUTPATIENT
Start: 2023-05-23

## 2023-05-23 RX ORDER — RISPERIDONE 0.5 MG/1
0.5 TABLET ORAL
Status: CANCELLED | OUTPATIENT
Start: 2023-05-23

## 2023-05-23 RX ORDER — TRAZODONE HYDROCHLORIDE 50 MG/1
50 TABLET ORAL
Status: CANCELLED | OUTPATIENT
Start: 2023-05-23

## 2023-05-23 RX ORDER — LOPERAMIDE HYDROCHLORIDE 2 MG/1
2 CAPSULE ORAL 4 TIMES DAILY PRN
Status: DISCONTINUED | OUTPATIENT
Start: 2023-05-23 | End: 2023-05-23 | Stop reason: HOSPADM

## 2023-05-23 RX ORDER — AMOXICILLIN 250 MG
1 CAPSULE ORAL DAILY PRN
Status: DISCONTINUED | OUTPATIENT
Start: 2023-05-23 | End: 2023-05-26 | Stop reason: HOSPADM

## 2023-05-23 RX ORDER — RISPERIDONE 1 MG/1
1 TABLET ORAL
Status: DISCONTINUED | OUTPATIENT
Start: 2023-05-23 | End: 2023-05-26 | Stop reason: HOSPADM

## 2023-05-23 RX ORDER — LORAZEPAM 2 MG/ML
1 INJECTION INTRAMUSCULAR
Status: CANCELLED | OUTPATIENT
Start: 2023-05-23

## 2023-05-23 RX ORDER — RISPERIDONE 0.5 MG/1
0.25 TABLET ORAL
Status: CANCELLED | OUTPATIENT
Start: 2023-05-23

## 2023-05-23 RX ORDER — ARIPIPRAZOLE 2 MG/1
2 TABLET ORAL DAILY
Status: CANCELLED | OUTPATIENT
Start: 2023-05-24

## 2023-05-23 RX ORDER — RISPERIDONE 0.25 MG/1
0.25 TABLET ORAL
Status: DISCONTINUED | OUTPATIENT
Start: 2023-05-23 | End: 2023-05-26 | Stop reason: HOSPADM

## 2023-05-23 RX ORDER — RISPERIDONE 1 MG/1
1 TABLET ORAL
Status: CANCELLED | OUTPATIENT
Start: 2023-05-23

## 2023-05-23 RX ORDER — ACETAMINOPHEN 325 MG/1
975 TABLET ORAL EVERY 6 HOURS PRN
Status: DISCONTINUED | OUTPATIENT
Start: 2023-05-23 | End: 2023-05-26 | Stop reason: HOSPADM

## 2023-05-23 RX ORDER — RISPERIDONE 0.5 MG/1
0.5 TABLET ORAL
Status: DISCONTINUED | OUTPATIENT
Start: 2023-05-23 | End: 2023-05-26 | Stop reason: HOSPADM

## 2023-05-23 RX ORDER — ACETAMINOPHEN 325 MG/1
975 TABLET ORAL EVERY 6 HOURS PRN
Status: CANCELLED | OUTPATIENT
Start: 2023-05-23

## 2023-05-23 RX ORDER — LOPERAMIDE HYDROCHLORIDE 2 MG/1
2 CAPSULE ORAL 4 TIMES DAILY PRN
Status: CANCELLED | OUTPATIENT
Start: 2023-05-23

## 2023-05-23 RX ORDER — HALOPERIDOL 5 MG/ML
5 INJECTION INTRAMUSCULAR
Status: DISCONTINUED | OUTPATIENT
Start: 2023-05-23 | End: 2023-05-26 | Stop reason: HOSPADM

## 2023-05-23 RX ORDER — MIRTAZAPINE 15 MG/1
15 TABLET, FILM COATED ORAL
Status: DISCONTINUED | OUTPATIENT
Start: 2023-05-23 | End: 2023-05-26 | Stop reason: HOSPADM

## 2023-05-23 RX ORDER — TRAZODONE HYDROCHLORIDE 50 MG/1
50 TABLET ORAL
Status: DISCONTINUED | OUTPATIENT
Start: 2023-05-23 | End: 2023-05-26 | Stop reason: HOSPADM

## 2023-05-23 RX ORDER — LORAZEPAM 1 MG/1
1 TABLET ORAL
Status: DISCONTINUED | OUTPATIENT
Start: 2023-05-23 | End: 2023-05-26 | Stop reason: HOSPADM

## 2023-05-23 RX ORDER — AMOXICILLIN 250 MG
1 CAPSULE ORAL DAILY PRN
Status: CANCELLED | OUTPATIENT
Start: 2023-05-23

## 2023-05-23 RX ORDER — ACETAMINOPHEN 325 MG/1
650 TABLET ORAL EVERY 4 HOURS PRN
Status: DISCONTINUED | OUTPATIENT
Start: 2023-05-23 | End: 2023-05-26 | Stop reason: HOSPADM

## 2023-05-23 RX ORDER — MELATONIN
1000 DAILY
Status: CANCELLED | OUTPATIENT
Start: 2023-05-23

## 2023-05-23 RX ORDER — ARIPIPRAZOLE 2 MG/1
2 TABLET ORAL DAILY
Status: DISCONTINUED | OUTPATIENT
Start: 2023-05-24 | End: 2023-05-26 | Stop reason: HOSPADM

## 2023-05-23 RX ORDER — HYDROXYZINE HYDROCHLORIDE 25 MG/1
25 TABLET, FILM COATED ORAL
Status: CANCELLED | OUTPATIENT
Start: 2023-05-23

## 2023-05-23 RX ORDER — HYDROXYZINE HYDROCHLORIDE 25 MG/1
50 TABLET, FILM COATED ORAL
Status: CANCELLED | OUTPATIENT
Start: 2023-05-23

## 2023-05-23 RX ORDER — LORAZEPAM 2 MG/ML
1 INJECTION INTRAMUSCULAR
Status: DISCONTINUED | OUTPATIENT
Start: 2023-05-23 | End: 2023-05-26 | Stop reason: HOSPADM

## 2023-05-23 RX ORDER — HALOPERIDOL 5 MG/ML
5 INJECTION INTRAMUSCULAR
Status: CANCELLED | OUTPATIENT
Start: 2023-05-23

## 2023-05-23 RX ORDER — BUPROPION HYDROCHLORIDE 300 MG/1
300 TABLET ORAL DAILY
Status: DISCONTINUED | OUTPATIENT
Start: 2023-05-24 | End: 2023-05-26 | Stop reason: HOSPADM

## 2023-05-23 RX ORDER — HYDROXYZINE 50 MG/1
50 TABLET, FILM COATED ORAL
Status: DISCONTINUED | OUTPATIENT
Start: 2023-05-23 | End: 2023-05-26 | Stop reason: HOSPADM

## 2023-05-23 RX ORDER — HYDROXYZINE HYDROCHLORIDE 25 MG/1
25 TABLET, FILM COATED ORAL
Status: DISCONTINUED | OUTPATIENT
Start: 2023-05-23 | End: 2023-05-26 | Stop reason: HOSPADM

## 2023-05-23 RX ADMIN — BUPROPION HYDROCHLORIDE 300 MG: 300 TABLET, FILM COATED, EXTENDED RELEASE ORAL at 07:51

## 2023-05-23 RX ADMIN — ACETAMINOPHEN 650 MG: 325 TABLET ORAL at 21:22

## 2023-05-23 RX ADMIN — ATORVASTATIN CALCIUM 40 MG: 40 TABLET, FILM COATED ORAL at 15:58

## 2023-05-23 RX ADMIN — MIRTAZAPINE 15 MG: 15 TABLET, FILM COATED ORAL at 21:23

## 2023-05-23 RX ADMIN — PANTOPRAZOLE SODIUM 40 MG: 40 TABLET, DELAYED RELEASE ORAL at 05:37

## 2023-05-23 RX ADMIN — GABAPENTIN 100 MG: 100 CAPSULE ORAL at 07:51

## 2023-05-23 RX ADMIN — ARIPIPRAZOLE 2 MG: 2 TABLET ORAL at 07:52

## 2023-05-23 RX ADMIN — ACETAMINOPHEN 650 MG: 325 TABLET, FILM COATED ORAL at 11:32

## 2023-05-23 RX ADMIN — CLOPIDOGREL BISULFATE 75 MG: 75 TABLET ORAL at 07:51

## 2023-05-23 RX ADMIN — HEPARIN SODIUM 5000 UNITS: 5000 INJECTION INTRAVENOUS; SUBCUTANEOUS at 05:37

## 2023-05-23 RX ADMIN — MAGNESIUM OXIDE TAB 400 MG (241.3 MG ELEMENTAL MG) 400 MG: 400 (241.3 MG) TAB at 07:50

## 2023-05-23 RX ADMIN — LOPERAMIDE HYDROCHLORIDE 2 MG: 2 CAPSULE ORAL at 14:20

## 2023-05-23 NOTE — ASSESSMENT & PLAN NOTE
· Patient reports new onset diarrhea this am, suspect 2/2 PO mag + anxiety  · No abdominal tenderness or N/V/ other GI sx  · No evidence of infection, not meeting SIRS criteria  · Hold PO magnesium   · Imodium PRN

## 2023-05-23 NOTE — DISCHARGE SUMMARY
"[place]  Discharge- Nithin Maharaj 1950, 74 y o  female MRN: [id number]  Unit/Bed#: [facility] [id number] Encounter: [id number]  Primary Care Provider: Melinda Cifuentes MD   Date and time admitted to hospital: 2023  1:03 PM    * Prolonged Q-T interval on ECG  Assessment & Plan  · Initially presented to the ED  due to depression, see below     · Suspect due to overuse of seroquel, daughter noted patient may have been \"overusing\" night meds  · seroquel and zoloft discontinued, last doses on   · QTC : 540  · QTC  s/p 2g IV ma  · QTC : 533  · QTC : 504  · Mag 2 3  · Hold PO supplement due to new diarrhea   · Tele with NSR + PVCs  · Medically cleared for DC to BHU, hold QT prolonging agents in the future    MDD (major depressive disorder)  Assessment & Plan  · Initially presented to the ED due to severe depression and anxiety due to recent social stressors including  who has been exhibiting personality changes, using her medications, and reportedly \"left her\"   · Patient confirms taking Seroquel, Zoloft, Wellbutrin at home, she states she does not think she was taking buspar  · Hold Seroquel and Zoloft given QTc  · Continue Wellbutrin  · Patient initially signed 201 on , was scheduled for inpatient BHU transfer on  however then refused to sign EMTALA form and was admitted due to prolonged QTc interval   · Psych consult appreciated  · Patient agreeable to signing 201 for voluntary IP psych --> new 201 signed   · Increase Wellbutrin to 300 mg daily   · Start Abilify 2 mg daily   · Start remeron 15 mg HS for insomnia   · Continue virtual one-to-one  · DC to inpatient BHU today     Diarrhea  Assessment & Plan  · Patient reports new onset diarrhea this am, suspect 2/2 PO mag + anxiety  · No abdominal tenderness or N/V/ other GI sx  · No evidence of infection, not meeting SIRS criteria  · Hold PO magnesium   · Imodium PRN    GERD (gastroesophageal " "reflux disease)  Assessment & Plan  · Continue Protonix        Medical Problems     Resolved Problems  Date Reviewed: 5/23/2023   None       Discharging Physician / Practitioner: Vicki Flores PA-C  PCP: Kerstin Cranker, MD  Admission Date:   Admission Orders (From admission, onward)     Ordered        05/21/23 1404  INPATIENT ADMISSION  Once            Signed and Held  ED TO DIFFERENT CAMPUS [address] UNIT or INPATIENT MEDICAL UNIT to [facility] (using Discharge Readmit Navigator) - Admit Patient to [address] [place] [address]  Once,   Status:  Canceled            Signed and Held  ED TO DIFFERENT CAMPUS [place] [address] or INPATIENT MEDICAL UNIT to [place] [address] (using Discharge Readmit Navigator) - Admit Patient to [address] [place] [address]  Once,   Status:  Canceled            Signed and Held  ED TO DIFFERENT CAMPUS [address] or INPATIENT MEDICAL UNIT to [place] [address] (using Discharge Readmit Navigator) - Admit Patient to [address] [place] [address]  Once                      Discharge Date: 05/23/23    Consultations During Hospital Stay:  · Psychiatry   · Medical Toxicology     Procedures Performed:   · EKGs  · Telemetry  · Virtual observation     Significant Findings / Test Results:   No orders to display   ·   · QTC 5/20: 540  · QTC 5/21: 550  · QTC 5/22: 522  · QTC 5/23: 504      Incidental Findings:   · None     Test Results Pending at Discharge (will require follow up): · None      Outpatient Tests Requested:  · Repeat ECG 3-5 days     Complications:  None     Reason for Admission: Prolonged QTC + mood disorder    Hospital Course:   Jay Reese is a 74 y o  female patient with PMH ofanxiety, depression, hypertension no longer on antihypertensives, CAD, GERD   who originally presented to the hospital on 5/20/2023 due to complaint of \"I can't stop crying\"  Patient had presented to ED hoping for mental health support due to increased social stressors and recent separation from  of 55+ years   Family had " endorsed patient struggling with ADLs and not taking care of self at home + overusing her sleep medications (HS seroquel)  Medical toxicology had also been consulted as patient was was over using her Excedrin however was not found to have acetaminophen toxicity  Patient was noted to have QTC prolongation 540 likely due to seroquel use  Patient was admitted as QTC did not improve despite IV mag in ED  Patient was observed on tele and virutal observation and QTC gradually downtrended with treatment with mag + cessation of QT prolonging agents  Psychiatry was consulted due to patient's mood disorder, she was initiated on abilify and remeron and continued on wellbutrin with recommendation to sign 201 for IP psych  Patient signed 12 on 5/22 and was cleared for DC to psych on 5/23  Please see above list of diagnoses and related plan for additional information  Condition at Discharge: stable    Discharge Day Visit / Exam:   Subjective:  Patient verbalized understanding of plan to transfer to IP psych today, she reports only concern is cleaning self up first      Vitals: Blood Pressure: 142/85 (05/23/23 0717)  Pulse: 74 (05/23/23 0717)  Temperature: (!) 97 3 °F (36 3 °C) (05/23/23 0717)  Respirations: 16 (05/22/23 2333)  Weight - Scale: 78 2 kg (172 lb 8 oz) (05/23/23 0500)  SpO2: 93 % (05/23/23 0717)      Discussion with Family: Updated  (daughter) via phone  Discharge instructions/Information to patient and family:   See after visit summary for information provided to patient and family  Provisions for Follow-Up Care:  See after visit summary for information related to follow-up care and any pertinent home health orders  Disposition:   Inpatient Psychiatry at [facility]     Planned Readmission: yes      Discharge Statement:  I spent 60 minutes discharging the patient  This time was spent on the day of discharge  I had direct contact with the patient on the day of discharge   Greater than 50% of the total time was spent examining patient, answering all patient questions, arranging and discussing plan of care with patient as well as directly providing post-discharge instructions  Additional time then spent on discharge activities  Discharge Medications:  See after visit summary for reconciled discharge medications provided to patient and/or family        **Please Note: This note may have been constructed using a voice recognition system**

## 2023-05-23 NOTE — PLAN OF CARE
Problem: Prexisting or High Potential for Compromised Skin Integrity  Goal: Skin integrity is maintained or improved  Description: INTERVENTIONS:  - Identify patients at risk for skin breakdown  - Assess and monitor skin integrity  - Assess and monitor nutrition and hydration status  - Monitor labs   - Assess for incontinence   - Turn and reposition patient  - Assist with mobility/ambulation  - Relieve pressure over bony prominences  - Avoid friction and shearing  - Provide appropriate hygiene as needed including keeping skin clean and dry  - Evaluate need for skin moisturizer/barrier cream  - Collaborate with interdisciplinary team   - Patient/family teaching  - Consider wound care consult   5/23/2023 1417 by Patricia Rdz RN  Outcome: Adequate for Discharge  5/23/2023 0945 by Patricia Rdz RN  Outcome: Progressing     Problem: MOBILITY - ADULT  Goal: Maintain or return to baseline ADL function  Description: INTERVENTIONS:  -  Assess patient's ability to carry out ADLs; assess patient's baseline for ADL function and identify physical deficits which impact ability to perform ADLs (bathing, care of mouth/teeth, toileting, grooming, dressing, etc )  - Assess/evaluate cause of self-care deficits   - Assess range of motion  - Assess patient's mobility; develop plan if impaired  - Assess patient's need for assistive devices and provide as appropriate  - Encourage maximum independence but intervene and supervise when necessary  - Involve family in performance of ADLs  - Assess for home care needs following discharge   - Consider OT consult to assist with ADL evaluation and planning for discharge  - Provide patient education as appropriate  5/23/2023 1417 by Patricia Rdz RN  Outcome: Adequate for Discharge  5/23/2023 0945 by Patricia Rdz RN  Outcome: Progressing  Goal: Maintains/Returns to pre admission functional level  Description: INTERVENTIONS:  - Perform BMAT or MOVE assessment daily    - Set and communicate daily mobility goal to care team and patient/family/caregiver  - Collaborate with rehabilitation services on mobility goals if consulted  - Perform Range of Motion  times a day  - Reposition patient every  hours    - Dangle patient  times a day  - Stand patient  times a day  - Ambulate patient  times a day  - Out of bed to chair  times a day   - Out of bed for meals  times a day  - Out of bed for toileting  - Record patient progress and toleration of activity level   5/23/2023 1417 by Koko Chavez RN  Outcome: Adequate for Discharge  5/23/2023 0945 by Koko Chavez RN  Outcome: Progressing     Problem: PAIN - ADULT  Goal: Verbalizes/displays adequate comfort level or baseline comfort level  Description: Interventions:  - Encourage patient to monitor pain and request assistance  - Assess pain using appropriate pain scale  - Administer analgesics based on type and severity of pain and evaluate response  - Implement non-pharmacological measures as appropriate and evaluate response  - Consider cultural and social influences on pain and pain management  - Notify physician/advanced practitioner if interventions unsuccessful or patient reports new pain  5/23/2023 1417 by Koko Chavez RN  Outcome: Adequate for Discharge  5/23/2023 0945 by Koko Chavez RN  Outcome: Progressing     Problem: INFECTION - ADULT  Goal: Absence or prevention of progression during hospitalization  Description: INTERVENTIONS:  - Assess and monitor for signs and symptoms of infection  - Monitor lab/diagnostic results  - Monitor all insertion sites, i e  indwelling lines, tubes, and drains  - Monitor endotracheal if appropriate and nasal secretions for changes in amount and color  - [place] appropriate cooling/warming therapies per order  - Administer medications as ordered  - Instruct and encourage patient and family to use good hand hygiene technique  - Identify and instruct in appropriate isolation precautions for identified infection/condition  5/23/2023 1417 by Karen Garcia RN  Outcome: Adequate for Discharge  5/23/2023 0945 by Karen Garcia RN  Outcome: Progressing  Goal: Absence of fever/infection during neutropenic period  Description: INTERVENTIONS:  - Monitor WBC    5/23/2023 1417 by Karen Garcia RN  Outcome: Adequate for Discharge  5/23/2023 0945 by Karen Garcia RN  Outcome: Progressing     Problem: SAFETY ADULT  Goal: Maintain or return to baseline ADL function  Description: INTERVENTIONS:  -  Assess patient's ability to carry out ADLs; assess patient's baseline for ADL function and identify physical deficits which impact ability to perform ADLs (bathing, care of mouth/teeth, toileting, grooming, dressing, etc )  - Assess/evaluate cause of self-care deficits   - Assess range of motion  - Assess patient's mobility; develop plan if impaired  - Assess patient's need for assistive devices and provide as appropriate  - Encourage maximum independence but intervene and supervise when necessary  - Involve family in performance of ADLs  - Assess for home care needs following discharge   - Consider OT consult to assist with ADL evaluation and planning for discharge  - Provide patient education as appropriate  5/23/2023 1417 by Karen Garcia RN  Outcome: Adequate for Discharge  5/23/2023 0945 by Karen Garcia RN  Outcome: Progressing  Goal: Maintains/Returns to pre admission functional level  Description: INTERVENTIONS:  - Perform BMAT or MOVE assessment daily    - Set and communicate daily mobility goal to care team and patient/family/caregiver  - Collaborate with rehabilitation services on mobility goals if consulted  - Perform Range of Motion  times a day  - Reposition patient every  hours    - Dangle patient  times a day  - Stand patient  times a day  - Ambulate patient  times a day  - Out of bed to chair  times a day   - Out of bed for meals  times a day  - Out of bed for toileting  - Record patient progress and toleration of activity level   5/23/2023 1417 by Angel Dixon RN  Outcome: Adequate for Discharge  5/23/2023 0945 by Angel Dixon RN  Outcome: Progressing  Goal: Patient will remain free of falls  Description: INTERVENTIONS:  - Educate patient/family on patient safety including physical limitations  - Instruct patient to call for assistance with activity   - Consult OT/PT to assist with strengthening/mobility   - Keep Call bell within reach  - Keep bed low and locked with side rails adjusted as appropriate  - Keep care items and personal belongings within reach  - Initiate and maintain comfort rounds  - Make Fall Risk Sign visible to staff  - Offer Toileting every  Hours, in advance of need  - Initiate/Maintain alarm  - Obtain necessary fall risk management equipment:  - Apply yellow socks and bracelet for high fall risk patients  - Consider moving patient to room near nurses station  5/23/2023 1417 by Angel Dixon RN  Outcome: Adequate for Discharge  5/23/2023 0945 by Angel Dixon RN  Outcome: Progressing     Problem: DISCHARGE PLANNING  Goal: Discharge to home or other facility with appropriate resources  Description: INTERVENTIONS:  - Identify barriers to discharge w/patient and caregiver  - Arrange for needed discharge resources and transportation as appropriate  - Identify discharge learning needs (meds, wound care, etc )  - Arrange for interpretive services to assist at discharge as needed  - Refer to Case Management Department for coordinating discharge planning if the patient needs post-hospital services based on physician/advanced practitioner order or complex needs related to functional status, cognitive ability, or social support system  5/23/2023 1417 by Angel Dixon RN  Outcome: Adequate for Discharge  5/23/2023 0945 by Angel Dixon RN  Outcome: Progressing     Problem: Knowledge Deficit  Goal: Patient/family/caregiver demonstrates understanding of disease process, treatment plan, medications, and discharge instructions  Description: Complete learning assessment and assess knowledge base  Interventions:  - Provide teaching at level of understanding  - Provide teaching via preferred learning methods  5/23/2023 1417 by Laura Nice RN  Outcome: Adequate for Discharge  5/23/2023 0945 by Laura Nice RN  Outcome: Progressing     Problem: Potential for Falls  Goal: Patient will remain free of falls  Description: INTERVENTIONS:  - Educate patient/family on patient safety including physical limitations  - Instruct patient to call for assistance with activity   - Consult OT/PT to assist with strengthening/mobility   - Keep Call bell within reach  - Keep bed low and locked with side rails adjusted as appropriate  - Keep care items and personal belongings within reach  - Initiate and maintain comfort rounds  - Make Fall Risk Sign visible to staff  - Offer Toileting every  Hours, in advance of need  - Initiate/Maintain alarm  - Obtain necessary fall risk management equipment:  - Apply yellow socks and bracelet for high fall risk patients  - Consider moving patient to room near nurses station  5/23/2023 1417 by Laura Nice RN  Outcome: Adequate for Discharge  5/23/2023 0945 by Laura Nice RN  Outcome: Progressing     Problem: Nutrition/Hydration-ADULT  Goal: Nutrient/Hydration intake appropriate for improving, restoring or maintaining nutritional needs  Description: Monitor and assess patient's nutrition/hydration status for malnutrition  Collaborate with interdisciplinary team and initiate plan and interventions as ordered  Monitor patient's weight and dietary intake as ordered or per policy  Utilize nutrition screening tool and intervene as necessary  Determine patient's food preferences and provide high-protein, high-caloric foods as appropriate       INTERVENTIONS:  - Monitor oral intake, urinary output, labs, and treatment plans  - Assess nutrition and hydration status and recommend course of action  - Evaluate amount of meals eaten  - Assist patient with eating if necessary   - Allow adequate time for meals  - Recommend/ encourage appropriate diets, oral nutritional supplements, and vitamin/mineral supplements  - Order, calculate, and assess calorie counts as needed  - Recommend, monitor, and adjust tube feedings and TPN/PPN based on assessed needs  - Assess need for intravenous fluids  - Provide specific nutrition/hydration education as appropriate  - Include patient/family/caregiver in decisions related to nutrition  5/23/2023 1417 by Bree Ventura RN  Outcome: Adequate for Discharge  5/23/2023 0945 by Bree Ventura RN  Outcome: Progressing     Problem: NEUROSENSORY - ADULT  Goal: Achieves stable or improved neurological status  Description: INTERVENTIONS  - Monitor and report changes in neurological status  - Monitor vital signs such as temperature, blood pressure, glucose, and any other labs ordered   - Initiate measures to prevent increased intracranial pressure  - Monitor for seizure activity and implement precautions if appropriate      5/23/2023 1417 by Bree Ventura RN  Outcome: Adequate for Discharge  5/23/2023 0945 by Bree Ventura RN  Outcome: Progressing     Problem: METABOLIC, FLUID AND ELECTROLYTES - ADULT  Goal: Electrolytes maintained within normal limits  Description: INTERVENTIONS:  - Monitor labs and assess patient for signs and symptoms of electrolyte imbalances  - Administer electrolyte replacement as ordered  - Monitor response to electrolyte replacements, including repeat lab results as appropriate  - Instruct patient on fluid and nutrition as appropriate  5/23/2023 1417 by Bree Ventura RN  Outcome: Adequate for Discharge  5/23/2023 0945 by Bree Ventura RN  Outcome: Progressing  Goal: Fluid balance maintained  Description: INTERVENTIONS:  - Monitor labs   - Monitor I/O and WT  - Instruct patient on fluid and nutrition as appropriate  - Assess for signs & symptoms of volume excess or deficit  5/23/2023 1417 by Rehan Chilel RN  Outcome: Adequate for Discharge  5/23/2023 0945 by Rehan Chilel RN  Outcome: Progressing     Problem: SKIN/TISSUE INTEGRITY - ADULT  Goal: Skin Integrity remains intact(Skin Breakdown Prevention)  Description: Assess:  -Perform Bishop assessment every x  -Clean and moisturize skin every x  -Inspect skin when repositioning, toileting, and assisting with ADLS  -Assess under medical devices such as x every x  -Assess extremities for adequate circulation and sensation     Bed Management:  -Have minimal linens on bed & keep smooth, unwrinkled  -Change linens as needed when moist or perspiring  -Avoid sitting or lying in one position for more than x hours while in bed  -Keep HOB at [facility]     Toileting:  -Offer bedside commode  -Assess for incontinence every x  -Use incontinent care products after each incontinent episode such as x    Activity:  -Mobilize patient x times a day  -Encourage activity and walks on unit  -Encourage or provide ROM exercises   -Turn and reposition patient every x Hours  -Use appropriate equipment to lift or move patient in bed  -Instruct/ Assist with weight shifting every x when out of bed in chair  -Consider limitation of chair time x hour intervals    Skin Care:  -Avoid use of baby powder, tape, friction and shearing, hot water or constrictive clothing  -Relieve pressure over bony prominences using x  -Do not massage red bony areas    Next Steps:  -Teach patient strategies to minimize risks such as x   -Consider consults to  interdisciplinary teams such as x  5/23/2023 1417 by Rehan Chilel RN  Outcome: Adequate for Discharge  5/23/2023 0945 by Rehan Chilel RN  Outcome: Progressing     Problem: HEMATOLOGIC - ADULT  Goal: Maintains hematologic stability  Description: INTERVENTIONS  - Assess for signs and symptoms of bleeding or hemorrhage  - Monitor labs  - Administer supportive blood products/factors as ordered and appropriate  5/23/2023 1417 by Karen Garcia RN  Outcome: Adequate for Discharge  5/23/2023 0945 by Karen Garcia RN  Outcome: Progressing     Problem: MUSCULOSKELETAL - ADULT  Goal: Maintain or return mobility to safest level of function  Description: INTERVENTIONS:  - Assess patient's ability to carry out ADLs; assess patient's baseline for ADL function and identify physical deficits which impact ability to perform ADLs (bathing, care of mouth/teeth, toileting, grooming, dressing, etc )  - Assess/evaluate cause of self-care deficits   - Assess range of motion  - Assess patient's mobility  - Assess patient's need for assistive devices and provide as appropriate  - Encourage maximum independence but intervene and supervise when necessary  - Involve family in performance of ADLs  - Assess for home care needs following discharge   - Consider OT consult to assist with ADL evaluation and planning for discharge  - Provide patient education as appropriate  5/23/2023 1417 by Karen Garcia RN  Outcome: Adequate for Discharge  5/23/2023 0945 by Karen Garcia RN  Outcome: Progressing

## 2023-05-23 NOTE — CASE MANAGEMENT
Case Management Discharge Planning Note    Patient name Porsha Plummer  Location /-01 MRN [id number]  : 1950 Date 2023       Current Admission Date: 2023  Current Admission Diagnosis:Prolonged Q-T interval on ECG   Patient Active Problem List    Diagnosis Date Noted   • Diarrhea 2023   • Prolonged Q-T interval on ECG 2023   • MDD (major depressive disorder) 2023   • Pressure injury of left heel, stage 3 ([facility] [address] ) 2021   • Effusion of left knee 04/10/2021   • Urinary retention 2021   • Pulmonary hypertension ([facility] [address] ) 2021   • Peripheral vascular disease ([facility] [address] ) 2021   • Tobacco abuse 2021   • ETOH abuse 2021   • Anxiety 2021   • Pulmonary nodule 2021   • Dysphagia 2021   • Anemia 2021   • Valvular heart disease 2021   • Thrombocytosis 2021   • Severe protein-calorie malnutrition ([facility] [address] ) 2021   • CAD (coronary artery disease) 2021   • Parapneumonic effusion 2021   • GERD (gastroesophageal reflux disease) 2021   • HTN (hypertension) 2021   • Supratherapeutic INR 2021      LOS (days): 2  Geometric Mean LOS (GMLOS) (days): 2 70  Days to GMLOS:0 7     OBJECTIVE:  Risk of Unplanned Readmission Score: 11 74         Current admission status: Inpatient   Preferred Pharmacy:   [id number], [address] [address]  Phone: [phone] [facility]ax: [id number] [address] [place] [id number]  Phone: [phone] Fax: [phone]    Primary Care Provider: Jomar Brown MD    Primary Insurance: MEDICARE  Secondary Insurance: COMMERCIAL MISCELLANEOUS    DISCHARGE DETAILS:    Discharge planning discussed with[de-identified] patient  Freedom of Choice: Yes  Comments - Freedom of Choice: Pt signed 252 and is accepted at [facility]  CM contacted family/caregiver?: No- see comments (pt declined cm calling dtr)  Were Treatment Team discharge recommendations reviewed with patient/caregiver?: Yes  Did patient/caregiver verbalize understanding of patient care needs?: Yes  Were patient/caregiver advised of the risks associated with not following Treatment Team discharge recommendations?: Yes         [facility]         Is the patient interested in [place] [address] at discharge?: No    DME Referral Provided  Referral made for DME?: No    Other Referral/Resources/Interventions Provided:  Interventions: Inpatient Behavioral Health  [facility]         Treatment Team Recommendation: Inpatient Behavioral Health  Discharge Destination Plan[de-identified] Inpatient [place] at Discharge : [facility]  Dispatcher Contacted: Yes  Number/Name of Dispatcher: roundtrip  Transported by [employer] and Unit #): TBD  ETA of Transport (Date): 05/23/23  ETA of Transport (Time): 1630     Transfer Mode: Stretcher  Accompanied by: EMS personnel  Transfer Equipment: BLS devices           Additional Comments: Cm spoke to pt about acceptance at [facility]  She declined to have cm contact her dtr  Original 201 is in pt's chart  BLS transport requested for 4:30 pm  Report to [phone]

## 2023-05-23 NOTE — ASSESSMENT & PLAN NOTE
"· Initially presented to the ED due to severe depression and anxiety due to recent social stressors including  who has been exhibiting personality changes, using her medications, and reportedly \"left her\"   · Patient confirms taking Seroquel, Zoloft, Wellbutrin at home, she states she does not think she was taking buspar  · Hold Seroquel and Zoloft given QTc  · Continue Wellbutrin  · Patient initially signed 201 on 5/20, was scheduled for inpatient BHU transfer on 5/21 however then refused to sign EMTALA form and was admitted due to prolonged QTc interval   · Psych consult appreciated  · Patient agreeable to signing 201 for voluntary IP psych --> new 201 signed 5/22  · Increase Wellbutrin to 300 mg daily   · Start Abilify 2 mg daily   · Start remeron 15 mg HS for insomnia   · Continue virtual one-to-one  · DC to inpatient BHU today 5/23  " DISORIENTATION

## 2023-05-23 NOTE — PLAN OF CARE
Problem: Prexisting or High Potential for Compromised Skin Integrity  Goal: Skin integrity is maintained or improved  Description: INTERVENTIONS:  - Identify patients at risk for skin breakdown  - Assess and monitor skin integrity  - Assess and monitor nutrition and hydration status  - Monitor labs   - Assess for incontinence   - Turn and reposition patient  - Assist with mobility/ambulation  - Relieve pressure over bony prominences  - Avoid friction and shearing  - Provide appropriate hygiene as needed including keeping skin clean and dry  - Evaluate need for skin moisturizer/barrier cream  - Collaborate with interdisciplinary team   - Patient/family teaching  - Consider wound care consult   Outcome: Progressing     Problem: MOBILITY - ADULT  Goal: Maintain or return to baseline ADL function  Description: INTERVENTIONS:  -  Assess patient's ability to carry out ADLs; assess patient's baseline for ADL function and identify physical deficits which impact ability to perform ADLs (bathing, care of mouth/teeth, toileting, grooming, dressing, etc )  - Assess/evaluate cause of self-care deficits   - Assess range of motion  - Assess patient's mobility; develop plan if impaired  - Assess patient's need for assistive devices and provide as appropriate  - Encourage maximum independence but intervene and supervise when necessary  - Involve family in performance of ADLs  - Assess for home care needs following discharge   - Consider OT consult to assist with ADL evaluation and planning for discharge  - Provide patient education as appropriate  Outcome: Progressing  Goal: Maintains/Returns to pre admission functional level  Description: INTERVENTIONS:  - Perform BMAT or MOVE assessment daily    - Set and communicate daily mobility goal to care team and patient/family/caregiver  - Collaborate with rehabilitation services on mobility goals if consulted  - Perform Range of Motion  times a day    - Reposition patient every hours   - Dangle patient  times a day  - Stand patient  times a day  - Ambulate patient  times a day  - Out of bed to chair  times a day   - Out of bed for meals  times a day  - Out of bed for toileting  - Record patient progress and toleration of activity level   Outcome: Progressing     Problem: PAIN - ADULT  Goal: Verbalizes/displays adequate comfort level or baseline comfort level  Description: Interventions:  - Encourage patient to monitor pain and request assistance  - Assess pain using appropriate pain scale  - Administer analgesics based on type and severity of pain and evaluate response  - Implement non-pharmacological measures as appropriate and evaluate response  - Consider cultural and social influences on pain and pain management  - Notify physician/advanced practitioner if interventions unsuccessful or patient reports new pain  Outcome: Progressing     Problem: INFECTION - ADULT  Goal: Absence or prevention of progression during hospitalization  Description: INTERVENTIONS:  - Assess and monitor for signs and symptoms of infection  - Monitor lab/diagnostic results  - Monitor all insertion sites, i e  indwelling lines, tubes, and drains  - Monitor endotracheal if appropriate and nasal secretions for changes in amount and color  - [place] appropriate cooling/warming therapies per order  - Administer medications as ordered  - Instruct and encourage patient and family to use good hand hygiene technique  - Identify and instruct in appropriate isolation precautions for identified infection/condition  Outcome: Progressing  Goal: Absence of fever/infection during neutropenic period  Description: INTERVENTIONS:  - Monitor WBC    Outcome: Progressing     Problem: SAFETY ADULT  Goal: Maintain or return to baseline ADL function  Description: INTERVENTIONS:  -  Assess patient's ability to carry out ADLs; assess patient's baseline for ADL function and identify physical deficits which impact ability to perform ADLs (bathing, care of mouth/teeth, toileting, grooming, dressing, etc )  - Assess/evaluate cause of self-care deficits   - Assess range of motion  - Assess patient's mobility; develop plan if impaired  - Assess patient's need for assistive devices and provide as appropriate  - Encourage maximum independence but intervene and supervise when necessary  - Involve family in performance of ADLs  - Assess for home care needs following discharge   - Consider OT consult to assist with ADL evaluation and planning for discharge  - Provide patient education as appropriate  Outcome: Progressing  Goal: Maintains/Returns to pre admission functional level  Description: INTERVENTIONS:  - Perform BMAT or MOVE assessment daily    - Set and communicate daily mobility goal to care team and patient/family/caregiver  - Collaborate with rehabilitation services on mobility goals if consulted  - Perform Range of Motion  times a day  - Reposition patient every  hours    - Dangle patient  times a day  - Stand patient  times a day  - Ambulate patient  times a day  - Out of bed to chair  times a day   - Out of bed for meals  times a day  - Out of bed for toileting  - Record patient progress and toleration of activity level   Outcome: Progressing  Goal: Patient will remain free of falls  Description: INTERVENTIONS:  - Educate patient/family on patient safety including physical limitations  - Instruct patient to call for assistance with activity   - Consult OT/PT to assist with strengthening/mobility   - Keep Call bell within reach  - Keep bed low and locked with side rails adjusted as appropriate  - Keep care items and personal belongings within reach  - Initiate and maintain comfort rounds  - Make Fall Risk Sign visible to staff  - Offer Toileting every  Hours, in advance of need  - Initiate/Maintain alarm  - Obtain necessary fall risk management equipment:  - Apply yellow socks and bracelet for high fall risk patients  - Consider moving patient to room near nurses station  Outcome: Progressing     Problem: DISCHARGE PLANNING  Goal: Discharge to home or other facility with appropriate resources  Description: INTERVENTIONS:  - Identify barriers to discharge w/patient and caregiver  - Arrange for needed discharge resources and transportation as appropriate  - Identify discharge learning needs (meds, wound care, etc )  - Arrange for interpretive services to assist at discharge as needed  - Refer to Case Management Department for coordinating discharge planning if the patient needs post-hospital services based on physician/advanced practitioner order or complex needs related to functional status, cognitive ability, or social support system  Outcome: Progressing     Problem: Knowledge Deficit  Goal: Patient/family/caregiver demonstrates understanding of disease process, treatment plan, medications, and discharge instructions  Description: Complete learning assessment and assess knowledge base    Interventions:  - Provide teaching at level of understanding  - Provide teaching via preferred learning methods  Outcome: Progressing     Problem: Potential for Falls  Goal: Patient will remain free of falls  Description: INTERVENTIONS:  - Educate patient/family on patient safety including physical limitations  - Instruct patient to call for assistance with activity   - Consult OT/PT to assist with strengthening/mobility   - Keep Call bell within reach  - Keep bed low and locked with side rails adjusted as appropriate  - Keep care items and personal belongings within reach  - Initiate and maintain comfort rounds  - Make Fall Risk Sign visible to staff  - Offer Toileting every  Hours, in advance of need  - Initiate/Maintain alarm  - Obtain necessary fall risk management equipment:  - Apply yellow socks and bracelet for high fall risk patients  - Consider moving patient to room near nurses station  Outcome: Progressing

## 2023-05-23 NOTE — NURSING NOTE
Report called to [address] [place] [address] @ # [phone]  Reviewed pt 's admission to [facility] and pt 's assessment  Made aware pt  Was picked up by transport and is in-route to [address] [place] [address]  Call-back # left for any future questions

## 2023-05-23 NOTE — ASSESSMENT & PLAN NOTE
"· Initially presented to the ED  due to depression, see below     · Suspect due to overuse of seroquel, daughter noted patient may have been \"overusing\" night meds  · seroquel and zoloft discontinued, last doses on   · QTC : 540  · QTC  s/p 2g IV ma  · QTC : 533  · QTC : 504  · Mag 2 3  · Hold PO supplement due to new diarrhea   · Tele with NSR + PVCs  · Medically cleared for DC to [facility], hold QT prolonging agents in the future  "

## 2023-05-23 NOTE — ASSESSMENT & PLAN NOTE
"· Initially presented to the ED  due to depression, see below     · Suspect due to overuse of seroquel, daughter noted patient may have been \"overusing\" night meds  · seroquel and zoloft discontinued, last doses on   · QTC : 540  · QTC  s/p 2g IV ma  · QTC : 533  · QTC : 504  · Mag 2 3  · Hold PO supplement due to new diarrhea   · Tele with NSR + PVCs  · Medically cleared for DC to [facility], hold QT prolonging agents in the future  "

## 2023-05-23 NOTE — PROGRESS NOTES
"[place]  Progress Note  Name: Iva Fallon  MRN: [id number]  Unit/Bed#: -01 I Date of Admission: 2023   Date of Service: 2023 I Hospital Day: 2    Assessment/Plan   * Prolonged Q-T interval on ECG  Assessment & Plan  · Initially presented to the ED  due to depression, see below     · Suspect due to overuse of seroquel, daughter noted patient may have been \"overusing\" night meds  · seroquel and zoloft discontinued, last doses on   · QTC : 540  · QTC  s/p 2g IV ma  · QTC : 533  · QTC : 504  · Mag 2 3  · Hold PO supplement due to new diarrhea   · Tele with NSR + PVCs  · Medically cleared for DC to BHU, hold QT prolonging agents in the future    MDD (major depressive disorder)  Assessment & Plan  · Initially presented to the ED due to severe depression and anxiety due to recent social stressors including  who has been exhibiting personality changes, using her medications, and reportedly \"left her\"   · Patient confirms taking Seroquel, Zoloft, Wellbutrin at home, she states she does not think she was taking buspar  · Hold Seroquel and Zoloft given QTc  · Continue Wellbutrin  · Patient initially signed 201 on , was scheduled for inpatient BHU transfer on  however then refused to sign EMTALA form and was admitted due to prolonged QTc interval   · Psych consult appreciated  · Patient agreeable to signing 201 for voluntary IP psych --> new 201 signed   · Increase Wellbutrin to 300 mg daily   · Start Abilify 2 mg daily   · Start remeron 15 mg HS for insomnia   · Continue virtual one-to-one    Diarrhea  Assessment & Plan  · Patient reports new onset diarrhea this am, suspect 2/2 PO mag + anxiety  · No abdominal tenderness or N/V/ other GI sx  · No evidence of infection, not meeting SIRS criteria  · Hold PO magnesium   · Imodium PRN    GERD (gastroesophageal reflux disease)  Assessment & Plan  · Continue Protonix     " VTE Pharmacologic Prophylaxis:   Moderate Risk (Score 3-4) - Pharmacological DVT Prophylaxis Ordered: heparin  Patient Centered Rounds: I performed bedside rounds with nursing staff today  Discussions with Specialists or Other Care Team Provider: None     Education and Discussions with Family / Patient: will contact daughters   Total Time Spent on Date of Encounter in care of patient: 35 minutes This time was spent on one or more of the following: performing physical exam; counseling and coordination of care; obtaining or reviewing history; documenting in the medical record; reviewing/ordering tests, medications or procedures; communicating with other healthcare professionals and discussing with patient's family/caregivers  Current Length of Stay: 2 day(s)  Current Patient Status: Inpatient   Certification Statement: The patient will continue to require additional inpatient hospital stay due to awaiting BHU placement  Discharge Plan: Anticipate discharge later today or tomorrow to inpatient psych  Code Status: Level 1 - Full Code    Subjective:   Persistent sadness/depression however not tearful today  Patient reports diarrhea x 2-3 episodes this am however no abdominal pain or complaints  No recent abx or hx of c diff  Objective:     Vitals:   Temp (24hrs), Av °F (36 7 °C), Min:97 3 °F (36 3 °C), Max:98 6 °F (37 °C)    Temp:  [97 3 °F (36 3 °C)-98 6 °F (37 °C)] 97 3 °F (36 3 °C)  HR:  [74-87] 74  Resp:  [16] 16  BP: (118-142)/(72-85) 142/85  SpO2:  [93 %-95 %] 93 %  Body mass index is 27 84 kg/m²  Input and Output Summary (last 24 hours): Intake/Output Summary (Last 24 hours) at 2023 1205  Last data filed at 2023 1215  Gross per 24 hour   Intake 800 ml   Output --   Net 800 ml       Physical Exam:   Physical Exam  Vitals and nursing note reviewed  Constitutional:       General: She is not in acute distress  Appearance: She is well-developed     HENT:      Head: Normocephalic and atraumatic  Eyes:      General:         Right eye: No discharge  Left eye: No discharge  Extraocular Movements: Extraocular movements intact  Conjunctiva/sclera: Conjunctivae normal    Cardiovascular:      Rate and Rhythm: Normal rate and regular rhythm  Heart sounds: No murmur heard  Pulmonary:      Effort: Pulmonary effort is normal  No respiratory distress  Breath sounds: Normal breath sounds  No wheezing, rhonchi or rales  Abdominal:      General: Bowel sounds are normal  There is no distension  Palpations: Abdomen is soft  Tenderness: There is no abdominal tenderness  Musculoskeletal:      Cervical back: Neck supple  Right lower leg: No edema  Left lower leg: No edema  Skin:     General: Skin is warm and dry  Capillary Refill: Capillary refill takes less than 2 seconds  Neurological:      Mental Status: She is alert and oriented to person, place, and time  Mental status is at baseline  Cranial Nerves: No cranial nerve deficit  Psychiatric:         Mood and Affect: Mood normal          Behavior: Behavior normal           Additional Data:     Labs:  Results from last 7 days   Lab Units 05/23/23  0457   WBC Thousand/uL 9 24   HEMOGLOBIN g/dL 13 5   HEMATOCRIT % 42 8   PLATELETS Thousands/uL 273   NEUTROS PCT % 51   LYMPHS PCT % 31   MONOS PCT % 9   EOS PCT % 7*     Results from last 7 days   Lab Units 05/23/23  0457 05/22/23  0347   SODIUM mmol/L 140 139   POTASSIUM mmol/L 3 8 3 4*   CHLORIDE mmol/L 104 102   CO2 mmol/L 28 26   BUN mg/dL 12 12   CREATININE mg/dL 1 13 1 09   ANION GAP mmol/L 8 11   CALCIUM mg/dL 9 2 8 8   ALBUMIN g/dL  --  3 6   TOTAL BILIRUBIN mg/dL  --  0 43   ALK PHOS U/L  --  84   ALT U/L  --  6*   AST U/L  --  11*   GLUCOSE RANDOM mg/dL 98 124                       Lines/Drains:  Invasive Devices     Peripheral Intravenous Line  Duration           Peripheral IV 05/21/23 Dorsal (posterior); Right Hand 2 days Imaging: No pertinent imaging reviewed  Recent Cultures (last 7 days):         Last 24 Hours Medication List:   Current Facility-Administered Medications   Medication Dose Route Frequency Provider Last Rate   • acetaminophen  650 mg Oral Q6H PRN Efrem Tripathi PA-C     • ARIPiprazole  2 mg Oral Daily Brinda Swan PA-C     • atorvastatin  40 mg Oral Daily With [place] Jaime PA-C     • buPROPion  300 mg Oral Daily [place], [place]     • clopidogrel  75 mg Oral Daily [place], [place]     • gabapentin  100 mg Oral Daily [place], [place]     • heparin (porcine)  5,000 Units Subcutaneous [facility] [address] Tenn[place] Swan PA-C     • melatonin  6 mg Oral HS Efrem Tripathi PA-C     • mirtazapine  15 mg Oral HS Brinda Swan PA-C     • pantoprazole  40 mg Oral Early Morning Irving Lucas          Today, Patient Was Seen By: Ana Amezquita PA-C    **Please Note: This note may have been constructed using a voice recognition system  **

## 2023-05-23 NOTE — PLAN OF CARE
Problem: Prexisting or High Potential for Compromised Skin Integrity  Goal: Skin integrity is maintained or improved  Description: INTERVENTIONS:  - Identify patients at risk for skin breakdown  - Assess and monitor skin integrity  - Assess and monitor nutrition and hydration status  - Monitor labs   - Assess for incontinence   - Turn and reposition patient  - Assist with mobility/ambulation  - Relieve pressure over bony prominences  - Avoid friction and shearing  - Provide appropriate hygiene as needed including keeping skin clean and dry  - Evaluate need for skin moisturizer/barrier cream  - Collaborate with interdisciplinary team   - Patient/family teaching  - Consider wound care consult   Outcome: Progressing     Problem: MOBILITY - ADULT  Goal: Maintain or return to baseline ADL function  Description: INTERVENTIONS:  -  Assess patient's ability to carry out ADLs; assess patient's baseline for ADL function and identify physical deficits which impact ability to perform ADLs (bathing, care of mouth/teeth, toileting, grooming, dressing, etc )  - Assess/evaluate cause of self-care deficits   - Assess range of motion  - Assess patient's mobility; develop plan if impaired  - Assess patient's need for assistive devices and provide as appropriate  - Encourage maximum independence but intervene and supervise when necessary  - Involve family in performance of ADLs  - Assess for home care needs following discharge   - Consider OT consult to assist with ADL evaluation and planning for discharge  - Provide patient education as appropriate  Outcome: Progressing  Goal: Maintains/Returns to pre admission functional level  Description: INTERVENTIONS:  - Perform BMAT or MOVE assessment daily    - Set and communicate daily mobility goal to care team and patient/family/caregiver  - Collaborate with rehabilitation services on mobility goals if consulted  - Perform Range of Motion  times a day    - Reposition patient every hours   - Dangle patient  times a day  - Stand patient  times a day  - Ambulate patient  times a day  - Out of bed to chair  times a day   - Out of bed for meals  times a day  - Out of bed for toileting  - Record patient progress and toleration of activity level   Outcome: Progressing     Problem: PAIN - ADULT  Goal: Verbalizes/displays adequate comfort level or baseline comfort level  Description: Interventions:  - Encourage patient to monitor pain and request assistance  - Assess pain using appropriate pain scale  - Administer analgesics based on type and severity of pain and evaluate response  - Implement non-pharmacological measures as appropriate and evaluate response  - Consider cultural and social influences on pain and pain management  - Notify physician/advanced practitioner if interventions unsuccessful or patient reports new pain  Outcome: Progressing     Problem: INFECTION - ADULT  Goal: Absence or prevention of progression during hospitalization  Description: INTERVENTIONS:  - Assess and monitor for signs and symptoms of infection  - Monitor lab/diagnostic results  - Monitor all insertion sites, i e  indwelling lines, tubes, and drains  - Monitor endotracheal if appropriate and nasal secretions for changes in amount and color  - [place] appropriate cooling/warming therapies per order  - Administer medications as ordered  - Instruct and encourage patient and family to use good hand hygiene technique  - Identify and instruct in appropriate isolation precautions for identified infection/condition  Outcome: Progressing  Goal: Absence of fever/infection during neutropenic period  Description: INTERVENTIONS:  - Monitor WBC    Outcome: Progressing     Problem: SAFETY ADULT  Goal: Maintain or return to baseline ADL function  Description: INTERVENTIONS:  -  Assess patient's ability to carry out ADLs; assess patient's baseline for ADL function and identify physical deficits which impact ability to perform ADLs (bathing, care of mouth/teeth, toileting, grooming, dressing, etc )  - Assess/evaluate cause of self-care deficits   - Assess range of motion  - Assess patient's mobility; develop plan if impaired  - Assess patient's need for assistive devices and provide as appropriate  - Encourage maximum independence but intervene and supervise when necessary  - Involve family in performance of ADLs  - Assess for home care needs following discharge   - Consider OT consult to assist with ADL evaluation and planning for discharge  - Provide patient education as appropriate  Outcome: Progressing  Goal: Maintains/Returns to pre admission functional level  Description: INTERVENTIONS:  - Perform BMAT or MOVE assessment daily    - Set and communicate daily mobility goal to care team and patient/family/caregiver  - Collaborate with rehabilitation services on mobility goals if consulted  - Perform Range of Motion  times a day  - Reposition patient every  hours    - Dangle patient  times a day  - Stand patient  times a day  - Ambulate patient  times a day  - Out of bed to chair  times a day   - Out of bed for meals  times a day  - Out of bed for toileting  - Record patient progress and toleration of activity level   Outcome: Progressing  Goal: Patient will remain free of falls  Description: INTERVENTIONS:  - Educate patient/family on patient safety including physical limitations  - Instruct patient to call for assistance with activity   - Consult OT/PT to assist with strengthening/mobility   - Keep Call bell within reach  - Keep bed low and locked with side rails adjusted as appropriate  - Keep care items and personal belongings within reach  - Initiate and maintain comfort rounds  - Make Fall Risk Sign visible to staff  - Offer Toileting every  Hours, in advance of need  - Initiate/Maintain alarm  - Obtain necessary fall risk management equipment:  - Apply yellow socks and bracelet for high fall risk patients  - Consider moving patient to room near nurses station  Outcome: Progressing     Problem: DISCHARGE PLANNING  Goal: Discharge to home or other facility with appropriate resources  Description: INTERVENTIONS:  - Identify barriers to discharge w/patient and caregiver  - Arrange for needed discharge resources and transportation as appropriate  - Identify discharge learning needs (meds, wound care, etc )  - Arrange for interpretive services to assist at discharge as needed  - Refer to Case Management Department for coordinating discharge planning if the patient needs post-hospital services based on physician/advanced practitioner order or complex needs related to functional status, cognitive ability, or social support system  Outcome: Progressing     Problem: Knowledge Deficit  Goal: Patient/family/caregiver demonstrates understanding of disease process, treatment plan, medications, and discharge instructions  Description: Complete learning assessment and assess knowledge base    Interventions:  - Provide teaching at level of understanding  - Provide teaching via preferred learning methods  Outcome: Progressing     Problem: Potential for Falls  Goal: Patient will remain free of falls  Description: INTERVENTIONS:  - Educate patient/family on patient safety including physical limitations  - Instruct patient to call for assistance with activity   - Consult OT/PT to assist with strengthening/mobility   - Keep Call bell within reach  - Keep bed low and locked with side rails adjusted as appropriate  - Keep care items and personal belongings within reach  - Initiate and maintain comfort rounds  - Make Fall Risk Sign visible to staff  - Offer Toileting every  Hours, in advance of need  - Initiate/Maintain alarm  - Obtain necessary fall risk management equipment:  - Apply yellow socks and bracelet for high fall risk patients  - Consider moving patient to room near nurses station  Outcome: Progressing     Problem: Nutrition/Hydration-ADULT  Goal: Nutrient/Hydration intake appropriate for improving, restoring or maintaining nutritional needs  Description: Monitor and assess patient's nutrition/hydration status for malnutrition  Collaborate with interdisciplinary team and initiate plan and interventions as ordered  Monitor patient's weight and dietary intake as ordered or per policy  Utilize nutrition screening tool and intervene as necessary  Determine patient's food preferences and provide high-protein, high-caloric foods as appropriate       INTERVENTIONS:  - Monitor oral intake, urinary output, labs, and treatment plans  - Assess nutrition and hydration status and recommend course of action  - Evaluate amount of meals eaten  - Assist patient with eating if necessary   - Allow adequate time for meals  - Recommend/ encourage appropriate diets, oral nutritional supplements, and vitamin/mineral supplements  - Order, calculate, and assess calorie counts as needed  - Recommend, monitor, and adjust tube feedings and TPN/PPN based on assessed needs  - Assess need for intravenous fluids  - Provide specific nutrition/hydration education as appropriate  - Include patient/family/caregiver in decisions related to nutrition  Outcome: Progressing     Problem: NEUROSENSORY - ADULT  Goal: Achieves stable or improved neurological status  Description: INTERVENTIONS  - Monitor and report changes in neurological status  - Monitor vital signs such as temperature, blood pressure, glucose, and any other labs ordered   - Initiate measures to prevent increased intracranial pressure  - Monitor for seizure activity and implement precautions if appropriate      Outcome: Progressing     Problem: METABOLIC, FLUID AND ELECTROLYTES - ADULT  Goal: Electrolytes maintained within normal limits  Description: INTERVENTIONS:  - Monitor labs and assess patient for signs and symptoms of electrolyte imbalances  - Administer electrolyte replacement as ordered  - Monitor response to electrolyte replacements, including repeat lab results as appropriate  - Instruct patient on fluid and nutrition as appropriate  Outcome: Progressing  Goal: Fluid balance maintained  Description: INTERVENTIONS:  - Monitor labs   - Monitor I/O and WT  - Instruct patient on fluid and nutrition as appropriate  - Assess for signs & symptoms of volume excess or deficit  Outcome: Progressing     Problem: SKIN/TISSUE INTEGRITY - ADULT  Goal: Skin Integrity remains intact(Skin Breakdown Prevention)  Description: Assess:  -Perform Bishop assessment every x  -Clean and moisturize skin every x  -Inspect skin when repositioning, toileting, and assisting with ADLS  -Assess under medical devices such as x every x  -Assess extremities for adequate circulation and sensation     Bed Management:  -Have minimal linens on bed & keep smooth, unwrinkled  -Change linens as needed when moist or perspiring  -Avoid sitting or lying in one position for more than x hours while in bed  -Keep HOB at [facility]     Toileting:  -Offer bedside commode  -Assess for incontinence every x  -Use incontinent care products after each incontinent episode such as x    Activity:  -Mobilize patient x times a day  -Encourage activity and walks on unit  -Encourage or provide ROM exercises   -Turn and reposition patient every x Hours  -Use appropriate equipment to lift or move patient in bed  -Instruct/ Assist with weight shifting every x when out of bed in chair  -Consider limitation of chair time x hour intervals    Skin Care:  -Avoid use of baby powder, tape, friction and shearing, hot water or constrictive clothing  -Relieve pressure over bony prominences using x  -Do not massage red bony areas    Next Steps:  -Teach patient strategies to minimize risks such as x   -Consider consults to  interdisciplinary teams such as x  Outcome: Progressing     Problem: HEMATOLOGIC - ADULT  Goal: Maintains hematologic stability  Description: INTERVENTIONS  - Assess for signs and symptoms of bleeding or hemorrhage  - Monitor labs  - Administer supportive blood products/factors as ordered and appropriate  Outcome: Progressing     Problem: MUSCULOSKELETAL - ADULT  Goal: Maintain or return mobility to safest level of function  Description: INTERVENTIONS:  - Assess patient's ability to carry out ADLs; assess patient's baseline for ADL function and identify physical deficits which impact ability to perform ADLs (bathing, care of mouth/teeth, toileting, grooming, dressing, etc )  - Assess/evaluate cause of self-care deficits   - Assess range of motion  - Assess patient's mobility  - Assess patient's need for assistive devices and provide as appropriate  - Encourage maximum independence but intervene and supervise when necessary  - Involve family in performance of ADLs  - Assess for home care needs following discharge   - Consider OT consult to assist with ADL evaluation and planning for discharge  - Provide patient education as appropriate  Outcome: Progressing

## 2023-05-23 NOTE — PLAN OF CARE
Problem: Ineffective Coping  Goal: Cooperates with admission process  Description: Interventions:   - Complete admission process  Outcome: Not Progressing  Goal: Identifies ineffective coping skills  Outcome: Not Progressing  Goal: Identifies healthy coping skills  Outcome: Not Progressing  Goal: Demonstrates healthy coping skills  Outcome: Not Progressing  Goal: Patient/Family participate in treatment and DC plans  Description: Interventions:  - Provide therapeutic environment  Outcome: Not Progressing  Goal: Patient/Family verbalizes awareness of resources  Outcome: Not Progressing  Goal: Understands least restrictive measures  Description: Interventions:  - Utilize least restrictive behavior  Outcome: Not Progressing  Goal: Free from restraint events  Description: - Utilize least restrictive measures   - Provide behavioral interventions   - Redirect inappropriate behaviors   Outcome: Not Progressing     Problem: Depression  Goal: Treatment Goal: Demonstrate behavioral control of depressive symptoms, verbalize feelings of improved mood/affect, and adopt new coping skills prior to discharge  Outcome: Not Progressing  Goal: Verbalize thoughts and feelings  Description: Interventions:  - Assess and re-assess patient's level of risk   - Engage patient in 1:1 interactions, daily, for a minimum of 15 minutes   - Encourage patient to express feelings, fears, frustrations, hopes   Outcome: Not Progressing  Goal: Refrain from harming self  Description: Interventions:  - Monitor patient closely, per order   - Supervise medication ingestion, monitor effects and side effects   Outcome: Not Progressing  Goal: Refrain from isolation  Description: Interventions:  - Develop a trusting relationship   - Encourage socialization   Outcome: Not Progressing  Goal: Refrain from self-neglect  Outcome: Not Progressing  Goal: Complete daily ADLs, including personal hygiene independently, as able  Description: Interventions:  - Observe, teach, and assist patient with ADLS  -  Monitor and promote a balance of rest/activity, with adequate nutrition and elimination   Outcome: Not Progressing     Problem: Anxiety  Goal: Anxiety is at manageable level  Description: Interventions:  - Assess and monitor patient's anxiety level  - Monitor for signs and symptoms (heart palpitations, chest pain, shortness of breath, headaches, nausea, feeling jumpy, restlessness, irritable, apprehensive)  - Collaborate with interdisciplinary team and initiate plan and interventions as ordered    - [place] patient to unit/surroundings  - Explain treatment plan  - Encourage participation in care  - Encourage verbalization of concerns/fears  - Identify coping mechanisms  - Assist in developing anxiety-reducing skills  - Administer/offer alternative therapies  - Limit or eliminate stimulants  Outcome: Not Progressing

## 2023-05-24 PROBLEM — D50.9 IRON DEFICIENCY ANEMIA: Status: ACTIVE | Noted: 2017-10-19

## 2023-05-24 PROBLEM — F33.2 SEVERE EPISODE OF RECURRENT MAJOR DEPRESSIVE DISORDER (HCC): Status: ACTIVE | Noted: 2023-05-21

## 2023-05-24 PROBLEM — Z00.8 MEDICAL CLEARANCE FOR PSYCHIATRIC ADMISSION: Status: ACTIVE | Noted: 2023-05-24

## 2023-05-24 LAB
25(OH)D3 SERPL-MCNC: 114.3 NG/ML (ref 30–100)
ALBUMIN SERPL BCP-MCNC: 3.8 G/DL (ref 3.5–5)
ALP SERPL-CCNC: 84 U/L (ref 34–104)
ALT SERPL W P-5'-P-CCNC: 5 U/L (ref 7–52)
ANION GAP SERPL CALCULATED.3IONS-SCNC: 12 MMOL/L (ref 4–13)
AST SERPL W P-5'-P-CCNC: 11 U/L (ref 13–39)
ATRIAL RATE: 80 BPM
ATRIAL RATE: 84 BPM
BASOPHILS # BLD AUTO: 0.06 THOUSANDS/ÂΜL (ref 0–0.1)
BASOPHILS NFR BLD AUTO: 1 % (ref 0–1)
BILIRUB SERPL-MCNC: 0.42 MG/DL (ref 0.2–1)
BUN SERPL-MCNC: 13 MG/DL (ref 5–25)
CALCIUM SERPL-MCNC: 9.1 MG/DL (ref 8.4–10.2)
CHLORIDE SERPL-SCNC: 104 MMOL/L (ref 96–108)
CO2 SERPL-SCNC: 26 MMOL/L (ref 21–32)
CREAT SERPL-MCNC: 1.19 MG/DL (ref 0.6–1.3)
EOSINOPHIL # BLD AUTO: 0.5 THOUSAND/ÂΜL (ref 0–0.61)
EOSINOPHIL NFR BLD AUTO: 6 % (ref 0–6)
ERYTHROCYTE [DISTWIDTH] IN BLOOD BY AUTOMATED COUNT: 15.2 % (ref 11.6–15.1)
FOLATE SERPL-MCNC: 4.9 NG/ML
GFR SERPL CREATININE-BSD FRML MDRD: 45 ML/MIN/1.73SQ M
GLUCOSE P FAST SERPL-MCNC: 91 MG/DL (ref 65–99)
GLUCOSE SERPL-MCNC: 91 MG/DL (ref 65–140)
HCT VFR BLD AUTO: 43 % (ref 34.8–46.1)
HGB BLD-MCNC: 13.8 G/DL (ref 11.5–15.4)
IMM GRANULOCYTES # BLD AUTO: 0.05 THOUSAND/UL (ref 0–0.2)
IMM GRANULOCYTES NFR BLD AUTO: 1 % (ref 0–2)
LYMPHOCYTES # BLD AUTO: 2.26 THOUSANDS/ÂΜL (ref 0.6–4.47)
LYMPHOCYTES NFR BLD AUTO: 28 % (ref 14–44)
MAGNESIUM SERPL-MCNC: 2.2 MG/DL (ref 1.9–2.7)
MCH RBC QN AUTO: 29.2 PG (ref 26.8–34.3)
MCHC RBC AUTO-ENTMCNC: 32.1 G/DL (ref 31.4–37.4)
MCV RBC AUTO: 91 FL (ref 82–98)
MONOCYTES # BLD AUTO: 0.68 THOUSAND/ÂΜL (ref 0.17–1.22)
MONOCYTES NFR BLD AUTO: 8 % (ref 4–12)
NEUTROPHILS # BLD AUTO: 4.5 THOUSANDS/ÂΜL (ref 1.85–7.62)
NEUTS SEG NFR BLD AUTO: 56 % (ref 43–75)
NRBC BLD AUTO-RTO: 0 /100 WBCS
P AXIS: 72 DEGREES
P AXIS: 75 DEGREES
PHOSPHATE SERPL-MCNC: 3.8 MG/DL (ref 2.3–4.1)
PLATELET # BLD AUTO: 261 THOUSANDS/UL (ref 149–390)
PMV BLD AUTO: 11.1 FL (ref 8.9–12.7)
POTASSIUM SERPL-SCNC: 3.6 MMOL/L (ref 3.5–5.3)
PR INTERVAL: 174 MS
PR INTERVAL: 176 MS
PROT SERPL-MCNC: 6.1 G/DL (ref 6.4–8.4)
QRS AXIS: -12 DEGREES
QRS AXIS: 129 DEGREES
QRSD INTERVAL: 110 MS
QRSD INTERVAL: 112 MS
QT INTERVAL: 448 MS
QT INTERVAL: 450 MS
QTC INTERVAL: 519 MS
QTC INTERVAL: 529 MS
RBC # BLD AUTO: 4.73 MILLION/UL (ref 3.81–5.12)
SODIUM SERPL-SCNC: 142 MMOL/L (ref 135–147)
T WAVE AXIS: 124 DEGREES
T WAVE AXIS: 184 DEGREES
TSH SERPL DL<=0.05 MIU/L-ACNC: 1.83 UIU/ML (ref 0.45–4.5)
VENTRICULAR RATE: 80 BPM
VENTRICULAR RATE: 84 BPM
VIT B12 SERPL-MCNC: 195 PG/ML (ref 180–914)
WBC # BLD AUTO: 8.05 THOUSAND/UL (ref 4.31–10.16)

## 2023-05-24 RX ORDER — CLONAZEPAM 0.5 MG/1
0.5 TABLET ORAL
Status: DISCONTINUED | OUTPATIENT
Start: 2023-05-24 | End: 2023-05-26 | Stop reason: HOSPADM

## 2023-05-24 RX ORDER — LOPERAMIDE HYDROCHLORIDE 2 MG/1
2 CAPSULE ORAL 4 TIMES DAILY PRN
Status: DISCONTINUED | OUTPATIENT
Start: 2023-05-24 | End: 2023-05-26 | Stop reason: HOSPADM

## 2023-05-24 RX ORDER — ASCORBIC ACID 500 MG
500 TABLET ORAL DAILY
Status: DISCONTINUED | OUTPATIENT
Start: 2023-05-24 | End: 2023-05-26 | Stop reason: HOSPADM

## 2023-05-24 RX ORDER — DOXYCYCLINE HYCLATE 50 MG/1
324 CAPSULE, GELATIN COATED ORAL
Status: DISCONTINUED | OUTPATIENT
Start: 2023-05-25 | End: 2023-05-26 | Stop reason: HOSPADM

## 2023-05-24 RX ADMIN — ACETAMINOPHEN 975 MG: 325 TABLET ORAL at 19:10

## 2023-05-24 RX ADMIN — TRAZODONE HYDROCHLORIDE 50 MG: 50 TABLET ORAL at 21:24

## 2023-05-24 RX ADMIN — ARIPIPRAZOLE 2 MG: 2 TABLET ORAL at 08:22

## 2023-05-24 RX ADMIN — MIRTAZAPINE 15 MG: 15 TABLET, FILM COATED ORAL at 21:24

## 2023-05-24 RX ADMIN — BUPROPION HYDROCHLORIDE 300 MG: 300 TABLET, FILM COATED, EXTENDED RELEASE ORAL at 08:22

## 2023-05-24 RX ADMIN — OXYCODONE HYDROCHLORIDE AND ACETAMINOPHEN 500 MG: 500 TABLET ORAL at 10:50

## 2023-05-24 RX ADMIN — CLONAZEPAM 0.5 MG: 0.5 TABLET ORAL at 21:24

## 2023-05-24 RX ADMIN — ACETAMINOPHEN 650 MG: 325 TABLET ORAL at 08:57

## 2023-05-24 NOTE — ASSESSMENT & PLAN NOTE
· QTc at 529 today  · Avoid medications that prolong the QTc interval  · If acute QTC continues to rise, consider cardiology consult

## 2023-05-24 NOTE — PROGRESS NOTES
05/24/23 1314   Team Meeting   Meeting Type Tx Team Meeting   Initial Conference Date 05/24/23   Next Conference Date 06/23/23   Team Members Present   Team Members Present Physician;Nurse;   Patient/Family Present   Patient Present Yes   Patient's Family Present No       Dr Carole Garcia, Rahul Peters

## 2023-05-24 NOTE — NURSING NOTE
Patient denies anxiety, depression, SI, HI, VH and AH  Patient is cooperative with staff  Medication compliant  Patient is withdrawn to room, but socializes with roommate  Patient is visible in dining room eating meals  Patient denies any needs at this time  Safety checks ongoing

## 2023-05-24 NOTE — PROGRESS NOTES
05/24/23 1319   Referral Data   Referral Reason [place] [address]   Readmission Root Cause   30 Day Readmission No   Patient Information   Mental Status Alert   Primary Caregiver Self   Support System Extended family   Legal Information   Current Status: 89  (sihned 65 on 5/23 @ 1842)   Legal Issues none   Advance Directives   (only for finances - Daughter Alicia Bland)   Activities of Daily Living Prior to Admission   Functional Status Minimum assistance   Assistive Device Walker   Living Arrangement House   Ambulation Minimum assistance  (visual impairment prohibits her in new settings)   Access to Firearms   Access to Firearms No   [address] SSI/SSD  ($600)   Means of Transportation   Means of Transport to Appts: Family transport

## 2023-05-24 NOTE — ASSESSMENT & PLAN NOTE
· Vital signs stable  Reviewed admission labs with the exception of vitamin D, B12, and folate levels  Patient is medically cleared for admission to the [facility] for treatment of the underlying psychiatric illness  Slim will sign off    Please call with questions or concerns

## 2023-05-24 NOTE — NURSING NOTE
Pt anxious and constricted but pleasant and med-compliant with poor appetite  No tearfulness noted  VSS  Using walker with steady gait  Attended group  Monitored for safety and support

## 2023-05-24 NOTE — TREATMENT TEAM
05/24/23 0602   Provider Notification   Reason for Communication Procedure/test   Provider Name Merly Das   Provider Role Hospitalist   Method of Communication Other (Comment)  (TigerText)   Response No new orders   Notification Time 0601     EKG sent to provider for review  No new orders

## 2023-05-24 NOTE — TREATMENT PLAN
TREATMENT PLAN REVIEW - [address] 74 y o  1950 female MRN: [id number]    [facility] 6B [facility] Room / Bed: [facility]/-25 Encounter: [id number]          Admit Date/Time:  5/23/2023  6:19 PM    Treatment Team: Attending Provider: Shravan Clark MD; Consulting Physician: Anibal Boland MD; Patient Care Assistant: Abram Camarillo; Nurse Manager: Elen Welsh RN; Registered Nurse: Heather Ramirez RN; Patient Care Assistant: Rhea Olivares; : Alistair Villarreal OT; Nurse Practitioner: NICKOLAS Berry; Patient Care Assistant: Layla Finney    Diagnosis: Principal Problem:    Severe episode of recurrent major depressive disorder [facility])  Active Problems:    GERD (gastroesophageal reflux disease)    Peripheral vascular disease ([facility] [address] )    Prolonged Q-T interval on ECG    Iron deficiency anemia    Medical clearance for psychiatric admission      Patient Strengths/Assets: cooperative, communication skills, compliant with medication, family ties, supportive family/friends    Patient Barriers/Limitations: difficulty adapting, limited support system, poor physical health    Short Term Goals: decrease in depressive symptoms, decrease in anxiety symptoms, ability to stay safe on the unit, improvement in insight, improvement in reality testing, improvement in self care, sleep improvement, improvement in appetite    Long Term Goals: improvement in depression, improvement in anxiety, stabilization of mood, free of suicidal thoughts, free of homicidal thoughts, improvement in reality testing, able to express basic needs, acceptance of need for psychiatric medications, acceptance of need for psychiatric follow up after discharge, adequate sleep    Progress Towards Goals: starting psychiatric medications as prescribed    Recommended Treatment: medication management, patient medication education, group therapy, milieu therapy, continued Behavioral Health psychiatric evaluation/assessment process    Treatment Frequency: daily medication monitoring, group and milieu therapy daily, monitoring through interdisciplinary rounds, monitoring through weekly patient care conferences    Expected Discharge Date:  7 days    Discharge Plan: referral for outpatient medication management with a psychiatrist, referral for outpatient psychotherapy, referral to partial hospitalization program, return to previous living arrangement    Treatment Plan Created/Updated By: Yesenia Mo MD

## 2023-05-24 NOTE — H&P
Psychiatric Evaluation - Behavioral Health     Identification Data:Nery Bundy 74 y o  female MRN: [id number]  Unit/Bed#: [facility] 739-22 Encounter: [id number]    Chief Complaint:     History of present illness:    Sarah Dent is a 74 y o   female, , domiciled (intermittently w/  or her daughters), w/ PMH significant for CAD s/p PCI, PAD, COPD, HLD, Iron def, GERD, impaired vision (b/l macular degeneration) and PPH of depression and anxiety, no prior psychiatric admissions, no prior SA, no h/o self-injurious behavior who, recently saw a psychiatrist (virtual in [place]) on Wellbutrin 300 mg daily, Buspar 10 mg BID, Ativan 1 mg nightly and Abilify 2 mg daily (meds recently changed as per patient), who was BIB her daughter to the ED on 5/20/23 due to worsening of depression over past week in the context of marital conflicts and recent changes in her meds  Psych consult visited the patient virtually, and the patient signed [id number] 81 and admitted to the inpatient psychiatric unit 6B for further psychiatric stabilization  The pt was visited on the unit; chart reviewed  Presented calm, cooperative and well related, casually dressed w/ fair hygiene, good eye contact despite impaired vision (b/l macular degeneration), depressed mood, constricted affect, talking in normal tone, volume and amount, w/ linear thought process, fair insight and judgement  She reported that her  who reportedly dealing with medical condition and is on dialysis, started to have some cognitive decline and behavioral changes, and reported marital conflicts since Oct 2353  She has been living intermittently with her daughters and back home for few days, most recently she was with her  who reportedly called  on her 3 weeks ago and wanted to 302 her as per patient, but while the  came and spoke with her and her daughter, they realized that she was doing OK as per patient   She then stayed with her daughters two days and went back home and had trouble sleeping as per patient, and took 2 doses of Seroquel last week and reportedly had some VH at that night seeing two people in the hallway and called her daughter  The patient was initially evaluated at [facility] ED when her QTc: was 540 and received medical care and then admitted to this unit  She reported worsening of depressive sxs since Mother's day, as being depressed, tearful and not being able to stop crying, decreased appetite, poor sleep and decreased energy  Strongly denied SI/HI, intent or plan upon direct inquiry at this time  Denied A/VH (other than one night after taking sleep meds as per patient)  No manic sxs, paranoid ideations or fixed delusions were elicited  Denied smoking cigarettes (quit 2 yrs ago), binge drinking alcohol or other illicit substance use  The patient noted that she has been feeling calmer since being in the hospital, but wanted to leave on Friday to go to her grandson's birthday this weekend  She maintained on Wellbutrin  mg and Abilify 2 mg daily and started on Remeron 15 mg nightly for depression and anxiety and Ativan 1 mg nightly switched to Klonopin 0 5 mg nightly; to be tapered off as tolerated  Agreed with further f/uat PHP upon discharge  QTc: 519 this morning; will continue daily EKG       Psychiatric Review Of Systems:  Pertinent items are noted in HPI; all others negative    Historical Information     Past Psychiatric History:    PPH of depression and anxiety, no prior psychiatric admissions, no prior SA, no h/o self-injurious behavior who, recently saw a psychiatrist (virtual in [place]) on Wellbutrin 300 mg daily, Buspar 10 mg BID, Ativan 1 mg nightly and Abilify 2 mg daily (meds recently changed as per patient)    Substance Abuse History:  Social History     Substance and Sexual Activity   Alcohol Use Not Currently    Comment: social     Social History     Substance and Sexual Activity   Drug Use No   Denied smoking cigarettes (quit 2 yrs ago), binge drinking alcohol or other illicit substance use  Family Psychiatric History:   Family History   Problem Relation Age of Onset   • No Known Problems Mother    • No Known Problems Father    • Heart disease Family        Social History:  Social History     Socioeconomic History   • Marital status: /Civil Union     Spouse name: Not on file   • Number of children: Not on file   • Years of education: Not on file   • Highest education level: Not on file   Occupational History   • Not on file   Tobacco Use   • Smoking status: Former     Packs/day: 1 00     Years: 0 10     Total pack years: 0 10     Types: Cigarettes     Quit date:      Years since quittin 3   • Smokeless tobacco: Never   • Tobacco comments:     pt states she stopped smoking 3 months ago ( 19)   Vaping Use   • Vaping Use: Never used   Substance and Sexual Activity   • Alcohol use: Not Currently     Comment: social   • Drug use: No   • Sexual activity: Not on file   Other Topics Concern   • Not on file   Social History Narrative   • Not on file     Social Determinants of Health     Financial Resource Strain: Not on file   Food Insecurity: No Food Insecurity (2023)    Hunger Vital Sign    • Worried About Running Out of Food in the Last Year: Never true    • Ran Out of Food in the Last Year: Never true   Transportation Needs: No Transportation Needs (2023)    PRAPARE - Transportation    • Lack of Transportation (Medical): No    • Lack of Transportation (Non-Medical):  No   Physical Activity: Not on file   Stress: Not on file   Social Connections: Not on file   Intimate Partner Violence: Not on file   Housing Stability: Low Risk  (2023)    Housing Stability Vital Sign    • Unable to Pay for Housing in the Last Year: No    • Number of Places Lived in the Last Year: 1    • Unstable Housing in the Last Year: No       Developmental:  Education: high school diploma/GED  Marital history: marriedfor 54 years (knows her  for 61 yrs)  Children: two adult daughters  Living arrangement, social support: daughter  Occupational History: homemaker  Access to firearms: denied (reportedly her 's guns were removed from the house a while ago)    Traumatic History:   Abuse:none is reported  Other Traumatic Events: none    Past Medical History:   Diagnosis Date   • Anemia    • Cardiac disease    • Chronic pain    • Coronary artery disease    • Hypertension    • [facility]        Medical Review Of Systems:  Pertinent items are noted in HPI; all others negative    Meds/Allergies   all current active meds have been reviewed, current meds:   Current Facility-Administered Medications   Medication Dose Route Frequency   • acetaminophen (TYLENOL) tablet 650 mg  650 mg Oral Q4H PRN   • acetaminophen (TYLENOL) tablet 650 mg  650 mg Oral Q4H PRN   • acetaminophen (TYLENOL) tablet 975 mg  975 mg Oral Q6H PRN   • ARIPiprazole (ABILIFY) tablet 2 mg  2 mg Oral Daily   • ascorbic acid (VITAMIN C) tablet 500 mg  500 mg Oral Daily   • buPROPion (WELLBUTRIN XL) 24 hr tablet 300 mg  300 mg Oral Daily   • clonazePAM (KlonoPIN) tablet 0 5 mg  0 5 mg Oral HS   • [START ON 5/25/2023] ferrous gluconate (FERGON) tablet 324 mg  324 mg Oral Daily Before Breakfast   • haloperidol lactate (HALDOL) injection 5 mg  5 mg Intramuscular Q4H PRN Max 4/day   • hydrOXYzine HCL (ATARAX) tablet 25 mg  25 mg Oral Q6H PRN Max 4/day   • hydrOXYzine HCL (ATARAX) tablet 50 mg  50 mg Oral Q6H PRN Max 4/day   • loperamide (IMODIUM) capsule 2 mg  2 mg Oral 4x Daily PRN   • LORazepam (ATIVAN) injection 1 mg  1 mg Intramuscular Q6H PRN Max 3/day   • LORazepam (ATIVAN) tablet 1 mg  1 mg Oral Q6H PRN Max 3/day   • mirtazapine (REMERON) tablet 15 mg  15 mg Oral HS   • risperiDONE (RisperDAL) tablet 0 25 mg  0 25 mg Oral Q4H PRN Max 6/day   • risperiDONE (RisperDAL) tablet 0 5 mg  0 5 mg Oral Q4H PRN Max 3/day   • risperiDONE (RisperDAL) tablet 1 mg  1 mg Oral Q2H PRN Max 3/day   • senna-docusate sodium (SENOKOT S) 8 6-50 mg per tablet 1 tablet  1 tablet Oral Daily PRN   • traZODone (DESYREL) tablet 50 mg  50 mg Oral HS PRN    and PTA meds:   Prior to Admission Medications   Prescriptions Last Dose Informant Patient Reported? Taking? ALPRAZolam (XANAX) 0 25 mg tablet   Yes No   Sig: Take 0 25 mg by mouth Three times daily as needed   Patient not taking: Reported on 5/21/2023   QUEtiapine (SEROquel) 100 mg tablet Not Taking  Yes No   Sig: Take 100 mg by mouth daily at bedtime   Patient not taking: Reported on 5/23/2023   acetaminophen (TYLENOL) 325 mg tablet 5/23/2023  No Yes   Sig: Take 3 tablets (975 mg total) by mouth every 8 (eight) hours   atorvastatin (LIPITOR) 40 mg tablet Past Month  Yes Yes   Sig: Take 40 mg by mouth daily  buPROPion (WELLBUTRIN XL) 300 mg 24 hr tablet 5/23/2023  Yes Yes   Sig: Take 300 mg by mouth daily   busPIRone (BUSPAR) 10 mg tablet Not Taking  Yes No   Sig: Take 10 mg by mouth 3 (three) times a day   Patient not taking: Reported on 5/23/2023   clopidogrel (PLAVIX) 75 mg tablet 5/23/2023  Yes Yes   Sig: Take 75 mg by mouth daily     gabapentin (NEURONTIN) 100 mg capsule 5/23/2023  No Yes   Sig: Take 1 capsule (100 mg total) by mouth 3 (three) times a day   omeprazole (PriLOSEC) 40 MG capsule Not Taking  Yes No   Sig: Take 40 mg by mouth daily   Patient not taking: Reported on 5/23/2023   sertraline (ZOLOFT) 100 mg tablet Not Taking  Yes No   Sig: Take 100 mg by mouth daily at bedtime   Patient not taking: Reported on 5/23/2023   traZODone (DESYREL) 100 mg tablet Not Taking  No No   Sig: Take 2 tablets (200 mg total) by mouth daily at bedtime   Patient not taking: Reported on 5/21/2023      Facility-Administered Medications: None     Allergies   Allergen Reactions   • Aspirin GI Bleeding     Patient states she is not allergic to ASA    • Digoxin Hives     HA   • Gadolinium Derivatives "    Objective      Mental Status Evaluation:  Appearance and attitude: appeared as stated age, dressed in hospital attire, with good hygiene, using the walker  Eye contact: good  Motor Function: within normal limits, No PMA/PMR  Gait/station: slow and  needs assistive device  Speech: normal for rate, rhythm, volume, latency, amount  Language: No overt abnormality  Mood/affect: depressed / Affect was constricted but reactive, mood congruent  Thought Processes: linear  Thought content: denies suicidal ideation or homicidal ideation; no delusions or first rank symptoms  Associations: concrete associations  Perceptual disturbances: denies Auditory/Visual/Tactile Hallucinations  Orientation: oriented to time, person, place and to the situational context  Cognitive Function: intact  Memory: recent and remote memory grossly intact  Intellect: average  Fund of knowledge: aware of current events, aware of past history and vocabulary average  Impulse control: good  Insight/judgment: fair/fair    Lab Results: I have personally reviewed pertinent lab results          MCV   Date Value Ref Range Status   05/24/2023 91 82 - 98 fL Final     WBC   Date Value Ref Range Status   05/24/2023 8 05 4 31 - 10 16 Thousand/uL Final     WBC, Fluid   Date Value Ref Range Status   04/11/2021 35,070 (H) 0 - 200 /ul Final     WBC, UA   Date Value Ref Range Status   05/20/2023 10-20 (A) None Seen, 0-1, 1-2, 0-5, 2-4 /hpf Final     Lab Results   Component Value Date    BUN 13 05/24/2023    CO2 26 05/24/2023    SODIUM 142 05/24/2023     Lab Results   Component Value Date    ALKPHOS 84 05/24/2023     Lab Results   Component Value Date    CKMB 8 2 (H) 04/08/2021     No results found for: \"TSH\"  INR   Date Value Ref Range Status   04/08/2021 1 52 (H) 0 84 - 1 19 Final   04/07/2021 1 40 (H) 0 84 - 1 19 Final   04/02/2021 1 45 (H) 0 84 - 1 19 Final     No results found for: \"APTT\"  No results found for: \"PHENO\"  BUN   Date Value Ref Range Status " "  05/24/2023 13 5 - 25 mg/dL Final     Creatinine   Date Value Ref Range Status   05/24/2023 1 19 0 60 - 1 30 mg/dL Final     Comment:     Standardized to IDMS reference method     Sodium   Date Value Ref Range Status   05/24/2023 142 135 - 147 mmol/L Final     SODIUM, I-STAT   Date Value Ref Range Status   04/02/2021 138 136 - 145 mmol/l Final     TSH 3RD GENERATON   Date Value Ref Range Status   05/24/2023 1 827 0 450 - 4 500 uIU/mL Final     Comment:     The recommended reference ranges for TSH during pregnancy are as follows:   First trimester 0 1 to 2 5 uIU/mL   Second trimester  0 2 to 3 0 uIU/mL   Third trimester 0 3 to 3 0 uIU/m    Note: Normal ranges may not apply to patients who are transgender, non-binary, or whose legal sex, sex at birth, and gender identity differ  Adult TSH (3rd generation) reference range follows the recommended guidelines of the [employer], January, 2020       WBC   Date Value Ref Range Status   05/24/2023 8 05 4 31 - 10 16 Thousand/uL Final     WBC, Fluid   Date Value Ref Range Status   04/11/2021 35,070 (H) 0 - 200 /ul Final     WBC, UA   Date Value Ref Range Status   05/20/2023 10-20 (A) None Seen, 0-1, 1-2, 0-5, 2-4 /hpf Final     No components found for: \"B12\"  No results found for: \"FOLATE\"  No results found for: \"RPR\"      Imaging Studies: reviewed    EKG, Pathology, and Other Studies: reviewed - will continue to monitor QTc    Code Status:Full code    Patient Strengths/Assets: ability for insight, communication skills, family ties    Patient Barriers/Limitations: difficulty adapting, limited support system    Suicide/Homicide Risk Assessment:    Risk of Harm to Self:   Nursing Suicide Risk Assessment Last 24 hours: C-SSRS Risk (Since Last Contact)  Calculated C-SSRS Risk Score (Since Last Contact): No Risk Indicated  Current Specific Risk Factors include: current depressive symptoms, current anxiety symptoms  Protective Factors: no current suicidal " ideation  Based on today's assessment, Willi Rosario presents the following risk of harm to self: low    Risk of Harm to Others:  Nursing Homicide Risk Assessment: Violence Risk to Others: Denies within past 6 months  Current Specific Risk Factors include: none  Protective Factors: no current homicidal ideation  Based on today's assessment, Willi Rosario presents the following risk of harm to others: low    The following interventions are recommended: behavioral checks every 7 minutes, continued hospitalization on locked unit    Assessment/Plan     Principal Problem:    Severe episode of recurrent major depressive disorder (HCC)  Active Problems:    GERD (gastroesophageal reflux disease)    Peripheral vascular disease (HCC)    Prolonged Q-T interval on ECG    Iron deficiency anemia    Medical clearance for psychiatric admission    Plan:   Risks, benefits and possible side effects of Medications:   Risks, benefits, and possible side effects of medications explained to patient and patient verbalizes understanding        - f/u SLIM recs regarding the medical problems  - PT eval  - fall precaution  - expand collat  - Continue medication titration and treatment plan; adjust medication to optimize treatment response and as clinically indicated       Scheduled medications:  Current Facility-Administered Medications   Medication Dose Route Frequency Provider Last Rate   • acetaminophen  650 mg Oral Q4H PRN NICKOLAS Medina     • acetaminophen  650 mg Oral Q4H PRN NICKOLAS Medina     • acetaminophen  975 mg Oral Q6H PRN NICKOLAS Medina     • ARIPiprazole  2 mg Oral Daily NICKOLAS Medina     • ascorbic acid  500 mg Oral Daily Timothy Hightower PA-C     • buPROPion  300 mg Oral Daily NICKOLAS Medina     • clonazePAM  0 5 mg Oral HS Ric Barron MD     • [START ON 5/25/2023] ferrous gluconate  324 mg Oral Daily Before Breakfast Timothy Hightower PA-C     • haloperidol lactate  5 mg Intramuscular Q4H PRN Max 4/day Olga Lager, CRNP     • hydrOXYzine HCL  25 mg Oral Q6H PRN Max 4/day Olga Lager, CRNP     • hydrOXYzine HCL  50 mg Oral Q6H PRN Max 4/day Olga Lager, CRNP     • loperamide  2 mg Oral 4x Daily PRN Suze Hernandez PA-C     • LORazepam  1 mg Intramuscular Q6H PRN Max 3/day Olga Lager, CRNP     • LORazepam  1 mg Oral Q6H PRN Max 3/day Olga Lager, CRNP     • mirtazapine  15 mg Oral HS Olga Lager, CRNP     • risperiDONE  0 25 mg Oral Q4H PRN Max 6/day Olga Lager, CRNP     • risperiDONE  0 5 mg Oral Q4H PRN Max 3/day Olga Lager, CRNP     • risperiDONE  1 mg Oral Q2H PRN Max 3/day Olga Lager, CRNP     • senna-docusate sodium  1 tablet Oral Daily PRN Olga Lager, CRNP     • traZODone  50 mg Oral HS PRN Olga Lager, CRNP          PRN:  •  acetaminophen  •  acetaminophen  •  acetaminophen  •  haloperidol lactate  •  hydrOXYzine HCL  •  hydrOXYzine HCL  •  loperamide  •  LORazepam  •  LORazepam  •  risperiDONE  •  risperiDONE  •  risperiDONE  •  senna-docusate sodium  •  traZODone    - Observation: routine    - VS: as per unit protocol  - Legal status:   201  - Diet: Regular diet  - Psychoeducation (benefits and potential risks) discussed, importance of compliance with the psychiatric treatment reiterated, and the patient verbalized understanding of the matter  - Encourage group attendance and milieu therapy     - The pt was educated and agreed to verbalize any suicidal thoughts, frustrations or concerns to the nursing staff, immediately  - Dispo: To be determined       Next of Kin  · Extended Emergency Contact Information  · Primary Emergency Contact: Lucia Simons  · Mobile Phone: [phone]  · Relation: Daughter  · Secondary Emergency Contact: Greg Richardson  · Mobile Phone: [phone]  · Relation: Daughter  ·  needed?  No    Kaiser Ho MD  Attending [address]

## 2023-05-24 NOTE — ASSESSMENT & PLAN NOTE
· Hemoglobin stable at 13 8  · Continue ferrous gluconate daily before breakfast, will add vitamin C for increased absorption

## 2023-05-24 NOTE — TREATMENT TEAM
"Met with pt to complete  relapse prevention plan  Pt noted signs and symptoms upon admission were: \"depression, crying and poor sleep, \"  Crisis, 988 and warmline phone number provided  Pt signed and copy in chart  Pt signed 72 hour notice and wants to speak with dr about leaving  Pharmacy: [facility]  05/24/23 0940   Activity/Group Checklist   Group Admission/Discharge   Attendance Attended   Attendance Duration (min) 16-30   Interactions Interacted appropriately   Affect/Mood Appropriate   Goals Achieved Identified feelings; Identified relapse prevention strategies; Able to listen to others; Able to engage in interactions       "

## 2023-05-24 NOTE — NURSING NOTE
Patient is withdrawn to her room  She requested prn tylenol 650 mg for a headache rated 6/10 at 21:22  Patient is compliant with her night medications  Patient denies any needs at this time Safety checks ongoing  Upon re-assessment patient is sleeping, medication appears to be effective

## 2023-05-24 NOTE — PLAN OF CARE
Problem: Ineffective Coping  Goal: Cooperates with admission process  Description: Interventions:   - Complete admission process  Outcome: Progressing  Goal: Identifies ineffective coping skills  Outcome: Progressing  Goal: Identifies healthy coping skills  Outcome: Progressing  Goal: Demonstrates healthy coping skills  Outcome: Progressing  Goal: Patient/Family participate in treatment and DC plans  Description: Interventions:  - Provide therapeutic environment  Outcome: Progressing  Goal: Patient/Family verbalizes awareness of resources  Outcome: Progressing  Goal: Understands least restrictive measures  Description: Interventions:  - Utilize least restrictive behavior  Outcome: Progressing  Goal: Free from restraint events  Description: - Utilize least restrictive measures   - Provide behavioral interventions   - Redirect inappropriate behaviors   Outcome: Progressing     Problem: Depression  Goal: Treatment Goal: Demonstrate behavioral control of depressive symptoms, verbalize feelings of improved mood/affect, and adopt new coping skills prior to discharge  Outcome: Progressing  Goal: Verbalize thoughts and feelings  Description: Interventions:  - Assess and re-assess patient's level of risk   - Engage patient in 1:1 interactions, daily, for a minimum of 15 minutes   - Encourage patient to express feelings, fears, frustrations, hopes   Outcome: Progressing  Goal: Refrain from harming self  Description: Interventions:  - Monitor patient closely, per order   - Supervise medication ingestion, monitor effects and side effects   Outcome: Progressing  Goal: Refrain from isolation  Description: Interventions:  - Develop a trusting relationship   - Encourage socialization   Outcome: Progressing  Goal: Refrain from self-neglect  Outcome: Progressing  Goal: Complete daily ADLs, including personal hygiene independently, as able  Description: Interventions:  - Observe, teach, and assist patient with ADLS  -  Monitor and promote a balance of rest/activity, with adequate nutrition and elimination   Outcome: Progressing     Problem: Anxiety  Goal: Anxiety is at manageable level  Description: Interventions:  - Assess and monitor patient's anxiety level  - Monitor for signs and symptoms (heart palpitations, chest pain, shortness of breath, headaches, nausea, feeling jumpy, restlessness, irritable, apprehensive)  - Collaborate with interdisciplinary team and initiate plan and interventions as ordered  - Nipomo patient to unit/surroundings  - Explain treatment plan  - Encourage participation in care  - Encourage verbalization of concerns/fears  - Identify coping mechanisms  - Assist in developing anxiety-reducing skills  - Administer/offer alternative therapies  - Limit or eliminate stimulants  Outcome: Progressing     Problem: Nutrition/Hydration-ADULT  Goal: Nutrient/Hydration intake appropriate for improving, restoring or maintaining nutritional needs  Description: Monitor and assess patient's nutrition/hydration status for malnutrition  Collaborate with interdisciplinary team and initiate plan and interventions as ordered  Monitor patient's weight and dietary intake as ordered or per policy  Utilize nutrition screening tool and intervene as necessary  Determine patient's food preferences and provide high-protein, high-caloric foods as appropriate       INTERVENTIONS:  - Monitor oral intake, urinary output, labs, and treatment plans  - Assess nutrition and hydration status and recommend course of action  - Evaluate amount of meals eaten  - Assist patient with eating if necessary   - Allow adequate time for meals  - Recommend/ encourage appropriate diets, oral nutritional supplements, and vitamin/mineral supplements  - Order, calculate, and assess calorie counts as needed  - Recommend, monitor, and adjust tube feedings and TPN/PPN based on assessed needs  - Assess need for intravenous fluids  - Provide specific nutrition/hydration education as appropriate  - Include patient/family/caregiver in decisions related to nutrition  Outcome: Progressing

## 2023-05-24 NOTE — CMS CERTIFICATION NOTE
Certification: Based upon physical, mental and social evaluations, I certify that inpatient psychiatric services are medically necessary for this patient for a duration of 7 midnights for the treatment of Severe episode of recurrent major depressive disorder ([facility] [facility] )    Available alternative community resources do not meet the patient's mental health care needs  I further attest that an established written individualized plan of care has been implemented and is outlined in the patient's medical records

## 2023-05-24 NOTE — PROGRESS NOTES
05/24/23 0924    Team Meeting   Meeting Type Daily Rounds   Initial Conference Date 05/24/23   Next Conference Date 05/25/23   Team Members Present   Team Members Present Physician;;Nurse;;Occupational Therapist   Patient/Family Present   Patient Present No   Patient's Family Present No   Dr Maggie Julien, Doristine Severance,, ZHANG Ramos, Chato Contrerasor -   depressed, poor appetite, H left pt and she is distraught, he took her Xanax prescription for himself, she may have opverused Seroquel, signed 72 on 5/23 @ 1842 - wants ME Friday 5/25

## 2023-05-24 NOTE — NURSING NOTE
Patient is a 201 admission from [address] [place] [address] for depression  Patient presented to the ER reporting tearfulness, poor appetite and problems at home  Upon admission patient reports bettering mood since medication adjustment on SLUB unit  Patient endorsing high stress related to spousal problems and financial concerns  Denies current depression and anxiety, reports VH of a woman in a green dress a week ago  Denies current VH,AH, SI and HI  Patient is guarded, frequently stating she wants to go home on Friday  BMAT 3 with a walker, bilateral bruising with skin intact

## 2023-05-24 NOTE — CONSULTS
[facility]  Consult  Name: Maria Esther Mccollum 74 y o  female I MRN: [id number]  Unit/Bed#: Mira Viktor 883-35 I Date of Admission: 5/23/2023   Date of Service: 5/24/2023 I Hospital Day: 1    Inpatient consult for Medical Clearance for [address] patient  Consult performed by: Eliecer Saucedo PA-C  Consult ordered by: NICKOLAS Corona          Assessment/Plan   Medical clearance for psychiatric admission  Assessment & Plan  · Vital signs stable  Reviewed admission labs with the exception of vitamin D, B12, and folate levels  Patient is medically cleared for admission to the [facility] for treatment of the underlying psychiatric illness  Slim will sign off  Please call with questions or concerns    Iron deficiency anemia  Assessment & Plan  · Hemoglobin stable at 13 8  · Continue ferrous gluconate daily before breakfast, will add vitamin C for increased absorption    Prolonged Q-T interval on ECG  Assessment & Plan  · QTc at 529 today  · Avoid medications that prolong the QTc interval  · If acute QTC continues to rise, consider cardiology consult    Peripheral vascular disease ([facility] )  Assessment & Plan  · Continue prehospital Plavix 75 mg daily and atorvastatin 40 mg daily    GERD (gastroesophageal reflux disease)  Assessment & Plan  · Continue prehospital Prilosec 40 mg daily    * Severe episode of recurrent major depressive disorder ([facility] )  Assessment & Plan  · Management per primary team             Counseling / Coordination of Care Time: 45 minutes  Greater than 50% of total time spent on patient counseling and coordination of care  Collaboration of Care: Were Recommendations Directly Discussed with Primary Treatment Team? - No     History of Present Illness:    Maria Esther Mccollum is a 74 y o  female who is originally admitted to the psychiatry service due to increasing depression   We are consulted for medical clearance for admission to [facility] and treatment of underlying psychiatric illness  Per chart review, this patient has a past medical history significant for PAD, GERD, hypertension, iron deficiency anemia  Per chart review, she initially presented to the ED for increasing depression  It is reported that patient was having difficulties performing ADLs and overusing her Seroquel for sleep  She was transferred to the [facility] medical surgical unit for management of prolonged QTc  Her QTc decreased to 496 and [facility]  Review of Systems:    Review of Systems   Constitutional: Negative for activity change, chills, diaphoresis and fever  HENT: Negative for congestion, rhinorrhea, sinus pressure, sinus pain and sore throat  Eyes: Negative for visual disturbance  Respiratory: Negative for cough, shortness of breath and wheezing  Cardiovascular: Negative for chest pain and palpitations  Gastrointestinal: Negative for abdominal distention, abdominal pain, constipation, diarrhea, nausea and vomiting  Genitourinary: Negative for dysuria, frequency, hematuria and urgency  Musculoskeletal: Negative for arthralgias, back pain and myalgias  Skin: Negative for rash  Neurological: Negative for dizziness, weakness, light-headedness and headaches         Past Medical and Surgical History:     Past Medical History:   Diagnosis Date   • Anemia    • Cardiac disease    • Chronic pain    • Coronary artery disease    • Hypertension    • PVD (peripheral vascular disease) ([facility] )        Past Surgical History:   Procedure Laterality Date   • ANKLE SURGERY     • IR CHEST TUBE PLACEMENT  3/23/2021   • IR NON-TUNNELED CENTRAL LINE PLACEMENT  3/24/2021   • THORACOSCOPY VIDEO ASSISTED SURGERY (VATS) Right 4/2/2021    Procedure: THORACOSCOPY VIDEO ASSISTED SURGERY (VATS); DECORTICATION;  Surgeon: Elieser Holcomb MD;  Location: [facility];  Service: Thoracic       Meds/Allergies:    all medications and allergies "reviewed    Allergies: Allergies   Allergen Reactions   • Aspirin GI Bleeding     Patient states she is not allergic to ASA    • Digoxin Hives     HA   • Gadolinium Derivatives        Social History:     Marital Status: /Civil Union    Substance Use History:   Social History     Substance and Sexual Activity   Alcohol Use Not Currently    Comment: social     Social History     Tobacco Use   Smoking Status Former   • Packs/day: 1 00   • Years: 0 10   • Total pack years: 0 10   • Types: Cigarettes   • Quit date:    • Years since quittin 3   Smokeless Tobacco Never   Tobacco Comments    pt states she stopped smoking 3 months ago ( 19)     Social History     Substance and Sexual Activity   Drug Use No       Family History:    non-contributory    Physical Exam:     Vitals:   Blood Pressure: 143/73 (23)  Pulse: 82 (23)  Temperature: (!) 97 °F (36 1 °C) (23)  Temp Source: Temporal (23)  Respirations: 16 (23)  Height: 5' 6\" (167 6 cm) (23 0100)  Weight - Scale: 78 kg (172 lb) (23 1833)  SpO2: 91 % (23)    Physical Exam  Constitutional:       General: She is not in acute distress  Appearance: Normal appearance  She is not ill-appearing, toxic-appearing or diaphoretic  HENT:      Head: Normocephalic and atraumatic  Nose: Nose normal  No congestion or rhinorrhea  Mouth/Throat:      Mouth: Mucous membranes are moist    Eyes:      General: No scleral icterus  Extraocular Movements: Extraocular movements intact  Cardiovascular:      Rate and Rhythm: Normal rate and regular rhythm  Heart sounds: Normal heart sounds  No murmur heard  Pulmonary:      Effort: Pulmonary effort is normal  No respiratory distress  Breath sounds: Normal breath sounds  No wheezing  Abdominal:      General: Abdomen is flat  Bowel sounds are normal  There is no distension  Palpations: Abdomen is soft        Tenderness: " "There is no abdominal tenderness  Musculoskeletal:      Right lower leg: No edema  Left lower leg: No edema  Skin:     General: Skin is warm and dry  Coloration: Skin is not jaundiced  Neurological:      General: No focal deficit present  Mental Status: She is alert and oriented to person, place, and time  Additional Data:     Lab Results: I have personally reviewed pertinent reports  Results from last 7 days   Lab Units 05/24/23  0540   EOS PCT % 6   HEMATOCRIT % 43 0   HEMOGLOBIN g/dL 13 8   LYMPHS PCT % 28   MONOS PCT % 8   NEUTROS PCT % 56   PLATELETS Thousands/uL 261   WBC Thousand/uL 8 05     Results from last 7 days   Lab Units 05/24/23  0540   ANION GAP mmol/L 12   ALBUMIN g/dL 3 8   ALK PHOS U/L 84   ALT U/L 5*   AST U/L 11*   BUN mg/dL 13   CALCIUM mg/dL 9 1   CHLORIDE mmol/L 104   CO2 mmol/L 26   CREATININE mg/dL 1 19   GLUCOSE RANDOM mg/dL 91   POTASSIUM mmol/L 3 6   SODIUM mmol/L 142   TOTAL BILIRUBIN mg/dL 0 42             No results found for: \"HGBA1C\"        EKG, Pathology, and Other Studies Reviewed on Admission:   · EKG On 05/24/23 shows Sinus rhythm with frequent PVCs  HR 84, QTc 529    ** Please Note: This note has been constructed using a voice recognition system   **  "

## 2023-05-24 NOTE — TREATMENT TEAM
Pt attended 1 am group  Pt left with doctor for assessment at end of group  Pt cooperative and interested in groups  05/24/23 1000   Activity/Group Checklist   Group Other (Comment)  (short terrm problem solving)   Attendance Attended; Other (Comment)  (left with doctor)   Attendance Duration (min) 31-45   Interactions Interacted appropriately   Affect/Mood Appropriate   Goals Achieved Identified feelings; Identified relapse prevention strategies; Able to listen to others; Able to engage in interactions

## 2023-05-25 LAB
ATRIAL RATE: 78 BPM
P AXIS: 74 DEGREES
PR INTERVAL: 172 MS
QRS AXIS: -19 DEGREES
QRSD INTERVAL: 118 MS
QT INTERVAL: 444 MS
QTC INTERVAL: 506 MS
T WAVE AXIS: 98 DEGREES
VENTRICULAR RATE: 78 BPM

## 2023-05-25 RX ADMIN — MIRTAZAPINE 15 MG: 15 TABLET, FILM COATED ORAL at 21:32

## 2023-05-25 RX ADMIN — BUPROPION HYDROCHLORIDE 300 MG: 300 TABLET, FILM COATED, EXTENDED RELEASE ORAL at 09:09

## 2023-05-25 RX ADMIN — ARIPIPRAZOLE 2 MG: 2 TABLET ORAL at 09:09

## 2023-05-25 RX ADMIN — FERROUS GLUCONATE 324 MG: 324 TABLET ORAL at 09:09

## 2023-05-25 RX ADMIN — ACETAMINOPHEN 975 MG: 325 TABLET ORAL at 15:33

## 2023-05-25 RX ADMIN — HYDROXYZINE HYDROCHLORIDE 25 MG: 25 TABLET ORAL at 15:33

## 2023-05-25 RX ADMIN — CLONAZEPAM 0.5 MG: 0.5 TABLET ORAL at 21:32

## 2023-05-25 RX ADMIN — OXYCODONE HYDROCHLORIDE AND ACETAMINOPHEN 500 MG: 500 TABLET ORAL at 09:09

## 2023-05-25 NOTE — NURSING NOTE
Pt blunted and withdrawn but pleasant and med-compliant with fair appetite  VSS  Did not attend group  Monitored for safety and support

## 2023-05-25 NOTE — CASE MANAGEMENT
Case Management Assessment    Patient name Wong Emery  Location Yasmany Benitez 650/OABHU 034-31 MRN [id number]  : 1950 Date 2023       Current Admission Date: 2023  Current Admission Diagnosis:Severe episode of recurrent major depressive disorder [facility])   Patient Active Problem List    Diagnosis Date Noted   • Medical clearance for psychiatric admission 2023   • Diarrhea 2023   • Prolonged Q-T interval on ECG 2023   • Severe episode of recurrent major depressive disorder ([facility] ) 2023   • Pressure injury of left heel, stage 3 ([facility] ) 2021   • Effusion of left knee 04/10/2021   • Urinary retention 2021   • Pulmonary hypertension ([facility] ) 2021   • Peripheral vascular disease ([place] [address] ) 2021   • Tobacco abuse 2021   • ETOH abuse 2021   • Anxiety 2021   • Pulmonary nodule 2021   • Dysphagia 2021   • Anemia 2021   • Valvular heart disease 2021   • Thrombocytosis 2021   • Severe protein-calorie malnutrition ([facility] ) 2021   • CAD (coronary artery disease) 2021   • Parapneumonic effusion 2021   • GERD (gastroesophageal reflux disease) 2021   • HTN (hypertension) 2021   • Supratherapeutic INR 2021   • Iron deficiency anemia 10/19/2017   • Leg pain, diffuse, right    • Eosinophilic esophagitis    • GI bleed 2016   • Claudication ([facility] ) 07/15/2015   • Paresthesia of right foot 2015   • Ankle pain 2015   • Aortic stenosis 2015   • Status post percutaneous transluminal coronary angioplasty 10/04/2012   • Hyperlipidemia 2011   • Chronic obstructive pulmonary disease ([facility] ) 2011   • Benign essential hypertension 2010      LOS (days): 2  Geometric Mean LOS (GMLOS) (days):   Days to GMLOS:     OBJECTIVE:    Risk of Unplanned Readmission Score: 12 77         Current admission status: Inpatient Psych  Referral Reason: Psych    Preferred Pharmacy: "  [id number] - [address] [address]  Phone: [phone] Fax: [phone] [place], [place] - [address] [place] [address],[address] [place] [address],[address] [place] [id number]  Phone: [phone] Fax: [phone]    Primary Care Provider: Jazzmine Garcia MD    Primary Insurance: MEDICARE  Secondary Insurance:     ASSESSMENT:  [facility]    There are no active Health Care Proxies on file  Approached pt as she lay in bed while socializing with her roommate  She was initially warm and kind, then became annoyed that she was asked to get OOB to go meet in a private area  She refused and her roommate then offered to leave us so pt could remain in bed  Pt c/o of being fatigued and said she was in charge of her body, not the staff and she would not cooperate  She insisted she wanted to \"Just go home  \"    CM explained that participating in this intake would have potential to expedite that process and she slowly began to tolerate my questioning and then fully cooperated with same  Pt reports she is  and has two daughters  She and her  have been having marital issues and she totally blames her   She made it clear she has visual impairment and cannot navigate new environments so she is not sure where she is going to go at time of discharge  She would like to stay with her daughter Yennifer Bautista, to be near to her [facility] who will be celebrating his birthday this weekend  Pt insists she is leaving on 5/26 so she can be included in those birthday celebrations  Pt reports she will then eventually return to her home and hopes she and her  can work things out  Pt reports she has a high school education  She states she has never worked outside of the home  She enjoys watching TV and listening to game shows, related to her poor vision she struggles to see the shows      Pt denies and " form of substance abuse  She denies any legal issues  She manages her own finances  She gets approx  $600 mo  She reports [employer] is her Financial POA, but has no healthcare POA at this time  Pt reports the police have all of the guns that were in the home  She hasd expressed interest in participating in [employer] and when informed this could be made available to her she refused to consider because she no longer has WiFi at her home  And since she is unsure if [employer] will allow her to stay at her home, she is no longer interested in same       IMM Rights signed  KERI: Noretta Riedel daughter, Dr Federico Duane PCP

## 2023-05-25 NOTE — CASE MANAGEMENT
CM called Kiesha Modemaria t - pt's daughter to introduce self and answer any questions  Also to clarify some of what Bobby Dick has discussed  Hilary Esposito was delighted to hear that her mother was here and getting the help they feel she needs  She explained that the turmoil in her parents marriage is ongoing since 2016 when her father filed for divorce and eventually canceled only to again filed a short praneeth,we later and again canceled same  She then stated the pt herself has also filed and reniged on a divorce filing in recent years  Hilary Esposito states that pt's  is a retired  and his mood/cognitive state have become questionable recently  Hilary Esposito explained that her mother has recently been having increased falls at home, periods of bowel incontinence and then struggles to clean everything up because of her balance and visual impairment  She reports her mother has been crying frequently and calling many people in her life to complain and ask for help  Pt reportedly takes too much of her prescribed medicines at time-unknowing if this is intentional or accidental      Hilary Esposito is unsure how she feels about having her mother come to stay with her p dc and will discuss same with her sister and call back with their decision

## 2023-05-25 NOTE — NURSING NOTE
"Pt is withdrawn to room and self  Pt has irritable edge and negative on approach  Pt reports \"high\" anxiety and depression  She relates this to finding out that her daughters will not take her  Pt requested PRN medication for anxiety  Pt stated \" I need an ativan I used to take my husbands so I know it works  \" RN educated pt of anxiety scale and ordered medication    "

## 2023-05-25 NOTE — TREATMENT TEAM
05/25/23 1533   Tracey Anxiety Scale   Anxious Mood 2   Tension 2   Fears 2   Insomnia 0   Intellectual 2   Depressed Mood 2   Somatic Complaints: Muscular 0   Somatic Complaints: Sensory 0   Cardiovascular Symptoms 0   Respiratory Symptoms 0   Gastrointestinal Symptoms 0   Genitourinary Symptoms 0   Autonomic Symptoms 2   Behavior at Interview 2   Tracey Anxiety Score 14     Pt received PRN atarax 25mg at 15:33 for anxiety with tracey score 14

## 2023-05-25 NOTE — PROGRESS NOTES
05/25/23 0838   Team Meeting   Initial Conference Date 05/25/23   Team Members Present   Team Members Present Physician;Nurse;;   Physician Team Member [facility] Team Member JESSIKA [facility] Management Team Member Sharifa Pascual   Social Work Team Member Chase Ramsey   Patient/Family Present   Patient Present No   Patient's Family Present No     Calm and cooperative, 67 hour notice is up tomorrow at 2pm  CM to call daughters to see if they will let her stay  Discharge is pending for Friday

## 2023-05-25 NOTE — PLAN OF CARE
Problem: Ineffective Coping  Goal: Cooperates with admission process  Description: Interventions:   - Complete admission process  Outcome: Progressing  Goal: Identifies ineffective coping skills  Outcome: Progressing  Goal: Identifies healthy coping skills  Outcome: Progressing  Goal: Demonstrates healthy coping skills  Outcome: Progressing  Goal: Patient/Family participate in treatment and DC plans  Description: Interventions:  - Provide therapeutic environment  Outcome: Progressing  Goal: Patient/Family verbalizes awareness of resources  Outcome: Progressing  Goal: Understands least restrictive measures  Description: Interventions:  - Utilize least restrictive behavior  Outcome: Progressing  Goal: Free from restraint events  Description: - Utilize least restrictive measures   - Provide behavioral interventions   - Redirect inappropriate behaviors   Outcome: Progressing

## 2023-05-25 NOTE — DISCHARGE INSTR - OTHER ORDERS
At time of discharge you will be going to stay at _________TBD_______________    If you are in a Crisis situation Call [phone] to speak to a trained crisis worker - Anytime - Day or Night  You may also call one of the numbers below:  [employer]     [facility]:   [address] [facility], or Teri, [address], will be calling you after your discharge, on the phone number that you provided  They will be available as an additional support, if needed  If you wish to speak with one of them, you may contact Zohaib Anthony at [phone] or Enoch Zavala at [phone]

## 2023-05-25 NOTE — PROGRESS NOTES
Progress Note - [address] 74 y o  female MRN: [id number]  Unit/Bed#: Rahel Snow 418-33 Encounter: [id number]    Patient was seen today for continuation of care, records reviewed and patient was discussed with the morning case review team     Charles Penn was seen today for psychiatric follow-up  On assessment today, Charles Penn was cooperative  Less depressed, Charles Penn continues to be focused on leaving due to grandson visiting this weekend  72 hour notice completed by patient, this writer attempted to encourage patient to stay to assist with depressive state and ongoing stressors  Attempt unsuccessful, will discharge patient tomorrow with hopes that she may be able to stay with her daughter  WIll continue with current regimen as patient will be discharged tomorrow  Charles Penn denies acute suicidal/self-harm ideation/intent/plan upon direct inquiry at this time  Charles Penn remains behaviorally appropriate, no agitation or aggression noted on exam or in report  Charles Penn also denies HI/AH/VH, and does not appear overtly manic  No overt delusions or paranoia are verbalized  Impulse control is intact  Charles Penn remains adherent to her current psychotropic medication regimen and denies any side effects from medications, as well as none noted on exam     Vitals:  Vitals:    05/25/23 0735   BP: 151/85   Pulse: 87   Resp: 16   Temp: 97 7 °F (36 5 °C)   SpO2: 96%       Laboratory Results:  I have personally reviewed all pertinent laboratory/tests results  Psychiatric Review of Systems:  Behavior over the last 24 hours:  unchanged     Sleep: improved  Appetite: well   Medication side effects: none   ROS: no complaints, denies shortness of breath or chest pain and all other systems are negative for acute changes    Mental Status Evaluation:  Appearance:  adequate grooming   Behavior:  cooperative, calm   Speech:  normal rate and volume   Mood:  depressed, anxious   Affect:  flat   Thought Process:  goal directed   Thought Content:  no overt delusions   Perceptual Disturbances: no auditory hallucinations, no visual hallucinations   Risk Potential: Suicidal ideation - None  Homicidal ideation - None  Potential for aggression - No   Memory:  recent and remote memory grossly intact   Sensorium  person, place and time/date      Consciousness:  alert and awake   Attention: attention span and concentration are age appropriate   Insight:  improving   Judgment: improving   Gait/Station: normal gait/station, also uses walker   Motor Activity: no abnormal movements   Progress Toward Goals:   Ranjan Hernandez is progressing towards goals of inpatient psychiatric treatment by continued medication compliance and is attending therapeutic modalities on the milieu  However, the patient continues to require inpatient psychiatric hospitalization for continued medication management and titration to optimize symptom reduction, improve sleep hygiene, and demonstrate adequate self-care  Assessment/Plan   Principal Problem:    Severe episode of recurrent major depressive disorder (HCC)  Active Problems:    GERD (gastroesophageal reflux disease)    Peripheral vascular disease (HCC)    Prolonged Q-T interval on ECG    Iron deficiency anemia    Medical clearance for psychiatric admission      Recommended Treatment: Treatment plan and medication changes discussed and per the attending physician the plan is: 1  Continue with group therapy, milieu therapy and occupational therapy  2  Behavioral Health checks every 7 minutes  3  Continue frequent safety checks and vitals per unit protocol  4  Continue with SLIM medical management as indicated  5  Continue with current medication regimen  6  Will review labs in the a m  7 Disposition Planning: Discharge planning and efforts remain ongoing    Behavioral Health Medications: all current active meds have been reviewed and continue current psychiatric medications    Current Facility-Administered Medications Medication Dose Route Frequency Provider Last Rate   • acetaminophen  650 mg Oral Q4H PRN Catana Blotter, CRNP     • acetaminophen  650 mg Oral Q4H PRN Catana Blotter, CRNP     • acetaminophen  975 mg Oral Q6H PRN Catana Blotter, CRNP     • ARIPiprazole  2 mg Oral Daily Catana Blotter, CRNP     • ascorbic acid  500 mg Oral Daily Rose Faustin PA-C     • buPROPion  300 mg Oral Daily Catana Blotter, CRNP     • clonazePAM  0 5 mg Oral HS Iliana Conklin MD     • ferrous gluconate  324 mg Oral Daily Before Breakfast Rose Faustin PA-C     • haloperidol lactate  5 mg Intramuscular Q4H PRN Max 4/day Catana Blotter, CRNP     • hydrOXYzine HCL  25 mg Oral Q6H PRN Max 4/day Catana Blotter, CRNP     • hydrOXYzine HCL  50 mg Oral Q6H PRN Max 4/day Catana Blotter, CRNP     • loperamide  2 mg Oral 4x Daily PRN Alicia [place] PA-C     • LORazepam  1 mg Intramuscular Q6H PRN Max 3/day Catana Blotter, CRNP     • LORazepam  1 mg Oral Q6H PRN Max 3/day Catana Blotter, CRNP     • mirtazapine  15 mg Oral HS Catana Blotter, CRNP     • risperiDONE  0 25 mg Oral Q4H PRN Max 6/day Catana Blotter, CRNP     • risperiDONE  0 5 mg Oral Q4H PRN Max 3/day Catana Blotter, CRNP     • risperiDONE  1 mg Oral Q2H PRN Max 3/day Catana Blotter, CRNP     • senna-docusate sodium  1 tablet Oral Daily PRN Catana Blotter, CRNP     • traZODone  50 mg Oral HS PRN Catana Blotter, CRNP         Risks / Benefits of Treatment:  Risks, benefits, and possible side effects of medications explained to patient and patient verbalizes understanding and agreement for treatment  Counseling / Coordination of Care:  Patient's progress reviewed with nursing staff  Medications, treatment progress and treatment plan reviewed with patient  Supportive counseling provided to the patient  Total floor/unit time spent today 25 minutes   Greater than 50% of total time was spent with the patient and / or family counseling and / or coordination of care  A description of the counseling / coordination of care: medication education, treatment plan, supportive therapy  This note was completed in part utilizing Dragon dictation Software  Grammatical, translation, syntax errors, random word insertions, spelling mistakes, and incomplete sentences may be an occasional consequence of this system secondary to software limitations with voice recognition, ambient noise, and hardware issues   If you have any questions or concerns about the content, text, or information contained within the body of this dictation, please contact the provider for clarification

## 2023-05-25 NOTE — PROGRESS NOTES
05/25/23 4341   Patient Intake   Can patient return home?   (TBD)   Address to be Discharge to: TBD   Patient's Telephone Number [phone]   Access to Firearms   (Pt reports police have all guns from the home)   Type of Work [employer]   Work History Homemaker   School Grade/Year   (HS graduate)   Admission Status   [facility]  Ras Nguyễn -daughter -Evan Richardson-daughter [phone], Dr Palmira Mcclendon [phone]) normal...

## 2023-05-25 NOTE — SOCIAL WORK
"Cm spoke to daughter Chris De La Cruz ([phone]) regarding patient discharge plan  Both daughters and patient's brother are not able/willing to take her back and Chris De La Cruz shares that patients  recently left the home and they are not aware of where he is  They do not think that she can live on her own any longer and they wasn't the hospital to pursue other avenues  CM spoke to Dr Lana Yo who said that 36 may be a possibility but patient is in good behavioral control and does not have many mental health symptoms  Patient was made aware and told CM to tell her daughters Ana Michael go scratch after all I have done for them,\" and that we need to \"just 302 her\" but she doesn't want to be here and wants to go back to [facility]   CM and team to reconvene in the am    "

## 2023-05-26 VITALS
HEART RATE: 80 BPM | DIASTOLIC BLOOD PRESSURE: 59 MMHG | TEMPERATURE: 98.5 F | HEIGHT: 66 IN | BODY MASS INDEX: 27.64 KG/M2 | OXYGEN SATURATION: 96 % | RESPIRATION RATE: 16 BRPM | WEIGHT: 172 LBS | SYSTOLIC BLOOD PRESSURE: 103 MMHG

## 2023-05-26 RX ORDER — DOXYCYCLINE HYCLATE 50 MG/1
324 CAPSULE, GELATIN COATED ORAL
Qty: 30 TABLET | Refills: 1 | Status: SHIPPED | OUTPATIENT
Start: 2023-05-27 | End: 2023-07-26

## 2023-05-26 RX ORDER — FOLIC ACID 1 MG/1
500 TABLET ORAL DAILY
Qty: 15 TABLET | Refills: 1 | Status: SHIPPED | OUTPATIENT
Start: 2023-05-27 | End: 2023-07-26

## 2023-05-26 RX ORDER — ASCORBIC ACID 500 MG
500 TABLET ORAL DAILY
Qty: 30 TABLET | Refills: 1 | Status: SHIPPED | OUTPATIENT
Start: 2023-05-27 | End: 2023-07-26

## 2023-05-26 RX ORDER — CYANOCOBALAMIN 1000 UG/ML
1000 INJECTION, SOLUTION INTRAMUSCULAR; SUBCUTANEOUS
Status: DISCONTINUED | OUTPATIENT
Start: 2023-05-26 | End: 2023-05-26 | Stop reason: HOSPADM

## 2023-05-26 RX ORDER — BUPROPION HYDROCHLORIDE 300 MG/1
300 TABLET ORAL DAILY
Qty: 30 TABLET | Refills: 1 | Status: SHIPPED | OUTPATIENT
Start: 2023-05-26 | End: 2023-07-25

## 2023-05-26 RX ORDER — ARIPIPRAZOLE 2 MG/1
2 TABLET ORAL DAILY
Qty: 30 TABLET | Refills: 1 | Status: SHIPPED | OUTPATIENT
Start: 2023-05-27 | End: 2023-07-26

## 2023-05-26 RX ORDER — TRAZODONE HYDROCHLORIDE 50 MG/1
50 TABLET ORAL
Qty: 30 TABLET | Refills: 0 | Status: SHIPPED | OUTPATIENT
Start: 2023-05-26 | End: 2023-06-25

## 2023-05-26 RX ORDER — MIRTAZAPINE 15 MG/1
15 TABLET, FILM COATED ORAL
Qty: 30 TABLET | Refills: 1 | Status: SHIPPED | OUTPATIENT
Start: 2023-05-26 | End: 2023-07-25

## 2023-05-26 RX ORDER — FOLIC ACID 1 MG/1
500 TABLET ORAL DAILY
Status: DISCONTINUED | OUTPATIENT
Start: 2023-05-26 | End: 2023-05-26 | Stop reason: HOSPADM

## 2023-05-26 RX ORDER — CLONAZEPAM 0.5 MG/1
0.5 TABLET ORAL
Qty: 10 TABLET | Refills: 0 | Status: SHIPPED | OUTPATIENT
Start: 2023-05-26 | End: 2023-06-05

## 2023-05-26 RX ADMIN — OXYCODONE HYDROCHLORIDE AND ACETAMINOPHEN 500 MG: 500 TABLET ORAL at 08:37

## 2023-05-26 RX ADMIN — ARIPIPRAZOLE 2 MG: 2 TABLET ORAL at 08:37

## 2023-05-26 RX ADMIN — BUPROPION HYDROCHLORIDE 300 MG: 300 TABLET, FILM COATED, EXTENDED RELEASE ORAL at 08:37

## 2023-05-26 RX ADMIN — CYANOCOBALAMIN TAB 1000 MCG 1000 MCG: 1000 TAB at 08:39

## 2023-05-26 RX ADMIN — CYANOCOBALAMIN 1000 MCG: 1000 INJECTION INTRAMUSCULAR; SUBCUTANEOUS at 09:54

## 2023-05-26 RX ADMIN — FOLIC ACID 500 MCG: 1 TABLET ORAL at 08:39

## 2023-05-26 RX ADMIN — FERROUS GLUCONATE 324 MG: 324 TABLET ORAL at 08:39

## 2023-05-26 NOTE — PROGRESS NOTES
05/26/23 0737   Team Meeting   Meeting Type Daily Rounds   Initial Conference Date 05/26/23   Next Conference Date 05/27/23   Team Members Present   Team Members Present Physician;Nurse;;    Patient/Family Present   Patient Present No   Patient's Family Present No     Dr Masood [place] JAROD To Mean  -   depressed, anxious, insists on leaving today related to her 72 hr notice

## 2023-05-26 NOTE — DISCHARGE SUMMARY
Discharge Summary - [address] 74 y o  female MRN: [id number]  Unit/Bed#: [facility] 390-28 Encounter: [id number]     Admission Date: 5/23/2023         Discharge Date: 5/26/2023    Attending Psychiatrist: Raymond Cobb MD    Reason for Admission/HPI:  As per Dr Lana Yo:     Sarah Dent is a 74 y o   female, , domiciled (intermittently w/  or her daughters), w/ PMH significant for CAD s/p PCI, PAD, COPD, HLD, Iron def, GERD, impaired vision (b/l macular degeneration) and PPH of depression and anxiety, no prior psychiatric admissions, no prior SA, no h/o self-injurious behavior who, recently saw a psychiatrist (virtual in [place]) on Wellbutrin 300 mg daily, Buspar 10 mg BID, Ativan 1 mg nightly and Abilify 2 mg daily (meds recently changed as per patient), who was BIB her daughter to the ED on 5/20/23 due to worsening of depression over past week in the context of marital conflicts and recent changes in her meds  Psych consult visited the patient virtually, and the patient signed 12 and admitted to the inpatient psychiatric unit 6B for further psychiatric stabilization      The pt was visited on the unit; chart reviewed  Presented calm, cooperative and well related, casually dressed w/ fair hygiene, good eye contact despite impaired vision (b/l macular degeneration), depressed mood, constricted affect, talking in normal tone, volume and amount, w/ linear thought process, fair insight and judgement  She reported that her  who reportedly dealing with medical condition and is on dialysis, started to have some cognitive decline and behavioral changes, and reported marital conflicts since Oct 3590   She has been living intermittently with her daughters and back home for few days, most recently she was with her  who reportedly called  on her 3 weeks ago and wanted to 302 her as per patient, but while the  came and spoke with her and her daughter, they realized that she was doing OK as per patient  She then stayed with her daughters two days and went back home and had trouble sleeping as per patient, and took 2 doses of Seroquel last week and reportedly had some VH at that night seeing two people in the hallway and called her daughter  The patient was initially evaluated at [facility] ED when her QTc: was 540 and received medical care and then admitted to this unit      She reported worsening of depressive sxs since Mother's day, as being depressed, tearful and not being able to stop crying, decreased appetite, poor sleep and decreased energy  Strongly denied SI/HI, intent or plan upon direct inquiry at this time  Denied A/VH (other than one night after taking sleep meds as per patient)  No manic sxs, paranoid ideations or fixed delusions were elicited       Denied smoking cigarettes (quit 2 yrs ago), binge drinking alcohol or other illicit substance use         Hospital Course: The patient was admitted to the inpatient psychiatric unit and started on every 7 minutes precautions  During the hospitalization the patient was attending individual therapy, group therapy, milieu therapy and occupational therapy  Psychiatric medications were titrated over the hospital stay  To address increase in depression  Maintained on home medication of Wellbutrin  mg daily, Abilify 2 mg daily, and Remeron 15 mg nightly and Klonopin 0 5 mg nightly from Ativan 1 mg home dose  QTc also evaluated secondary to elongation prior to hospitalization, QTc 506 on 5/25/2023   72-hour notice completed and signed by patient, Mary Byers continues to voice leaving hospital despite encouragement from staff including this writer and attending  Increasingly angry and tearful, she expresses anger toward her children and spouse  Behaviorally appropriate, with no thoughts of SI/HI, or perceptual disturbances    Patient is made aware family is not in agreement with patient leaving despite medical advice, she is agreeable to discharge to shelter/hotel if unable to stay with family  Risk and benefits discussed, will on a 72-hour notice and discharge patient as requested  The patient denied suicidal ideation, intent or plan at the time of discharge and denied homicidal ideation, intent or plan at the time of discharge  There was no overt psychosis at the time of discharge  The patient was tolerating medications and was not reporting any significant side effects at the time of discharge  Patient is to requested to contact PCP post discharge for follow-up plan  Mental Status at time of Discharge:     Appearance:  casually dressed   Behavior:  normal   Speech:  profane   Mood:  angry   Affect:  mood-congruent   Thought Process:  goal directed   Thought Content:  normal   Perceptual Disturbances: None   Risk Potential: Suicidal Ideations none   Sensorium:  person, place and time/date   Cognition:  recent and remote memory grossly intact   Consciousness:  alert and awake    Attention: attention span and concentration were age appropriate   Insight:  fair   Judgment: fair   Gait/Station: walker   Motor Activity: no abnormal movements     Admission Diagnosis:Major depressive disorder, single episode, unspecified [F32 9]  Major depressive disorder [F32 9]    Discharge Diagnosis:   Principal Problem:    Severe episode of recurrent [facility] )  Active Problems:    GERD (gastroesophageal reflux disease)    Peripheral vascular disease (HCC)    Prolonged Q-T interval on ECG    Iron deficiency anemia    Medical clearance for psychiatric admission  Resolved Problems:    * No resolved hospital problems   *        Lab results:  Admission on 05/23/2023   Component Date Value   • Sodium 05/24/2023 142    • Potassium 05/24/2023 3 6    • Chloride 05/24/2023 104    • CO2 05/24/2023 26    • ANION GAP 05/24/2023 12    • BUN 05/24/2023 13    • Creatinine 05/24/2023 1 19    • Glucose 05/24/2023 91 • Glucose, Fasting 05/24/2023 91    • Calcium 05/24/2023 9 1    • AST 05/24/2023 11 (L)    • ALT 05/24/2023 5 (L)    • Alkaline Phosphatase 05/24/2023 84    • Total Protein 05/24/2023 6 1 (L)    • Albumin 05/24/2023 3 8    • Total Bilirubin 05/24/2023 0 42    • eGFR 05/24/2023 45    • Magnesium 05/24/2023 2 2    • Phosphorus 05/24/2023 3 8    • WBC 05/24/2023 8 05    • RBC 05/24/2023 4 73    • Hemoglobin 05/24/2023 13 8    • Hematocrit 05/24/2023 43 0    • MCV 05/24/2023 91    • MCH 05/24/2023 29 2    • MCHC 05/24/2023 32 1    • RDW 05/24/2023 15 2 (H)    • MPV 05/24/2023 11 1    • Platelets 70/18/4616 261    • nRBC 05/24/2023 0    • Neutrophils Relative 05/24/2023 56    • Immat GRANS % 05/24/2023 1    • Lymphocytes Relative 05/24/2023 28    • Monocytes Relative 05/24/2023 8    • Eosinophils Relative 05/24/2023 6    • Basophils Relative 05/24/2023 1    • Neutrophils Absolute 05/24/2023 4 50    • Immature Grans Absolute 05/24/2023 0 05    • Lymphocytes Absolute 05/24/2023 2 26    • Monocytes Absolute 05/24/2023 0 68    • Eosinophils Absolute 05/24/2023 0 50    • Basophils Absolute 05/24/2023 0 06    • TSH 3RD GENERATON 05/24/2023 1 827    • Vitamin B-12 05/24/2023 195    • Folate 05/24/2023 4 9 (L)    • Vit D, 25-Hydroxy 05/24/2023 114 3 (H)    • Ventricular Rate 05/23/2023 84    • Atrial Rate 05/23/2023 84    • FL Interval 05/23/2023 174    • QRSD Interval 05/23/2023 112    • QT Interval 05/23/2023 448    • QTC Interval 05/23/2023 529    • P Axis 05/23/2023 75    • QRS Axis 05/23/2023 -12    • T Wave Axis 05/23/2023 184    • Ventricular Rate 05/24/2023 80    • Atrial Rate 05/24/2023 80    • FL Interval 05/24/2023 176    • QRSD Interval 05/24/2023 110    • QT Interval 05/24/2023 450    • QTC Interval 05/24/2023 519    • P Axis 05/24/2023 72    • QRS Axis 05/24/2023 129    • T Wave Axis 05/24/2023 124    • Ventricular Rate 05/25/2023 78    • Atrial Rate 05/25/2023 78    • FL Interval 05/25/2023 172    • QRSD Interval 05/25/2023 118    • QT Interval 05/25/2023 444    • QTC Interval 05/25/2023 506    • P Axis 05/25/2023 74    • QRS Axis 05/25/2023 -19    • T Wave Axis 05/25/2023 98        Discharge Medications:  Current Discharge Medication List      START taking these medications    Details   ARIPiprazole (ABILIFY) 2 mg tablet Take 1 tablet (2 mg total) by mouth daily Do not start before May 27, 2023  Qty: 30 tablet, Refills: 1    Associated Diagnoses: Severe episode of recurrent major depressive disorder (HCC)      ascorbic acid (VITAMIN C) 500 MG tablet Take 1 tablet (500 mg total) by mouth daily Do not start before May 27, 2023  Qty: 30 tablet, Refills: 1    Associated Diagnoses: Severe episode of recurrent major depressive disorder (HCC)      clonazePAM (KlonoPIN) 0 5 mg tablet Take 1 tablet (0 5 mg total) by mouth daily at bedtime for 10 days  Qty: 10 tablet, Refills: 0    Associated Diagnoses: Severe episode of recurrent major depressive disorder (HCC)      cyanocobalamin (VITAMIN B-12) 1000 MCG tablet Take 1 tablet (1,000 mcg total) by mouth daily Do not start before May 27, 2023  Qty: 30 tablet, Refills: 1    Associated Diagnoses: Severe episode of recurrent major depressive disorder (HCC)      ferrous gluconate (FERGON) 324 mg tablet Take 1 tablet (324 mg total) by mouth daily before breakfast Do not start before May 27, 2023  Qty: 30 tablet, Refills: 1    Associated Diagnoses: Anemia, unspecified type      folic acid (FOLVITE) 1 mg tablet Take 0 5 tablets (500 mcg total) by mouth daily Do not start before May 27, 2023    Qty: 15 tablet, Refills: 1    Associated Diagnoses: ETOH abuse      mirtazapine (REMERON) 15 mg tablet Take 1 tablet (15 mg total) by mouth daily at bedtime  Qty: 30 tablet, Refills: 1    Associated Diagnoses: Severe episode of recurrent major depressive disorder ([facility] )            Current Discharge Medication List      STOP taking these medications       acetaminophen (TYLENOL) 325 mg tablet Comments:   Reason for Stopping:         atorvastatin (LIPITOR) 40 mg tablet Comments:   Reason for Stopping:         clopidogrel (PLAVIX) 75 mg tablet Comments:   Reason for Stopping:         gabapentin (NEURONTIN) 100 mg capsule Comments:   Reason for Stopping:         ALPRAZolam (XANAX) 0 25 mg tablet Comments:   Reason for Stopping:         busPIRone (BUSPAR) 10 mg tablet Comments:   Reason for Stopping:         omeprazole (PriLOSEC) 40 MG capsule Comments:   Reason for Stopping:         QUEtiapine (SEROquel) 100 mg tablet Comments:   Reason for Stopping:         sertraline (ZOLOFT) 100 mg tablet Comments:   Reason for Stopping:         traZODone (DESYREL) 100 mg tablet Comments:   Reason for Stopping:              Current Discharge Medication List      CONTINUE these medications which have CHANGED    Details   buPROPion (WELLBUTRIN XL) 300 mg 24 hr tablet Take 1 tablet (300 mg total) by mouth daily  Qty: 30 tablet, Refills: 1    Associated Diagnoses: Severe episode of recurrent major depressive disorder [facility])            Current Discharge Medication List           Discharge instructions/Information to patient and family:   See after visit summary for information provided to patient and family  Provisions for Follow-Up Care:  See after visit summary for information related to follow-up care and any pertinent home health orders  Discharge Statement:    I spent 25 minutes discharging the patient  This time was spent on the day of discharge  I had direct contact with the patient on the day of discharge  Additional documentation is required if more than 30 minutes were spent on discharge:    I reviewed with [place]  importance of compliance with medications and outpatient treatment after discharge  I reviewed with [place]  crisis plan and safety plan upon discharge  Nery signed 72 hour notice and requested discharge   At the time of the 72 hour notice expiration she had no criteria for involuntary commitment and denied any suicidal or homicidal ideation      NICKOLAS Bhatti 05/26/23

## 2023-05-26 NOTE — BH TRANSITION RECORD
Contact Information: If you have any questions, concerns, pended studies, tests and/or procedures, or emergencies regarding your inpatient behavioral health visit  Please contact [address] [place] [address] older adult behavioral health unit 6B [phone] and ask to speak to a , nurse or physician  A contact is available 24 hours/ 7 days a week at this number  Summary of Procedures Performed During your Stay:  Below is a list of major procedures performed during your hospital stay and a summary of results:  - Cardiac Procedures/Studies: Sinus rhythm with occasional Premature ventricular complexes  Pending Studies (From admission, onward)    None        Please follow up on the above pending studies with your PCP and/or referring provider

## 2023-05-26 NOTE — NURSING NOTE
Pt irritable and withdrawn with constrited affect  Good appetite and steady gait with walker  VSS  Pt expressed understanding of d/c meds and f/u instructions  Pt left unit with all her belongings and escorted by staff to meet her Daughter in [facility] for transport home at [phone]  Monitored for safety and support

## 2023-05-26 NOTE — PROGRESS NOTES
05/26/23 1353   Discharge [facility]   Type of Current Residence Private residence   [facility] No   Discharge Communications   Discharge planning discussed with: patient   Freedom of Choice Yes   IMM Given (Date): 05/26/23   IMM Given to: Patient   Family notified: Park Arzola- daughter   Transportation at Discharge? (Daughter Andres Bond came to take her home)   Contacts   Patient Contacts Park Arzola - daughter   Contact Method Phone   Phone Number [phone]   Reason/Outcome Continuity of Care;Emergency Contact; Discharge Planning;Referral   [facility]   Would you like to participate in our [address] service program?   No - Declined  ([facility] #[id number] KITA Yanez - [phone] [place] [address])

## 2023-05-26 NOTE — CASE MANAGEMENT
Case Management Discharge Planning Note    Patient name Keiko Amado  Location [address] 650/[facility] 359-90 MRN [id number]  : 1950 Date 2023       Current Admission Date: 2023  Current Admission Diagnosis:Severe episode of recurrent major depressive disorder [facility])   Patient Active Problem List    Diagnosis Date Noted   • Medical clearance for psychiatric admission 2023   • Diarrhea 2023   • Prolonged Q-T interval on ECG 2023   • Severe episode of recurrent major depressive disorder ([facility] ) 2023   • Pressure injury of left heel, stage 3 ([facility] ) 2021   • Effusion of left knee 04/10/2021   • Urinary retention 2021   • Pulmonary hypertension ([facility] ) 2021   • Peripheral vascular disease ([facility]  ) 2021   • Tobacco abuse 2021   • ETOH abuse 2021   • Anxiety 2021   • Pulmonary nodule 2021   • Dysphagia 2021   • Anemia 2021   • Valvular heart disease 2021   • Thrombocytosis 2021   • Severe protein-calorie malnutrition ([facility] ) 2021   • CAD (coronary artery disease) 2021   • Parapneumonic effusion 2021   • GERD (gastroesophageal reflux disease) 2021   • HTN (hypertension) 2021   • Supratherapeutic INR 2021   • Iron deficiency anemia 10/19/2017   • Leg pain, diffuse, right    • Eosinophilic esophagitis    • GI bleed 2016   • Claudication ([facility] ) 07/15/2015   • Paresthesia of right foot 2015   • Ankle pain 2015   • Aortic stenosis 2015   • Status post percutaneous transluminal coronary angioplasty 10/04/2012   • Hyperlipidemia 2011   • Chronic obstructive pulmonary disease ([facility] ) 2011   • Benign essential hypertension 2010      LOS (days): 3  Geometric Mean LOS (GMLOS) (days):   Days to GMLOS:     OBJECTIVE:  Risk of Unplanned Readmission Score: 14 52         Current admission status: Inpatient Psych   [id number] - [address] [address]  Phone: [phone] Fax: [facility] #[phone] Chrissiebrittany [place], [place] - [address] [facility]  [address] [facility] [facility] [address]  Phone: [phone] Fax: [phone]    Primary Care Provider: Simran Mcgee MD    Primary Insurance: MEDICARE  Secondary Insurance:     DISCHARGE DETAILS:     CM has been in contact with pt's daughter Rebecca Lopez several times this Daniel Gibbons is aware of her mother's insisting on being discharged and we have no grounds for a 302  Rebecca Lopez has contacted the [facility] and learned they have not taken possession of any guns from [facility]  Rebecca Lopez and her family will scour the home and make effort to remove any weapons in the home prior to pt's return to the home later today  Daughter, Kate Fitzgerald will be her at 1:30 to  a[pt and take her home  Pt is aware  Pt had reported she needs a RW to go home with and when CM provided same, she stated that the walker she has at home has skids on the back legs and she likes that better  CM then confirmed that despite her earlier reports, she does have a RW at home and a new RW was not provided  CM has a call out to pt's PCP to schedule a follow up appointment and await there Kinza Us will ensure pt is able to enter pt's home as she has a set of keys that the pt reports were missing

## 2024-12-16 NOTE — ASSESSMENT & PLAN NOTE
"· Initially presented to the ED due to severe depression and anxiety due to recent social stressors including  who has been exhibiting personality changes, using her medications, and reportedly \"left her\"   · Patient confirms taking Seroquel, Zoloft, Wellbutrin at home, she states she does not think she was taking buspar  · Hold Seroquel and Zoloft given QTc  · Continue Wellbutrin  · Patient initially signed 201 on 5/20, was scheduled for inpatient [facility] transfer on 5/21 however then refused to sign EMTALA form and was admitted due to prolonged QTc interval   · Psych consult appreciated  · Patient agreeable to signing 201 for voluntary IP psych --> new 201 signed 5/22  · Increase Wellbutrin to 300 mg daily   · Start Abilify 2 mg daily   · Start remeron 15 mg HS for insomnia   · Continue virtual one-to-one  " No

## (undated) DEVICE — INTENDED FOR TISSUE SEPARATION, AND OTHER PROCEDURES THAT REQUIRE A SHARP SURGICAL BLADE TO PUNCTURE OR CUT.: Brand: BARD-PARKER SAFETY BLADES SIZE 15, STERILE

## (undated) DEVICE — ELECTRODE LAP L WIRE E-Z CLEAN 33CM -0100

## (undated) DEVICE — 28 FR STRAIGHT – SOFT PVC CATHETER: Brand: PVC THORACIC CATHETERS

## (undated) DEVICE — CHLORAPREP HI-LITE 26ML ORANGE

## (undated) DEVICE — INTENDED FOR TISSUE SEPARATION, AND OTHER PROCEDURES THAT REQUIRE A SHARP SURGICAL BLADE TO PUNCTURE OR CUT.: Brand: BARD-PARKER SAFETY BLADES SIZE 10, STERILE

## (undated) DEVICE — SUT MONOCRYL 4-0 PS-2 18 IN Y496G

## (undated) DEVICE — SINGLE USE SUCTION VALVE MAJ-209: Brand: SINGLE USE SUCTION VALVE (STERILE)

## (undated) DEVICE — SUT PROLENE 0 CT-1 30 IN 8424H

## (undated) DEVICE — GLOVE SRG BIOGEL ECLIPSE 6

## (undated) DEVICE — SUT VICRYL 2-0 SH 27 IN UNDYED J417H

## (undated) DEVICE — PAD GROUNDING ADULT

## (undated) DEVICE — ENDOPATH 5MM ENDOSCOPIC BLUNT TIP DISSECTORS (12 POUCHES CONTAINING 3 DISSECTORS EACH): Brand: ENDOPATH

## (undated) DEVICE — SPONGE TONSIL STRUNG 7/8 IN X-RAY DETECT

## (undated) DEVICE — ELECTRODE BLADE MOD E-Z CLEAN 2.5IN 6.4CM -0012M

## (undated) DEVICE — 28 FR RIGHT ANGLE – SOFT PVC CATHETER: Brand: PVC THORACIC CATHETERS

## (undated) DEVICE — TUBING SUCTION 5MM X 12 FT

## (undated) DEVICE — 2000CC GUARDIAN II: Brand: GUARDIAN

## (undated) DEVICE — ADHESIVE SKIN HIGH VISCOSITY EXOFIN 1ML

## (undated) DEVICE — OASIS DRAIN, SINGLE, INLINE & ATS COMPATIBLE: Brand: OASIS

## (undated) DEVICE — STERILE THORACIC PACK: Brand: CARDINAL HEALTH

## (undated) DEVICE — WOUND RETRACTOR AND PROTECTOR: Brand: ALEXIS WOUND PROTECTOR-RETRACTOR

## (undated) DEVICE — SUT VICRYL 0 UR-6 27 IN J603H

## (undated) DEVICE — ANTI-FOG SOLUTION WITH FOAM PAD: Brand: DEVON

## (undated) DEVICE — SINGLE USE BIOPSY VALVE MAJ-210: Brand: SINGLE USE BIOPSY VALVE (STERILE)

## (undated) DEVICE — THE ECHELON, ECHELON ENDOPATH™ AND ECHELON FLEX™ FAMILIES OF ENDOSCOPIC LINEAR CUTTERS AND RELOADS ARE STERILE, SINGLE PATIENT USE INSTRUMENTS THAT SIMULTANEOUSLY CUT AND STAPLE TISSUE. THERE ARE SIX STAGGERED ROWS OF STAPLES, THREE ON EITHER SIDE OF THE CUT LINE. THE 45 MM INSTRUMENTS HAVE A STAPLE LINE THATIS APPROXIMATELY 45 MM LONG AND A CUT LINE THAT IS APPROXIMATELY 42 MM LONG. THE SHAFT CAN ROTATE FREELY IN BOTH DIRECTIONS AND AN ARTICULATION MECHANISM ON ARTICULATING INSTRUMENTS ENABLES BENDING THE DISTAL PORTIONOF THE SHAFT TO FACILITATE LATERAL ACCESS OF THE OPERATIVE SITE.THE INSTRUMENTS ARE SHIPPED WITHOUT A RELOAD AND MUST BE LOADED PRIOR TO USE. A STAPLE RETAINING CAP ON THE RELOAD PROTECTS THE STAPLE LEG POINTS DURING SHIPPING AND TRANSPORTATION. THE INSTRUMENTS’ LOCK-OUT FEATURE IS DESIGNED TO PREVENT A USED RELOAD FROM BEING REFIRED.: Brand: ECHELON ENDOPATH

## (undated) DEVICE — THE ECHELON FLEX POWERED PLUS ARTICULATING ENDOSCOPIC LINEAR CUTTERS ARE STERILE, SINGLE PATIENT USE INSTRUMENTS THAT SIMULTANEOUSLYCUT AND STAPLE TISSUE. THERE ARE SIX STAGGERED ROWS OF STAPLES, THREE ON EITHER SIDE OF THE CUT LINE. THE ECHELON FLEX 45 POWERED PLUSINSTRUMENTS HAVE A STAPLE LINE THAT IS APPROXIMATELY 45 MM LONG AND A CUT LINE THAT IS APPROXIMATELY 42 MM LONG. THE SHAFT CAN ROTATE FREELYIN BOTH DIRECTIONS AND AN ARTICULATION MECHANISM ENABLES THE DISTAL PORTION OF THE SHAFT TO PIVOT TO FACILITATE LATERAL ACCESS TO THE OPERATIVESITE.THE INSTRUMENTS ARE PACKAGED WITH A PRIMARY LITHIUM BATTERY PACK THAT MUST BE INSTALLED PRIOR TO USE. THERE ARE SPECIFIC REQUIREMENTS FORDISPOSING OF THE BATTERY PACK. REFER TO THE BATTERY PACK DISPOSAL SECTION.THE INSTRUMENTS ARE PACKAGED WITHOUT A RELOAD AND MUST BE LOADED PRIOR TO USE. A STAPLE RETAINING CAP ON THE RELOAD PROTECTS THE STAPLE LEGPOINTS DURING SHIPPING AND TRANSPORTATION. THE INSTRUMENTS’ LOCK-OUT FEATURE IS DESIGNED TO PREVENT A USED OR IMPROPERLY INSTALLED RELOADFROM BEING REFIRED OR AN INSTRUMENT FROM BEING FIRED WITHOUT A RELOAD.: Brand: ECHELON FLEX

## (undated) DEVICE — DRAPE FLUID WARMER (BIRD BATH)

## (undated) DEVICE — SUT VICRYL 0 CT-1 27 IN J260H

## (undated) DEVICE — Y BARBED CONNECTOR,POLYPROPYLENE, LARGE: Brand: ARGYLE

## (undated) DEVICE — NEEDLE SPINAL 20G X 3.5 LF

## (undated) DEVICE — GLOVE INDICATOR PI UNDERGLOVE SZ 6.5 BLUE

## (undated) DEVICE — SUT VICRYL 3-0 SH 27 IN J416H

## (undated) DEVICE — GAUZE SPONGES,16 PLY: Brand: CURITY